# Patient Record
Sex: FEMALE | Race: WHITE
[De-identification: names, ages, dates, MRNs, and addresses within clinical notes are randomized per-mention and may not be internally consistent; named-entity substitution may affect disease eponyms.]

---

## 2017-11-29 ENCOUNTER — HOSPITAL ENCOUNTER (OUTPATIENT)
Dept: HOSPITAL 75 - LAB | Age: 77
End: 2017-11-29
Attending: INTERNAL MEDICINE
Payer: MEDICARE

## 2017-11-29 ENCOUNTER — HOSPITAL ENCOUNTER (OUTPATIENT)
Dept: HOSPITAL 75 - RAD | Age: 77
End: 2017-11-29
Attending: NURSE PRACTITIONER
Payer: MEDICARE

## 2017-11-29 DIAGNOSIS — J43.9: Primary | ICD-10-CM

## 2017-11-29 DIAGNOSIS — R31.9: Primary | ICD-10-CM

## 2017-11-29 DIAGNOSIS — I48.91: ICD-10-CM

## 2017-11-29 LAB
ALBUMIN SERPL-MCNC: 3.8 GM/DL (ref 3.2–4.5)
ALT SERPL-CCNC: 10 U/L (ref 0–55)
ANION GAP SERPL CALC-SCNC: 8 MMOL/L (ref 5–14)
AST SERPL-CCNC: 17 U/L (ref 5–34)
BILIRUB DIRECT SERPL-MCNC: 0.3 MG/DL (ref 0–0.3)
BILIRUB SERPL-MCNC: 0.8 MG/DL (ref 0.1–1)
BUN SERPL-MCNC: 14 MG/DL (ref 7–18)
BUN/CREAT SERPL: 19
CALCIUM SERPL-MCNC: 9.1 MG/DL (ref 8.5–10.1)
CHLORIDE SERPL-SCNC: 101 MMOL/L (ref 98–107)
CHOLEST SERPL-MCNC: 182 MG/DL (ref ?–200)
CO2 SERPL-SCNC: 27 MMOL/L (ref 21–32)
CREAT SERPL-MCNC: 0.72 MG/DL (ref 0.6–1.3)
ERYTHROCYTE [DISTWIDTH] IN BLOOD BY AUTOMATED COUNT: 12 % (ref 10–14.5)
GFR SERPLBLD BASED ON 1.73 SQ M-ARVRAT: > 60 ML/MIN
GLUCOSE SERPL-MCNC: 86 MG/DL (ref 70–105)
LDLC SERPL DIRECT ASSAY-MCNC: 98 MG/DL (ref 1–129)
MAGNESIUM SERPL-MCNC: 2.1 MG/DL (ref 1.8–2.4)
MCH RBC QN AUTO: 33 PG (ref 25–34)
MCHC RBC AUTO-ENTMCNC: 34 G/DL (ref 32–36)
MCV RBC AUTO: 96 FL (ref 80–99)
PLATELET # BLD: 314 10^3/UL (ref 130–400)
PMV BLD AUTO: 9.1 FL (ref 7.4–10.4)
POTASSIUM SERPL-SCNC: 4.1 MMOL/L (ref 3.6–5)
PROT SERPL-MCNC: 6.6 GM/DL (ref 6.4–8.2)
RBC # BLD AUTO: 3.9 10^6/UL (ref 4.35–5.85)
SODIUM SERPL-SCNC: 136 MMOL/L (ref 135–145)
TRIGL SERPL-MCNC: 79 MG/DL (ref ?–150)
VLDLC SERPL CALC-MCNC: 16 MG/DL (ref 5–40)
WBC # BLD AUTO: 9.9 10^3/UL (ref 4.3–11)

## 2017-11-29 PROCEDURE — 36415 COLL VENOUS BLD VENIPUNCTURE: CPT

## 2017-11-29 PROCEDURE — 84443 ASSAY THYROID STIM HORMONE: CPT

## 2017-11-29 PROCEDURE — 80053 COMPREHEN METABOLIC PANEL: CPT

## 2017-11-29 PROCEDURE — 84439 ASSAY OF FREE THYROXINE: CPT

## 2017-11-29 PROCEDURE — 71020: CPT

## 2017-11-29 PROCEDURE — 83735 ASSAY OF MAGNESIUM: CPT

## 2017-11-29 PROCEDURE — 85027 COMPLETE CBC AUTOMATED: CPT

## 2017-11-29 PROCEDURE — 80076 HEPATIC FUNCTION PANEL: CPT

## 2017-11-29 PROCEDURE — 80061 LIPID PANEL: CPT

## 2017-11-29 NOTE — DIAGNOSTIC IMAGING REPORT
INDICATION: Dyspnea. History of emphysema.



COMPARISON: 12/26/2015



FINDINGS: Two views of the chest were obtained. The heart size is

normal. The pulmonary vessels appear unremarkable. There is no

pneumothorax, mediastinal widening or pleural fluid demonstrated.

There are chronic findings of COPD with flattening of diaphragms.

Nodular density at the right lung base is unchanged and likely

represents a nipple shadow. No new or acute pulmonary parenchymal

abnormality is suspected. The osseous structures appear

unremarkable.



IMPRESSION: Chronic findings of COPD. No acute or new

cardiopulmonary abnormality seen compared to the prior study.



Dictated by: 



  Dictated on workstation # [id number]

## 2017-12-06 ENCOUNTER — HOSPITAL ENCOUNTER (OUTPATIENT)
Dept: HOSPITAL 75 - RT | Age: 77
End: 2017-12-06
Attending: NURSE PRACTITIONER
Payer: MEDICARE

## 2017-12-06 DIAGNOSIS — R06.00: ICD-10-CM

## 2017-12-06 DIAGNOSIS — J43.9: Primary | ICD-10-CM

## 2017-12-06 PROCEDURE — 94726 PLETHYSMOGRAPHY LUNG VOLUMES: CPT

## 2017-12-06 PROCEDURE — 94729 DIFFUSING CAPACITY: CPT

## 2017-12-06 PROCEDURE — 94060 EVALUATION OF WHEEZING: CPT

## 2017-12-28 ENCOUNTER — HOSPITAL ENCOUNTER (OUTPATIENT)
Dept: HOSPITAL 75 - RAD | Age: 77
End: 2017-12-28
Attending: NURSE PRACTITIONER
Payer: MEDICARE

## 2017-12-28 DIAGNOSIS — J44.9: Primary | ICD-10-CM

## 2017-12-28 PROCEDURE — 71020: CPT

## 2017-12-28 NOTE — DIAGNOSTIC IMAGING REPORT
PA and lateral views of the chest.



INDICATION: Dyspnea. COPD.



FINDINGS: The lungs are hyperinflated. There is a stable 8 mm

calcified nodule in the right lung base. This is likely related

to an old granuloma. Similarly a left lung base nodule is also

stable from old exams. No focal consolidation. The heart size is

normal. No effusion or pneumothorax.



The mediastinum and bridget appear unremarkable.



IMPRESSION: COPD. No acute process.



Dictated by: 



  Dictated on workstation # [id number]

## 2018-05-23 ENCOUNTER — HOSPITAL ENCOUNTER (OUTPATIENT)
Dept: HOSPITAL 75 - LAB | Age: 78
End: 2018-05-23
Attending: FAMILY MEDICINE
Payer: MEDICARE

## 2018-05-23 DIAGNOSIS — R19.7: Primary | ICD-10-CM

## 2018-05-23 PROCEDURE — 87324 CLOSTRIDIUM AG IA: CPT

## 2018-05-23 PROCEDURE — 87449 NOS EACH ORGANISM AG IA: CPT

## 2018-05-23 PROCEDURE — 89055 LEUKOCYTE ASSESSMENT FECAL: CPT

## 2018-05-23 PROCEDURE — 87045 FECES CULTURE AEROBIC BACT: CPT

## 2018-05-23 PROCEDURE — 87046 STOOL CULTR AEROBIC BACT EA: CPT

## 2018-06-01 ENCOUNTER — HOSPITAL ENCOUNTER (OUTPATIENT)
Dept: HOSPITAL 75 - CARD | Age: 78
End: 2018-06-01
Attending: FAMILY MEDICINE
Payer: MEDICARE

## 2018-06-01 DIAGNOSIS — K52.9: Primary | ICD-10-CM

## 2018-06-01 PROCEDURE — 78227 HEPATOBIL SYST IMAGE W/DRUG: CPT

## 2018-06-01 NOTE — DIAGNOSTIC IMAGING REPORT
INDICATION: Chronic diarrhea and abdominal pain.



EXAMINATION: The patient was administered 5.5 mCi technetium 99m

Choletec and imaging over the abdomen was performed. 



FINDINGS: At one hour the patient ingested 8 ounces of Ensure and

gallbladder ejection fraction was calculated.



There is homogeneous uptake of activity by the liver. There

appears to be prompt excretion of activity into the common duct

and gallbladder. Normal passage of activity into the small bowel

is identified. Gallbladder ejection fraction is calculated to be

78%.



IMPRESSION: Normal HIDA scan and gallbladder ejection fraction. 



Dictated by: 



  Dictated on workstation # [id number]

## 2018-06-20 ENCOUNTER — HOSPITAL ENCOUNTER (OUTPATIENT)
Dept: HOSPITAL 75 - RAD | Age: 78
End: 2018-06-20
Attending: FAMILY MEDICINE
Payer: MEDICARE

## 2018-06-20 DIAGNOSIS — Z87.2: ICD-10-CM

## 2018-06-20 DIAGNOSIS — L29.8: Primary | ICD-10-CM

## 2018-06-20 NOTE — DIAGNOSTIC IMAGING REPORT
INDICATION: Foot itching.



Three views of each foot were obtained.



FINDINGS: There is no fracture or dislocation. Joint spaces well

maintained. Articular surfaces appear smooth.



IMPRESSION: Negative right and left foot.



Dictated by: 



  Dictated on workstation # [id number]

## 2018-07-12 ENCOUNTER — HOSPITAL ENCOUNTER (OUTPATIENT)
Dept: HOSPITAL 75 - CARD | Age: 78
End: 2018-07-12
Payer: MEDICARE

## 2018-07-12 DIAGNOSIS — I48.91: Primary | ICD-10-CM

## 2018-07-12 PROCEDURE — 93005 ELECTROCARDIOGRAM TRACING: CPT

## 2018-09-06 ENCOUNTER — HOSPITAL ENCOUNTER (OUTPATIENT)
Dept: HOSPITAL 75 - RAD | Age: 78
End: 2018-09-06
Attending: FAMILY MEDICINE
Payer: MEDICARE

## 2018-09-06 DIAGNOSIS — Z12.31: Primary | ICD-10-CM

## 2018-09-06 DIAGNOSIS — M81.0: ICD-10-CM

## 2018-09-06 DIAGNOSIS — Z78.0: ICD-10-CM

## 2018-09-06 DIAGNOSIS — Z13.820: ICD-10-CM

## 2018-09-06 DIAGNOSIS — M85.88: ICD-10-CM

## 2018-09-06 PROCEDURE — 77067 SCR MAMMO BI INCL CAD: CPT

## 2018-09-06 PROCEDURE — 77080 DXA BONE DENSITY AXIAL: CPT

## 2018-09-06 NOTE — DIAGNOSTIC IMAGING REPORT
INDICATION: Routine screening.



No prior mammograms are available for comparison.



2-D and 3-D bilateral screening mammography was performed with a

Computer Aided Detection (CAD) system.



FINDINGS: Both breasts are heterogeneously dense, limiting the

sensitivity of mammography. There are benign parenchymal and

vascular calcifications bilaterally. No mass or malignant

appearing microcalcifications are seen. The axillae are

unremarkable.



IMPRESSION: No mammographic features suspicious for malignancy

are identified.



ACR BI-RADS Category 2: Benign findings.

Result letter will be mailed to the patient.

Note: At least 10% of breast cancer is not imaged by mammography.



Dictated by: 



  Dictated on workstation # [id number]

## 2018-11-03 ENCOUNTER — HOSPITAL ENCOUNTER (OUTPATIENT)
Dept: HOSPITAL 75 - ER | Age: 78
Setting detail: OBSERVATION
LOS: 1 days | Discharge: HOME | End: 2018-11-04
Attending: INTERNAL MEDICINE | Admitting: INTERNAL MEDICINE
Payer: MEDICARE

## 2018-11-03 VITALS — SYSTOLIC BLOOD PRESSURE: 128 MMHG | DIASTOLIC BLOOD PRESSURE: 67 MMHG

## 2018-11-03 VITALS — BODY MASS INDEX: 18.61 KG/M2 | WEIGHT: 105 LBS | HEIGHT: 63 IN

## 2018-11-03 VITALS — DIASTOLIC BLOOD PRESSURE: 48 MMHG | SYSTOLIC BLOOD PRESSURE: 99 MMHG

## 2018-11-03 VITALS — DIASTOLIC BLOOD PRESSURE: 67 MMHG | SYSTOLIC BLOOD PRESSURE: 141 MMHG

## 2018-11-03 VITALS — SYSTOLIC BLOOD PRESSURE: 114 MMHG | DIASTOLIC BLOOD PRESSURE: 45 MMHG

## 2018-11-03 VITALS — DIASTOLIC BLOOD PRESSURE: 98 MMHG | SYSTOLIC BLOOD PRESSURE: 126 MMHG

## 2018-11-03 VITALS — SYSTOLIC BLOOD PRESSURE: 124 MMHG | DIASTOLIC BLOOD PRESSURE: 68 MMHG

## 2018-11-03 VITALS — SYSTOLIC BLOOD PRESSURE: 157 MMHG | DIASTOLIC BLOOD PRESSURE: 67 MMHG

## 2018-11-03 VITALS — SYSTOLIC BLOOD PRESSURE: 127 MMHG | DIASTOLIC BLOOD PRESSURE: 69 MMHG

## 2018-11-03 DIAGNOSIS — M34.9: ICD-10-CM

## 2018-11-03 DIAGNOSIS — E87.1: ICD-10-CM

## 2018-11-03 DIAGNOSIS — Z79.82: ICD-10-CM

## 2018-11-03 DIAGNOSIS — R00.1: Primary | ICD-10-CM

## 2018-11-03 DIAGNOSIS — R13.10: ICD-10-CM

## 2018-11-03 DIAGNOSIS — I10: ICD-10-CM

## 2018-11-03 DIAGNOSIS — I27.20: ICD-10-CM

## 2018-11-03 DIAGNOSIS — J44.9: ICD-10-CM

## 2018-11-03 DIAGNOSIS — I48.0: ICD-10-CM

## 2018-11-03 DIAGNOSIS — Z79.899: ICD-10-CM

## 2018-11-03 DIAGNOSIS — M06.9: ICD-10-CM

## 2018-11-03 DIAGNOSIS — Z87.891: ICD-10-CM

## 2018-11-03 DIAGNOSIS — I73.9: ICD-10-CM

## 2018-11-03 LAB
ALBUMIN SERPL-MCNC: 4.2 GM/DL (ref 3.2–4.5)
ALP SERPL-CCNC: 64 U/L (ref 40–136)
ALT SERPL-CCNC: 9 U/L (ref 0–55)
APTT BLD: 72 SEC (ref 24–35)
BASOPHILS # BLD AUTO: 0.1 10^3/UL (ref 0–0.1)
BASOPHILS NFR BLD AUTO: 1 % (ref 0–10)
BILIRUB SERPL-MCNC: 0.6 MG/DL (ref 0.1–1)
BUN/CREAT SERPL: 14
CALCIUM SERPL-MCNC: 9.4 MG/DL (ref 8.5–10.1)
CHLORIDE SERPL-SCNC: 97 MMOL/L (ref 98–107)
CO2 SERPL-SCNC: 22 MMOL/L (ref 21–32)
CREAT SERPL-MCNC: 0.79 MG/DL (ref 0.6–1.3)
EOSINOPHIL # BLD AUTO: 0.2 10^3/UL (ref 0–0.3)
EOSINOPHIL NFR BLD AUTO: 2 % (ref 0–10)
ERYTHROCYTE [DISTWIDTH] IN BLOOD BY AUTOMATED COUNT: 13.2 % (ref 10–14.5)
GFR SERPLBLD BASED ON 1.73 SQ M-ARVRAT: > 60 ML/MIN
GLUCOSE SERPL-MCNC: 115 MG/DL (ref 70–105)
HCT VFR BLD CALC: 35 % (ref 35–52)
HGB BLD-MCNC: 12.1 G/DL (ref 11.5–16)
INR PPP: 3.3 (ref 0.8–1.4)
LYMPHOCYTES # BLD AUTO: 1.7 X 10^3 (ref 1–4)
LYMPHOCYTES NFR BLD AUTO: 21 % (ref 12–44)
MAGNESIUM SERPL-MCNC: 2 MG/DL (ref 1.8–2.4)
MANUAL DIFFERENTIAL PERFORMED BLD QL: NO
MCH RBC QN AUTO: 34 PG (ref 25–34)
MCHC RBC AUTO-ENTMCNC: 35 G/DL (ref 32–36)
MCV RBC AUTO: 96 FL (ref 80–99)
MONOCYTES # BLD AUTO: 0.8 X 10^3 (ref 0–1)
MONOCYTES NFR BLD AUTO: 10 % (ref 0–12)
MYOGLOBIN SERPL-MCNC: 32.4 NG/ML (ref 10–92)
NEUTROPHILS # BLD AUTO: 5.6 X 10^3 (ref 1.8–7.8)
NEUTROPHILS NFR BLD AUTO: 67 % (ref 42–75)
PLATELET # BLD: 332 10^3/UL (ref 130–400)
PMV BLD AUTO: 9 FL (ref 7.4–10.4)
POTASSIUM SERPL-SCNC: 4 MMOL/L (ref 3.6–5)
PROT SERPL-MCNC: 6.9 GM/DL (ref 6.4–8.2)
PROTHROMBIN TIME: 34 SEC (ref 12.2–14.7)
RBC # BLD AUTO: 3.61 10^6/UL (ref 4.35–5.85)
SODIUM SERPL-SCNC: 130 MMOL/L (ref 135–145)
T4 FREE SERPL-MCNC: 1.12 NG/DL (ref 0.7–1.48)
TSH SERPL DL<=0.05 MIU/L-ACNC: 0.33 UIU/ML (ref 0.35–4.94)
WBC # BLD AUTO: 8.4 10^3/UL (ref 4.3–11)

## 2018-11-03 PROCEDURE — 83735 ASSAY OF MAGNESIUM: CPT

## 2018-11-03 PROCEDURE — 93005 ELECTROCARDIOGRAM TRACING: CPT

## 2018-11-03 PROCEDURE — 84443 ASSAY THYROID STIM HORMONE: CPT

## 2018-11-03 PROCEDURE — 83874 ASSAY OF MYOGLOBIN: CPT

## 2018-11-03 PROCEDURE — 36415 COLL VENOUS BLD VENIPUNCTURE: CPT

## 2018-11-03 PROCEDURE — 84439 ASSAY OF FREE THYROXINE: CPT

## 2018-11-03 PROCEDURE — 93041 RHYTHM ECG TRACING: CPT

## 2018-11-03 PROCEDURE — 80061 LIPID PANEL: CPT

## 2018-11-03 PROCEDURE — 85730 THROMBOPLASTIN TIME PARTIAL: CPT

## 2018-11-03 PROCEDURE — 85025 COMPLETE CBC W/AUTO DIFF WBC: CPT

## 2018-11-03 PROCEDURE — 84484 ASSAY OF TROPONIN QUANT: CPT

## 2018-11-03 PROCEDURE — 71045 X-RAY EXAM CHEST 1 VIEW: CPT

## 2018-11-03 PROCEDURE — 80053 COMPREHEN METABOLIC PANEL: CPT

## 2018-11-03 PROCEDURE — 93306 TTE W/DOPPLER COMPLETE: CPT

## 2018-11-03 PROCEDURE — 85610 PROTHROMBIN TIME: CPT

## 2018-11-03 PROCEDURE — 80048 BASIC METABOLIC PNL TOTAL CA: CPT

## 2018-11-03 RX ADMIN — SODIUM CHLORIDE SCH MLS/HR: 900 INJECTION, SOLUTION INTRAVENOUS at 14:39

## 2018-11-03 RX ADMIN — SOTALOL HYDROCHLORIDE SCH MG: 80 TABLET ORAL at 20:01

## 2018-11-03 NOTE — ED CHEST PAIN
General


Chief Complaint:  Cardiac/General Problems


Stated Complaint:  LOW HEART RATE,CARDIOVERSION YESTERDAY,HX OF A-FIB


Nursing Triage Note:  


TO ROOM CONCERN REPORTS HR HAS BEEN IN 50'S OVER HER LOW HEART RATE. REPORTS 

SHE 


HAD A CARDIOVERSION AT [facility] YESTERDAY FOR A FIB.


Nursing Sepsis Screen:  No Definite Risk


Source:  patient, family, old records


Exam Limitations:  no limitations





History of Present Illness


Date Seen by Provider:  Nov 3, 2018


Time Seen by Provider:  12:13


Initial Comments


This 78 year old woman with history of atrial fibrillation presents to the ER 

after having electrocardioversion therapy at [place] yesterday.  She reports 

heart rates in the 40's and 50's overnight as well as some chest heaviness.  

She denies coronary artery disease.  She is anticoagulated but has not taken 

aspirin.  Her cardiologist is Dr. Bustillo in [place].  Her primary care provider 

is Saadia Cueva.  She had a cardioversion in August as well.  She is noted to 

have sinus rhythm with frequent PVCs on the monitor.





Allergies and Home Medications


Allergies


Coded Allergies:  


     Penicillins (Verified  Allergy, Unknown, 12/26/15)


     fluticasone (Verified  Allergy, Unknown, 12/26/15)


     lactase (Verified  Allergy, Unknown, 12/26/15)





Home Medications


Acetaminophen 500 Mg Tablet, 500 MG PO Q6H, (Reported)


Aspirin 81 Mg Tablet.dr, 81 MG PO DAILY, (Reported)


Atorvastatin Calcium 40 Mg Tablet, 40 MG PO DAILY, (Reported)


Clopidogrel Bisulfate 75 Mg Tablet, 75 MG PO DAILY, (Reported)


Metoprolol Succinate 50 Mg Tab.er.24h, 50 MG PO DAILY, (Reported)


Pilocarpine HCl 5 Mg Tablet, 5 MG PO TID, (Reported)


Ranitidine HCl 150 Mg Tablet, 150 MG PO BID, (Reported)


Rivaroxaban 20 Mg Tablet, 20 MG PO DAILY


   Prescribed by: ROSLYN WHITMAN on 12/26/15 1137





Patient Home Medication List


Home Medication List Reviewed:  Yes





Review of Systems


Review of Systems


Constitutional:  no symptoms reported


EENTM:  No Symptoms Reported


Respiratory:  No Symptoms Reported


Cardiovascular:  See HPI


Gastrointestinal:  No Symptoms Reported


Genitourinary:  No Symptoms Reported


Musculoskeletal:  no symptoms reported


Skin:  no symptoms reported


Psychiatric/Neurological:  No Symptoms Reported


Endocrine:  No Symptoms Reported


Hematologic/Lymphatic:  See HPI





Past Medical-Social-Family Hx


Past Med/Social Hx:  Reviewed and Corrections made


Patient Social History


Alcohol Use:  Regular Use


Alcohol Beverage of Choice:  Beer


Recreational Drug Use:  No


Smoking Status:  Never a Smoker


Former Smoker, Quit:  1980


Recent Foreign Travel:  No


Contact w/Someone Who Travel:  No


Recent Infectious Disease Expo:  No





Seasonal Allergies


Seasonal Allergies:  No





Past Medical History


Surgeries:  Yes


Eye Surgery, Orthopedic


Respiratory:  Yes


COPD


Currently Using CPAP:  No


Currently Using BIPAP:  No


Cardiac:  Yes


Atrial Fibrillation, Hypertension, Peripheral Vascular, Valvular Heart Disease


Neurological:  Yes


TIA


Pregnant:  No


Reproductive Disorders:  No


GYN History:  Menopausal


Genitourinary:  No


Gastrointestinal:  Yes (dysphagia)


Musculoskeletal:  Yes (scleroderma)


Rheumatoid Arthritis


Endocrine:  No


Cataract


Cancer:  No


Psychosocial:  No


Integumentary:  Yes (SCLERODERMA)


Blood Disorders:  No


Adverse Reaction/Blood Tranf:  No





Physical Exam


Vital Signs





Vital Signs - First Documented








 11/3/18





 12:02


 


Temp 98.0


 


Pulse 52


 


Resp 18


 


B/P (MAP) 129/82 (98)


 


Pulse Ox 100


 


O2 Delivery Room Air





Capillary Refill : Less Than 3 Seconds


Height, Weight, BMI


Height: 5'3.00"


Weight: 103lbs. 0.2oz. 46.178646ce; 19.1 BMI


Method:Stated


General Appearance:  No Apparent Distress, Thin


HEENT:  PERRL/EOMI, Normal ENT Inspection


Neck:  Normal Inspection


Respiratory:  Lungs Clear, Normal Breath Sounds, No Accessory Muscle Use, No 

Respiratory Distress


Cardiovascular:  No Edema, No Murmur, Irregularly Irregular


Gastrointestinal:  Non Tender, Soft


Extremity:  Normal Inspection, No Pedal Edema


Neurologic/Psychiatric:  Alert, Oriented x3, No Motor/Sensory Deficits, Normal 

Mood/Affect, CNs II-XII Norm as Tested


Skin:  Normal Color, Warm/Dry





Progress/Results/Core Measures


Results/Orders


Lab Results





Laboratory Tests








Test


 11/3/18


12:16 Range/Units


 


 


White Blood Count


 8.4 


 4.3-11.0


10^3/uL


 


Red Blood Count


 3.61 L


 4.35-5.85


10^6/uL


 


Hemoglobin 12.1  11.5-16.0  G/DL


 


Hematocrit 35  35-52  %


 


Mean Corpuscular Volume 96  80-99  FL


 


Mean Corpuscular Hemoglobin 34  25-34  PG


 


Mean Corpuscular Hemoglobin


Concent 35 


 32-36  G/DL





 


Red Cell Distribution Width 13.2  10.0-14.5  %


 


Platelet Count


 332 


 130-400


10^3/uL


 


Mean Platelet Volume 9.0  7.4-10.4  FL


 


Neutrophils (%) (Auto) 67  42-75  %


 


Lymphocytes (%) (Auto) 21  12-44  %


 


Monocytes (%) (Auto) 10  0-12  %


 


Eosinophils (%) (Auto) 2  0-10  %


 


Basophils (%) (Auto) 1  0-10  %


 


Neutrophils # (Auto) 5.6  1.8-7.8  X 10^3


 


Lymphocytes # (Auto) 1.7  1.0-4.0  X 10^3


 


Monocytes # (Auto) 0.8  0.0-1.0  X 10^3


 


Eosinophils # (Auto)


 0.2 


 0.0-0.3


10^3/uL


 


Basophils # (Auto)


 0.1 


 0.0-0.1


10^3/uL


 


Prothrombin Time 34.0 H 12.2-14.7  SEC


 


INR Comment 3.3 H 0.8-1.4  


 


Activated Partial


Thromboplast Time 72 H


 24-35  SEC





 


Sodium Level 130 L 135-145  MMOL/L


 


Potassium Level 4.0  3.6-5.0  MMOL/L


 


Chloride Level 97 L   MMOL/L


 


Carbon Dioxide Level 22  21-32  MMOL/L


 


Anion Gap 11  5-14  MMOL/L


 


Blood Urea Nitrogen 11  7-18  MG/DL


 


Creatinine


 0.79 


 0.60-1.30


MG/DL


 


Estimat Glomerular Filtration


Rate > 60 


  





 


BUN/Creatinine Ratio 14   


 


Glucose Level 115 H   MG/DL


 


Calcium Level 9.4  8.5-10.1  MG/DL


 


Corrected Calcium 9.2  8.5-10.1  MG/DL


 


Magnesium Level 2.0  1.8-2.4  MG/DL


 


Total Bilirubin 0.6  0.1-1.0  MG/DL


 


Aspartate Amino Transf


(AST/SGOT) 23 


 5-34  U/L





 


Alanine Aminotransferase


(ALT/SGPT) 9 


 0-55  U/L





 


Alkaline Phosphatase 64    U/L


 


Myoglobin


 32.4 


 10.0-92.0


NG/ML


 


Troponin I < 0.30  <0.30  NG/ML


 


Total Protein 6.9  6.4-8.2  GM/DL


 


Albumin 4.2  3.2-4.5  GM/DL


 


Free Thyroxine


 1.12 


 0.70-1.48


NG/DL


 


TSH Cascade Testing


 0.33 L


 0.35-4.94


UIU/ML








My Orders





Orders - ROSLYN ALANIS MD


Cbc With Automated Diff (11/3/18 12:22)


Magnesium (11/3/18 12:22)


Chest 1 View, Ap/Pa Only (11/3/18 12:22)


Cardiac Profile 1 (11/3/18 12:22)


Comprehensive Metabolic Panel (11/3/18 12:22)


Myoglobin Serum (11/3/18 12:22)


Protime With Inr (11/3/18 12:22)


Partial Thromboplastin Time (11/3/18 12:22)


O2 (11/3/18 12:22)


Monitor-Rhythm Ecg Trace Only (11/3/18 12:22)


Lipid Panel (18 06:00)


Saline Lock/Iv-Start (11/3/18 12:22)


Thyroid Analyzer (11/3/18 12:22)


Aspirin Chewable Tablet (Baby Aspirin Ch (11/3/18 13:00)


Free T4 (Free Thyroxine) (11/3/18 12:16)





Medications Given in ED





Current Medications








 Medications  Dose


 Ordered  Sig/Sylvester


 Route  Start Time


 Stop Time Status Last Admin


Dose Admin


 


 Aspirin  324 mg  ONCE  ONCE


 PO  11/3/18 13:00


 11/3/18 13:01 DC 11/3/18 13:07


324 MG








Vital Signs/I&O











 11/3/18 11/3/18





 12:02 12:50


 


Temp 98.0 


 


Pulse 52 56


 


Resp 18 18


 


B/P (MAP) 129/82 (98) 114/45 (68)


 


Pulse Ox 100 96


 


O2 Delivery Room Air Room Air














Blood Pressure Mean:  68











Progress


Progress Note :  


Progress Note


Workup was largely unremarkable.  She has mild hyponatremia.  She continued to 

have frequent PVCs throughout her ER stay.  Aspirin was given.  Case was 

discussed with Dr. Babcock who believes patient should be admitted for 

observation.  He requested an echocardiogram in the morning and continuation of 

her home medications.  Patient was offered the choice to transfer to her 

primary cardiology team versus admission at [facility].  She much prefers admission to 

[facility].


Initial ECG Impression Date:  Nov 3, 2018


Initial ECG Impression Time:  12:05


Initial ECG Rate:  68


Comment


Sinus rhythm with frequent PVCs.  No overt ST elevation or depression.  No 

abnormal intervals or axis deviation.





Diagnostic Imaging





   Diagonstic Imaging:  Xray


   Plain Films/CT/US/NM/MRI:  chest


Comments


Chest x-ray viewed by me and report reviewed.  See report below:





NAME:   ED PHILLIPS  


[facility] REC#:   [id number]  


ACCOUNT#:   [id number]  


PT STATUS:   REG ER  


:   1940  


PHYSICIAN:   ROSLYN ALANIS MD  


ADMIT DATE:   18/ER  


 ***Draft***  


Date of Exam:18  


 


CHEST 1 VIEW, AP/PA ONLY  


 


Indication: Dyspnea.  


 


 Comparison: 2017.  


 


 Discussion: Single portable upright view of the chest was  


 obtained. Underlying COPD is stable. Scarring within the lung  


 apices is stable. Stable normal heart size. Antecedent  


 granulomatous disease is again noted, benign. No focal  


 consolidation, pleural fluid, or pneumothorax. No osseous  


 abnormality.  


 


 Impression:  


 1. Stable changes of chronic lung disease.  


 


   Dictated on workstation # [id number]  


 


Dict:   18 [id number]  


Trans:   18 [id number]  


[facility] [phone]  


 


Interpreted by:     SHAKEEL MCDONALD MD





Departure


Communication (Admissions)


Time/Spoke to Admitting Phy:  13:26


Dr. Richard


Time/Spoke to Consulting Phy:  13:15


Dr. Babcock





Impression





 Primary Impression:  


 Chest pain


 Qualified Codes:  R07.9 - Chest pain, unspecified


 Additional Impressions:  


 Frequent PVCs


 Hyponatremia


 History of atrial fibrillation


Disposition:   ADMITTED AS INPATIENT


Condition:  Stable





Admissions


Decision to Admit Reason:  Admit from ER (General)


Decision to Admit/Date:  Nov 3, 2018


Time/Decision to Admit Time:  13:15





Departure-Patient Inst.


Referrals:  


SAADIA CUEVA MD (PCP/Family)


Primary Care Physician











ROSLYN ALANIS MD Nov 3, 2018 13:00

## 2018-11-03 NOTE — DIAGNOSTIC IMAGING REPORT
Indication: Dyspnea.



Comparison: 12/28/2017.



Discussion: Single portable upright view of the chest was

obtained. Underlying COPD is stable. Scarring within the lung

apices is stable. Stable normal heart size. Antecedent

granulomatous disease is again noted, benign. No focal

consolidation, pleural fluid, or pneumothorax. No osseous

abnormality.



Impression:

1. Stable changes of chronic lung disease.



Dictated by: 



  Dictated on workstation # [id number]

## 2018-11-04 VITALS — SYSTOLIC BLOOD PRESSURE: 128 MMHG | DIASTOLIC BLOOD PRESSURE: 54 MMHG

## 2018-11-04 VITALS — DIASTOLIC BLOOD PRESSURE: 66 MMHG | SYSTOLIC BLOOD PRESSURE: 139 MMHG

## 2018-11-04 VITALS — SYSTOLIC BLOOD PRESSURE: 139 MMHG | DIASTOLIC BLOOD PRESSURE: 68 MMHG

## 2018-11-04 VITALS — DIASTOLIC BLOOD PRESSURE: 54 MMHG | SYSTOLIC BLOOD PRESSURE: 128 MMHG

## 2018-11-04 VITALS — SYSTOLIC BLOOD PRESSURE: 105 MMHG | DIASTOLIC BLOOD PRESSURE: 53 MMHG

## 2018-11-04 VITALS — SYSTOLIC BLOOD PRESSURE: 132 MMHG | DIASTOLIC BLOOD PRESSURE: 71 MMHG

## 2018-11-04 VITALS — SYSTOLIC BLOOD PRESSURE: 128 MMHG | DIASTOLIC BLOOD PRESSURE: 70 MMHG

## 2018-11-04 LAB
BASOPHILS # BLD AUTO: 0 10^3/UL (ref 0–0.1)
BASOPHILS NFR BLD AUTO: 1 % (ref 0–10)
BUN/CREAT SERPL: 12
CALCIUM SERPL-MCNC: 8.8 MG/DL (ref 8.5–10.1)
CHLORIDE SERPL-SCNC: 108 MMOL/L (ref 98–107)
CHOLEST SERPL-MCNC: 150 MG/DL (ref ?–200)
CO2 SERPL-SCNC: 20 MMOL/L (ref 21–32)
CREAT SERPL-MCNC: 0.6 MG/DL (ref 0.6–1.3)
EOSINOPHIL # BLD AUTO: 0.2 10^3/UL (ref 0–0.3)
EOSINOPHIL NFR BLD AUTO: 3 % (ref 0–10)
ERYTHROCYTE [DISTWIDTH] IN BLOOD BY AUTOMATED COUNT: 13.2 % (ref 10–14.5)
GFR SERPLBLD BASED ON 1.73 SQ M-ARVRAT: > 60 ML/MIN
GLUCOSE SERPL-MCNC: 85 MG/DL (ref 70–105)
HCT VFR BLD CALC: 33 % (ref 35–52)
HDLC SERPL-MCNC: 49 MG/DL (ref 40–60)
HGB BLD-MCNC: 11 G/DL (ref 11.5–16)
LYMPHOCYTES # BLD AUTO: 1.4 X 10^3 (ref 1–4)
LYMPHOCYTES NFR BLD AUTO: 26 % (ref 12–44)
MANUAL DIFFERENTIAL PERFORMED BLD QL: NO
MCH RBC QN AUTO: 33 PG (ref 25–34)
MCHC RBC AUTO-ENTMCNC: 34 G/DL (ref 32–36)
MCV RBC AUTO: 98 FL (ref 80–99)
MONOCYTES # BLD AUTO: 0.6 X 10^3 (ref 0–1)
MONOCYTES NFR BLD AUTO: 10 % (ref 0–12)
NEUTROPHILS # BLD AUTO: 3.3 X 10^3 (ref 1.8–7.8)
NEUTROPHILS NFR BLD AUTO: 61 % (ref 42–75)
PLATELET # BLD: 235 10^3/UL (ref 130–400)
PMV BLD AUTO: 9.6 FL (ref 7.4–10.4)
POTASSIUM SERPL-SCNC: 3.9 MMOL/L (ref 3.6–5)
RBC # BLD AUTO: 3.32 10^6/UL (ref 4.35–5.85)
SODIUM SERPL-SCNC: 137 MMOL/L (ref 135–145)
TRIGL SERPL-MCNC: 74 MG/DL (ref ?–150)
VLDLC SERPL CALC-MCNC: 15 MG/DL (ref 5–40)
WBC # BLD AUTO: 5.5 10^3/UL (ref 4.3–11)

## 2018-11-04 RX ADMIN — SODIUM CHLORIDE SCH MLS/HR: 900 INJECTION, SOLUTION INTRAVENOUS at 07:53

## 2018-11-04 RX ADMIN — SODIUM CHLORIDE SCH MLS/HR: 900 INJECTION, SOLUTION INTRAVENOUS at 00:32

## 2018-11-04 RX ADMIN — SOTALOL HYDROCHLORIDE SCH MG: 80 TABLET ORAL at 07:52

## 2018-11-04 NOTE — HISTORY & PHYSICAL-HOSPITALIST
History of Present Illness


HPI/Chief Complaint


This is a 78-year-old white female with a history of atrial fibrillation status 

post cardioversion on Friday one day prior to this presentation.  This was done 

at [place] by Dr. Dixon  The cardioversion was successful with restoration 

of sinus rhythm however the patient never felt right after it occurred.  

Saturday morning she woke up and was so dizzy she couldn't stand up straight 

and kept nearly passing out.  She checked her blood pressure and pulse at home 

and her pulse was found to be in the 30s.  Here in the emergency room she was 

found to be in sinus bradycardia with multiple PVCs.  She relates some of her 

symptoms it would appear to the Procardia.  This morning she is feeling much 

better with a stable blood pressure and a pulse around 59.


Source:  patient


Exam Limitations:  no limitations


Date Seen


18


Time Seen by a Provider:  08:00


Attending Physician


Francoise Richard MD


PCP


Saadia Cueva MD


Referring Physician





Date of Admission


Nov 3, 2018 at 13:57





Home Medications & Allergies


Home Medications


Reviewed patient Home Medication Reconciliation performed by pharmacy 

medication reconciliations technician and/or nursing.


Patients Allergies have been reviewed.





Allergies





Allergies


Coded Allergies


  Penicillins (Verified Allergy, Unknown, 12/26/15)


  fluticasone (Verified Allergy, Unknown, 12/26/15)


  lactase (Verified Allergy, Unknown, 12/26/15)








Past Medical-Social-Family Hx


Past Med/Social Hx:  Reviewed Nursing Past Med/Soc Hx, Reviewed and Corrections 

made


Patient Social History


Marrital Status:  single, cohabiting


Employed/Student:  employed ([employer] in [place])


Alcohol Use:  Regular Use


Number of Drinks Today:  AA


Alcohol Beverage of Choice:  Beer (2-3 a day)


Recreational Drug Use:  No


Smoking Status:  Former Smoker


Former Smoker, Quit:  1980


Physical Abuse Screen:  No


Sexual Abuse:  No


Recent Foreign Travel:  No


Contact w/other who traveled:  No


Recent Infectious Disease Expo:  No





Immunizations Up To Date


Date of Pneumonia Vaccine:  Nov 3, 2017


Date of Influenza Vaccine:  Sep 30, 2018





Seasonal Allergies


Seasonal Allergies:  No





Past Medical History


Surgeries:  Eye Surgery, Orthopedic


Currently Using CPAP:  No


Currently Using BIPAP:  No


Cardiac:  Atrial Fibrillation, Hypertension, Peripheral Vascular, Valvular 

Heart Disease


Neurological:  Neuropathy, TIA


Pregnant:  No


Reproductive:  No


Menopausal


Musculoskeletal:  Rheumatoid Arthritis


HEENT:  Cataract


History of Blood Disorders:  No


Adverse Reaction to Blood Quintanilla:  No





Family History


Reviewed Nursing Family Hx


No Pertinent Family Hx





Review of Systems


Constitutional:  dizziness, weakness


EENTM:  dental problems, other (Dry mouth)


Respiratory:  no symptoms reported


Cardiovascular:  palpitations


Gastrointestinal:  no symptoms reported, dysphagia


Genitourinary:  no symptoms reported


Musculoskeletal:  muscle stiffness


Skin:  dryness





Physical Exam


Physical Exam


Vital Signs





Vital Signs - First Documented








 11/3/18





 12:02


 


Temp 98.0


 


Pulse 52


 


Resp 18


 


B/P (MAP) 129/82 (98)


 


Pulse Ox 100


 


O2 Delivery Room Air





Capillary Refill : Less Than 3 Seconds


Height, Weight, BMI


Height: 5'3.00"


Weight: 105lbs. 0.0oz. 47.550328ly; 18.3 BMI


Method:Stated


General Appearance:  No Apparent Distress, WD/WN, Thin


Eyes:  Bilateral Eye Normal Inspection


HEENT:  Other (Dentures upper and lower dry mouth)


Neck:  Normal Inspection, Non Tender, Supple


Respiratory:  Chest Non Tender, Lungs Clear, Normal Breath Sounds, No Accessory 

Muscle Use, No Respiratory Distress


Cardiovascular:  Regular Rate, Rhythm, No Gallop, Systolic Murmur (High pitched 

2/6), Extra Beats


Gastrointestinal:  Normal Bowel Sounds, No Organomegaly, No Pulsatile Mass, Non 

Tender, Soft


Rectal:  Deferred


Back:  Normal Inspection, No CVA Tenderness, No Vertebral Tenderness


Extremity:  Normal Capillary Refill, Normal Inspection, Normal Range of Motion, 

Non Tender, No Calf Tenderness, No Pedal Edema


Neurologic/Psychiatric:  Alert, Oriented x3, No Motor/Sensory Deficits, Normal 

Mood/Affect, CNs II-XII Norm as Tested


Skin:  Normal Color, Warm/Dry


Lymphatic:  No Adenopathy





Results


Results/Procedures


Labs


Laboratory Tests


11/3/18 12:16








18 03:00








Patient resulted labs reviewed.


Imaging:  Reviewed Imaging Report





Assessment/Plan


Admission Diagnosis


Bradycardia


Dizziness


History of a A. fib status post cardioversion


Valvular heart disease


Hypertension by history


Scleroderma


Dysphagia most likely secondary to scleroderma


Peripheral neuropathy possible orthostasis





The patient's Procardia's been held she's been continued on her sotalol pulse 

and blood pressure adequate we'll check orthostatics blood pressures and 

discharge planning per Dr. Andre.


Admission Status:  Observation





Clinical Quality Measures


DVT/VTE Risk/Contraindication:


Risk Factor Score Per Nursin


RFS Level Per Nursing on Admit:  1=Low/No VTE PPX





Copy


Copies To 1:   SAADIA CUEVA MD, KATHLEEN M MD 2018 08:46

## 2018-11-04 NOTE — CONSULTATION-CARDIOLOGY
HPI-Cardiology


Cardiology Consultation:


Date of Consultation


18


Time Seen by a Provider:  11:00


Date of Admission





Attending Physician


Francoise Richard MD


Admitting Physician


Saadia Cueva MD


Consulting Physician


NICOLE DUMONT MD, MA, FACP, FACC, FSCAI, CCDS





HPI:


Chief Complaint:


CC: Low heart rate and chest discomfort


79 yo woman who underwent elec CV for A Fib with Dr Dixon at [facility] on 

18. Presented to ER at this [facility] on 11/3/18 because she felt her heart 

rate was low. In addition, she had had a feeling of some vague chest discomfort 

since the elec CV. This persisted for over 24 hours and gradually faded away. 

Nothing made it worse or better. It was mild, burning/pressure-like, 

nonradiating, never experienced before and w/o recurrence. Feels good at the 

time of my exam and wishes to go home. Denies shortness of breath or palp or 

syncope or leg swelling





Review of Systems-Cardiology


Review of Systems


Constitutional:  malaise, tiredness; No weight loss, No weight gain


Eyes:  No vision change


Ears/Nose/Throat:  No ear discharge, No nasal drainage


Respiratory:  As described under HPI


Cardiovascular:  As described under HPI


Gastrointestinal:  No constipation, No diarrhea, No nausea, No vomiting; other (

chronic mild dysphagia)


Genitourinary:  No dysuria, No hematuria


Musculoskeletal:  back pain (chronic)


Skin:  No rash on exposed areas, No ulcerations on exposed areas


Psychiatric/Neurological:  No seizure, No focal weakness, No syncope


Hematologic:  No bleeding abnormalities





PMH-Social-Family Hx


Patient Social History


Marrital Status:  single, cohabiting


Employed/Student:  employed ([employer] in [place])


Alcohol Use:  Regular Use


Recreational Drug Use:  No


Smoking Status:  Former Smoker


Recent Foreign Travel:  No


Recent Infectious Disease Expo:  No


Hospitalization with Isolation:  Denies


Physical Abuse Screen:  No


Sexual Abuse:  No





Immunizations Up To Date


Date of Pneumonia Vaccine:  Nov 3, 2017


Date of Influenza Vaccine:  Sep 30, 2018





Past Medical History


PMH


As described under Assessment.





Family Medical History


Family Medical History:  


She does not report any fam h/o early CAD or SCD





Allergies and Home Medications


Allergies


Coded Allergies:  


     Penicillins (Verified  Allergy, Unknown, 12/26/15)


     fluticasone (Verified  Allergy, Unknown, 12/26/15)


     lactase (Verified  Allergy, Unknown, 12/26/15)





Home Medications


Acetaminophen 500 Mg Tablet, 500 MG PO Q6H, (Reported)


Losartan Potassium 50 Mg Tablet, 50 MG PO DAILY, (Reported)


Rivaroxaban 20 Mg Tablet, 20 MG PO DAILY


   Prescribed by: ROSLYN WHITMAN on 12/26/15 1556


Sotalol HCl 80 Mg Tablet, 80 MG PO BID, (Reported)





Patient Home Medication List


Home Medication List Reviewed:  Yes





Physical Exam-Cardiology


Physical Exam


Vital Signs/I&O











 18





 01:08 03:38 04:00 07:00


 


Temp  98.0  


 


Pulse 46 59  65


 


Resp  17  


 


B/P (MAP)  128/70 (89)  


 


Pulse Ox  98 99 


 


O2 Delivery  Room Air Room Air 


 


    





 18





 08:00 08:00 12:00 12:00


 


Temp  98.7 98.2 


 


Pulse  73 62 62


 


Resp  20 18 


 


B/P (MAP)  105/53 (70) 132/71 (91) 132/71 (91)


 


Pulse Ox 98 98 98 


 


O2 Delivery Room Air Room Air Room Air 


 


    





 18 





 12:00 12:16 12:18 


 


Pulse  60 60 





  59  


 


Resp   18 


 


B/P (MAP)  139/66 (90) 139/66 





  128/68 (88)  


 


Pulse Ox 98  98 


 


O2 Delivery Room Air  Room Air 














 18





 00:00


 


Intake Total 300 ml


 


Balance 300 ml





Capillary Refill : Less Than 3 Seconds


Constitutional:  AAO x 3, well-developed, other (thin appearing)


HEENT:  PERRL, EOMI, oral hygience is good; No xanthelasmas are seen


Neck:  carotid pulses are 2 + bilaterally, with good upstrokes


Respiratory:  No accessory muscle use; other (good bilat air entry)


Cardiovascular:  regular rate-rhythm, S1 and S2, systolic murmur (soft DEBORAH at 

card base)


Gastrointestinal:  No tender; soft; No guarding, No rebound; audible bowel 

sounds


Extremities:  No clubbing, No cyanosis, No significant edema


Neurologic/Psychiatric:  oriented x 3, grossly intact, power is 5/5 both on 

sides


Skin:  No rash on exposed areas, No ulcerations on exposed areas





Data Review


Labs


Laboratory Tests


11/3/18 20:15: Troponin I < 0.30


18 03:00: 


White Blood Count 5.5, Red Blood Count 3.32L, Hemoglobin 11.0L, Hematocrit 33L, 

Mean Corpuscular Volume 98, Mean Corpuscular Hemoglobin 33, Mean Corpuscular 

Hemoglobin Concent 34, Red Cell Distribution Width 13.2, Platelet Count 235, 

Mean Platelet Volume 9.6, Neutrophils (%) (Auto) 61, Lymphocytes (%) (Auto) 26, 

Monocytes (%) (Auto) 10, Eosinophils (%) (Auto) 3, Basophils (%) (Auto) 1, 

Neutrophils # (Auto) 3.3, Lymphocytes # (Auto) 1.4, Monocytes # (Auto) 0.6, 

Eosinophils # (Auto) 0.2, Basophils # (Auto) 0.0, Sodium Level 137, Potassium 

Level 3.9, Chloride Level 108H, Carbon Dioxide Level 20L, Anion Gap 9, Blood 

Urea Nitrogen 7, Creatinine 0.60, Estimat Glomerular Filtration Rate > 60, BUN/

Creatinine Ratio 12, Glucose Level 85, Calcium Level 8.8, Triglycerides Level 74

, Cholesterol Level 150, LDL Cholesterol Direct 92, VLDL Cholesterol 15, HDL 

Cholesterol 49





Laboratory Tests


11/3/18 12:16








18 03:00











A/P-Cardiology


Assessment/Admission Diagnosis





PAF, s/p elec cardioversion at [facility] on 18. Currently 

exhibiting NSR with PVCs





Bradycardia, essentially asymptomatic





No evidence of ACS during this admission





Echo of 18: LVEF 65-70%, grade 3 diastolic dysfunction of LV, mild AS (

peak grad 25, mean grad 16, valve area 1.2 sq cm), mild AI, mod to severe TR, 

MAC w/o MS





Mod pulm htn (see echo results above)





H/o scleroderma with a h/o dysphagia





Reports a recent stress test with Dr Dixon in [place] and states she was told 

it was normal





Discussion and Recomendations





* I had a detailed discussion with her regarding her CV issues


* We advised f/u with her regular cardiologist, but she wishes to establish f/u 

locally. We will try to assist her with that


* We have advised return to ER in case of recurrent symptoms or new symptoms





Clinical Quality Measures


DVT/VTE Risk/Contraindication:


Risk Factor Score Per Nursin


RFS Level Per Nursing on Admit:  1=Low/No VTE PPX











NICOLE DUMONT MD FACP [facility] CCDS 2018 11:32

## 2019-03-29 ENCOUNTER — HOSPITAL ENCOUNTER (OUTPATIENT)
Dept: HOSPITAL 75 - RAD | Age: 79
End: 2019-03-29
Attending: FAMILY MEDICINE
Payer: MEDICARE

## 2019-03-29 DIAGNOSIS — T17.920A: Primary | ICD-10-CM

## 2019-03-29 PROCEDURE — 71046 X-RAY EXAM CHEST 2 VIEWS: CPT

## 2019-03-29 NOTE — DIAGNOSTIC IMAGING REPORT
INDICATION: Aspiration.



TIME OF EXAM: 10:26 AM



Correlation is made with prior chest from 11/03/2018.



FINDINGS: The heart size is stable. Lungs appear to be clear of

acute infiltrates. No effusion or pneumothorax is identified. No

definite radiopaque foreign bodies are seen. There is some

hyperinflation consistent with COPD.



IMPRESSION: No acute feature is detected.



Dictated by: 



  Dictated on workstation # [id number]

## 2019-08-23 ENCOUNTER — HOSPITAL ENCOUNTER (EMERGENCY)
Dept: HOSPITAL 75 - ER | Age: 79
Discharge: HOME | End: 2019-08-23
Payer: MEDICARE

## 2019-08-27 ENCOUNTER — HOSPITAL ENCOUNTER (OUTPATIENT)
Dept: HOSPITAL 75 - CATH | Age: 79
Discharge: HOME | End: 2019-08-27
Attending: INTERNAL MEDICINE
Payer: MEDICARE

## 2019-08-27 VITALS — DIASTOLIC BLOOD PRESSURE: 99 MMHG | SYSTOLIC BLOOD PRESSURE: 169 MMHG

## 2019-08-27 VITALS — SYSTOLIC BLOOD PRESSURE: 146 MMHG | DIASTOLIC BLOOD PRESSURE: 99 MMHG

## 2019-08-27 VITALS — SYSTOLIC BLOOD PRESSURE: 172 MMHG | DIASTOLIC BLOOD PRESSURE: 103 MMHG

## 2019-08-27 VITALS — SYSTOLIC BLOOD PRESSURE: 183 MMHG | DIASTOLIC BLOOD PRESSURE: 98 MMHG

## 2019-08-27 VITALS — SYSTOLIC BLOOD PRESSURE: 170 MMHG | DIASTOLIC BLOOD PRESSURE: 90 MMHG

## 2019-08-27 VITALS — SYSTOLIC BLOOD PRESSURE: 180 MMHG | DIASTOLIC BLOOD PRESSURE: 109 MMHG

## 2019-08-27 VITALS — SYSTOLIC BLOOD PRESSURE: 156 MMHG | DIASTOLIC BLOOD PRESSURE: 99 MMHG

## 2019-08-27 VITALS — WEIGHT: 114 LBS | BODY MASS INDEX: 20.2 KG/M2 | HEIGHT: 63 IN

## 2019-08-27 VITALS — SYSTOLIC BLOOD PRESSURE: 164 MMHG | DIASTOLIC BLOOD PRESSURE: 88 MMHG

## 2019-08-27 DIAGNOSIS — Z82.49: ICD-10-CM

## 2019-08-27 DIAGNOSIS — I25.10: Primary | ICD-10-CM

## 2019-08-27 DIAGNOSIS — Z79.899: ICD-10-CM

## 2019-08-27 DIAGNOSIS — Z83.6: ICD-10-CM

## 2019-08-27 DIAGNOSIS — I65.29: ICD-10-CM

## 2019-08-27 DIAGNOSIS — Z87.891: ICD-10-CM

## 2019-08-27 DIAGNOSIS — M79.89: ICD-10-CM

## 2019-08-27 DIAGNOSIS — Z79.82: ICD-10-CM

## 2019-08-27 DIAGNOSIS — J44.9: ICD-10-CM

## 2019-08-27 DIAGNOSIS — Z79.01: ICD-10-CM

## 2019-08-27 DIAGNOSIS — Z84.0: ICD-10-CM

## 2019-08-27 DIAGNOSIS — I27.0: ICD-10-CM

## 2019-08-27 DIAGNOSIS — I48.91: ICD-10-CM

## 2019-08-27 LAB
ALBUMIN SERPL-MCNC: 4.3 GM/DL (ref 3.2–4.5)
ALP SERPL-CCNC: 83 U/L (ref 40–136)
ALT SERPL-CCNC: < 6 U/L (ref 0–55)
APTT BLD: 47 SEC (ref 24–35)
BILIRUB SERPL-MCNC: 0.6 MG/DL (ref 0.1–1)
BUN/CREAT SERPL: 13
CALCIUM SERPL-MCNC: 9.4 MG/DL (ref 8.5–10.1)
CHLORIDE SERPL-SCNC: 104 MMOL/L (ref 98–107)
CHOLEST SERPL-MCNC: 191 MG/DL (ref ?–200)
CO2 SERPL-SCNC: 24 MMOL/L (ref 21–32)
CREAT SERPL-MCNC: 0.72 MG/DL (ref 0.6–1.3)
ERYTHROCYTE [DISTWIDTH] IN BLOOD BY AUTOMATED COUNT: 12.3 % (ref 10–14.5)
GFR SERPLBLD BASED ON 1.73 SQ M-ARVRAT: > 60 ML/MIN
GLUCOSE SERPL-MCNC: 81 MG/DL (ref 70–105)
HCT VFR BLD CALC: 39 % (ref 35–52)
HDLC SERPL-MCNC: 55 MG/DL (ref 40–60)
HGB BLD-MCNC: 12.8 G/DL (ref 11.5–16)
INR PPP: 1.8 (ref 0.8–1.4)
MCH RBC QN AUTO: 32 PG (ref 25–34)
MCHC RBC AUTO-ENTMCNC: 33 G/DL (ref 32–36)
MCV RBC AUTO: 96 FL (ref 80–99)
PLATELET # BLD: 290 10^3/UL (ref 130–400)
PMV BLD AUTO: 9.2 FL (ref 7.4–10.4)
POTASSIUM SERPL-SCNC: 4.2 MMOL/L (ref 3.6–5)
PROT SERPL-MCNC: 7.3 GM/DL (ref 6.4–8.2)
PROTHROMBIN TIME: 21.6 SEC (ref 12.2–14.7)
SODIUM SERPL-SCNC: 139 MMOL/L (ref 135–145)
TRIGL SERPL-MCNC: 76 MG/DL (ref ?–150)
VLDLC SERPL CALC-MCNC: 15 MG/DL (ref 5–40)
WBC # BLD AUTO: 6.7 10^3/UL (ref 4.3–11)

## 2019-08-27 PROCEDURE — 87081 CULTURE SCREEN ONLY: CPT

## 2019-08-27 PROCEDURE — 93458 L HRT ARTERY/VENTRICLE ANGIO: CPT

## 2019-08-27 PROCEDURE — 85027 COMPLETE CBC AUTOMATED: CPT

## 2019-08-27 PROCEDURE — 80061 LIPID PANEL: CPT

## 2019-08-27 PROCEDURE — 80053 COMPREHEN METABOLIC PANEL: CPT

## 2019-08-27 PROCEDURE — 85610 PROTHROMBIN TIME: CPT

## 2019-08-27 PROCEDURE — 36415 COLL VENOUS BLD VENIPUNCTURE: CPT

## 2019-08-27 PROCEDURE — 85730 THROMBOPLASTIN TIME PARTIAL: CPT

## 2019-08-27 NOTE — NUR
SPOKE WITH PT AS WELL AS GOING THRU THE EXTERNAL MED HISTORY TO COMPLETE THE MED REC. 



PT WAS ABLE TO TELL HER ALL HER MEDICATIONS ALONG WITH HOW SHE TAKES EACH ONE.



OTC MEDS:

ACETAMINOPHEN 650M TAB PRN 

ASPIRIN 81M TAB DAILY

DIPHENDYDRAMINE 25M HS PRN 

DOCUSATE 100M DAILY PRN

## 2019-08-27 NOTE — NUR
DR DUMONT NOTIFIED OF B/P.  NO FURTHER ORDERS.  STATED OK TO D/C.

-------------------------------------------------------------------------------

Addendum: 08/27/19 at 1706 by SUPA PALENCIA RN

-------------------------------------------------------------------------------

Amended: Links added.

## 2019-08-27 NOTE — CARDIAC PROCEDURE NOTE-CS/ASA
Pre-Procedure Note


Pre-Op Procedure Note


H&P Reviewed


The H&P was reviewed, patient examined and no changes noted.


Date H&P Reviewed:  Aug 27, 2019


Time H&P Reviewed:  13:53





Conscious Sedation Pre-Proced


Time


13:53





ASA Score


3


For ASA 3 and 4: Consider anesthesia and medical clearance. Also, for patients

with a history of failed moderate sedation consider anesthesia.

















Airway 


 


Lungs 


 


Heart 


 


 ASA score


 


 ASA 1: a normal healthy patient


 


 ASA 2:  a patient with a mild systemic disease (mid diabetes, controlled 

hypertension, obesity 


 


 ASA 3:  a patient with a severe systemic disease that limits activity  (angina,

COPD, prior Myocardial infarction)


 


 ASA 4:  a patient with an incapacitating disease that is a constant threat to 

life (CHF, renal failure)


 


 ASA 5:  a moribund patient not expected to survive 24 hrs.  (ruptured aneurysm)


 


 ASA 6:  a declared brain-dead patient whose organs are being harvested.


 


 For emergent operations, add the letter E after the classification











Mallampati Classification


Grade 2





Sedation Plan


Analgesia, Amnesia, Plan communicated to team members, Discussed options with 

patient/fam, Discussed risks with patient/fam


The patient is an appropriate candidate to undergo the planned procedure, 

sedation, and anesthesia.





The patient immediately re-assessed prior to indication.











NICOLE DUMONT MD FACP FAC CCDS   Aug 27, 2019 13:53

## 2019-08-27 NOTE — DISCHARGE INST-POST CATH
Discharge Inst-CATH/EP


Problems Reviewed?:  Yes


Post Cardiac Cath/EP D/C Inst


Follow Up/Plan


F/u with Dr Babcock in 2 weeks








ACTIVITY





* Go Home directly and rest.


* Limit activity of the leg (or wrist if it was used) for 7 days including 

aerobics, swimming,


   jogging, bicycling, etc.


* Restrict stair-climbing for 7 days if possible, if not, climb up with your 

non-cath leg, then


   bring together on the same step.


* Avoid lifting, pushing, pulling or excessive movement of the affected 

extremity for 7 days.


* Customary sexual activity may be resumed after 2 days-use caution not to use a

position  


   that strains or causes pain to the affected extremity.


* No driving for 24 hours.


* NO SMOKING. 


* Avoid straining for bowel movements for 7 days.


* Gentle walking on level ground is allowed.


* Returning to work will depend on the type of procedure and the results. Your 

doctor will discuss


   this with you.





CALL YOUR DOCTOR FOR ANY OF THE FOLLOWING:





*If bleeding from the puncture site occurs- Apply gentle pressure to site with 

clean cloth and call


   your doctor or EMS.


* If a knot or lump forms under the skin, increases in size, or causes pain.


* If bruising appears to be worsening or moving further down your leg instead of

disappearing.


* Temperature above 101 F.





CARE OF YOUR GROIN INCISION;





* Bruising or purple discoloration of the skin near the puncture site is common.


* You may shower only, no bathtub bathing for 5 days.  Be careful to avoid 

slipping as your


   leg may feel stiff.


* If a closure device was used on your femoral artery, please see the attached 

guide regarding


   care of the device and your leg.


* Leave dressing on FOR 24 hours.





CARE OF YOUR WRIST INCISION;





* Bruising or purple discoloration of the skin near the puncture site is common.


* You may shower.


* DO NOT submerge wrist.


* Leave dressing on FOR 24 hours.











NICOLE BABCOCK MD FACP [facility] CCDS   Aug 27, 2019 14:23

## 2019-08-27 NOTE — DISCHARGE INST-CARDIOLOGY
Discharge Inst-Cardiac


Problems Reviewed?:  Yes


Discharge Medications


New Medications:  


Pravastatin Sodium (Pravachol) 20 Mg Tablet


20 MG PO HS for 30 Days, #30 TAB 5 Refills





 


Continued Medications:  


Acetaminophen (Tylenol Arthritis) 650 Mg Tablet.er


650 MG PO Q8H PRN for PAIN-MILD, TAB





Aspirin (Aspir 81) 81 Mg Tablet.dr


81 MG PO DAILY, TAB





Diphenhydramine HCl (Benadryl) 25 Mg Capsule


25 MG PO HS PRN for INSOMNIA, CAP





Docusate Sodium (Docusate Sodium) 100 Mg Capsule


100 MG PO DAILY PRN for CONSTIPATION-1ST LINE, CAP





Gabapentin (Gabapentin) 800 Mg Tablet


800 MG PO HS





Losartan Potassium (Losartan Potassium) 50 Mg Tablet


50 MG PO DAILY





Rivaroxaban (Xarelto) 20 Mg Tablet


20 MG PO 1000





Sotalol HCl (Sotalol) 80 Mg Tablet


80 MG PO BID

















NICOLE DUMONT MD FACP [facility] CCDS   Aug 27, 2019 14:22

## 2019-08-27 NOTE — CARDIAC CATHETERIZATION
DATE OF SERVICE:  08/27/2019



CARDIAC CATHETERIZATION REPORT



INDICATIONS:

The patient is an 79-year-old lady, who has multiple coronary artery disease

risk factors and who has been experiencing chest discomfort that is suggestive

of new onset of angina pectoris.  Cardiac catheterization was carried out today

after having obtained an informed consent.



PROCEDURE IN DETAIL:

She was brought to the cardiac catheterization laboratory in a fasting state. 

Right groin was prepared and draped in the usual sterile fashion.  Lidocaine 1%

was used for local anesthesia.  Modified Seldinger technique was used to advance

a 5-Andorran sheath in the right femoral artery, 5-Andorran JL4 catheter was used

for left coronary angiography, 5-Andorran JR4 catheter for right coronary

angiography, 5-Andorran pigtail catheter was used for left heart catheterization

and left ventricular angiography.  The pigtail catheter was pulled back and then

removed.  Angiography of the right femoral artery was carried out through the

sheath.  Mynx was used to achieve hemostasis.  She tolerated the procedure well.



HEMODYNAMICS:

Left ventricular end-diastolic pressure following coronary angiography was 13

mmHg.  There was no significant pressure gradient on pullback across the aortic

valve.  Ascending aortic pressure was 149/87 with a mean of 111 mmHg.



CORONARY ANGIOGRAPHY:

Coronary calcification is seen.  Left main coronary artery does not exhibit

significant obstructive disease.  Left anterior descending and left circumflex

arteries have mild plaques.  Right coronary artery is dominant and has mild

plaques.



LEFT VENTRICULAR ANGIOGRAPHY:

Left ventricular angiography was carried out in the right anterior oblique

projection.  Global left ventricular systolic function is normal.  No distinct

regional wall motion abnormality was seen in this view.  Ejection fraction is

approximately 65%.  There is mitral annular calcification seen.



CONCLUSIONS:

1.  Angiographically mild coronary artery disease.

2.  Normal global left ventricular systolic function with ejection fraction of

65%.

3.   Left ventricular end-diastolic pressure at the top limit of normal.



DISCUSSION AND RECOMMENDATIONS:

Based on results of the study, it appears appropriate to continue a conservative

approach.  Risk factor modification has been reviewed and outpatient followup is

advised.





Job ID: [id number]

DocumentID: [id number]

Dictated Date:  08/27/2019 14:09:06

Transcription Date: 08/27/2019 14:19:34

Dictated By: NICOLE DUMONT MD, MA, FACP, FACC,

## 2019-09-07 ENCOUNTER — HOSPITAL ENCOUNTER (EMERGENCY)
Dept: HOSPITAL 75 - ER | Age: 79
Discharge: HOME | End: 2019-09-07
Payer: MEDICARE

## 2019-09-07 VITALS — BODY MASS INDEX: 19.84 KG/M2 | WEIGHT: 112 LBS | HEIGHT: 63 IN

## 2019-09-07 VITALS — SYSTOLIC BLOOD PRESSURE: 144 MMHG | DIASTOLIC BLOOD PRESSURE: 85 MMHG

## 2019-09-07 DIAGNOSIS — Z87.891: ICD-10-CM

## 2019-09-07 DIAGNOSIS — M06.9: ICD-10-CM

## 2019-09-07 DIAGNOSIS — I48.91: ICD-10-CM

## 2019-09-07 DIAGNOSIS — J44.9: ICD-10-CM

## 2019-09-07 DIAGNOSIS — Z86.73: ICD-10-CM

## 2019-09-07 DIAGNOSIS — I10: ICD-10-CM

## 2019-09-07 DIAGNOSIS — I97.630: Primary | ICD-10-CM

## 2019-09-07 DIAGNOSIS — Z79.82: ICD-10-CM

## 2019-09-07 DIAGNOSIS — K21.9: ICD-10-CM

## 2019-09-07 DIAGNOSIS — I73.9: ICD-10-CM

## 2019-09-07 DIAGNOSIS — M79.81: ICD-10-CM

## 2019-09-07 DIAGNOSIS — Z95.9: ICD-10-CM

## 2019-09-07 DIAGNOSIS — G62.9: ICD-10-CM

## 2019-09-07 PROCEDURE — 96365 THER/PROPH/DIAG IV INF INIT: CPT

## 2019-09-07 NOTE — ED INTEGUMENTARY GENERAL
General


Chief Complaint:  Skin/Wound Problems


Stated Complaint:  RECENT HEART CATH,BLEEDING FROM WOUND,DIZZINESS


Nursing Triage Note:  


PT AMBULATED TO ROOM 5 PT CO OF R GROIN AREA OOZING BLOOD FROM HEART CATH APPROX




11DAYS AGO PT DENIES PAIN. PT HAS BRUISING NOTED ON R GROIN AREA AND UPPER THIGH




FROM HEART CATH. NO SWELLING NOTED.


Source:  patient


Exam Limitations:  no limitations


 (CARMELO BUNN MD)





History of Present Illness


Date Seen by Provider:  Sep 7, 2019


Time Seen by Provider:  08:46


Initial Comments


This 79-year-old white female presents with bleeding from her right femoral 

artery catheterization site following a cardiac catheterization a week ago.  The

patient had noted some bruising to the medial aspect of her thigh but had not 

had any bleeding from the catheterization site until this morning.  The patient 

stated that she bled approximately an eyedrop are full during her shower.  

Patient has noted raised blister to the area of the catheterization puncture 

site.





The patient's bleeding was self-limited.  She is back on her L Aquinas for her 

A. fib.  The procedure was done by Dr. Babcock.


 (CARMELO BUNN MD)





Allergies and Home Medications


Allergies


Coded Allergies:  


     No Known Drug Allergies (Unverified , 8/27/19)





Home Medications


Acetaminophen 650 Mg Tablet.er, 650 MG PO Q8H PRN for PAIN-MILD, (Reported)


Aspirin 81 Mg Tablet.dr, 81 MG PO DAILY, (Reported)


Diphenhydramine HCl 25 Mg Capsule, 25 MG PO HS PRN for INSOMNIA, (Reported)


Docusate Sodium 100 Mg Capsule, 100 MG PO DAILY PRN for CONSTIPATION-1ST LINE, 

(Reported)


Gabapentin 800 Mg Tablet, 800 MG PO HS, (Reported)


Losartan Potassium 50 Mg Tablet, 50 MG PO DAILY, (Reported)


Pravastatin Sodium 20 Mg Tablet, 20 MG PO HS


   Prescribed by: NICOLE BABCOCK on 8/27/19 1421


Rivaroxaban 20 Mg Tablet, 20 MG PO 1000, (Reported)


Sotalol HCl 80 Mg Tablet, 80 MG PO BID, (Reported)





Patient Home Medication List


Home Medication List Reviewed:  Yes


 (CARMELO BUNN MD)





Review of Systems


Review of Systems


Constitutional:  No chills, No fever


EENTM:  no symptoms reported


Respiratory:  no symptoms reported


Cardiovascular:  no symptoms reported; No chest pain, No palpitations


Gastrointestinal:  No abdominal pain, No nausea, No vomiting


Genitourinary:  no symptoms reported


Musculoskeletal:  No back pain


Skin:  see HPI


Psychiatric/Neurological:  No Symptoms Reported


Endocrine:  No Symptoms Reported


Hematologic/Lymphatic:  No Symptoms Reported (CARMELO BUNN MD)





Past Medical-Social-Family Hx


Past Med/Social Hx:  Reviewed Nursing Past Med/Soc Hx


 (CARMELO BUNN MD)





Patient Social History


Alcohol Use:  Regular Use


Number of Drinks Today:  0


Alcohol Beverage of Choice:  Beer


Recreational Drug Use:  No


Smoking Status:  Never a Smoker


Type Used:  Cigarettes


Former Smoker, Quit:  Feb 1, 1980


2nd Hand Smoke Exposure:  Yes (1980 QUITE)


Recent Foreign Travel:  No


Contact w/Someone Who Travel:  No


Recent Infectious Disease Expo:  No


Physical Abuse:  No


Sexual Abuse:  No


 (CARMELO BUNN MD)





Immunizations Up To Date


Date of Pneumonia Vaccine:  Nov 3, 2017


Date of Influenza Vaccine:  Sep 30, 2018


 (CARMELO BUNN MD)





Seasonal Allergies


Seasonal Allergies:  No


 (CARMELO BUNN MD)





Past Medical History


Surgeries:  Yes


Eye Surgery, Orthopedic


Respiratory:  Yes


COPD


Currently Using CPAP:  No


Currently Using BIPAP:  No


Cardiac:  Yes


Atrial Fibrillation, Hypertension, Peripheral Vascular, Valvular Heart Disease


Neurological:  Yes


Neuropathy, TIA


Reproductive Disorders:  No


GYN History:  Menopausal


Genitourinary:  No


Gastrointestinal:  Yes (dysphagia)


Gastroesophageal Reflux


Musculoskeletal:  Yes (scleroderma)


Rheumatoid Arthritis


Endocrine:  No


Cataract


Cancer:  No


Psychosocial:  No


Integumentary:  Yes (SCLERODERMA)


Blood Disorders:  No


Adverse Reaction/Blood Tranf:  No


 (CARMELO BUNN MD)





Family Medical History


No Pertinent Family Hx


 (CARMELO BUNN MD)





Physical Exam


Vital Signs





Vital Signs - First Documented








 9/7/19





 08:05


 


Temp 97.8


 


Pulse 84


 


Resp 22


 


B/P (MAP) 144/85 (104)


 


Pulse Ox 99





 (MALORIE HILLIARD MD)


Vital Signs


Capillary Refill : Less Than 3 Seconds 


 (CARMELO BUNN MD)


General Appearance:  WD/WN, no apparent distress


HEENT:  normal ENT inspection


Neck:  full range of motion, normal inspection


Cardiovascular:  irregularly irregular


Respiratory:  lungs clear, normal breath sounds


Gastrointestinal:  normal bowel sounds, non tender


Back:  normal inspection


Extremities:  other (there is bruising present to the medial aspect of the right

thigh.  There is a raised blister lesion to the catheterization insertion site 

in the right groin.)


Neurologic/Psychiatric:  no motor/sensory deficits, alert, normal mood/affect


Skin:  ecchymosis (Ecchymosis to the medial right thigh.  Raised blister at the 

catheter insertion site in the right groin) (CARMELO BUNN MD)





Progress/Results/Core Measures


Results/Orders


My Orders





Orders - MALORIE HILLIARD MD


Vancomycin Injection (Vancomycin Injecti (9/7/19 11:30)


Vancomycin Injection (Vancomycin Injecti (9/7/19 12:00)


 (MALORIE HILLIARD MD)


Vital Signs/I&O











 9/7/19





 08:05


 


Temp 97.8


 


Pulse 84


 


Resp 22


 


B/P (MAP) 144/85 (104)


 


Pulse Ox 99





 (MALORIE HILLIARD MD)








Blood Pressure Mean:                    104











Progress


Progress Note :  


   Time:  08:50


Progress Note


I performed a bedside ultrasound the patient's right groin catheter insertion 

site.  I do not see any evidence of a pulsatile mass in the area.  However I do 

not feel that my exam was adequate.





I consulted  who recommended a formal ultrasound.  He is contacting the

echo tech to see a this would be possible today on Saturday.





Dr. Hilliard was kind enough to assume care at 9:45 of the patient. Dr. Gamez is 

working on getting the echo tech to evaluate the patient.


 (CARMELO BUNN MD)





Departure


Communication (Admissions)


[id number] Dr. Gamez has completed his exam.  The ultrasound showed no evidence of 

pseudoaneurysm or arterial leak.  This is therefore most likely a liquefying 

hematoma.  She will be given a gram of vancomycin IV here and dismissed on 

Keflex.  She is to see NICOLE Britt in the office on Monday.


 (MALORIE HILLIARD MD)





Impression





   Primary Impression:  


   right groin hematoma post cardiac angiography one week


Disposition:  01 HOME, SELF-CARE


Condition:  Stable/Unchanged





Departure-Patient Inst.


Decision time for Depature:  11:30


 (MALORIE HILLIARD MD)


Referrals:  


SALVADOR BAIRES MD (PCP/Family)


Primary Care Physician





Add. Discharge Instructions:  


All discharge instructions reviewed with patient and/or family. Voiced un

derstanding.


Take Keflex as directed.


Call NICOLE Britt's office Monday morning for an appointment on Monday.











CARMELO BUNN MD              Sep 7, 2019 08:51


MALORIE HILLIARD MD              Sep 7, 2019 11:19

## 2019-09-07 NOTE — CONSULTATION-CARDIOLOGY
HPI-Cardiology


Cardiology Consultation:


Date of Consultation


9/7/19


Date of Admission





Attending Physician





Admitting Physician


Saadia Cueva MD


Consulting Physician


MARTÍN SANFORD MD





HPI:


Time Seen by a Provider:  09:45


Chief Complaint:


Bleeding at the cardiac catheterization access site


This is a 79-year-old female who follows with Dr. Babcock as an outpatient.  She 

has history of atrial fibrillation on sotalol, oral anticoagulation.  She had 

recent coronary angiography through the right femoral artery one week ago.  She 

noted some bruising on the medial aspect of her thigh.  This morning she had a 

very slight bleeding from the site of catheterization.  There is also a blister.

 No further bleeding.  She denies any other cardiac complaints including chest 

pain, shortness of breath, syncope or near syncope.





Review of Systems-Cardiology


Review of Systems


Constitutional:  As described under HPI; No As described under HPI, No no 

symptoms reported, No chills, No fever, No lightheadedness


Eyes:  No As described under HPI, No no symptoms reported, No blindness, No 

blurred vision, No contact lenses, No drainage, No decreased acuity, No foreign 

body sensation, No pain, No vision change


Ears/Nose/Throat:  No As described under HPI, No no symptoms reported, No 

chronic hearing loss, No ear discharge, No ear pain, No nasal drainage, No 

ulcerations


Respiratory:  No no symptoms reported; As described under HPI; No As described 

under HPI, No cough, No orthopnea, No shortness of breath, No SOB with excertion


Cardiovascular:  No no symptoms reported; As described under HPI; No As 

described under HPI, No chest pain, No edema, No irregular heart rate, No 

lightheadedness, No palpitations


Gastrointestinal:  No no symptoms reported, No As described under HPI, No 

abdomen distended, No abdominal pain, No blood streaked bowels, No constipation,

No diarrhea, No nausea, No vomiting, No stool coloration changes


Genitourinary:  No As described under HPI, No burning, No dysuria, No discharge,

No frequency, No flank pain, No hematuria, No urgency


Pregnant:  Yes


Pregnant:  No


Musculoskeletal:  As describe under HPI


Skin:  As described under HPI; No rash, No skin related problems, No ulcerations


Psychiatric/Neurological:  No anxiety, No depression, No seizure, No focal 

weakness, No syncope


Hematologic:  No bleeding abnormalities





PMH-Social-Family Hx


Patient Social History


Alcohol Use:  Regular Use


Recreational Drug Use:  No


Smoking Status:  Never a Smoker


Type Used:  Cigarettes


2nd Hand Smoke Exposure:  Yes (1980 QUITE)


Recent Foreign Travel:  No


Recent Infectious Disease Expo:  No


Hospitalization with Isolation:  Denies





Immunizations Up To Date


Date of Pneumonia Vaccine:  Nov 3, 2017


Date of Influenza Vaccine:  Sep 30, 2018





Past Medical History


PMH


As described under Assessment.





Family Medical History


Family Medical History:  


She does not report any fam h/o early CAD or SCD





Allergies and Home Medications


Allergies


Coded Allergies:  


     No Known Drug Allergies (Unverified , 8/27/19)





Home Medications


Acetaminophen 650 Mg Tablet.er, 650 MG PO Q8H PRN for PAIN-MILD, (Reported)


Aspirin 81 Mg Tablet.dr, 81 MG PO DAILY, (Reported)


Diphenhydramine HCl 25 Mg Capsule, 25 MG PO HS PRN for INSOMNIA, (Reported)


Docusate Sodium 100 Mg Capsule, 100 MG PO DAILY PRN for CONSTIPATION-1ST LINE, 

(Reported)


Gabapentin 800 Mg Tablet, 800 MG PO HS, (Reported)


Losartan Potassium 50 Mg Tablet, 50 MG PO DAILY, (Reported)


Pravastatin Sodium 20 Mg Tablet, 20 MG PO HS


   Prescribed by: NICOLE BABCOCK on 8/27/19 1421


Rivaroxaban 20 Mg Tablet, 20 MG PO 1000, (Reported)


Sotalol HCl 80 Mg Tablet, 80 MG PO BID, (Reported)





Patient Home Medication List


Home Medication List Reviewed:  Yes





Physical Exam-Cardiology


Physical Exam


Vital Signs/I&O











 9/7/19





 08:05


 


Temp 97.8


 


Pulse 84


 


Resp 22


 


B/P (MAP) 144/85 (104)


 


Pulse Ox 99





Capillary Refill : Less Than 3 Seconds


Constitutional:  appears stated age, AAO x 3; No apparent distress; well-

developed, well-nourished


HEENT:  PERRL; No discharge; hearing is well preserved, oral hygience is good; 

No ulceration, No xanthelasmas are seen


Neck:  No carotid bruit; carotid pulses are 2 + bilaterally


Respiratory:  No accessory muscle use, No respiratory distress, No chest tender,

No chest expansion is symmetric; chest is bilaterally symmetric; No lungs clear 

to percussion; lungs clear to auscultation; No crackles, No rhonchi, No rales, 

No stridor, No wheezing, No pleural rub, No other


Cardiovascular:  No regular rate-rhythm; irregularly irregular; No extra beats, 

No parasternal heave is noted, No JVD, No edema, No bradycardia, No tachycardia,

No point of maximal impulse, No cardiac thrills are palpable; S1 and S2; No 

gallop/S3, No gallop/S4, No diastolic murmur; systolic murmur; No friction rub, 

No click, No other


Gastrointestinal:  No tender; soft; No round, No distended, No pulsatile mass, 

No organomegaly, No guarding, No rebound, No tenderness, No hernia, No mass; 

audible bowel sounds; No abnormal bowel sounds, No abdominal bruits, No 

spleenomegaly, No other


Rectal:  deferred


Extremities:  other (mild bruising right lower extremity, induration and slight 

swelling right groin.  pustule.  No bruit auscultated.); No clubbing, No 

cyanosis; no lower extremity edema bilateral; No significant edema; wound


Neurologic/Psychiatric:  no motor/sensory deficits, alert, normal mood/affect, 

oriented x 3, power is 5/5 both on sides


Skin:  normal color; No rash, No ulcerations





A/P-Cardiology


Assessment/Admission Diagnosis


Recent cardiac catheterization,


Mild bleeding from the cardiac catheterization access site,


Persistent atrial fibrillation,


Infected pustule at the site of cath access





Plan


This is a 79-year-old lady who had recent cardiac catheterization a week ago 

from right femoral artery.  She is complaining of mild bruising of the right 

lower extremity, mild bleeding from the site of the right femoral access as well

as pustule.





There is no bruit however there is an induration, mild swelling and pustule.  I 

performed a bedside ultrasound on the right groin which showed a small hematoma 

but no pseudoaneurysm or AV fistula.  I have recommended vancomycin 1 g in the 

ER followed by Keflex 500 mg 3 times a day for 5 days.  No further bleeding.  

She can continue oral anticoagulation and follow with Dr. Babcock/KIMMY on 

Monday.





Persistent atrial fibrillation, on sotalol, Eliquis.





Hypertension, continue outpatient medication.





Hyperlipidemia, continue pravastatin.








Thank you for your consultation. Please call me if you have any questions.








ANASTASIA Sanford MD, FACP, FACC, FSCAI, FHRS, CCDS


Interventional Cardiology


Cardiac Electrophysiology


Vascular Medicine and Endovascular Interventions











MARTÍN SANFORD MD              Sep 7, 2019 10:09

## 2019-10-23 ENCOUNTER — HOSPITAL ENCOUNTER (OUTPATIENT)
Dept: HOSPITAL 75 - REHAB | Age: 79
Discharge: HOME | End: 2019-10-23
Attending: FAMILY MEDICINE
Payer: MEDICARE

## 2019-10-23 DIAGNOSIS — R53.1: ICD-10-CM

## 2019-10-23 DIAGNOSIS — M34.9: Primary | ICD-10-CM

## 2019-12-18 ENCOUNTER — HOSPITAL ENCOUNTER (OUTPATIENT)
Dept: HOSPITAL 75 - PULM | Age: 79
LOS: 90 days | Discharge: HOME | End: 2020-03-17
Attending: NURSE PRACTITIONER
Payer: MEDICARE

## 2019-12-18 DIAGNOSIS — J44.9: Primary | ICD-10-CM

## 2019-12-18 PROCEDURE — 99211 OFF/OP EST MAY X REQ PHY/QHP: CPT

## 2019-12-18 NOTE — NUR
Pt came for Pulmonary Rehab evaluation; during walk test, pt had to stop and rest several 
times. She stated due to SOA and a little dizzy. Later she stated she was having left sided 
chest pain (sharp), while walking. Pt states she has chronic a-fibb; I asked how long this 
pain has been there; she stated it started earlier this past week with exertion. Pt's BP 
100/50; I asked pt if she would like to go to Emergency Room; she declined. I did instruct 
pt to contact her cardiologist, Dr. Babcock, to follow up with him about new symptom, she 
stated she would. I also instructed pt to go to E.R if symptoms persist or worsen, she 
stated she would. Pt rested, pain went away (she states its only with exertion). I 
instructed pt that we would need medical clearance from physician before starting exercise; 
pt to follow up with Dr. Babcock.

## 2020-01-06 ENCOUNTER — HOSPITAL ENCOUNTER (OUTPATIENT)
Dept: HOSPITAL 75 - RAD | Age: 80
End: 2020-01-06
Attending: NURSE PRACTITIONER
Payer: MEDICARE

## 2020-01-06 DIAGNOSIS — I48.0: ICD-10-CM

## 2020-01-06 DIAGNOSIS — M34.9: ICD-10-CM

## 2020-01-06 DIAGNOSIS — J30.9: ICD-10-CM

## 2020-01-06 DIAGNOSIS — J43.8: Primary | ICD-10-CM

## 2020-01-06 LAB
BUN/CREAT SERPL: 16
CREAT SERPL-MCNC: 0.76 MG/DL (ref 0.6–1.3)
GFR SERPLBLD BASED ON 1.73 SQ M-ARVRAT: > 60 ML/MIN

## 2020-01-06 PROCEDURE — 71260 CT THORAX DX C+: CPT

## 2020-01-06 PROCEDURE — 84520 ASSAY OF UREA NITROGEN: CPT

## 2020-01-06 PROCEDURE — 36415 COLL VENOUS BLD VENIPUNCTURE: CPT

## 2020-01-06 PROCEDURE — 82565 ASSAY OF CREATININE: CPT

## 2020-01-06 NOTE — NUR
PT CAME FOR PFT; PHYSICIAN DID ORDER A POST BRONCHODILATOR TX. PT STATED SHE DID NOT WANT TO 
TAKE TX, DUE TO REACTION IN PAST TO ALBUTEROL (LIGHTHEADEDNESS, DIZZY, AND WEAKNESS). 
ALBUTEROL WAS NOT GIVEN PER PTS REFUSAL.

## 2020-01-06 NOTE — DIAGNOSTIC IMAGING REPORT
PROCEDURE: CT chest with contrast only.



TECHNIQUE: Multiple contiguous axial images were obtained through

the chest after administration of intravenous contrast. Auto

Exposure Controls were utilized during the CT exam to meet ALARA

standards for radiation dose reduction. 



INDICATION: Thyroid issues and right shoulder pain.



COMPARISON: Correlation is made with a prior CT chest study from

06/10/2015.



FINDINGS: No axillary lymphadenopathy is detected. No mediastinal

or hilar lymphadenopathy is detected. No pericardial fluid is

identified. There is a trace right pleural effusion. No discrete

noncalcified parenchymal mass is identified. There is some

scarring versus atelectasis in the posteromedial right lower

lobe. The upper abdomen is unremarkable. The bony structures are

nonacute. Central airways are patent.



IMPRESSION: Right lower lobe scarring or atelectasis. No

parenchymal mass or infiltrate is seen. No thoracic

lymphadenopathy is identified. Note is made of trace right

pleural effusion.



Dictated by: 



  Dictated on workstation # [id number]

## 2020-01-09 ENCOUNTER — HOSPITAL ENCOUNTER (OUTPATIENT)
Dept: HOSPITAL 75 - LAB | Age: 80
End: 2020-01-09
Attending: NURSE PRACTITIONER
Payer: MEDICARE

## 2020-01-09 DIAGNOSIS — E05.90: Primary | ICD-10-CM

## 2020-01-09 LAB
ALBUMIN SERPL-MCNC: 4 GM/DL (ref 3.2–4.5)
ALP SERPL-CCNC: 94 U/L (ref 40–136)
ALT SERPL-CCNC: 15 U/L (ref 0–55)
BILIRUB SERPL-MCNC: 0.6 MG/DL (ref 0.1–1)
BUN/CREAT SERPL: 14
CALCIUM SERPL-MCNC: 9.3 MG/DL (ref 8.5–10.1)
CHLORIDE SERPL-SCNC: 99 MMOL/L (ref 98–107)
CO2 SERPL-SCNC: 22 MMOL/L (ref 21–32)
CREAT SERPL-MCNC: 0.74 MG/DL (ref 0.6–1.3)
GFR SERPLBLD BASED ON 1.73 SQ M-ARVRAT: > 60 ML/MIN
GLUCOSE SERPL-MCNC: 105 MG/DL (ref 70–105)
POTASSIUM SERPL-SCNC: 5.2 MMOL/L (ref 3.6–5)
PROT SERPL-MCNC: 7.1 GM/DL (ref 6.4–8.2)
SODIUM SERPL-SCNC: 131 MMOL/L (ref 135–145)
T4 FREE SERPL-MCNC: 0.99 NG/DL (ref 0.7–1.48)

## 2020-01-09 PROCEDURE — 84480 ASSAY TRIIODOTHYRONINE (T3): CPT

## 2020-01-09 PROCEDURE — 36415 COLL VENOUS BLD VENIPUNCTURE: CPT

## 2020-01-09 PROCEDURE — 80053 COMPREHEN METABOLIC PANEL: CPT

## 2020-01-09 PROCEDURE — 84439 ASSAY OF FREE THYROXINE: CPT

## 2020-01-14 VITALS — SYSTOLIC BLOOD PRESSURE: 130 MMHG | DIASTOLIC BLOOD PRESSURE: 60 MMHG

## 2020-01-14 VITALS — DIASTOLIC BLOOD PRESSURE: 56 MMHG | SYSTOLIC BLOOD PRESSURE: 97 MMHG

## 2020-01-16 VITALS — DIASTOLIC BLOOD PRESSURE: 40 MMHG | SYSTOLIC BLOOD PRESSURE: 100 MMHG

## 2020-01-16 VITALS — DIASTOLIC BLOOD PRESSURE: 40 MMHG | SYSTOLIC BLOOD PRESSURE: 90 MMHG

## 2020-01-21 VITALS — DIASTOLIC BLOOD PRESSURE: 60 MMHG | SYSTOLIC BLOOD PRESSURE: 128 MMHG

## 2020-01-21 VITALS — SYSTOLIC BLOOD PRESSURE: 100 MMHG | DIASTOLIC BLOOD PRESSURE: 60 MMHG

## 2020-01-23 VITALS — DIASTOLIC BLOOD PRESSURE: 60 MMHG | SYSTOLIC BLOOD PRESSURE: 120 MMHG

## 2020-01-23 VITALS — DIASTOLIC BLOOD PRESSURE: 60 MMHG | SYSTOLIC BLOOD PRESSURE: 110 MMHG

## 2020-01-24 ENCOUNTER — HOSPITAL ENCOUNTER (EMERGENCY)
Dept: HOSPITAL 75 - ER | Age: 80
Discharge: HOME | End: 2020-01-24
Payer: MEDICARE

## 2020-01-24 VITALS — WEIGHT: 110.23 LBS | BODY MASS INDEX: 19.53 KG/M2 | HEIGHT: 62.99 IN

## 2020-01-24 VITALS — SYSTOLIC BLOOD PRESSURE: 146 MMHG | DIASTOLIC BLOOD PRESSURE: 76 MMHG

## 2020-01-24 DIAGNOSIS — J44.9: ICD-10-CM

## 2020-01-24 DIAGNOSIS — I10: Primary | ICD-10-CM

## 2020-01-24 DIAGNOSIS — I48.91: ICD-10-CM

## 2020-01-24 DIAGNOSIS — Z79.82: ICD-10-CM

## 2020-01-24 DIAGNOSIS — M06.9: ICD-10-CM

## 2020-01-24 DIAGNOSIS — E05.90: ICD-10-CM

## 2020-01-24 DIAGNOSIS — Z86.73: ICD-10-CM

## 2020-01-24 DIAGNOSIS — G62.9: ICD-10-CM

## 2020-01-24 DIAGNOSIS — Z77.22: ICD-10-CM

## 2020-01-24 DIAGNOSIS — R91.8: ICD-10-CM

## 2020-01-24 DIAGNOSIS — K21.9: ICD-10-CM

## 2020-01-24 DIAGNOSIS — Z87.891: ICD-10-CM

## 2020-01-24 DIAGNOSIS — Z79.01: ICD-10-CM

## 2020-01-24 LAB
ALBUMIN SERPL-MCNC: 3.9 GM/DL (ref 3.2–4.5)
ALP SERPL-CCNC: 95 U/L (ref 40–136)
ALT SERPL-CCNC: 12 U/L (ref 0–55)
BASOPHILS # BLD AUTO: 0 10^3/UL (ref 0–0.1)
BASOPHILS NFR BLD AUTO: 0 % (ref 0–10)
BASOPHILS NFR BLD MANUAL: 0 %
BILIRUB SERPL-MCNC: 0.5 MG/DL (ref 0.1–1)
BUN/CREAT SERPL: 19
CALCIUM SERPL-MCNC: 9.3 MG/DL (ref 8.5–10.1)
CHLORIDE SERPL-SCNC: 96 MMOL/L (ref 98–107)
CO2 SERPL-SCNC: 23 MMOL/L (ref 21–32)
CREAT SERPL-MCNC: 0.74 MG/DL (ref 0.6–1.3)
EOSINOPHIL # BLD AUTO: 0 10^3/UL (ref 0–0.3)
EOSINOPHIL NFR BLD AUTO: 0 % (ref 0–10)
EOSINOPHIL NFR BLD MANUAL: 0 %
ERYTHROCYTE [DISTWIDTH] IN BLOOD BY AUTOMATED COUNT: 12.3 % (ref 10–14.5)
GFR SERPLBLD BASED ON 1.73 SQ M-ARVRAT: > 60 ML/MIN
GLUCOSE SERPL-MCNC: 138 MG/DL (ref 70–105)
HCT VFR BLD CALC: 34 % (ref 35–52)
HGB BLD-MCNC: 11.3 G/DL (ref 11.5–16)
LYMPHOCYTES # BLD AUTO: 0.4 X 10^3 (ref 1–4)
LYMPHOCYTES NFR BLD AUTO: 3 % (ref 12–44)
MAGNESIUM SERPL-MCNC: 1.9 MG/DL (ref 1.6–2.4)
MANUAL DIFFERENTIAL PERFORMED BLD QL: YES
MCH RBC QN AUTO: 31 PG (ref 25–34)
MCHC RBC AUTO-ENTMCNC: 34 G/DL (ref 32–36)
MCV RBC AUTO: 93 FL (ref 80–99)
MONOCYTES # BLD AUTO: 0.3 X 10^3 (ref 0–1)
MONOCYTES NFR BLD AUTO: 3 % (ref 0–12)
MONOCYTES NFR BLD: 3 %
NEUTROPHILS # BLD AUTO: 10.8 X 10^3 (ref 1.8–7.8)
NEUTROPHILS NFR BLD AUTO: 94 % (ref 42–75)
NEUTS BAND NFR BLD MANUAL: 92 %
NEUTS BAND NFR BLD: 0 %
PLATELET # BLD: 461 10^3/UL (ref 130–400)
PMV BLD AUTO: 8.7 FL (ref 7.4–10.4)
POTASSIUM SERPL-SCNC: 4.7 MMOL/L (ref 3.6–5)
PROT SERPL-MCNC: 7.2 GM/DL (ref 6.4–8.2)
RBC MORPH BLD: NORMAL
SODIUM SERPL-SCNC: 130 MMOL/L (ref 135–145)
T4 FREE SERPL-MCNC: 1.36 NG/DL (ref 0.7–1.48)
VARIANT LYMPHS NFR BLD MANUAL: 5 %
WBC # BLD AUTO: 11.5 10^3/UL (ref 4.3–11)

## 2020-01-24 PROCEDURE — 85027 COMPLETE CBC AUTOMATED: CPT

## 2020-01-24 PROCEDURE — 84439 ASSAY OF FREE THYROXINE: CPT

## 2020-01-24 PROCEDURE — 83735 ASSAY OF MAGNESIUM: CPT

## 2020-01-24 PROCEDURE — 71045 X-RAY EXAM CHEST 1 VIEW: CPT

## 2020-01-24 PROCEDURE — 36415 COLL VENOUS BLD VENIPUNCTURE: CPT

## 2020-01-24 PROCEDURE — 93005 ELECTROCARDIOGRAM TRACING: CPT

## 2020-01-24 PROCEDURE — 93041 RHYTHM ECG TRACING: CPT

## 2020-01-24 PROCEDURE — 84443 ASSAY THYROID STIM HORMONE: CPT

## 2020-01-24 PROCEDURE — 84484 ASSAY OF TROPONIN QUANT: CPT

## 2020-01-24 PROCEDURE — 80053 COMPREHEN METABOLIC PANEL: CPT

## 2020-01-24 PROCEDURE — 85007 BL SMEAR W/DIFF WBC COUNT: CPT

## 2020-01-24 NOTE — DIAGNOSTIC IMAGING REPORT
INDICATION: Tachycardia, palpitations, and hypertension.



Frontal chest obtained at 3:27 p.m. and is compared to

08/23/2019.



FINDINGS: Heart is borderline in size. There is some mild

bibasilar atelectasis versus infiltrate. There are small

bilateral pleural effusions, left greater than right. There is no

pneumothorax.



IMPRESSION:



Cardiomegaly. Mild bibasilar atelectatic change versus infiltrate

with small bilateral pleural effusions, left greater than right.



Dictated by: 



  Dictated on workstation # [id number]

## 2020-01-24 NOTE — ED CARDIAC GENERAL
History of Present Illness


General


Chief Complaint:  Cardiac/General Problems


Stated Complaint:  FEELS LIKE A-FIB


Nursing Triage Note:  


PT PRESENTS TO ED FROM HOME WITH COMPLAINTS OF PALPITATIONS, TACHYCARDIA, 


BILATERAL ANKLE SWEELING, AND HYPERTENSION X 1 WEEK. PT REPROTS SHE THINKS THE 


STERIODS SHE RECIEVED ON 1/21/2020 MAY HAVE CAUSED SOME OF HER S/S. PT ALSO 


REPORTS SHE WAS RECENTLY TAKEN OF HER LOSARTAN AS WELL.


Source:  patient


Exam Limitations:  no limitations





History of Present Illness


Date Seen by Provider:  Jan 24, 2020


Time Seen by Provider:  14:52


Initial Comments


This 79-year-old woman presents to the emergency room anxious about her cardiac 

health.  She has recently been experiencing increased swelling of her lower 

extremities which improved by the time of arrival to the ER.  She also has had 

increased blood pressure with blood pressure 154/88 at home.  She reports her 

heart rate was 116.  She is known history of atrial fibrillation and is on 

Xarelto.  She noted dyspnea on exertion today as well.  She reports Dr. Babcock 

recently discontinued her losartan.  She was also recently treated with a 

steroid taper by her pulmonologist.  She is afebrile and has no chest pain.  

Review of chart notes a cardiac catheter in August of last year demonstrating 

mild coronary artery disease and ejection fraction of 60 percent.  She does take

methimazole for hyperthyroidism.





Allergies and Home Medications


Allergies


Coded Allergies:  


     No Known Drug Allergies (Unverified , 8/27/19)





Home Medications


Acetaminophen 650 Mg Tablet.er, 650 MG PO Q8H PRN for PAIN-MILD, (Reported)


Aspirin 81 Mg Tablet.dr, 81 MG PO DAILY, (Reported)


Cephalexin 500 Mg Capsule, 500 MG PO 3 times a day


   Prescribed by: MALORIE HILLIARD on 9/7/19 1225


Diphenhydramine HCl 25 Mg Capsule, 25 MG PO HS PRN for INSOMNIA, (Reported)


Docusate Sodium 100 Mg Capsule, 100 MG PO DAILY PRN for CONSTIPATION-1ST LINE, 

(Reported)


Doxycycline Hyclate 100 Mg Tablet, 100 MG PO BID


   Prescribed by: ROSLYN WHITMAN on 1/24/20 1709


Gabapentin 800 Mg Tablet, 800 MG PO HS, (Reported)


Losartan Potassium 50 Mg Tablet, 50 MG PO DAILY, (Reported)


Pravastatin Sodium 20 Mg Tablet, 20 MG PO HS


   Prescribed by: NICOLE BABCOCK on 8/27/19 1421


Rivaroxaban 20 Mg Tablet, 20 MG PO 1000, (Reported)


Sotalol HCl 80 Mg Tablet, 80 MG PO BID, (Reported)





Patient Home Medication List


Home Medication List Reviewed:  Yes





Review of Systems


Review of Systems


Constitutional:  no symptoms reported


EENTM:  No Symptoms Reported


Respiratory:  See HPI


Cardiovascular:  See HPI


Gastrointestinal:  No Symptoms Reported


Genitourinary:  No Symptoms Reported


Musculoskeletal:  no symptoms reported


Skin:  no symptoms reported


Psychiatric/Neurological:  No Symptoms Reported


Endocrine:  No Symptoms Reported


Hematologic/Lymphatic:  No Symptoms Reported





Past Medical-Social-Family Hx


Past Med/Social Hx:  Reviewed Nursing Past Med/Soc Hx


Patient Social History


Alcohol Use:  Occasionally Uses


Number of Drinks Today:  AA


Alcohol Beverage of Choice:  Beer


Recreational Drug Use:  No


Smoking Status:  Former Smoker


Type Used:  Cigarettes


Former Smoker, Quit:  Feb 1, 1980


2nd Hand Smoke Exposure:  Yes (1980 QUITE)


Recent Foreign Travel:  No


Contact w/Someone Who Travel:  No


Recent Infectious Disease Expo:  No





Immunizations Up To Date


Date of Pneumonia Vaccine:  Sep 18, 2019


Date of Influenza Vaccine:  Sep 12, 2019





Seasonal Allergies


Seasonal Allergies:  No





Past Medical History


Surgeries:  Yes


Eye Surgery, Orthopedic


Respiratory:  Yes


COPD


Currently Using CPAP:  No


Currently Using BIPAP:  No


Cardiac:  Yes


Atrial Fibrillation, Hypertension, Peripheral Vascular, Valvular Heart Disease


Neurological:  Yes


Neuropathy, TIA


Reproductive Disorders:  No


GYN History:  Menopausal


Genitourinary:  No


Gastrointestinal:  Yes (dysphagia)


Gastroesophageal Reflux


Musculoskeletal:  Yes (scleroderma)


Rheumatoid Arthritis


Endocrine:  Yes


Hyperthyroidism


Cataract


Cancer:  No


Psychosocial:  No


Integumentary:  Yes (SCLERODERMA)


Blood Disorders:  No


Adverse Reaction/Blood Tranf:  No





Family Medical History


No Pertinent Family Hx





Physical Exam


Vital Signs





Vital Signs - First Documented








 1/24/20 1/24/20





 15:06 17:21


 


Temp 37.3 


 


Pulse 96 


 


Resp 20 


 


B/P (MAP) 161/95 (117) 


 


Pulse Ox 99 


 


O2 Delivery  Room Air





Capillary Refill : Less Than 3 Seconds


Height, Weight, BMI


Height: 5'3.00"


Weight: 112lbs. 0.0oz. [id number]; 19.00 BMI


Method:Stated


General Appearance:  WD/WN, Anxious, Thin


HEENT:  PERRL/EOMI, Normal ENT Inspection


Neck:  Normal Inspection


Respiratory:  Lungs Clear, Normal Breath Sounds, No Accessory Muscle Use, No 

Respiratory Distress


Cardiovascular:  No Edema, No Murmur, Normal Peripheral Pulses, Irregularly 

Irregular


Extremity:  Normal Inspection, Non Tender, No Pedal Edema


Neurologic/Psychiatric:  Alert, Oriented x3, No Motor/Sensory Deficits, CNs II-

XII Norm as Tested, Other (mildly anxious)


Skin:  Normal Color, Warm/Dry





Progress/Results/Core Measures


Results/Orders


Lab Results





Laboratory Tests








Test


 1/24/20


15:14 Range/Units


 


 


White Blood Count


 11.5 H


 4.3-11.0


10^3/uL


 


Red Blood Count


 3.60 L


 4.35-5.85


10^6/uL


 


Hemoglobin 11.3 L 11.5-16.0  G/DL


 


Hematocrit 34 L 35-52  %


 


Mean Corpuscular Volume 93  80-99  FL


 


Mean Corpuscular Hemoglobin 31  25-34  PG


 


Mean Corpuscular Hemoglobin


Concent 34 


 32-36  G/DL





 


Red Cell Distribution Width 12.3  10.0-14.5  %


 


Platelet Count


 461 H


 130-400


10^3/uL


 


Mean Platelet Volume 8.7  7.4-10.4  FL


 


Neutrophils (%) (Auto) 94 H 42-75  %


 


Lymphocytes (%) (Auto) 3 L 12-44  %


 


Monocytes (%) (Auto) 3  0-12  %


 


Eosinophils (%) (Auto) 0  0-10  %


 


Basophils (%) (Auto) 0  0-10  %


 


Neutrophils # (Auto) 10.8 H 1.8-7.8  X 10^3


 


Lymphocytes # (Auto) 0.4 L 1.0-4.0  X 10^3


 


Monocytes # (Auto) 0.3  0.0-1.0  X 10^3


 


Eosinophils # (Auto)


 0.0 


 0.0-0.3


10^3/uL


 


Basophils # (Auto)


 0.0 


 0.0-0.1


10^3/uL


 


Neutrophils % (Manual) 92   %


 


Lymphocytes % (Manual) 5   %


 


Monocytes % (Manual) 3   %


 


Eosinophils % (Manual) 0   %


 


Basophils % (Manual) 0   %


 


Band Neutrophils 0   %


 


Blood Morphology Comment NORMAL   


 


Sodium Level 130 L 135-145  MMOL/L


 


Potassium Level 4.7  3.6-5.0  MMOL/L


 


Chloride Level 96 L   MMOL/L


 


Carbon Dioxide Level 23  21-32  MMOL/L


 


Anion Gap 11  5-14  MMOL/L


 


Blood Urea Nitrogen 14  7-18  MG/DL


 


Creatinine


 0.74 


 0.60-1.30


MG/DL


 


Estimat Glomerular Filtration


Rate > 60 


  





 


BUN/Creatinine Ratio 19   


 


Glucose Level 138 H   MG/DL


 


Calcium Level 9.3  8.5-10.1  MG/DL


 


Corrected Calcium 9.4  8.5-10.1  MG/DL


 


Magnesium Level 1.9  1.6-2.4  MG/DL


 


Total Bilirubin 0.5  0.1-1.0  MG/DL


 


Aspartate Amino Transf


(AST/SGOT) 21 


 5-34  U/L





 


Alanine Aminotransferase


(ALT/SGPT) 12 


 0-55  U/L





 


Alkaline Phosphatase 95    U/L


 


Troponin I < 0.028  <0.028  NG/ML


 


Total Protein 7.2  6.4-8.2  GM/DL


 


Albumin 3.9  3.2-4.5  GM/DL


 


Thyroid Stimulating Hormone


(TSH) 0.03 L


 0.35-4.94


UIU/ML


 


Free Thyroxine


 1.36 


 0.70-1.48


NG/DL








My Orders





Orders - ROSLYN ALANIS MD


Ekg Tracing (1/24/20 14:52)


Monitor-Rhythm Ecg Trace Only (1/24/20 14:52)


Cbc With Automated Diff (1/24/20 15:13)


Comprehensive Metabolic Panel (1/24/20 15:13)


Magnesium (1/24/20 15:13)


Thyroid Stimulating Hormone (1/24/20 15:13)


Troponin I (1/24/20 15:13)


Free T4 (Free Thyroxine) (1/24/20 15:13)


Chest 1 View, Ap/Pa Only (1/24/20 15:13)


Manual Differential (1/24/20 15:14)


Doxycycline Hyclate Tablet (Vibramycin T (1/24/20 17:15)





Vital Signs/I&O











 1/24/20 1/24/20





 15:06 17:21


 


Temp 37.3 


 


Pulse 96 88


 


Resp 20 11


 


B/P (MAP) 161/95 (117) 146/76


 


Pulse Ox 99 97


 


O2 Delivery  Room Air








2





Blood Pressure Mean:                    117











Progress


Progress Note :  


Progress Note


Workup was relatively benign.  Blood pressure was stable and within normal 

limits for most measurements.  There was some question of infiltrate on the 

chest x-ray.  There is no overt failure.  Patient's lower extremity edema and 

actually improved prior to coming to the ER.  Because of the questionable 

infiltrate on chest x-ray, doxycycline was initiated as an addition to her 

steroids as part of her COPD exacerbation treatment.  Patient was given 

reassurance about her heart rate and blood pressure.


Initial ECG Impression Date:  Jan 24, 2020


Initial ECG Impression Time:  15:05


Initial ECG Rate:  91


Initial ECG Rhythm:  A Fib/Flutter


Initial ECG Impression:  Atrial Fibrillation


Comment


Atrial fibrillation with no ST elevation or depression.  Rate controlled.





Departure


Impression





   Primary Impression:  


   Hypertension


   Qualified Codes:  I10 - Essential (primary) hypertension


   Additional Impressions:  


   Pulmonary infiltrate


   Hyperthyroidism


   Atrial fibrillation


   Qualified Codes:  I48.91 - Unspecified atrial fibrillation


Disposition:  01 HOME, SELF-CARE


Condition:  Stable





Departure-Patient Inst.


Decision time for Depature:  17:07


Referrals:  


SALVADOR BAIRES MD (PCP/Family)


Primary Care Physician


Patient Instructions:  Atrial Fibrillation





Add. Discharge Instructions:  


Continue your usual medications as previously prescribed.


Complete doxycycline as prescribed.


Follow-up with your primary care provider next week.


Elevate your feet when you experience swelling.


Avoid checking your blood pressure too often as this may induce anxiety about 

high blood pressure.


Return to the emergency room if you have worsening symptoms despite these 

measures.





All discharge instructions reviewed with patient and/or family. Voiced 

understanding.


Scripts


Doxycycline Hyclate (Doxycycline Hyclate) 100 Mg Tablet


100 MG PO BID, #20 TAB 0 Refills


   Prov: ROSLYN ALANIS MD         1/24/20





Copy


Copies To 1:   NICOLE BABCOCK MD [facility] CCDS


Copies To 2:   SALVADOR BAIRES MD, JOSHUA T MD        Jan 24, 2020 15:35

## 2020-01-28 ENCOUNTER — HOSPITAL ENCOUNTER (OUTPATIENT)
Dept: HOSPITAL 75 - LAB | Age: 80
End: 2020-01-28
Attending: NURSE PRACTITIONER
Payer: MEDICARE

## 2020-01-28 VITALS — DIASTOLIC BLOOD PRESSURE: 62 MMHG | SYSTOLIC BLOOD PRESSURE: 96 MMHG

## 2020-01-28 VITALS — SYSTOLIC BLOOD PRESSURE: 120 MMHG | DIASTOLIC BLOOD PRESSURE: 50 MMHG

## 2020-01-28 DIAGNOSIS — E04.2: Primary | ICD-10-CM

## 2020-01-28 PROCEDURE — 84480 ASSAY TRIIODOTHYRONINE (T3): CPT

## 2020-01-28 PROCEDURE — 36415 COLL VENOUS BLD VENIPUNCTURE: CPT

## 2020-01-30 VITALS — DIASTOLIC BLOOD PRESSURE: 60 MMHG | SYSTOLIC BLOOD PRESSURE: 140 MMHG

## 2020-01-30 VITALS — DIASTOLIC BLOOD PRESSURE: 60 MMHG | SYSTOLIC BLOOD PRESSURE: 130 MMHG

## 2020-02-04 VITALS — SYSTOLIC BLOOD PRESSURE: 120 MMHG | DIASTOLIC BLOOD PRESSURE: 60 MMHG

## 2020-02-04 VITALS — DIASTOLIC BLOOD PRESSURE: 74 MMHG | SYSTOLIC BLOOD PRESSURE: 110 MMHG

## 2020-02-06 VITALS — DIASTOLIC BLOOD PRESSURE: 64 MMHG | SYSTOLIC BLOOD PRESSURE: 120 MMHG

## 2020-02-06 VITALS — DIASTOLIC BLOOD PRESSURE: 60 MMHG | SYSTOLIC BLOOD PRESSURE: 130 MMHG

## 2020-02-11 VITALS — DIASTOLIC BLOOD PRESSURE: 60 MMHG | SYSTOLIC BLOOD PRESSURE: 120 MMHG

## 2020-02-11 VITALS — SYSTOLIC BLOOD PRESSURE: 140 MMHG | DIASTOLIC BLOOD PRESSURE: 60 MMHG

## 2020-02-13 VITALS — SYSTOLIC BLOOD PRESSURE: 150 MMHG | DIASTOLIC BLOOD PRESSURE: 50 MMHG

## 2020-02-13 VITALS — DIASTOLIC BLOOD PRESSURE: 60 MMHG | SYSTOLIC BLOOD PRESSURE: 120 MMHG

## 2020-02-18 VITALS — SYSTOLIC BLOOD PRESSURE: 128 MMHG | DIASTOLIC BLOOD PRESSURE: 60 MMHG

## 2020-02-18 VITALS — SYSTOLIC BLOOD PRESSURE: 120 MMHG | DIASTOLIC BLOOD PRESSURE: 50 MMHG

## 2020-02-20 VITALS — SYSTOLIC BLOOD PRESSURE: 120 MMHG | DIASTOLIC BLOOD PRESSURE: 60 MMHG

## 2020-02-20 VITALS — DIASTOLIC BLOOD PRESSURE: 70 MMHG | SYSTOLIC BLOOD PRESSURE: 116 MMHG

## 2020-02-25 VITALS — SYSTOLIC BLOOD PRESSURE: 102 MMHG | DIASTOLIC BLOOD PRESSURE: 63 MMHG

## 2020-02-25 VITALS — SYSTOLIC BLOOD PRESSURE: 150 MMHG | DIASTOLIC BLOOD PRESSURE: 60 MMHG

## 2020-03-10 VITALS — SYSTOLIC BLOOD PRESSURE: 120 MMHG | DIASTOLIC BLOOD PRESSURE: 80 MMHG

## 2020-03-10 VITALS — SYSTOLIC BLOOD PRESSURE: 120 MMHG | DIASTOLIC BLOOD PRESSURE: 60 MMHG

## 2020-03-17 VITALS — SYSTOLIC BLOOD PRESSURE: 110 MMHG | DIASTOLIC BLOOD PRESSURE: 50 MMHG

## 2020-03-17 VITALS — DIASTOLIC BLOOD PRESSURE: 50 MMHG | SYSTOLIC BLOOD PRESSURE: 110 MMHG

## 2020-03-19 VITALS — SYSTOLIC BLOOD PRESSURE: 115 MMHG | DIASTOLIC BLOOD PRESSURE: 60 MMHG

## 2020-03-19 VITALS — SYSTOLIC BLOOD PRESSURE: 100 MMHG | DIASTOLIC BLOOD PRESSURE: 50 MMHG

## 2020-06-07 ENCOUNTER — HOSPITAL ENCOUNTER (EMERGENCY)
Dept: HOSPITAL 75 - ER | Age: 80
Discharge: HOME | End: 2020-06-07
Payer: MEDICARE

## 2020-06-07 VITALS — BODY MASS INDEX: 22.04 KG/M2 | HEIGHT: 64.96 IN | WEIGHT: 132.28 LBS

## 2020-06-07 VITALS — SYSTOLIC BLOOD PRESSURE: 190 MMHG | DIASTOLIC BLOOD PRESSURE: 77 MMHG

## 2020-06-07 DIAGNOSIS — Z79.82: ICD-10-CM

## 2020-06-07 DIAGNOSIS — Z79.01: ICD-10-CM

## 2020-06-07 DIAGNOSIS — I48.91: ICD-10-CM

## 2020-06-07 DIAGNOSIS — R55: ICD-10-CM

## 2020-06-07 DIAGNOSIS — Z87.891: ICD-10-CM

## 2020-06-07 DIAGNOSIS — E87.1: ICD-10-CM

## 2020-06-07 DIAGNOSIS — Z86.73: ICD-10-CM

## 2020-06-07 DIAGNOSIS — I10: ICD-10-CM

## 2020-06-07 DIAGNOSIS — G62.9: Primary | ICD-10-CM

## 2020-06-07 LAB
ALBUMIN SERPL-MCNC: 3.9 GM/DL (ref 3.2–4.5)
ALP SERPL-CCNC: 64 U/L (ref 40–136)
ALT SERPL-CCNC: 8 U/L (ref 0–55)
APTT PPP: YELLOW S
BACTERIA #/AREA URNS HPF: (no result) /HPF
BASOPHILS # BLD AUTO: 0 10^3/UL (ref 0–0.1)
BASOPHILS NFR BLD AUTO: 0 % (ref 0–10)
BILIRUB SERPL-MCNC: 0.6 MG/DL (ref 0.1–1)
BILIRUB UR QL STRIP: NEGATIVE
BUN/CREAT SERPL: 12
CALCIUM SERPL-MCNC: 8.6 MG/DL (ref 8.5–10.1)
CHLORIDE SERPL-SCNC: 92 MMOL/L (ref 98–107)
CO2 SERPL-SCNC: 21 MMOL/L (ref 21–32)
CREAT SERPL-MCNC: 0.66 MG/DL (ref 0.6–1.3)
EOSINOPHIL # BLD AUTO: 0 10^3/UL (ref 0–0.3)
EOSINOPHIL NFR BLD AUTO: 1 % (ref 0–10)
EOSINOPHIL NFR BLD MANUAL: 1 %
ERYTHROCYTE [DISTWIDTH] IN BLOOD BY AUTOMATED COUNT: 12.8 % (ref 10–14.5)
FIBRINOGEN PPP-MCNC: CLEAR MG/DL
GFR SERPLBLD BASED ON 1.73 SQ M-ARVRAT: > 60 ML/MIN
GLUCOSE SERPL-MCNC: 100 MG/DL (ref 70–105)
GLUCOSE UR STRIP-MCNC: NEGATIVE MG/DL
HCT VFR BLD CALC: 33 % (ref 35–52)
HGB BLD-MCNC: 11.8 G/DL (ref 11.5–16)
KETONES UR QL STRIP: NEGATIVE
LEUKOCYTE ESTERASE UR QL STRIP: NEGATIVE
LYMPHOCYTES # BLD AUTO: 0.5 X 10^3 (ref 1–4)
LYMPHOCYTES NFR BLD AUTO: 6 % (ref 12–44)
MANUAL DIFFERENTIAL PERFORMED BLD QL: YES
MCH RBC QN AUTO: 33 PG (ref 25–34)
MCHC RBC AUTO-ENTMCNC: 35 G/DL (ref 32–36)
MCV RBC AUTO: 93 FL (ref 80–99)
MONOCYTES # BLD AUTO: 0.6 X 10^3 (ref 0–1)
MONOCYTES NFR BLD AUTO: 8 % (ref 0–12)
MONOCYTES NFR BLD: 6 %
NEUTROPHILS # BLD AUTO: 6.6 X 10^3 (ref 1.8–7.8)
NEUTROPHILS NFR BLD AUTO: 85 % (ref 42–75)
NEUTS BAND NFR BLD MANUAL: 87 %
NITRITE UR QL STRIP: NEGATIVE
PH UR STRIP: 7.5 [PH] (ref 5–9)
PLATELET # BLD: 234 10^3/UL (ref 130–400)
PMV BLD AUTO: 9.3 FL (ref 7.4–10.4)
POTASSIUM SERPL-SCNC: 4.3 MMOL/L (ref 3.6–5)
PROT SERPL-MCNC: 6.5 GM/DL (ref 6.4–8.2)
PROT UR QL STRIP: NEGATIVE
RBC #/AREA URNS HPF: (no result) /HPF
RBC MORPH BLD: NORMAL
SODIUM SERPL-SCNC: 124 MMOL/L (ref 135–145)
SP GR UR STRIP: 1.01 (ref 1.02–1.02)
SQUAMOUS #/AREA URNS HPF: (no result) /HPF
TSH SERPL DL<=0.05 MIU/L-ACNC: 0.85 UIU/ML (ref 0.35–4.94)
VARIANT LYMPHS NFR BLD MANUAL: 6 %
WBC # BLD AUTO: 7.8 10^3/UL (ref 4.3–11)
WBC #/AREA URNS HPF: (no result) /HPF

## 2020-06-07 PROCEDURE — 93005 ELECTROCARDIOGRAM TRACING: CPT

## 2020-06-07 PROCEDURE — 81000 URINALYSIS NONAUTO W/SCOPE: CPT

## 2020-06-07 PROCEDURE — 36415 COLL VENOUS BLD VENIPUNCTURE: CPT

## 2020-06-07 PROCEDURE — 85007 BL SMEAR W/DIFF WBC COUNT: CPT

## 2020-06-07 PROCEDURE — 84443 ASSAY THYROID STIM HORMONE: CPT

## 2020-06-07 PROCEDURE — 84484 ASSAY OF TROPONIN QUANT: CPT

## 2020-06-07 PROCEDURE — 85027 COMPLETE CBC AUTOMATED: CPT

## 2020-06-07 PROCEDURE — 80053 COMPREHEN METABOLIC PANEL: CPT

## 2020-06-07 NOTE — ED SYNCOPE
General


Chief Complaint:  Dizziness/Syncope


Stated Complaint:  WEAKNESS


Nursing Triage Note:  


THE PT IS ASSISTED TO THE ROOM BY WHEELCHAIR. 











NO DISTRESS SEEN ON ARRIVAL.  











LOC IS NORMAL FOR THE PT. 











THE PT C/O GENERALIZED WEAKNESS.





History of Present Illness


Date Seen by Provider:  Jun 7, 2020


Time Seen by Provider:  14:40


Initial Comments


79-year-old  female presents for neuropathy in bilateral upper and 

lower extremities. She's had this for multiple years, her PCP recently stopped 

her Gabapentin and started her on Lyrica, she had an allergic reaction to the 

Lyrica and had to stop it. She is not taking any medication for the neuropathy 

and it has gotten worse. She feels weak and dizzy at times. She reports eating 

and drinking. She lives alone but her son assists her, as needed. She sees her 

PCP regularly, up to 2 times a week. She denies any falls, she uses a walker for

ambulation


Timing/Prior Episodes:  Recent History


Symptoms Prior to Episode:  None


Precipitating Factors:  None


Loss of Consciousness:  No Loss of Consciousness


Current Symptoms:  No Blurred Vision, No Chest Pain, No Diaphoresis; Dizziness; 

No Headache, No Injury; Lightheadedness; No Loss of Bladder Control, No Loss of 

Bowel Control, No Motionless, No Nausea; Pale; No Shallow/Rapid Breathing, No 

Weak/Absent Pulse; Weakness (generalized)





Allergies and Home Medications


Allergies


Coded Allergies:  


     No Known Drug Allergies (Unverified , 8/27/19)





Home Medications


Acetaminophen 650 Mg Tablet.er, 650 MG PO Q8H PRN for PAIN-MILD, (Reported)


Aspirin 81 Mg Tablet.dr, 81 MG PO DAILY, (Reported)


Cephalexin 500 Mg Capsule, 500 MG PO 3 times a day


   Prescribed by: MALORIE HILLIARD on 9/7/19 1225


Diphenhydramine HCl 25 Mg Capsule, 25 MG PO HS PRN for INSOMNIA, (Reported)


Docusate Sodium 100 Mg Capsule, 100 MG PO DAILY PRN for CONSTIPATION-1ST LINE, 

(Reported)


Doxycycline Hyclate 100 Mg Tablet, 100 MG PO BID


   Prescribed by: ROSLYN WHITMAN on 1/24/20 1709


Gabapentin 800 Mg Tablet, 800 MG PO HS, (Reported)


Losartan Potassium 50 Mg Tablet, 50 MG PO DAILY, (Reported)


Pravastatin Sodium 20 Mg Tablet, 20 MG PO HS


   Prescribed by: NICOLE DUMONT on 8/27/19 1421


Rivaroxaban 20 Mg Tablet, 20 MG PO 1000, (Reported)


Sotalol HCl 80 Mg Tablet, 80 MG PO BID, (Reported)





Patient Home Medication List


Home Medication List Reviewed:  Yes





Review of Systems


Constitutional:  see HPI; No fever; malaise, weakness; No weight gain, No weight

loss


EENTM:  see HPI, no symptoms reported


Respiratory:  no symptoms reported, see HPI; No cough, No dyspnea on exertion, 

No short of breath


Cardiovascular:  no symptoms reported, see HPI; No chest pain, No palpitations


Gastrointestinal:  no symptoms reported, see HPI


Genitourinary:  no symptoms reported, see HPI


Musculoskeletal:  see HPI, muscle weakness (and numbness UEs and LEs)


Skin:  no symptoms reported, see HPI





All Other Systems Reviewed


Negative Unless Noted:  Yes





Past Medical-Social-Family Hx


Past Med/Social Hx:  Reviewed Nursing Past Med/Soc Hx


Patient Social History


Alcohol Beverage of Choice:  Beer


Type Used:  Cigarettes


Former Smoker, Quit:  Feb 1, 1980


2nd Hand Smoke Exposure:  Yes (1980 QUITE)


Recent Foreign Travel:  No


Contact w/Someone Who Travel:  No


Recent Infectious Disease Expo:  No





Immunizations Up To Date


Date of Pneumonia Vaccine:  Sep 18, 2019


Date of Influenza Vaccine:  Sep 12, 2019





Seasonal Allergies


Seasonal Allergies:  No





Past Medical History


Surgeries:  Yes


Eye Surgery, Orthopedic


Respiratory:  Yes


COPD


Currently Using CPAP:  No


Currently Using BIPAP:  No


Cardiac:  Yes


Atrial Fibrillation, Hypertension, Peripheral Vascular, Valvular Heart Disease


Neurological:  Yes


Neuropathy, TIA


Reproductive Disorders:  No


GYN History:  Menopausal


Genitourinary:  No


Gastrointestinal:  Yes (dysphagia)


Gastroesophageal Reflux


Musculoskeletal:  Yes (scleroderma)


Rheumatoid Arthritis


Endocrine:  Yes


Hyperthyroidism


Cataract


Cancer:  No


Psychosocial:  No


Integumentary:  Yes (SCLERODERMA)


Blood Disorders:  No


Adverse Reaction/Blood Tranf:  No





Family Medical History


No Pertinent Family Hx





Physical Exam


Vital Signs





Vital Signs - First Documented








 6/7/20 6/7/20





 14:50 16:40


 


Temp 36.6 


 


Pulse 70 


 


Resp 18 


 


B/P (MAP) 200/75 (116) 


 


Pulse Ox  97





Capillary Refill : Less Than 3 Seconds


Height, Weight, BMI


Height: 5'3.00"


Weight: 112lbs. 0.0oz. [id number]; 22.00 BMI


Method:Stated


General Appearance:  No Apparent Distress, WD/WN


HEENT:  PERRL/EOMI, TMs Normal, Normal ENT Inspection, Pharynx Normal


Neck:  Full Range of Motion, Normal Inspection, Non Tender, Supple


Cardiovascular:  Regular Rate, Rhythm, No Edema, Normal Peripheral Pulses, 

Diastolic Murmur, Systolic Murmur (III/VI)


Respiratory:  Chest Non Tender, Lungs Clear, Normal Breath Sounds, No 

Respiratory Distress


Gastrointestinal:  Normal Bowel Sounds, Non Tender, Soft


Back:  Normal Inspection, No CVA Tenderness, No Vertebral Tenderness


Extremities:  Normal Capillary Refill, Normal Inspection, Normal Range of 

Motion, No Pedal Edema


Neurologic/Psychiatric:  Alert, Oriented x3, No Motor/Sensory Deficits, Normal 

Mood/Affect, CNs II-XII Norm as Tested


Cranial Nerves:  Normal Hearing, Normal Speech, PERRL


Coordination/Gait:  Normal Finger to Nose


Motor/Sensory:  No Motor Deficit, No Sensory Deficit


Skin:  Normal Color, Warm/Dry





Progress/Results/Core Measures


Results/Orders


Lab Results





Laboratory Tests








Test


 6/7/20


15:15 6/7/20


15:30 Range/Units


 


 


White Blood Count


 7.8 


 


 4.3-11.0


10^3/uL


 


Red Blood Count


 3.59 L


 


 4.35-5.85


10^6/uL


 


Hemoglobin 11.8   11.5-16.0  G/DL


 


Hematocrit 33 L  35-52  %


 


Mean Corpuscular Volume 93   80-99  FL


 


Mean Corpuscular Hemoglobin 33   25-34  PG


 


Mean Corpuscular Hemoglobin


Concent 35 


 


 32-36  G/DL





 


Red Cell Distribution Width 12.8   10.0-14.5  %


 


Platelet Count


 234 


 


 130-400


10^3/uL


 


Mean Platelet Volume 9.3   7.4-10.4  FL


 


Neutrophils (%) (Auto) 85 H  42-75  %


 


Lymphocytes (%) (Auto) 6 L  12-44  %


 


Monocytes (%) (Auto) 8   0-12  %


 


Eosinophils (%) (Auto) 1   0-10  %


 


Basophils (%) (Auto) 0   0-10  %


 


Neutrophils # (Auto) 6.6   1.8-7.8  X 10^3


 


Lymphocytes # (Auto) 0.5 L  1.0-4.0  X 10^3


 


Monocytes # (Auto) 0.6   0.0-1.0  X 10^3


 


Eosinophils # (Auto)


 0.0 


 


 0.0-0.3


10^3/uL


 


Basophils # (Auto)


 0.0 


 


 0.0-0.1


10^3/uL


 


Neutrophils % (Manual) 87    %


 


Lymphocytes % (Manual) 6    %


 


Monocytes % (Manual) 6    %


 


Eosinophils % (Manual) 1    %


 


Blood Morphology Comment NORMAL    


 


Sodium Level 124 *L  135-145  MMOL/L


 


Potassium Level 4.3   3.6-5.0  MMOL/L


 


Chloride Level 92 L    MMOL/L


 


Carbon Dioxide Level 21   21-32  MMOL/L


 


Anion Gap 11   5-14  MMOL/L


 


Blood Urea Nitrogen 8   7-18  MG/DL


 


Creatinine


 0.66 


 


 0.60-1.30


MG/DL


 


Estimat Glomerular Filtration


Rate > 60 


 


  





 


BUN/Creatinine Ratio 12    


 


Glucose Level 100     MG/DL


 


Calcium Level 8.6   8.5-10.1  MG/DL


 


Corrected Calcium 8.7   8.5-10.1  MG/DL


 


Total Bilirubin 0.6   0.1-1.0  MG/DL


 


Aspartate Amino Transf


(AST/SGOT) 23 


 


 5-34  U/L





 


Alanine Aminotransferase


(ALT/SGPT) 8 


 


 0-55  U/L





 


Alkaline Phosphatase 64     U/L


 


Troponin I < 0.028   <0.028  NG/ML


 


Total Protein 6.5   6.4-8.2  GM/DL


 


Albumin 3.9   3.2-4.5  GM/DL


 


TSH Cascade Testing


 0.85 


 


 0.35-4.94


UIU/ML


 


Urine Color  YELLOW   


 


Urine Clarity  CLEAR   


 


Urine pH  7.5  5-9  


 


Urine Specific Gravity  1.010 L 1.016-1.022  


 


Urine Protein  NEGATIVE  NEGATIVE  


 


Urine Glucose (UA)  NEGATIVE  NEGATIVE  


 


Urine Ketones  NEGATIVE  NEGATIVE  


 


Urine Nitrite  NEGATIVE  NEGATIVE  


 


Urine Bilirubin  NEGATIVE  NEGATIVE  


 


Urine Urobilinogen  1.0  < = 1.0  MG/DL


 


Urine Leukocyte Esterase  NEGATIVE  NEGATIVE  


 


Urine RBC (Auto)  NEGATIVE  NEGATIVE  


 


Urine RBC  NONE   /HPF


 


Urine WBC  0-2   /HPF


 


Urine Squamous Epithelial


Cells 


 2-5 


  /HPF





 


Urine Crystals  NONE   /LPF


 


Urine Bacteria  TRACE   /HPF


 


Urine Casts  NONE   /LPF


 


Urine Mucus  NEGATIVE   /LPF


 


Urine Culture Indicated  NO   








My Orders





Orders - ALEJANDRA CEBALLOS


Cbc With Automated Diff (6/7/20 14:45)


Comprehensive Metabolic Panel (6/7/20 14:45)


Ua Culture If Indicated (6/7/20 14:45)


Ekg Tracing (6/7/20 14:45)


Thyroid Analyzer (6/7/20 14:47)


Troponin I (6/7/20 14:47)


Ed Iv/Invasive Line Start (6/7/20 15:05)


Ns Iv 500 Ml (Sodium Chloride 0.9%) (6/7/20 15:05)


Manual Differential (6/7/20 15:15)





Medications Given in ED





Current Medications








 Medications  Dose


 Ordered  Sig/Sylvester


 Route  Start Time


 Stop Time Status Last Admin


Dose Admin


 


 Sodium Chloride  500 ml @ 0


 mls/hr  Q0M ONCE


 IV  6/7/20 15:05


 6/7/20 15:06 DC 6/7/20 15:45


500 MLS/HR








Vital Signs/I&O











 6/7/20 6/7/20





 14:50 16:40


 


Temp 36.6 37.7


 


Pulse 70 70


 


Resp 18 18


 


B/P (MAP) 200/75 (116) 190/77


 


Pulse Ox  97














Blood Pressure Mean:                    116











Progress


Progress Note :  


   Time:  14:40


Progress Note


Patient seen and evaluated, will obtain labs, normal saline half a liter per IV,

and EKG.


1520 lab results discussed with the patient. She is not on diuretics to explain 

the low sodium. She does not feel like she is having polyuria. She's not 

increased her water intake.IV fluids infusing. 


1600 Discharge instructions and return precautions discussed.


Initial ECG Impression Date:  Jun 7, 2020


Initial ECG Impression Time:  15:20


Initial ECG Rate:  65


Initial ECG Rhythm:  Normal Sinus


Initial ECG Intervals:  Normal


Initial ECG Intervals


; QRSD 91; ; QTc 474


Axis P 76; QRS 15; T 61


Initial ECG Impression:  Normal


Initial ECG Comparisson:  Unchanged





Departure


Impression





   Primary Impression:  


   Peripheral neuropathy


   Qualified Codes:  G62.9 - Polyneuropathy, unspecified


   Additional Impressions:  


   Syncope


   Qualified Codes:  R55 - Syncope and collapse


   Hyponatremia


Disposition:  01 HOME, SELF-CARE


Condition:  Improved





Departure-Patient Inst.


Decision time for Depature:  16:00


Referrals:  


SALVADOR CUEVA MD (PCP/Family)


Primary Care Physician


Patient Instructions:  Hyponatremia (DC)





Add. Discharge Instructions:  


Use small amounts of salt on your foods. 


Limit your water intake, 4-6 oz every 2 hours, while awake. 


Follow up with Dr. Cueva in 4-5 days, to have your labs checked and discuss 

medications for your neuropathy. 


Return to the Emergency Dept for new, urgent health care needs. 





All discharge instructions reviewed with patient and/or family. Voiced 

understanding.





Copy


Copies To 1:   SALVADOR CUEVA MD, AMY ARNP                   Jun 7, 2020 15:02

## 2020-06-08 ENCOUNTER — HOSPITAL ENCOUNTER (EMERGENCY)
Dept: HOSPITAL 75 - ER | Age: 80
Discharge: HOME | End: 2020-06-08
Payer: MEDICARE

## 2020-06-08 VITALS — HEIGHT: 62.99 IN | BODY MASS INDEX: 23.44 KG/M2 | WEIGHT: 132.28 LBS

## 2020-06-08 VITALS — SYSTOLIC BLOOD PRESSURE: 145 MMHG | DIASTOLIC BLOOD PRESSURE: 59 MMHG

## 2020-06-08 DIAGNOSIS — Z79.01: ICD-10-CM

## 2020-06-08 DIAGNOSIS — I73.9: ICD-10-CM

## 2020-06-08 DIAGNOSIS — K21.9: ICD-10-CM

## 2020-06-08 DIAGNOSIS — Z87.891: ICD-10-CM

## 2020-06-08 DIAGNOSIS — I48.91: ICD-10-CM

## 2020-06-08 DIAGNOSIS — Z88.8: ICD-10-CM

## 2020-06-08 DIAGNOSIS — Z86.73: ICD-10-CM

## 2020-06-08 DIAGNOSIS — Z79.82: ICD-10-CM

## 2020-06-08 DIAGNOSIS — I10: ICD-10-CM

## 2020-06-08 DIAGNOSIS — R10.13: ICD-10-CM

## 2020-06-08 DIAGNOSIS — G62.9: Primary | ICD-10-CM

## 2020-06-08 LAB
ALBUMIN SERPL-MCNC: 4.1 GM/DL (ref 3.2–4.5)
ALP SERPL-CCNC: 64 U/L (ref 40–136)
ALT SERPL-CCNC: 9 U/L (ref 0–55)
APAP SERPL-MCNC: 19 UG/ML (ref 10–30)
APTT PPP: YELLOW S
BACTERIA #/AREA URNS HPF: (no result) /HPF
BASOPHILS # BLD AUTO: 0 10^3/UL (ref 0–0.1)
BASOPHILS NFR BLD AUTO: 0 % (ref 0–10)
BASOPHILS NFR BLD MANUAL: 0 %
BILIRUB SERPL-MCNC: 0.5 MG/DL (ref 0.1–1)
BILIRUB UR QL STRIP: NEGATIVE
BUN/CREAT SERPL: 13
CALCIUM SERPL-MCNC: 8.9 MG/DL (ref 8.5–10.1)
CHLORIDE SERPL-SCNC: 95 MMOL/L (ref 98–107)
CO2 SERPL-SCNC: 23 MMOL/L (ref 21–32)
CREAT SERPL-MCNC: 0.67 MG/DL (ref 0.6–1.3)
EOSINOPHIL # BLD AUTO: 0 10^3/UL (ref 0–0.3)
EOSINOPHIL NFR BLD AUTO: 0 % (ref 0–10)
EOSINOPHIL NFR BLD MANUAL: 3 %
ERYTHROCYTE [DISTWIDTH] IN BLOOD BY AUTOMATED COUNT: 13 % (ref 10–14.5)
FIBRINOGEN PPP-MCNC: CLEAR MG/DL
GFR SERPLBLD BASED ON 1.73 SQ M-ARVRAT: > 60 ML/MIN
GLUCOSE SERPL-MCNC: 116 MG/DL (ref 70–105)
GLUCOSE UR STRIP-MCNC: NEGATIVE MG/DL
HCT VFR BLD CALC: 37 % (ref 35–52)
HGB BLD-MCNC: 12.6 G/DL (ref 11.5–16)
KETONES UR QL STRIP: NEGATIVE
LEUKOCYTE ESTERASE UR QL STRIP: NEGATIVE
LYMPHOCYTES # BLD AUTO: 0.5 X 10^3 (ref 1–4)
LYMPHOCYTES NFR BLD AUTO: 6 % (ref 12–44)
MANUAL DIFFERENTIAL PERFORMED BLD QL: YES
MCH RBC QN AUTO: 32 PG (ref 25–34)
MCHC RBC AUTO-ENTMCNC: 35 G/DL (ref 32–36)
MCV RBC AUTO: 93 FL (ref 80–99)
MONOCYTES # BLD AUTO: 0.6 X 10^3 (ref 0–1)
MONOCYTES NFR BLD AUTO: 8 % (ref 0–12)
MONOCYTES NFR BLD: 6 %
NEUTROPHILS # BLD AUTO: 6.8 X 10^3 (ref 1.8–7.8)
NEUTROPHILS NFR BLD AUTO: 85 % (ref 42–75)
NEUTS BAND NFR BLD MANUAL: 85 %
NITRITE UR QL STRIP: NEGATIVE
PH UR STRIP: 6.5 [PH] (ref 5–9)
PLATELET # BLD: 275 10^3/UL (ref 130–400)
PMV BLD AUTO: 9.2 FL (ref 7.4–10.4)
POTASSIUM SERPL-SCNC: 4.3 MMOL/L (ref 3.6–5)
PROT SERPL-MCNC: 6.9 GM/DL (ref 6.4–8.2)
PROT UR QL STRIP: NEGATIVE
RBC #/AREA URNS HPF: (no result) /HPF
RBC MORPH BLD: NORMAL
SALICYLATES SERPL-MCNC: < 5 MG/DL (ref 5–20)
SODIUM SERPL-SCNC: 126 MMOL/L (ref 135–145)
SP GR UR STRIP: 1.01 (ref 1.02–1.02)
SQUAMOUS #/AREA URNS HPF: (no result) /HPF
VARIANT LYMPHS NFR BLD MANUAL: 6 %
WBC # BLD AUTO: 8 10^3/UL (ref 4.3–11)
WBC #/AREA URNS HPF: (no result) /HPF

## 2020-06-08 PROCEDURE — 81000 URINALYSIS NONAUTO W/SCOPE: CPT

## 2020-06-08 PROCEDURE — 36415 COLL VENOUS BLD VENIPUNCTURE: CPT

## 2020-06-08 PROCEDURE — 80329 ANALGESICS NON-OPIOID 1 OR 2: CPT

## 2020-06-08 PROCEDURE — 86141 C-REACTIVE PROTEIN HS: CPT

## 2020-06-08 PROCEDURE — 80053 COMPREHEN METABOLIC PANEL: CPT

## 2020-06-08 PROCEDURE — 85007 BL SMEAR W/DIFF WBC COUNT: CPT

## 2020-06-08 PROCEDURE — 85027 COMPLETE CBC AUTOMATED: CPT

## 2020-06-08 NOTE — ED GENERAL
General


Chief Complaint:  General Problems/Pain


Stated Complaint:  WEAKNESS


Nursing Triage Note:  


PT TO RM 6 BY WHEELCHAIR WITH COMPLAINT OF WEAKNESS, GAS, AND DIFFICULTY EATING.




STATES HAS BEEN GOING ON FOR MONTHS. WAS SEEN HERE YESTERDAY.


Nursing Sepsis Screen:  No Definite Risk


Source of Information:  Patient


Exam Limitations:  No Limitations





History of Present Illness


Date Seen by Provider:  Jun 8, 2020


Time Seen by Provider:  07:49


Initial Comments


Here with report of weakness and numbness and tingling of hands and legs. Also 

had difficulty with eating and states that she can't really eat well because her

stomach hurts. Reports acid in her stomach. Seen yesterday for the same and 

workup revealed hyponatremia. Patient was given IV fluid and did a little better

at discharge. Patient states that she got worse overnight especially since 

midnight. She states that her weakness and the numbness of her hands and legs 

was so bad that she started taking Tylenol every 2 hours starting at midnight. 

She states that she took 500 mg each time. She does not know the total dose. 

Previously on gabapentin and then changed to Lyrica. She had adverse reaction to

Lyrica and stop that. She is unsure what that reaction was. Follows with Dr. Cueva. Has not talked to her about this recently. Denies fever or chills.


Timing/Duration:  1 Week, Getting Worse


Severity:  Moderate


Modifying Factors:  improves with Other (no relieving factors)


Associated Systoms:  No Chest Pain, No Cough, No Fever/Chills, No Shortness of 

Air; Weakness





Allergies and Home Medications


Allergies


Coded Allergies:  


     pregabalin (Verified  Allergy, Unknown, 6/8/20)





Home Medications


Acetaminophen 650 Mg Tablet.er, 650 MG PO Q8H PRN for PAIN-MILD, (Reported)


Aspirin 81 Mg Tablet.dr, 81 MG PO DAILY, (Reported)


Cephalexin 500 Mg Capsule, 500 MG PO 3 times a day


   Prescribed by: MALORIE HILLIARD on 9/7/19 1225


Diphenhydramine HCl 25 Mg Capsule, 25 MG PO HS PRN for INSOMNIA, (Reported)


Docusate Sodium 100 Mg Capsule, 100 MG PO DAILY PRN for CONSTIPATION-1ST LINE, 

(Reported)


Doxycycline Hyclate 100 Mg Tablet, 100 MG PO BID


   Prescribed by: ROSLYN WHITMAN on 1/24/20 1709


Gabapentin 800 Mg Tablet, 800 MG PO HS, (Reported)


Losartan Potassium 50 Mg Tablet, 50 MG PO DAILY, (Reported)


Pravastatin Sodium 20 Mg Tablet, 20 MG PO HS


   Prescribed by: NICOLE DUMONT on 8/27/19 1421


Rivaroxaban 20 Mg Tablet, 20 MG PO 1000, (Reported)


Sotalol HCl 80 Mg Tablet, 80 MG PO BID, (Reported)





Patient Home Medication List


Home Medication List Reviewed:  Yes





Review of Systems


Review of Systems


Constitutional:  see HPI; No chills, No fever; malaise, weakness


EENTM:  No nose congestion, No throat pain


Respiratory:  No cough, No short of breath


Cardiovascular:  No chest pain, No edema


Gastrointestinal:  abdominal pain; No diarrhea; heartburn, nausea; No vomiting


Genitourinary:  No dysuria, No pain


Musculoskeletal:  No back pain; muscle weakness; No neck pain


Skin:  no symptoms reported


Psychiatric/Neurological:  Anxiety, Depressed, Weakness





All Other Systems Reviewed


Negative Unless Noted:  Yes





Past Medical-Social-Family Hx


Past Med/Social Hx:  Reviewed Nursing Past Med/Soc Hx


Patient Social History


Alcohol Use:  Denies Use


Number of Drinks Today:  AA


Alcohol Beverage of Choice:  Beer


Recreational Drug Use:  No


Smoking Status:  Former Smoker


Type Used:  Cigarettes


Former Smoker, Quit:  Feb 1, 1980


2nd Hand Smoke Exposure:  Yes (1980 QUITE)


Recent Foreign Travel:  No


Contact w/Someone Who Travel:  No


Recent Infectious Disease Expo:  No





Immunizations Up To Date


Tetanus Booster (TDap):  Unknown


Date of Pneumonia Vaccine:  Sep 18, 2019


Date of Influenza Vaccine:  Sep 12, 2019





Seasonal Allergies


Seasonal Allergies:  No





Past Medical History


Surgeries:  Yes


Eye Surgery, Orthopedic


Respiratory:  Yes


COPD


Currently Using CPAP:  No


Currently Using BIPAP:  No


Cardiac:  Yes


Atrial Fibrillation, Hypertension, Peripheral Vascular, Valvular Heart Disease


Neurological:  Yes


Neuropathy, TIA


Reproductive Disorders:  No


GYN History:  Menopausal


Genitourinary:  No


Gastrointestinal:  Yes (dysphagia)


Gastroesophageal Reflux


Musculoskeletal:  Yes (scleroderma)


Rheumatoid Arthritis


Endocrine:  Yes


Hyperthyroidism


Cataract


Cancer:  No


Psychosocial:  No


Integumentary:  Yes (SCLERODERMA)


Blood Disorders:  No


Adverse Reaction/Blood Tranf:  No





Family Medical History


Reviewed Nursing Family Hx


No Pertinent Family Hx





Physical Exam


Vital Signs





Vital Signs - First Documented








 6/8/20





 07:35


 


Temp 36.6


 


Pulse 60


 


Resp 16


 


B/P (MAP) 173/62 (99)


 


Pulse Ox 97


 


O2 Delivery Room Air





Capillary Refill : Less Than 3 Seconds


Height, Weight, BMI


Height: 5'3.00"


Weight: 112lbs. 0.0oz. 50.208024om; 23.00 BMI


Method:Stated


General Appearance:  Anxious, Mild Distress, Thin


HEENT:  PERRL/EOMI, Pharynx Normal


Neck:  Non Tender, Supple


Respiratory:  Lungs Clear, Normal Breath Sounds


Cardiovascular:  No Edema, Systolic Murmur (4/6)


Gastrointestinal:  Non Tender, Soft


Back:  Normal Inspection, No CVA Tenderness, No Vertebral Tenderness


Extremity:  Normal Range of Motion, Non Tender


Neurologic/Psychiatric:  Alert, Oriented x3


Skin:  Normal Color, Warm/Dry





Progress/Results/Core Measures


Suspected Sepsis


Recent Fever Within 48 Hours:  No


Infection Criteria Present:  None


New/Unexplained  Altered Menta:  No


Sepsis Screen:  No Definite Risk


SIRS


Temperature: 


Pulse: 60 


Respiratory Rate: 16


 


Laboratory Tests


6/8/20 07:48: White Blood Count 8.0


Blood Pressure 173 /62 


Mean: 99


 


Laboratory Tests


6/8/20 07:48: 


Creatinine 0.67, Platelet Count 275, Total Bilirubin 0.5








Results/Orders


Lab Results





Laboratory Tests








Test


 6/8/20


07:48 6/8/20


09:39 Range/Units


 


 


White Blood Count


 8.0 


 


 4.3-11.0


10^3/uL


 


Red Blood Count


 3.94 L


 


 4.35-5.85


10^6/uL


 


Hemoglobin 12.6   11.5-16.0  G/DL


 


Hematocrit 37   35-52  %


 


Mean Corpuscular Volume 93   80-99  FL


 


Mean Corpuscular Hemoglobin 32   25-34  PG


 


Mean Corpuscular Hemoglobin


Concent 35 


 


 32-36  G/DL





 


Red Cell Distribution Width 13.0   10.0-14.5  %


 


Platelet Count


 275 


 


 130-400


10^3/uL


 


Mean Platelet Volume 9.2   7.4-10.4  FL


 


Neutrophils (%) (Auto) 85 H  42-75  %


 


Lymphocytes (%) (Auto) 6 L  12-44  %


 


Monocytes (%) (Auto) 8   0-12  %


 


Eosinophils (%) (Auto) 0   0-10  %


 


Basophils (%) (Auto) 0   0-10  %


 


Neutrophils # (Auto) 6.8   1.8-7.8  X 10^3


 


Lymphocytes # (Auto) 0.5 L  1.0-4.0  X 10^3


 


Monocytes # (Auto) 0.6   0.0-1.0  X 10^3


 


Eosinophils # (Auto)


 0.0 


 


 0.0-0.3


10^3/uL


 


Basophils # (Auto)


 0.0 


 


 0.0-0.1


10^3/uL


 


Neutrophils % (Manual) 85    %


 


Lymphocytes % (Manual) 6    %


 


Monocytes % (Manual) 6    %


 


Eosinophils % (Manual) 3    %


 


Basophils % (Manual) 0    %


 


Blood Morphology Comment NORMAL    


 


Sodium Level 126 L  135-145  MMOL/L


 


Potassium Level 4.3   3.6-5.0  MMOL/L


 


Chloride Level 95 L    MMOL/L


 


Carbon Dioxide Level 23   21-32  MMOL/L


 


Anion Gap 8   5-14  MMOL/L


 


Blood Urea Nitrogen 9   7-18  MG/DL


 


Creatinine


 0.67 


 


 0.60-1.30


MG/DL


 


Estimat Glomerular Filtration


Rate > 60 


 


  





 


BUN/Creatinine Ratio 13    


 


Glucose Level 116 H    MG/DL


 


Calcium Level 8.9   8.5-10.1  MG/DL


 


Corrected Calcium 8.8   8.5-10.1  MG/DL


 


Total Bilirubin 0.5   0.1-1.0  MG/DL


 


Aspartate Amino Transf


(AST/SGOT) 21 


 


 5-34  U/L





 


Alanine Aminotransferase


(ALT/SGPT) 9 


 


 0-55  U/L





 


Alkaline Phosphatase 64     U/L


 


C-Reactive Protein High


Sensitivity 0.16 


 


 0.00-0.50


MG/DL


 


Total Protein 6.9   6.4-8.2  GM/DL


 


Albumin 4.1   3.2-4.5  GM/DL


 


Salicylates Level < 5.0 L  5.0-20.0  MG/DL


 


Acetaminophen Level 19   10-30  UG/ML


 


Urine Color  YELLOW   


 


Urine Clarity  CLEAR   


 


Urine pH  6.5  5-9  


 


Urine Specific Gravity  1.010 L 1.016-1.022  


 


Urine Protein  NEGATIVE  NEGATIVE  


 


Urine Glucose (UA)  NEGATIVE  NEGATIVE  


 


Urine Ketones  NEGATIVE  NEGATIVE  


 


Urine Nitrite  NEGATIVE  NEGATIVE  


 


Urine Bilirubin  NEGATIVE  NEGATIVE  


 


Urine Urobilinogen  0.2  < = 1.0  MG/DL


 


Urine Leukocyte Esterase  NEGATIVE  NEGATIVE  


 


Urine RBC (Auto)  TRACE-I  NEGATIVE  


 


Urine RBC  0-2   /HPF


 


Urine WBC  NONE   /HPF


 


Urine Squamous Epithelial


Cells 


 2-5 


  /HPF





 


Urine Crystals  NONE   /LPF


 


Urine Bacteria  TRACE   /HPF


 


Urine Casts  NONE   /LPF


 


Urine Mucus  NEGATIVE   /LPF


 


Urine Culture Indicated  NO   








My Orders





Orders - GARRET BROUSSARD MD


Cbc With Automated Diff (6/8/20 07:55)


Comprehensive Metabolic Panel (6/8/20 07:55)


Hs C Reactive Protein (6/8/20 07:55)


Ua Culture If Indicated (6/8/20 07:55)


Ed Iv/Invasive Line Start (6/8/20 07:55)


Ns Iv 500 Ml (Sodium Chloride 0.9%) (6/8/20 07:55)


Famotidine Injection (Pepcid Injection) (6/8/20 07:55)


Pantoprazole Injection (Protonix Injecti (6/8/20 08:00)


Ondansetron Injection (Zofran Injectio (6/8/20 08:15)


Manual Differential (6/8/20 07:48)


Acetaminophen (6/8/20 08:13)


Salicylate (6/8/20 08:13)


Ketorolac Injection (Toradol Injection) (6/8/20 11:04)


Tramadol Tablet (Ultram Tablet) (6/8/20 11:15)





Medications Given in ED





Current Medications








 Medications  Dose


 Ordered  Sig/Sylvester


 Route  Start Time


 Stop Time Status Last Admin


Dose Admin


 


 Ondansetron HCl  4 mg  ONCE  ONCE


 IVP  6/8/20 08:15


 6/8/20 08:16 DC 6/8/20 08:08


4 MG


 


 Pantoprazole  40 mg  ONCE  ONCE


 IV  6/8/20 08:00


 6/8/20 08:01 DC 6/8/20 08:06


40 MG


 


 Sodium Chloride  500 ml @ 0


 mls/hr  Q0M ONCE


 IV  6/8/20 07:55


 6/8/20 07:57 DC 6/8/20 08:05


0 MLS/HR








Vital Signs/I&O











 6/8/20





 07:35


 


Temp 36.6


 


Pulse 60


 


Resp 16


 


B/P (MAP) 173/62 (99)


 


Pulse Ox 97


 


O2 Delivery Room Air





Capillary Refill : Less Than 3 Seconds








Blood Pressure Mean:                    99








Progress Note :  


Progress Note


Seen and evaluated. IV, labs, UA, and will saline 500 mL bolus, Zofran 4 mg IV, 

Pepcid 20 mg IV and Protonix 40 mg IV ordered. Monitor patient. We will check 

Tylenol and aspirin levels given her report of taking Tylenol every 2 hours. 

1116: Toradol 15 mg IV and tramadol 50 mg by mouth given. 1210: Patient states 

that she is actually doing much better. She was able to stand with assistance 

and I did walk her several steps and she states that she is actually feeling 

better. I did talk with the patient's son. She has had problems with eating and 

stomach upset recently. I do believe the Protonix and Pepcid that was given 

earlier have helped. We discussed options for her including over-the-counter 

omeprazole which she will initiate for the next 2 weeks. They are going to try 

to find a new doctor here in town and he has 3 names that they are working on. I

will write for prescription for tramadol as well as the recommendation for 

omeprazole and Tylenol as needed for pain. He was in agreement and appreciative 

of the evaluation and plan. Patient also and agreement with plan. Discharged 

home with return precautions. Patient verbalize understanding of instructions 

and agreement with plan.





Departure


Impression





   Primary Impression:  


   Peripheral neuropathy


   Qualified Codes:  G62.9 - Polyneuropathy, unspecified


   Additional Impression:  


   Epigastric pain


Disposition:  01 HOME, SELF-CARE


Condition:  Improved





Departure-Patient Inst.


Decision time for Depature:  12:19


Referrals:  


CARMELO LANGLEY MD, HOLLY A MD STEVENS, RACHEL L MD (PCP/Family)


Primary Care Physician








JADA TOLBERT MD


Patient Instructions:  Acid Reflux and Gastroesophageal Reflux Disease in 

Adults, Peripheral Neuropathy





Add. Discharge Instructions:  








All discharge instructions reviewed with patient and/or family. Voiced 

understanding.





You may take Tylenol/acetaminophen 500 mg every 6 hours as needed for pain. You 

may take the prescribed pain medicine every 6 hours as needed for pain but only 

take that if you are in more significant pain. You should continue with 

instructions related to the low sodium as discussed yesterday and drinking 1 

Gatorade a day will likely help those symptoms quite a bit. You may take over-

the-counter omeprazole 20 mg 1 tablet daily for the next 2 weeks and then 

reevaluate further need based on symptoms after that. Discussed this with your 

doctor. Return for worse pain, fever, vomiting, weakness, breathing problems or 

other concerns as needed.


Scripts


Tramadol HCl (Tramadol HCl) 50 Mg Tablet


50 MG PO Q6H PRN for PAIN-MODERATE (5-7) for 7 Days, #20 TAB 0 Refills


   Prov: GARRET BROUSSARD MD         6/8/20











GARRET BROUSSARD MD           Jun 8, 2020 08:19

## 2020-06-10 ENCOUNTER — HOSPITAL ENCOUNTER (INPATIENT)
Dept: HOSPITAL 75 - ER | Age: 80
LOS: 2 days | Discharge: HOME HEALTH | DRG: 641 | End: 2020-06-12
Attending: INTERNAL MEDICINE | Admitting: INTERNAL MEDICINE
Payer: MEDICARE

## 2020-06-10 VITALS — SYSTOLIC BLOOD PRESSURE: 200 MMHG | DIASTOLIC BLOOD PRESSURE: 87 MMHG

## 2020-06-10 VITALS — DIASTOLIC BLOOD PRESSURE: 66 MMHG | SYSTOLIC BLOOD PRESSURE: 122 MMHG

## 2020-06-10 VITALS — BODY MASS INDEX: 22.22 KG/M2 | HEIGHT: 64.96 IN | WEIGHT: 133.38 LBS

## 2020-06-10 VITALS — DIASTOLIC BLOOD PRESSURE: 70 MMHG | SYSTOLIC BLOOD PRESSURE: 172 MMHG

## 2020-06-10 DIAGNOSIS — G62.9: ICD-10-CM

## 2020-06-10 DIAGNOSIS — E03.9: ICD-10-CM

## 2020-06-10 DIAGNOSIS — M34.9: ICD-10-CM

## 2020-06-10 DIAGNOSIS — M06.9: ICD-10-CM

## 2020-06-10 DIAGNOSIS — E86.1: ICD-10-CM

## 2020-06-10 DIAGNOSIS — R13.10: ICD-10-CM

## 2020-06-10 DIAGNOSIS — J44.1: ICD-10-CM

## 2020-06-10 DIAGNOSIS — Z79.01: ICD-10-CM

## 2020-06-10 DIAGNOSIS — K59.00: ICD-10-CM

## 2020-06-10 DIAGNOSIS — I10: ICD-10-CM

## 2020-06-10 DIAGNOSIS — I73.9: ICD-10-CM

## 2020-06-10 DIAGNOSIS — E78.5: ICD-10-CM

## 2020-06-10 DIAGNOSIS — E87.1: Primary | ICD-10-CM

## 2020-06-10 DIAGNOSIS — K21.9: ICD-10-CM

## 2020-06-10 DIAGNOSIS — Z87.891: ICD-10-CM

## 2020-06-10 DIAGNOSIS — I48.0: ICD-10-CM

## 2020-06-10 DIAGNOSIS — J90: ICD-10-CM

## 2020-06-10 DIAGNOSIS — Z86.73: ICD-10-CM

## 2020-06-10 DIAGNOSIS — R09.02: ICD-10-CM

## 2020-06-10 DIAGNOSIS — I35.0: ICD-10-CM

## 2020-06-10 DIAGNOSIS — I25.10: ICD-10-CM

## 2020-06-10 LAB
ALBUMIN SERPL-MCNC: 4.1 GM/DL (ref 3.2–4.5)
ALP SERPL-CCNC: 69 U/L (ref 40–136)
ALT SERPL-CCNC: 12 U/L (ref 0–55)
AMYLASE SERPL-CCNC: 38 U/L (ref 25–125)
APTT BLD: 49 SEC (ref 24–35)
APTT PPP: YELLOW S
BACTERIA #/AREA URNS HPF: NEGATIVE /HPF
BASOPHILS # BLD AUTO: 0 10^3/UL (ref 0–0.1)
BASOPHILS NFR BLD AUTO: 0 % (ref 0–10)
BILIRUB SERPL-MCNC: 0.7 MG/DL (ref 0.1–1)
BILIRUB UR QL STRIP: NEGATIVE
BUN/CREAT SERPL: 15
CALCIUM SERPL-MCNC: 8.6 MG/DL (ref 8.5–10.1)
CHLORIDE SERPL-SCNC: 87 MMOL/L (ref 98–107)
CO2 SERPL-SCNC: 22 MMOL/L (ref 21–32)
CREAT SERPL-MCNC: 0.61 MG/DL (ref 0.6–1.3)
EOSINOPHIL # BLD AUTO: 0 10^3/UL (ref 0–0.3)
EOSINOPHIL NFR BLD AUTO: 0 % (ref 0–10)
ERYTHROCYTE [DISTWIDTH] IN BLOOD BY AUTOMATED COUNT: 12.7 % (ref 10–14.5)
FIBRINOGEN PPP-MCNC: CLEAR MG/DL
GFR SERPLBLD BASED ON 1.73 SQ M-ARVRAT: > 60 ML/MIN
GLUCOSE SERPL-MCNC: 105 MG/DL (ref 70–105)
GLUCOSE UR STRIP-MCNC: NEGATIVE MG/DL
GRAN CASTS #/AREA URNS LPF: (no result) /LPF
HCT VFR BLD CALC: 34 % (ref 35–52)
HGB BLD-MCNC: 12.2 G/DL (ref 11.5–16)
INR PPP: 3.2 (ref 0.8–1.4)
KETONES UR QL STRIP: (no result)
LEUKOCYTE ESTERASE UR QL STRIP: NEGATIVE
LIPASE SERPL-CCNC: 7 U/L (ref 8–78)
LYMPHOCYTES # BLD AUTO: 0.4 X 10^3 (ref 1–4)
LYMPHOCYTES NFR BLD AUTO: 3 % (ref 12–44)
MAGNESIUM SERPL-MCNC: 1.6 MG/DL (ref 1.6–2.4)
MANUAL DIFFERENTIAL PERFORMED BLD QL: NO
MCH RBC QN AUTO: 32 PG (ref 25–34)
MCHC RBC AUTO-ENTMCNC: 36 G/DL (ref 32–36)
MCV RBC AUTO: 91 FL (ref 80–99)
MONOCYTES # BLD AUTO: 0.8 X 10^3 (ref 0–1)
MONOCYTES NFR BLD AUTO: 8 % (ref 0–12)
NEUTROPHILS # BLD AUTO: 9.5 X 10^3 (ref 1.8–7.8)
NEUTROPHILS NFR BLD AUTO: 89 % (ref 42–75)
NITRITE UR QL STRIP: NEGATIVE
PH UR STRIP: 7 [PH] (ref 5–9)
PLATELET # BLD: 265 10^3/UL (ref 130–400)
PMV BLD AUTO: 9.2 FL (ref 7.4–10.4)
POTASSIUM SERPL-SCNC: 3.9 MMOL/L (ref 3.6–5)
PROT SERPL-MCNC: 6.8 GM/DL (ref 6.4–8.2)
PROT UR QL STRIP: NEGATIVE
PROTHROMBIN TIME: 34.3 SEC (ref 12.2–14.7)
RBC #/AREA URNS HPF: (no result) /HPF
SODIUM SERPL-SCNC: 121 MMOL/L (ref 135–145)
SP GR UR STRIP: 1.01 (ref 1.02–1.02)
SQUAMOUS #/AREA URNS HPF: (no result) /HPF
WBC # BLD AUTO: 10.7 10^3/UL (ref 4.3–11)
WBC #/AREA URNS HPF: (no result) /HPF

## 2020-06-10 PROCEDURE — 71260 CT THORAX DX C+: CPT

## 2020-06-10 PROCEDURE — 80048 BASIC METABOLIC PNL TOTAL CA: CPT

## 2020-06-10 PROCEDURE — 85730 THROMBOPLASTIN TIME PARTIAL: CPT

## 2020-06-10 PROCEDURE — 80053 COMPREHEN METABOLIC PANEL: CPT

## 2020-06-10 PROCEDURE — 83605 ASSAY OF LACTIC ACID: CPT

## 2020-06-10 PROCEDURE — 85027 COMPLETE CBC AUTOMATED: CPT

## 2020-06-10 PROCEDURE — 80329 ANALGESICS NON-OPIOID 1 OR 2: CPT

## 2020-06-10 PROCEDURE — 84295 ASSAY OF SERUM SODIUM: CPT

## 2020-06-10 PROCEDURE — 96375 TX/PRO/DX INJ NEW DRUG ADDON: CPT

## 2020-06-10 PROCEDURE — 87635 SARS-COV-2 COVID-19 AMP PRB: CPT

## 2020-06-10 PROCEDURE — 86141 C-REACTIVE PROTEIN HS: CPT

## 2020-06-10 PROCEDURE — 82570 ASSAY OF URINE CREATININE: CPT

## 2020-06-10 PROCEDURE — 83935 ASSAY OF URINE OSMOLALITY: CPT

## 2020-06-10 PROCEDURE — 36415 COLL VENOUS BLD VENIPUNCTURE: CPT

## 2020-06-10 PROCEDURE — 81000 URINALYSIS NONAUTO W/SCOPE: CPT

## 2020-06-10 PROCEDURE — 93005 ELECTROCARDIOGRAM TRACING: CPT

## 2020-06-10 PROCEDURE — 85007 BL SMEAR W/DIFF WBC COUNT: CPT

## 2020-06-10 PROCEDURE — 83690 ASSAY OF LIPASE: CPT

## 2020-06-10 PROCEDURE — 83735 ASSAY OF MAGNESIUM: CPT

## 2020-06-10 PROCEDURE — 93306 TTE W/DOPPLER COMPLETE: CPT

## 2020-06-10 PROCEDURE — 85610 PROTHROMBIN TIME: CPT

## 2020-06-10 PROCEDURE — 82150 ASSAY OF AMYLASE: CPT

## 2020-06-10 PROCEDURE — 84300 ASSAY OF URINE SODIUM: CPT

## 2020-06-10 PROCEDURE — 96374 THER/PROPH/DIAG INJ IV PUSH: CPT

## 2020-06-10 PROCEDURE — 85025 COMPLETE CBC W/AUTO DIFF WBC: CPT

## 2020-06-10 PROCEDURE — 83880 ASSAY OF NATRIURETIC PEPTIDE: CPT

## 2020-06-10 PROCEDURE — 82962 GLUCOSE BLOOD TEST: CPT

## 2020-06-10 PROCEDURE — 74177 CT ABD & PELVIS W/CONTRAST: CPT

## 2020-06-10 PROCEDURE — 87804 INFLUENZA ASSAY W/OPTIC: CPT

## 2020-06-10 PROCEDURE — 96361 HYDRATE IV INFUSION ADD-ON: CPT

## 2020-06-10 PROCEDURE — 84443 ASSAY THYROID STIM HORMONE: CPT

## 2020-06-10 PROCEDURE — 93041 RHYTHM ECG TRACING: CPT

## 2020-06-10 PROCEDURE — 87040 BLOOD CULTURE FOR BACTERIA: CPT

## 2020-06-10 RX ADMIN — AMIODARONE HYDROCHLORIDE SCH MG: 200 TABLET ORAL at 20:27

## 2020-06-10 RX ADMIN — DOCUSATE SODIUM SCH MG: 100 CAPSULE ORAL at 20:27

## 2020-06-10 RX ADMIN — SENNOSIDES SCH MG: 8.6 TABLET, FILM COATED ORAL at 20:27

## 2020-06-10 RX ADMIN — SODIUM CHLORIDE SCH MLS/HR: 900 INJECTION, SOLUTION INTRAVENOUS at 20:28

## 2020-06-10 RX ADMIN — SODIUM CHLORIDE SCH MLS/HR: 900 INJECTION, SOLUTION INTRAVENOUS at 13:24

## 2020-06-10 RX ADMIN — SODIUM CHLORIDE SCH MLS/HR: 900 INJECTION, SOLUTION INTRAVENOUS at 19:44

## 2020-06-10 NOTE — NUR
LAB NOTIFIED THIS RN OF CRITICAL SODIUM  AT THIS TIME. DR TAVRAEZ NOTIFIED AT 1634. NO 
FURTHER ORDERS AT THIS TIME.

## 2020-06-10 NOTE — DIAGNOSTIC IMAGING REPORT
PROCEDURE: 

CT chest, abdomen, and pelvis with contrast.



TECHNIQUE: 

Multiple contiguous axial images were obtained through the chest,

abdomen, and pelvis after the administration of intravenous

contrast. Auto Exposure Controls were utilized during the CT exam

to meet ALARA standards for radiation dose reduction. 



INDICATION:  

Weakness, urinary tract infection.



COMPARISON: 

01/06/2020.



FINDINGS: 

No significant adenopathy within the chest. Moderate background

vascular calcifications. No aneurysmal dilatation of the thoracic

aorta. The heart is mildly enlarged. No significant pericardial

effusion. Moderate dependently layering pleural effusions are

noted bilaterally with adjacent atelectasis. Mild background

emphysematous changes. No pneumothorax. The trachea is patent. No

acute osseous abnormality within the chest.



Calcified hepatic granuloma; otherwise, the liver is

unremarkable. Calcified splenic granuloma; otherwise, the spleen

is unremarkable. The adrenal glands are unremarkable. The

gallbladder is mildly distended. There is suggestion of possible

gallbladder wall thickening. The pancreas is unremarkable. The

kidneys are unremarkable. Advanced vascular calcifications within

the abdominal aorta and its branch vessels without aneurysmal

dilatation of the abdominal aorta. The appendix is unremarkable.

A Kim catheter is present within the decompressed urinary

bladder. The uterus and adnexal structures are unremarkable for

age. No bowel obstruction or pneumatosis. No significant

adenopathy, free air, or free fluid within the abdomen or pelvis.

Scattered osseous degenerative changes without acute osseous

abnormality.



IMPRESSION: 

Moderate sized bilateral pleural effusions with adjacent

atelectasis.



Questionable gallbladder wall thickening. If there is clinical

concern for the gallbladder or for acute cholecystitis, a right

upper quadrant ultrasound would be recommended.



Mild background emphysematous changes.



Evidence of chronic granulomatous disease.



Additional findings as above.



Dictated by: 



  Dictated on workstation # [id number]

## 2020-06-10 NOTE — NUR
NURSE NOTIFIED ME THAT THERE WAS A MED LIST ON THE CLEAN AREA OUTSIDE OF THE PATIENTS ROOM- 
I WENT OVER THE LIST AND WENT THRU THE EXT MED HISTORY TO COMPLETE THE MED REC



THERE WERE MEDICATIONS LISTED ON THE LIST (PROAIR AND TRELEGY) THAT [facility] DOES NOT HAVE ON 
FILE. I TRIED TO CALL THE PATIENTS ROOM PHONE 3X AND CALLED THE CELL PHONE # WE HAVE ON FILE 
AND GOT NO ANSWER FROM EITHER. I AM LEAVING THOSE 2 OFF THE MED REC UNTIL I CAN SPEAK WITH 
THE PT. 



OTC MEDS:

ASPIRIN 81

TYLENOL

MIRALAX

-------------------------------------------------------------------------------

Addendum: 06/11/20 at 1453 by MICKEY NUNEZ CPhT

-------------------------------------------------------------------------------

I WAS ABLE TO SPEAK WITH THE PT, SHE LET ME KNOW I SHOULD CONTACT HER SON (JAI) AS HE TAKES 
CARE OF ALL HER MEDICATIONS. EVERYTHING I HAD ON THE MED REC WAS CORRECT AND JAI LET ME KNOW 
SHE DOES NOT USE THE INHALERS

-------------------------------------------------------------------------------

Addendum: 06/11/20 at 1456 by MICKEY NUNEZ CPhT

-------------------------------------------------------------------------------

ALSO THE PT IS NOT TAKING LYRICA- SHE HAD AN ALLERGIC REACTION Spoke to Violet schedule 3 month f/u with Trini Prado NP   AVS printed, mailed to patient along with lab slips and appointment card

## 2020-06-10 NOTE — NUR
Ed Phillips admitted to room 425-1, with an admitting diagnosis of hyponatremia, on 
06/10/20 from AM via , accompanied by .ED PHILLIPS introduced to surroundings, call 
light, bed controls, phone, TV, temperature control, lights, meal times, smoking policy, 
visitor policy, side rail policy, bathrooms and showers.  Patient Rights given to patient in 
the handbook.ED PHILLIPS verbalizes understanding that [facility] is not responsible 
for the loss or damage to any personal effects or valuables that are kept in the patients 
posession during their hospitalization. ED PHILLIPS verbalizes understanding of 
Interdisciplinary Patient Education. Patient and/or family were informed about the Rapid 
Response Team and its purpose.

## 2020-06-10 NOTE — ED GENERAL
General


Chief Complaint:  Abdominal/GI Problems


Stated Complaint:  WEAKNESS,BURNING URINATION


Nursing Triage Note:  


BIBA for diffuse abdominal pain; seen twice this week for same sx. No relief 


from home meds. Reports constipation and absence of passing gas with nausea.


Nursing Sepsis Screen:  No Definite Risk


Source of Information:  Patient, EMS, Family, Old Records


Exam Limitations:  No Limitations





History of Present Illness


Date Seen by Provider:  Steven 10, 2020


Time Seen by Provider:  05:34


Initial Comments


This 79-year-old woman presents to the emergency room with complaints of 

abdominal pain and weakness.  She has been seen in the emergency room on  

and  for dysuria, weakness, hyponatremia, and GI complaints.  Symptoms are 

worse today.  Pain is more intense now and she complains of no bowel movement or

flatus for several days.  She is afebrile.  She does report a new cough in 

recent days and chronic shortness of breath.





Allergies and Home Medications


Allergies


Coded Allergies:  


     pregabalin (Verified  Allergy, Unknown, 20)





Home Medications


Acetaminophen 325 Mg Tablet, 325-650 MG PO Q6H PRN for PAIN-MODERATE (5-7), 

(Reported)


Amiodarone HCl 200 Mg Tablet, 200 MG PO BID, (Reported)


Amlodipine Besylate 5 Mg Tablet, 5 MG PO DAILY, (Reported)


Aspirin 81 Mg Tablet.dr, 81 MG PO DAILY, (Reported)


Gabapentin 800 Mg Tablet, 800 MG PO HS, (Reported)


Levothyroxine Sodium 50 Mcg Tablet, 50 MCG PO DAILY, (Reported)


Losartan Potassium 100 Mg Tablet, 100 MG PO DAILY, (Reported)


Polyethylene Glycol 3350 17 Gm Powd.pack, 17 GM PO DAILY PRN for CONSTIPATION-

2ND LINE, (Reported)


Pravastatin Sodium 20 Mg Tablet, 20 MG PO HS, (Reported)


Pregabalin 50 Mg Capsule, 50 MG PO BID, (Reported)


Rivaroxaban 20 Mg Tablet, 20 MG PO DAILY, (Reported)


Tramadol HCl 50 Mg Tablet, 50 MG PO Q6H PRN for PAIN-MODERATE (5-7), (Reported)





Patient Home Medication List


Home Medication List Reviewed:  Yes





Review of Systems


Review of Systems


Constitutional:  see HPI, weakness


EENTM:  no symptoms reported


Respiratory:  see HPI, cough, short of breath


Cardiovascular:  no symptoms reported


Gastrointestinal:  see HPI, abdominal pain, nausea


Genitourinary:  see HPI


Musculoskeletal:  no symptoms reported


Skin:  no symptoms reported


Psychiatric/Neurological:  No Symptoms Reported


Hematologic/Lymphatic:  No Symptoms Reported


Immunological/Allergic:  no symptoms reported





Past Medical-Social-Family Hx


Past Med/Social Hx:  Reviewed Nursing Past Med/Soc Hx


Patient Social History


Alcohol Use:  Denies Use


Number of Drinks Today:  AA


Alcohol Beverage of Choice:  Beer


Recreational Drug Use:  No


Type Used:  Cigarettes


Former Smoker, Quit:  1980


2nd Hand Smoke Exposure:  Yes ( QUITE)


Recent Foreign Travel:  No


Contact w/Someone Who Travel:  No


Recent Infectious Disease Expo:  No


Physical Abuse:  No


Sexual Abuse:  No


Mistreated:  No


Fear:  No





Immunizations Up To Date


Tetanus Booster (TDap):  Unknown


Date of Pneumonia Vaccine:  Sep 18, 2019


Date of Influenza Vaccine:  Sep 12, 2019





Seasonal Allergies


Seasonal Allergies:  No





Past Medical History


Surgeries:  Yes


Eye Surgery, Orthopedic


Respiratory:  Yes


COPD


Currently Using CPAP:  No


Currently Using BIPAP:  No


Cardiac:  Yes


Atrial Fibrillation, Hypertension, Peripheral Vascular, Valvular Heart Disease


Neurological:  Yes


Neuropathy, TIA


Reproductive Disorders:  No


GYN History:  Menopausal


Genitourinary:  No


Gastrointestinal:  Yes (dysphagia)


Gastroesophageal Reflux


Musculoskeletal:  Yes (scleroderma)


Rheumatoid Arthritis


Endocrine:  Yes


Hyperthyroidism


Cataract


Cancer:  No


Psychosocial:  No


Integumentary:  Yes (SCLERODERMA)


Blood Disorders:  No


Adverse Reaction/Blood Tranf:  No





Family Medical History


No Pertinent Family Hx





Physical Exam


Vital Signs





Vital Signs - First Documented








 6/10/20 6/10/20





 06:04 12:00


 


Temp 36.6 


 


Pulse 67 


 


Resp 16 


 


B/P (MAP) 180/67 (104) 


 


Pulse Ox 93 


 


O2 Delivery Room Air 


 


O2 Flow Rate  2.00





Capillary Refill : Less Than 3 Seconds


Height, Weight, BMI


Height: 5'3.00"


Weight: 112lbs. 0.0oz. 50.894691yn; 21.00 BMI


Method:Stated


General Appearance:  No Apparent Distress, WD/WN, Other


HEENT:  PERRL/EOMI, Normal ENT Inspection (week), Pharynx Normal


Neck:  Normal Inspection


Respiratory:  No Accessory Muscle Use, No Respiratory Distress, Crackles 

(basis), Wheezing


Cardiovascular:  Regular Rate, Rhythm, No Edema, No Murmur, Normal Peripheral 

Pulses


Gastrointestinal:  Normal Bowel Sounds, Soft, Tenderness (generalized)


Extremity:  Normal Inspection, No Pedal Edema


Neurologic/Psychiatric:  Alert, Oriented x3, No Motor/Sensory Deficits, Normal 

Mood/Affect, CNs II-XII Norm as Tested


Skin:  Normal Color, Warm/Dry





Focused Exam


Lactate Level


6/10/20 05:57: Lactic Acid Level 1.24





Lactic Acid Level








Progress/Results/Core Measures


Suspected Sepsis


Recent Fever Within 48 Hours:  No


Infection Criteria Present:  None


New/Unexplained  Altered Menta:  No


Sepsis Screen:  No Definite Risk


SIRS


Temperature: 


Pulse: 67 


Respiratory Rate: 16


 


Laboratory Tests


6/10/20 05:36: White Blood Count 10.7


Blood Pressure 180 /67 


Mean: 104


 


6/10/20 05:57: Lactic Acid Level 1.24


Laboratory Tests


6/10/20 05:36: Platelet Count 265


6/10/20 06:13: 


Creatinine 0.61, INR Comment 3.2H, Total Bilirubin 0.7








Results/Orders


Lab Results





Laboratory Tests








Test


 6/10/20


05:36 6/10/20


05:49 6/10/20


05:57 6/10/20


06:12 Range/Units


 


 


White Blood Count


 10.7 


 


 


 


 4.3-11.0


10^3/uL


 


Red Blood Count


 3.77 L


 


 


 


 4.35-5.85


10^6/uL


 


Hemoglobin 12.2     11.5-16.0  G/DL


 


Hematocrit 34 L    35-52  %


 


Mean Corpuscular Volume 91     80-99  FL


 


Mean Corpuscular Hemoglobin 32     25-34  PG


 


Mean Corpuscular Hemoglobin


Concent 36 


 


 


 


 32-36  G/DL





 


Red Cell Distribution Width 12.7     10.0-14.5  %


 


Platelet Count


 265 


 


 


 


 130-400


10^3/uL


 


Mean Platelet Volume 9.2     7.4-10.4  FL


 


Neutrophils (%) (Auto) 89 H    42-75  %


 


Lymphocytes (%) (Auto) 3 L    12-44  %


 


Monocytes (%) (Auto) 8     0-12  %


 


Eosinophils (%) (Auto) 0     0-10  %


 


Basophils (%) (Auto) 0     0-10  %


 


Neutrophils # (Auto) 9.5 H    1.8-7.8  X 10^3


 


Lymphocytes # (Auto) 0.4 L    1.0-4.0  X 10^3


 


Monocytes # (Auto) 0.8     0.0-1.0  X 10^3


 


Eosinophils # (Auto)


 0.0 


 


 


 


 0.0-0.3


10^3/uL


 


Basophils # (Auto)


 0.0 


 


 


 


 0.0-0.1


10^3/uL


 


B-Type Natriuretic Peptide 189.5 H    <100.0  PG/ML


 


Urine Color  YELLOW     


 


Urine Clarity  CLEAR     


 


Urine pH  7.0    5-9  


 


Urine Specific Gravity  1.015 L   1.016-1.022  


 


Urine Protein  NEGATIVE    NEGATIVE  


 


Urine Glucose (UA)  NEGATIVE    NEGATIVE  


 


Urine Ketones  1+ H   NEGATIVE  


 


Urine Nitrite  NEGATIVE    NEGATIVE  


 


Urine Bilirubin  NEGATIVE    NEGATIVE  


 


Urine Urobilinogen  1.0    < = 1.0  MG/DL


 


Urine Leukocyte Esterase  NEGATIVE    NEGATIVE  


 


Urine RBC (Auto)  NEGATIVE    NEGATIVE  


 


Urine RBC  NONE     /HPF


 


Urine WBC  NONE     /HPF


 


Urine Squamous Epithelial


Cells 


 0-2 


 


 


  /HPF





 


Urine Crystals  NONE     /LPF


 


Urine Bacteria  NEGATIVE     /HPF


 


Urine Casts  PRESENT     /LPF


 


Urine Granular Casts  RARE     /LPF


 


Urine Mucus  NEGATIVE     /LPF


 


Urine Culture Indicated  NO     


 


Lactic Acid Level


 


 


 1.24 


 


 0.50-2.00


MMOL/L


 


C-Reactive Protein High


Sensitivity 


 


 


 0.24 


 0.00-0.50


MG/DL


 


Test


 6/10/20


06:13 6/10/20


07:50 6/10/20


16:15 


 Range/Units


 


 


Prothrombin Time 34.3 H    12.2-14.7  SEC


 


INR Comment 3.2 H    0.8-1.4  


 


Activated Partial


Thromboplast Time 49 H


 


 


 


 24-35  SEC





 


Sodium Level 121 *L  123 *L  135-145  MMOL/L


 


Potassium Level 3.9     3.6-5.0  MMOL/L


 


Chloride Level 87 L      MMOL/L


 


Carbon Dioxide Level 22     21-32  MMOL/L


 


Anion Gap 12     5-14  MMOL/L


 


Blood Urea Nitrogen 9     7-18  MG/DL


 


Creatinine


 0.61 


 


 


 


 0.60-1.30


MG/DL


 


Estimat Glomerular Filtration


Rate > 60 


 


 


 


  





 


BUN/Creatinine Ratio 15      


 


Glucose Level 105       MG/DL


 


Calcium Level 8.6     8.5-10.1  MG/DL


 


Corrected Calcium 8.5     8.5-10.1  MG/DL


 


Magnesium Level 1.6     1.6-2.4  MG/DL


 


Total Bilirubin 0.7     0.1-1.0  MG/DL


 


Aspartate Amino Transf


(AST/SGOT) 25 


 


 


 


 5-34  U/L





 


Alanine Aminotransferase


(ALT/SGPT) 12 


 


 


 


 0-55  U/L





 


Alkaline Phosphatase 69       U/L


 


Total Protein 6.8     6.4-8.2  GM/DL


 


Albumin 4.1     3.2-4.5  GM/DL


 


Amylase Level 38       U/L


 


Lipase 7 L    8-78  U/L


 


Coronavirus (COVID-19)(PCR)  Negative    Negative  


 


Thyroid Stimulating Hormone


(TSH) 


 


 1.17 


 


 0.35-4.94


UIU/ML








Micro Results





Microbiology


6/10/20 Influenza Types A,B Antigen (SALLIE) - Final, Complete


          





My Orders





Orders - ROSLYN ALANIS MD


Ns Iv 1000 Ml (Sodium Chloride 0.9%) (6/10/20 06:36)


Ns Iv 1000 Ml (Sodium Chloride 0.9%) (6/10/20 06:36)


Albuterol Inhaler (Ventolin Hfa) (6/10/20 06:45)


Ct Chest/Abdomen/Pelvis W (6/10/20 07:29)


Iohexol Injection (Omnipaque 350 Mg/Ml 1 (6/10/20 07:45)


Received Contrast (Hold Metformin- Contr (6/10/20 07:45)


Ns (Ivpb) (Sodium Chloride 0.9% Ivpb Bag (6/10/20 07:45)


Fentanyl  Injection (Sublimaze Injection (6/10/20 07:45)


Rx-Albuterol Inhaler (Rx-Proair) (6/10/20 07:34)


Influenza A And B Antigens (6/10/20 07:36)


Hs C Reactive Protein (6/10/20 07:36)


Coronavirus Sars-Cov-2 So  (6/10/20 07:36)





Medications Given in ED





Current Medications








 Medications  Dose


 Ordered  Sig/Sylvester


 Route  Start Time


 Stop Time Status Last Admin


Dose Admin


 


 Fentanyl Citrate  25 mcg  ONCE  ONCE


 IVP  6/10/20 07:45


 6/10/20 07:46 DC 6/10/20 07:50


25 MCG


 


 Iohexol  100 ml  ONCE  ONCE


 IV  6/10/20 07:45


 6/10/20 07:46 DC 6/10/20 08:27


80 ML


 


 Sodium Chloride  100 ml  ONCE  ONCE


 IV  6/10/20 07:45


 6/10/20 07:46 DC 6/10/20 08:28


80 ML








Vital Signs/I&O











 6/10/20 6/10/20 6/10/20 6/10/20





 12:00 12:12 12:55 13:03


 


Temp  36.1  


 


Pulse 70 70  64


 


Resp 15 22  


 


B/P (MAP) 134/90 200/87  


 


Pulse Ox 98 97  


 


O2 Delivery Nasal Cannula Nasal Cannula Nasal Cannula 


 


O2 Flow Rate 2.00 2.00 2.00 


 


    





 6/10/20   





 16:00   


 


Temp 36.7   


 


Pulse 70   


 


Resp 20   


 


B/P (MAP) 172/70 (104)   


 


Pulse Ox 97   


 


O2 Delivery Nasal Cannula   


 


O2 Flow Rate 1.00   





Capillary Refill : Less Than 3 Seconds








Blood Pressure Mean:                    104








Progress Note :  


   Time:  09:28


Progress Note


This 79-year-old woman was worked up extensively and found to have hyponatremia.

 A liter of IV normal saline is being provided.  She was found to be hypoxic and

nasal cannula oxygen was provided.  CT demonstrated pleural effusions.  No 

source of infection was identified.  There was some question about gallbladder 

disease on the CT scan but CRP and WBC do not suggest infection.  Patient is 

also not febrile.  Patient did report a new cough and corona virus testing and 

influenza testing were performed.  MDI was given for wheezing.





ECG


Initial ECG Impression Date:  Steven 10, 2020


Initial ECG Impression Time:  06:13


Initial ECG Rate:  68


Initial ECG Rhythm:  Normal Sinus


Initial ECG Intervals:  Normal


Initial ECG Impression:  Normal


Comment


Normal sinus rhythm with no ST elevation or depression.  No abnormal intervals 

or axis deviation.





Diagnostic Imaging





   Diagonstic Imaging:  CT


   Plain Films/CT/US/NM/MRI:  chest, abdomen, pelvis


Comments


CT chest, abdomen and pelvis viewed by me and report reviewed.  See report 

below:





NAME:   ED PHILLIPS


[facility] REC#:   [id number]


ACCOUNT#:   [id number]


PT STATUS:   REG ER


:   1940


PHYSICIAN:   ROSLYN ALANIS MD


ADMIT DATE:   06/10/20/ER


***Signed***


Date of Exam:06/10/20





CT CHEST/ABDOMEN/PELVIS W





PROCEDURE: 


CT chest, abdomen, and pelvis with contrast.





TECHNIQUE: 


Multiple contiguous axial images were obtained through the chest,


abdomen, and pelvis after the administration of intravenous


contrast. Auto Exposure Controls were utilized during the CT exam


to meet ALARA standards for radiation dose reduction. 





INDICATION:  


Weakness, urinary tract infection.





COMPARISON: 


2020.





FINDINGS: 


No significant adenopathy within the chest. Moderate background


vascular calcifications. No aneurysmal dilatation of the thoracic


aorta. The heart is mildly enlarged. No significant pericardial


effusion. Moderate dependently layering pleural effusions are


noted bilaterally with adjacent atelectasis. Mild background


emphysematous changes. No pneumothorax. The trachea is patent. No


acute osseous abnormality within the chest.





Calcified hepatic granuloma; otherwise, the liver is


unremarkable. Calcified splenic granuloma; otherwise, the spleen


is unremarkable. The adrenal glands are unremarkable. The


gallbladder is mildly distended. There is suggestion of possible


gallbladder wall thickening. The pancreas is unremarkable. The


kidneys are unremarkable. Advanced vascular calcifications within


the abdominal aorta and its branch vessels without aneurysmal


dilatation of the abdominal aorta. The appendix is unremarkable.


A Kim catheter is present within the decompressed urinary


bladder. The uterus and adnexal structures are unremarkable for


age. No bowel obstruction or pneumatosis. No significant


adenopathy, free air, or free fluid within the abdomen or pelvis.


Scattered osseous degenerative changes without acute osseous


abnormality.





IMPRESSION: 


Moderate sized bilateral pleural effusions with adjacent


atelectasis.





Questionable gallbladder wall thickening. If there is clinical


concern for the gallbladder or for acute cholecystitis, a right


upper quadrant ultrasound would be recommended.





Mild background emphysematous changes.





Evidence of chronic granulomatous disease.





Additional findings as above.





Dictated by: 





  Dictated on workstation # [id number]





Dict:   06/10/20 [id number]


Trans:   06/10/20 0851


 [id number]





Interpreted by:     SATNAM FERNANDO MD


Electronically signed by: SATNAM FERNANDO MD 06/10/20 0851





Departure


Communication (Admissions)


Time/Spoke to Admitting Phy:  09:46


Dr. Hassan





Impression





   Primary Impression:  


   Generalized abdominal pain


   Additional Impressions:  


   Hyponatremia


   Hypoxia


   Pleural effusion


   Hypertension


   Qualified Codes:  I10 - Essential (primary) hypertension


   Cough


   COPD exacerbation


Disposition:   ADMITTED AS INPATIENT


Condition:  Improved





Admissions


Decision to Admit Reason:  Admit from ER (General)


Decision to Admit/Date:  Steven 10, 2020


Time/Decision to Admit Time:  06:30





Departure-Patient Inst.


Referrals:  


SALVADOR BAIRES MD (PCP/Family)


Primary Care Physician





Copy


Copies To 1:   SALVADOR BAIRES MD, JOSHUA T MD        Steven 10, 2020 08:20

## 2020-06-10 NOTE — HISTORY & PHYSICAL-HOSPITALIST
History of Present Illness


HPI/Chief Complaint


Frida Rai is an 79 year old female with PMH HTN, HLD, CAD, hypothyroidism, 

paroxysmal atrial fibrillation on Xarelto, who presented with weakness. She 

reports that her weakness has been going on for months. She reports that she has

been feeling more short of breath lately. She also reports cough. She denies any

fevers and chills. She denies any orthopnea and PND. She feels like she is not 

strong enough to return home. She denies abdominal pain. She reports nausea, but

no vomiting. She reports palpitations. She denies leg swelling. She reports that

she has been having "sodium problems recently".


Source:  patient


Exam Limitations:  no limitations


Date Seen


6/10/20


Time Seen by a Provider:  16:00


Attending Physician


Pina Tavarez MD


PCP


Saadia Cueva MD


Referring Physician





Date of Admission


Steven 10, 2020 at 10:00





Home Medications & Allergies


Home Medications


Reviewed patient Home Medication Reconciliation performed by pharmacy medication

reconciliations technician and/or nursing.


Patients Allergies have been reviewed.





Allergies





Allergies


Coded Allergies


  pregabalin (Verified Allergy, Unknown, 20)








Past Medical-Social-Family Hx


Past Med/Social Hx:  Reviewed Nursing Past Med/Soc Hx


Patient Social History


Alcohol Use:  Denies Use


Number of Drinks Today:  AA


Alcohol Beverage of Choice:  Beer


Recreational Drug Use:  No


Former Smoker, Quit:  1980


Type Used:  Cigarettes


2nd Hand Smoke Exposure:  Yes ( QUITE)


Recent Foreign Travel:  No


Contact w/other who traveled:  No


Recent Infectious Disease Expo:  No





Immunizations Up To Date


Tetanus Booster (TDap):  Unknown


Date of Pneumonia Vaccine:  Steven 10, 2018


Date of Influenza Vaccine:  Sep 12, 2019





Seasonal Allergies


Seasonal Allergies:  No





Past Medical History


Surgeries:  Eye Surgery, Orthopedic


Currently Using CPAP:  No


Currently Using BIPAP:  No


Cardiac:  Atrial Fibrillation, Hypertension, Peripheral Vascular, Valvular Heart

Disease


Neurological:  Neuropathy, TIA


Reproductive:  No


Menopausal


Gastrointestinal:  Gastroesophageal Reflux


Musculoskeletal:  Rheumatoid Arthritis


Endocrine:  Hyperthyroidism


HEENT:  Cataract


History of Blood Disorders:  No


Adverse Reaction to Blood [place]:  No





Family History


No Pertinent Family Hx





Review of Systems


Constitutional:  weakness


EENTM:  no symptoms reported


Respiratory:  cough, short of breath


Cardiovascular:  palpitations


Gastrointestinal:  nausea


Genitourinary:  no symptoms reported


Musculoskeletal:  no symptoms reported


Skin:  no symptoms reported


Psychiatric/Neurological:  No Symptoms Reported





Physical Exam


Physical Exam


Vital Signs





Vital Signs - First Documented








 6/10/20 6/10/20





 06:04 12:00


 


Temp 36.6 


 


Pulse 67 


 


Resp 16 


 


B/P (MAP) 180/67 (104) 


 


Pulse Ox 93 


 


O2 Delivery Room Air 


 


O2 Flow Rate  2.00





Capillary Refill : Less Than 3 Seconds


Height, Weight, BMI


Height: 5'3.00"


Weight: 112lbs. 0.0oz. 50.172104bk; 17.85 BMI


Method:Stated


General Appearance:  No Apparent Distress, Chronically ill


HEENT:  PERRL/EOMI, Pharynx Normal


Neck:  Normal Inspection, Supple


Respiratory:  No Respiratory Distress, Decreased Breath Sounds


Cardiovascular:  Regular Rate, Rhythm, No Edema, Systolic Murmur (Blowing)


Gastrointestinal:  Normal Bowel Sounds, Non Tender, Soft


Extremity:  Normal Inspection, Non Tender, No Pedal Edema


Neurologic/Psychiatric:  Alert, Oriented x3, No Motor/Sensory Deficits, Normal 

Mood/Affect


Skin:  Normal Color, Warm/Dry





Results


Results/Procedures


Labs


Laboratory Tests


6/10/20 05:36








6/10/20 06:13








6/10/20 16:15








Patient resulted labs reviewed.


Imaging:  Reviewed Imaging Report





Assessment/Plan


Admission Diagnosis


Hyponatremia


Admission Status:  Inpatient Order (span 2 midnights)


Reason for Inpatient Admission:  


Hyponatremia and weakness requiring further treatment and likely placement





Assessment and Plan


Hyponatremia


-Hypovolemic vs euvolemic


-Started on normal saline, continue


-Sodium 121 on arrival, repeat increased to 123


-Urine studies pending


-TSH normal


-Fluid restriction 1L


-Repeat BMP tomorrow morning





Pleural effusions


Systolic murmur


-CT with moderate bilateral pleural effusions


-


-Obtain echocardiogram





HTN


-Continue amlodipine and losartan





AFib


-Continue amiodarone and Xarelto





Hypothyroidism


-Continue Synthroid





DVT Prophylaxis: already receiving therapeutic anticoagulation





Diagnosis/Problems


Diagnosis/Problems





(1) Hyponatremia


Status:  Acute


(2) Pleural effusion


Status:  Acute


(3) Hypertension


Status:  Acute


Qualifiers:  


   Hypertension type:  essential hypertension  Qualified Codes:  I10 - Essential

(primary) hypertension


(4) Atrial fibrillation


Status:  Acute





Clinical Quality Measures


DVT/VTE Risk/Contraindication:


Risk Factor Score Per Nursin


RFS Level Per Nursing on Admit:  4+=Very High











PINA TAVAREZ MD              Steven 10, 2020 20:56

## 2020-06-11 VITALS — DIASTOLIC BLOOD PRESSURE: 90 MMHG | SYSTOLIC BLOOD PRESSURE: 143 MMHG

## 2020-06-11 VITALS — SYSTOLIC BLOOD PRESSURE: 128 MMHG | DIASTOLIC BLOOD PRESSURE: 62 MMHG

## 2020-06-11 VITALS — DIASTOLIC BLOOD PRESSURE: 86 MMHG | SYSTOLIC BLOOD PRESSURE: 136 MMHG

## 2020-06-11 VITALS — SYSTOLIC BLOOD PRESSURE: 186 MMHG | DIASTOLIC BLOOD PRESSURE: 76 MMHG

## 2020-06-11 VITALS — SYSTOLIC BLOOD PRESSURE: 136 MMHG | DIASTOLIC BLOOD PRESSURE: 63 MMHG

## 2020-06-11 VITALS — DIASTOLIC BLOOD PRESSURE: 77 MMHG | SYSTOLIC BLOOD PRESSURE: 179 MMHG

## 2020-06-11 LAB
BASOPHILS # BLD AUTO: 0 10^3/UL (ref 0–0.1)
BASOPHILS NFR BLD AUTO: 0 % (ref 0–10)
BASOPHILS NFR BLD MANUAL: 0 %
BUN/CREAT SERPL: 9
CALCIUM SERPL-MCNC: 7.9 MG/DL (ref 8.5–10.1)
CHLORIDE SERPL-SCNC: 101 MMOL/L (ref 98–107)
CO2 SERPL-SCNC: 19 MMOL/L (ref 21–32)
CREAT SERPL-MCNC: 0.56 MG/DL (ref 0.6–1.3)
EOSINOPHIL # BLD AUTO: 0 10^3/UL (ref 0–0.3)
EOSINOPHIL NFR BLD AUTO: 0 % (ref 0–10)
EOSINOPHIL NFR BLD MANUAL: 0 %
ERYTHROCYTE [DISTWIDTH] IN BLOOD BY AUTOMATED COUNT: 12.9 % (ref 10–14.5)
GFR SERPLBLD BASED ON 1.73 SQ M-ARVRAT: > 60 ML/MIN
GLUCOSE SERPL-MCNC: 84 MG/DL (ref 70–105)
HCT VFR BLD CALC: 32 % (ref 35–52)
HGB BLD-MCNC: 11.2 G/DL (ref 11.5–16)
LYMPHOCYTES # BLD AUTO: 0.4 X 10^3 (ref 1–4)
LYMPHOCYTES NFR BLD AUTO: 4 % (ref 12–44)
MAGNESIUM SERPL-MCNC: 2.3 MG/DL (ref 1.6–2.4)
MCH RBC QN AUTO: 33 PG (ref 25–34)
MCHC RBC AUTO-ENTMCNC: 35 G/DL (ref 32–36)
MCV RBC AUTO: 93 FL (ref 80–99)
MONOCYTES # BLD AUTO: 0.8 X 10^3 (ref 0–1)
MONOCYTES NFR BLD AUTO: 8 % (ref 0–12)
MONOCYTES NFR BLD: 7 %
NEUTROPHILS # BLD AUTO: 8.3 X 10^3 (ref 1.8–7.8)
NEUTROPHILS NFR BLD AUTO: 88 % (ref 42–75)
NEUTS BAND NFR BLD MANUAL: 89 %
NEUTS BAND NFR BLD: 0 %
PLATELET # BLD: 248 10^3/UL (ref 130–400)
PMV BLD AUTO: 9.3 FL (ref 7.4–10.4)
POTASSIUM SERPL-SCNC: 3.8 MMOL/L (ref 3.6–5)
RBC MORPH BLD: NORMAL
SODIUM SERPL-SCNC: 131 MMOL/L (ref 135–145)
VARIANT LYMPHS NFR BLD MANUAL: 4 %
WBC # BLD AUTO: 9.5 10^3/UL (ref 4.3–11)

## 2020-06-11 RX ADMIN — HYDRALAZINE HYDROCHLORIDE SCH MG: 25 TABLET ORAL at 22:04

## 2020-06-11 RX ADMIN — DOCUSATE SODIUM SCH MG: 100 CAPSULE ORAL at 07:31

## 2020-06-11 RX ADMIN — RIVAROXABAN SCH MG: 20 TABLET, FILM COATED ORAL at 07:34

## 2020-06-11 RX ADMIN — SODIUM CHLORIDE SCH MLS/HR: 900 INJECTION, SOLUTION INTRAVENOUS at 03:08

## 2020-06-11 RX ADMIN — ASPIRIN SCH MG: 81 TABLET ORAL at 07:31

## 2020-06-11 RX ADMIN — DOCUSATE SODIUM SCH MG: 100 CAPSULE ORAL at 20:44

## 2020-06-11 RX ADMIN — AMIODARONE HYDROCHLORIDE SCH MG: 200 TABLET ORAL at 20:44

## 2020-06-11 RX ADMIN — AMLODIPINE BESYLATE SCH MG: 5 TABLET ORAL at 11:23

## 2020-06-11 RX ADMIN — LOSARTAN POTASSIUM SCH MG: 100 TABLET, FILM COATED ORAL at 07:31

## 2020-06-11 RX ADMIN — SODIUM CHLORIDE SCH MLS/HR: 900 INJECTION, SOLUTION INTRAVENOUS at 11:04

## 2020-06-11 RX ADMIN — SENNOSIDES SCH MG: 8.6 TABLET, FILM COATED ORAL at 07:35

## 2020-06-11 RX ADMIN — AMIODARONE HYDROCHLORIDE SCH MG: 200 TABLET ORAL at 07:32

## 2020-06-11 RX ADMIN — SENNOSIDES SCH MG: 8.6 TABLET, FILM COATED ORAL at 20:44

## 2020-06-11 RX ADMIN — HYDRALAZINE HYDROCHLORIDE SCH MG: 25 TABLET ORAL at 14:31

## 2020-06-11 RX ADMIN — LEVOTHYROXINE SODIUM SCH MCG: 50 TABLET ORAL at 06:02

## 2020-06-11 NOTE — NUR
IRF Evaluation 



Order received to evaluate patient for the ARU. Chart review complete and it appears patient 
is completing bed mobility, transferring and ambulating (300ft, RW) with set-up/SBA; 
therefore, patient does not require intensive therapies. Dr. Hassan notified of denial. 



Thank you for this referral.

## 2020-06-11 NOTE — PROGRESS NOTE - HOSPITALIST
Subjective


HPI/CC On Admission


Date Seen by Provider:  2020


Time Seen by Provider:  09:50


Frida Rai is an 79 year old female with PMH HTN, HLD, CAD, hypothyroidism, 

paroxysmal atrial fibrillation on Xarelto, who presented with weakness. She 

reports that her weakness has been going on for months. She reports that she has

been feeling more short of breath lately. She also reports cough. She denies any

fevers and chills. She denies any orthopnea and PND. She feels like she is not 

strong enough to return home. She denies abdominal pain. She reports nausea, but

no vomiting. She reports palpitations. She denies leg swelling. She reports that

she has been having "sodium problems recently".


Subjective/Events-last exam


She reports that she is feeling better today.  She reports having some 

confusion.  She says that her weakness is better.  She denies any shortness of 

breath or cough.  She denies any fevers or chills.  She denies any chest pain.  

She has no other complaints or concerns.  When asked if she had been told about 

a heart murmur, she is aware of this and says that her cardiologist had talked 

about replacing one of her heart valves but it was not severe enough to need the

procedure at that point.





Focused Exam


Lactate Level


6/10/20 05:57: Lactic Acid Level 1.24








Objective


Exam


Vital Signs





Vital Signs








  Date Time  Temp Pulse Resp B/P (MAP) Pulse Ox O2 Delivery O2 Flow Rate FiO2


 


20 08:24      Nasal Cannula 2.00 


 


20 08:00     98   


 


20 07:45 36.9 68 20 179/77 (111)    





Capillary Refill : Less Than 3 SecondsLess Than 3 Seconds


General Appearance:  No Apparent Distress, Chronically ill


Respiratory:  Lungs Clear, Normal Breath Sounds, No Respiratory Distress


Cardiovascular:  Regular Rate, Rhythm, No Edema, No Murmur


Gastrointestinal:  Normal Bowel Sounds, Non Tender, Soft


Extremity:  Normal Inspection, Non Tender, No Pedal Edema


Neurologic/Psychiatric:  Alert, Normal Mood/Affect, Motor Weakness


Skin:  Normal Color, Warm/Dry





Results/Procedures


Lab


Laboratory Tests


6/10/20 16:15








20 07:16








Patient resulted labs reviewed.


Imaging:  Reviewed Imaging Report





Assessment/Plan


Assessment and Plan


Assess & Plan/Chief Complaint


Hyponatremia


-Appears to be mainly hypovolemic with good response to fluid resuscitation


-Sodium 121 on arrival, improved to 131 today


-Urine studies pending


-TSH normal


-Fluid restriction 1L


-Continue normal saline at decreased rate





Pleural effusions


Systolic murmur, likely aortic stenosis


-CT with moderate bilateral pleural effusions


-


-Obtain echocardiogram





HTN


-Continue losartan


-Increase amlodipine


-Add hydralazine





AFib


-Continue amiodarone and Xarelto





Hypothyroidism


-Continue Synthroid





DVT Prophylaxis: already receiving therapeutic anticoagulation





Diagnosis/Problems


Diagnosis/Problems





(1) Hyponatremia


Status:  Acute


(2) Pleural effusion


Status:  Acute


(3) Hypertension


Status:  Acute


Qualifiers:  


   Hypertension type:  essential hypertension  Qualified Codes:  I10 - Essential

(primary) hypertension


(4) Atrial fibrillation


Status:  Acute


(5) Debility


Status:  Acute





Clinical Quality Measures


DVT/VTE Risk/Contraindication:


Risk Factor Score Per Nursin


RFS Level Per Nursing on Admit:  4+=Very High











KEDAR TAVAREZ MD              2020 11:21

## 2020-06-11 NOTE — NUR
CM DISCHARGE PLANNING: Patient will likely discharge to home tomorrow with home health care. 
Visited with patient about this POC. She reports that she would like to be able to stay at 
the hospital because of the level of support she is receiving here. She reports that she 
still feels slightly dizzy. We discussed skilled nursing facilities et this choice was 
offered to her as a second option. She reports that she does not want to do that. She is in 
agreement for HHC when she is discharged from the hospital.

## 2020-06-11 NOTE — D/C HH FACE TO FACE ORDER
D/C  Face to Face Orders


Reconcile Patient Problems


Problems Reviewed?:  Yes





Instructions for Patient


[facility], [phone]


Patient Instructions/FollowUp:  


Take medications as prescribed.  Participate in therapies.  Follow-up with


your primary care physician.


Physician to follow Patient:  Saadia Cueva


Discharge Diet for Home:  No Restrictions





Patient Data-Allergies,Ht & Wt


Patient Allergies:  


Coded Allergies:  


     pregabalin (Verified  Allergy, Unknown, 6/8/20)


Height (Feet):  5


Height (Inches):  3.00


Weight (Pounds):  112


Weight (Ounces):  0.0





Home Health Need/Face to Face


Date of Face to Face:  Jun 11, 2020


Clinical Findings:  Generalized weakness and fatigue, Muscle weakness


I have seen Pt face-to-face:  Yes


Discharged To:  Home


Diagnosis/Conditions:  


Hyponatremia, Debility


Problems/Diagnosis/Condition:  


(1) Debility


Patient is Homebound due to:  Muscle weakness


Homebound Status


   Due to the above stated illness, injury or surgical procedure (medical 

condition or diagnosis) and associated clinical findings, the patient is 

homebound because of his/her inability to leave home except with aid of a 

supportive device and/or person AND leaving the home requires a considerable and

taxing effort or is medically contraindicated.


Pt req the following assistanc:  Aid of another person





Home Health Nursing Orders


Home Health Services Order:  Nursing Services, Occupational Ther-Evaluate & 

Treat, Physical Therapy-Evaluate & Treat





Home Health Infusion Therapy


Line Start Date:  Steven 10, 2020





Therapy Orders


Therapy Orders:  OT (must have SN or PT order), Physical Therapy


Therapy Specific Orders:  Gait training, Increase strength/endurance


Certify Stmt


I certify that this patient is under my care and that I, a nurse practitioner or

a physician; a assistant working with me, had a face to face encounter that -

meets the physician face to face encounter requirements with this patient as 

dated.











KEDAR TAVAREZ MD              Jun 11, 2020 14:37

## 2020-06-11 NOTE — NUR
RD ASSESSMENT 



PMHx: 

HTN; HLD; CAD; hypothyroidism; afib; GERD; RA



PT INTERACTION: 

Pt was awake and pleasant during consult for MST score. Pt states current appetite is good, 
though it was poor prior to admit. Note avg PO intake 25% x2meal, per chart review. Pt 
states following a regular diet at home, and has some issues with swallowing food. Pt states 
some recent issues with nausea and constipation, and that her last BM was 6/8. Note pt 
currently on bowel regimen of colace BID; and senna BID, per chart review. Pt states no 
recent wt changes. Note recent 4# wt gain x5mon, per chart review. Note unable to fully 
visually assess pt as pt was covered in blankets. Note pt has BMI 19.1, which at her age is 
classified as Underweight. 



Though pt has low BMI, given diet hx and wt hx, pt does not meet criteria for malnutrition 
per ASPEN guidelines. 



ABNORMAL NUTRITION-RELATED LAB VALUES

LOW: Na 131; BUN 5; cr 0.56; Ca 7.9

HIGH:



Est. kcal needs: 9311-4844 kcal | 30-35 kcal/kg  

Est. Pro needs:  52-62 g Pro | 1.0-1.2 g Pro/kg 



PES STATEMENT: 

Inadequate oral intake (NI-2.1) related to nausea | constipation as evidenced by pt 
interview | avg PO intake 25% x2meal



Underweight (NC-3.1) related to inadequate energy intake as evidenced by BMI 19.1



INTERVENTION:  

Continue with current diet order of Clear Liquid diet. 

Encouraged pt to eat when able. 

Cannot recommend nutrition supplementation at this time, due to fluid restriction. 

Will continue to follow and reassess as pt needs, intake, and status change. 



MONITOR/EVALUATE:  

PO Intake; Plan of Care; Hydration Status; Weight Status; Lab Values 





RENA Malcolm, MS, RD, LD

## 2020-06-11 NOTE — OCCUPATIONAL THERAPY EVAL
OT Evaluation-General/PLF


Medical Diagnosis


Admission Date


Steven 10, 2020 at 10:00


Medical Diagnosis:  abdominal pain/ hyponatremia


Onset Date:  Steven 10, 2020





Therapy Diagnosis


Therapy Diagnosis:  Weakness





Height/Weight


Height (Feet):  5


Height (Inches):  3.00


Weight (Pounds):  112


Weight (Ounces):  0.0





Precautions


Precautions/Isolations:  Fall Prevention, Standard Precautions





Referral


Physician:  Mo


Referral Reason:  Activity Tolerance, Self Care, Evaluation/Treatment, 

Strengthening/ROM





Medical History


Pertinent Medical History:  Atrial Fib, CAD, COPD, GERD, HTN, PVD, Rheumatoid 

Arthritis


Reviewed History:  Yes





Social History


Home:  Single Level


Current Living Status:  Alone


Entry Into Home:  Stairs With Railing


 Steps Into Home:  3





ADL-Prior Level of Function


SCALE: Activities may be completed with or without assistive devices.





6-Indepedent-patient completes the activity by him/herself with no assistance f

rom a helper.


5-Set-up or Clean-up Assistance-helper sets up or cleans up; patient completes 

activity. [place] assists only prior to or  


    following the activity.


4-Supervision or Touching Assistance-helper provides verbal cues and/or 

touching/steadying and/or contact guard assistance as patient completes 

activity. Assistance may be provided   


    throughout the activity or intermittently.


3-Partial/Moderate Assistance-helper does LESS THAN HALF the effort. [place] 

lifts, holds or supports trunk or limbs, but provides less than half the effort.


2-Substantial/Maximal Assistance-helper does MORE THAN HALF the effort. [place] 

lifts or holds trunk or limbs and provides more than half the effort.


2-Vjatrapuc-wpctuf does ALL the effort. Patient does none of the effort to 

complete the activity. Or, the assistance of 2 or more helpers is required for 

the patient to complete the  


    activity.


If activity was not attempted, code reason:


7-Patient Refused.


9-Not Applicable-not attempted and the patient did not perform the activity 

before the current illness, exacerbation or injury.


10-Not Attempted due to Environmental Limitations-(lack of equipment, weather 

restraints, etc.).


88-Not Attempted due to Medical Conditions or Safety Concerns.


ADL PLOF Comments


Pt. reports that she was receiving in home therapy, but no other services.  Pt. 

states that she is getting set up for 24 hour care, and has paperwork to fill 

out.  When OT asks what this means, she is able to verbalize that she has the 

"insurance" for someone to cook and clean for her.  Pt. slightly confused.  Does

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  Reports that she 

was independent with daily skills.  She has a 4 wheeled walker, but would like a

2 wheeled walker.


Self Care:  Independent (per pt.)


Functional Cognition:  Unknown


DME/Equipment:  Bath Chair, Shower





OT Current Status


Subjective


Pt. reports no pain, but that she is weak.





Appearance


Pt. up in chair.  Agrees to work with OT.





Mental Status/Objective


Patient Orientation:  Person, Place


Attachments:  IV, Oxygen





Current


Glasses/Contacts:  Yes


Hand Dominance:  Right


Upper Extremity ROM


WFL with limitation at shoulder level.





ADL-Treatment


Shower/Bathe Self (QC):  4 (CGA in stance and cues for sequencing for sponge 

bath.  Declined showering stating she wasn't ready yet.  OT washed hair with 

shampoo cap.)


On/Off Footwear (QC):  4 (SBA to doff/don slipper socks.)





Other Treatments


Pt. requires cues to sequence next step during tasks.  Requires encouragement.  

Becomes slightly confused after bathing and attempts to put sock on nasal ca

nnula.  Pt. catches herself and states, "now why do I do that?"  Pt. is in chair

with feet elevated and warm blankets applied.  All needs met.





Education


OT Patient Education:  Correct positioning, Modified ADL techniques, Progress 

toward Goal/Update tx plan, Purpose of tx/functional activities, Reviewed 

precautions, Rehab process, Transfer techniques


Teaching Recipient:  Patient


Teaching Methods:  Demonstration, Discussion


Response to Teaching:  Verbalize Understanding, Return Demonstration, 

Reinforcement Needed





OT Long Term Goals


Long Term Goals


Time Frame:  Jun 18, 2020


Eating (QC):  6


Oral Hygiene (QC):  6


Toileting Hygiene (QC):  4


Shower/Bathe Self (QC):  4


Upper Body Dressing (QC):  5


Lower Body Dressing (QC):  5


On/Off Footwear (QC):  5


Additional Goals:  1-Demonstrate ADL Tasks, 2-Verbalize Understanding, 

3-ImproveStrength/Eliu


1=Demonstrate adherence to instructed precautions during ADL tasks.


2=Patient will verbalize/demonstrate understanding of assistive 

devices/modifications for ADL.


3=Patient will improve strength/tolerance for activity to enable patient to 

perform ADL's.





OT Education/Plan


Problem List/Assessment


Assessment:  Decreased Activ Tolerance, Impaired I ADL's, Impaired Self-Care 

Skills





Discharge Recommendations


Plan/Recommendations:  Continue POC


Therapy Discharge Recommendati:  Post Acute OT





Treatment Plan/Plan of Care


Treatment,Training & Education:  Yes


Patient would benefit from OT for education, treatment and training to promote 

independence in ADL's, mobility, safety and/or upper extremity function for 

ADL's.


Plan of Care:  ADL Retraining, Functional Mobility, UE Funct Exercise/Act


Treatment Duration:  Jun 18, 2020


Frequency:  5 times per week


Estimated Hrs Per Day:  .25 hour per day


Agreement:  Yes


Rehab Potential:  Fair





Time/GCodes


Start Time:  10:05


Stop Time:  10:35


Total Time Billed (hr/min):  30


Billed Treatment Time


1, EVM x 15minutes, ADL x 15minutes











ELMIRA JUAN OT           Jun 11, 2020 12:15

## 2020-06-12 VITALS — DIASTOLIC BLOOD PRESSURE: 57 MMHG | SYSTOLIC BLOOD PRESSURE: 142 MMHG

## 2020-06-12 VITALS — SYSTOLIC BLOOD PRESSURE: 175 MMHG | DIASTOLIC BLOOD PRESSURE: 81 MMHG

## 2020-06-12 VITALS — DIASTOLIC BLOOD PRESSURE: 65 MMHG | SYSTOLIC BLOOD PRESSURE: 148 MMHG

## 2020-06-12 VITALS — DIASTOLIC BLOOD PRESSURE: 61 MMHG | SYSTOLIC BLOOD PRESSURE: 161 MMHG

## 2020-06-12 LAB
BUN/CREAT SERPL: 10
CALCIUM SERPL-MCNC: 8.1 MG/DL (ref 8.5–10.1)
CHLORIDE SERPL-SCNC: 102 MMOL/L (ref 98–107)
CO2 SERPL-SCNC: 22 MMOL/L (ref 21–32)
CREAT SERPL-MCNC: 0.6 MG/DL (ref 0.6–1.3)
GFR SERPLBLD BASED ON 1.73 SQ M-ARVRAT: > 60 ML/MIN
GLUCOSE SERPL-MCNC: 94 MG/DL (ref 70–105)
POTASSIUM SERPL-SCNC: 3.5 MMOL/L (ref 3.6–5)
SODIUM SERPL-SCNC: 132 MMOL/L (ref 135–145)

## 2020-06-12 RX ADMIN — HYDRALAZINE HYDROCHLORIDE SCH MG: 25 TABLET ORAL at 13:32

## 2020-06-12 RX ADMIN — LOSARTAN POTASSIUM SCH MG: 100 TABLET, FILM COATED ORAL at 08:26

## 2020-06-12 RX ADMIN — HYDRALAZINE HYDROCHLORIDE SCH MG: 25 TABLET ORAL at 06:17

## 2020-06-12 RX ADMIN — BISACODYL PRN MG: 10 SUPPOSITORY RECTAL at 08:26

## 2020-06-12 RX ADMIN — AMIODARONE HYDROCHLORIDE SCH MG: 200 TABLET ORAL at 08:27

## 2020-06-12 RX ADMIN — ASPIRIN SCH MG: 81 TABLET ORAL at 08:26

## 2020-06-12 RX ADMIN — RIVAROXABAN SCH MG: 20 TABLET, FILM COATED ORAL at 08:26

## 2020-06-12 RX ADMIN — BISACODYL PRN MG: 10 SUPPOSITORY RECTAL at 06:18

## 2020-06-12 RX ADMIN — SENNOSIDES SCH MG: 8.6 TABLET, FILM COATED ORAL at 08:26

## 2020-06-12 RX ADMIN — DOCUSATE SODIUM SCH MG: 100 CAPSULE ORAL at 08:26

## 2020-06-12 RX ADMIN — LEVOTHYROXINE SODIUM SCH MCG: 50 TABLET ORAL at 06:11

## 2020-06-12 RX ADMIN — SODIUM CHLORIDE SCH MLS/HR: 900 INJECTION, SOLUTION INTRAVENOUS at 00:45

## 2020-06-12 RX ADMIN — AMLODIPINE BESYLATE SCH MG: 5 TABLET ORAL at 08:26

## 2020-06-12 NOTE — PHYSICAL THERAPY DAILY NOTE
PT Daily Note-Current


Subjective


Patient agrees to PT.





Mental Status


Patient Orientation:  Person, Time, Situation


Attachments:  IV





Transfers


SCALE: Activities may be completed with or without assistive devices.





6-Indepedent-patient completes the activity by him/herself with no assistance 

from a helper.


5-Set-up or Clean-up Assistance-helper sets up or cleans up; patient completes 

activity. [place] assists only prior to or  


    following the activity.


4-Supervision or Touching Assistance-helper provides verbal cues and/or 

touching/steadying and/or contact guard assistance as patient completes 

activity. Assistance may be provided   


    throughout the activity or intermittently.


3-Partial/Moderate Assistance-helper does LESS THAN HALF the effort. [place] 

lifts, holds or supports trunk or limbs, but provides less than half the effort.


2-Substantial/Maximal Assistance-helper does MORE THAN HALF the effort. [place] 

lifts or holds trunk or limbs and provides more than half the effort.


6-Ywwgcofbr-segxkt does ALL the effort. Patient does none of the effort to 

complete the activity. Or, the assistance of 2 or more helpers is required for 

the patient to complete the  


    activity.


If activity was not attempted, code reason:


7-Patient Refused.


9-Not Applicable-not attempted and the patient did not perform the activity 

before the current illness, exacerbation or injury.


10-Not Attempted due to Environmental Limitations-(lack of equipment, weather 

restraints, etc.).


88-Not Attempted due to Medical Conditions or Safety Concerns.


Roll Left & Right (QC):  6


Lying to Sitting/Side of Bed(Q:  6


Sit to Stand (QC):  6


Chair/Bed-to-Chair Xfer(QC):  6





Weight Bearing


Right Lower Extremity:  Right


Weight Bearing/Tolerated


Left Lower Extremity:  Left


Weight Bearing/Tolerated





Gait Training


Does the Patient Walk?:  Yes


Distance:  600'


Walk 10 feet (QC):  6


Walk 50 ft with 2 Turns(QC):  6


Walk 150 ft (QC):  6


Gait Assistive Device:  FWW


safe and functional with no deviation





Assessment


Patient is up in recliner with warm blanket for comfort.  Patient is currently 

at independent PLOF with all gross motor skills.  PT to dismiss patient from 

services and nursing to continue with ambulating in hallway PRN.





PT Short Term Goals


Short Term Goals


Time Frame:  2020


Roll Left & Right:  6


Sit to lyin


Lying to sitting on side of be:  6


Sit to stand:  6


Chair/bed-to-chair transfer:  6


Toilet transfer:  6


Walk 10 feet:  6


Walk 50 feet with two turns:  6


Walk 150 feet:  6





PT Plan


Treatment/Plan


Treatment Plan:  Discontinue PT, goals met


Treatment Plan:  Bed Mobility, Education, Functional Activity Eliu, Functional 

Strength, Gait, Safety, Therapeutic Exercise, Transfers


Treatment Duration:  2020


Frequency:  3 times per week


Estimated Hrs Per Day:  .25 hour per day


Patient and/or Family Agrees t:  Yes





Time/GCodes


Time In:  943


Time Out:  956


Total Billed Treatment Time:  13


Total Billed Treatment


1 visit


FA 13 min











SONG ISRAEL PT              2020 10:05

## 2020-06-12 NOTE — OCCUPATIONAL THER DAILY NOTE
OT Current Status-Daily Note


Subjective


Pt seated in recliner, agreeable to OT Tx. Pt reports she doesn't think her AC 

is working at home and states concerns.





ADL-Treatment


Therapy Code Descriptions/Definitions 





Functional [place] Measure:


0=Not Assessed/NA        4=Minimal Assistance


1=Total Assistance        5=Supervision or Setup


2=Maximal Assistance  6=Modified [place]


3=Moderate Assistance 7=Complete IndependenceSCALE: Activities may be completed 

with or without assistive devices.





6-Indepedent-patient completes the activity by him/herself with no assistance 

from a helper.


5-Set-up or Clean-up Assistance-helper sets up or cleans up; patient completes 

activity. [place] assists only prior to or  


    following the activity.


4-Supervision or Touching Assistance-helper provides verbal cues and/or 

touching/steadying and/or contact guard assistance as patient completes activi

ty. Assistance may be provided   


    throughout the activity or intermittently.


3-Partial/Moderate Assistance-helper does LESS THAN HALF the effort. [place] 

lifts, holds or supports trunk or limbs, but provides less than half the effort.


2-Substantial/Maximal Assistance-helper does MORE THAN HALF the effort. [place] 

lifts or holds trunk or limbs and provides more than half the effort.


2-Yptoqoesq-mdbaem does ALL the effort. Patient does none of the effort to 

complete the activity. Or, the assistance of 2 or more helpers is required for 

the patient to complete the  


    activity.


If activity was not attempted, code reason:


7-Patient Refused.


9-Not Applicable-not attempted and the patient did not perform the activity 

before the current illness, exacerbation or injury.


10-Not Attempted due to Environmental Limitations-(lack of equipment, weather 

restraints, etc.).


88-Not Attempted due to Medical Conditions or Safety Concerns.


Oral Hygiene (QC):  4 (Pt able to brush dentures with set up. Min verbal cues 

for sequencing.)





Other Treatment


Pt seated in recliner, agreeable to OT Tx with focus on ADLS. OT gathered ADL 

supplies and set up on tray table. Pt washed her face with set up at recliner 

level, then brushed her dentures. Pt required min verbal cues for sequencing of 

task. OT cleaned up post ADL tx. Pt seated in recliner, call light in reach and 

all needs met post tx.





Education


OT Patient Education:  Correct positioning, Energy conservation, Exercise 

program, Modified ADL techniques, Progress toward Goal/Update tx plan, Purpose 

of tx/functional activities


Teaching Recipient:  Patient


Teaching Methods:  Discussion


Response to Teaching:  Verbalize Understanding





OT Long Term Goals


Long Term Goals


Time Frame:  Jun 18, 2020


Eating (QC):  6


Oral Hygiene (QC):  6


Toileting Hygiene (QC):  4


Shower/Bathe Self (QC):  4


Upper Body Dressing (QC):  5


Lower Body Dressing (QC):  5


On/Off Footwear (QC):  5


Additional Goals:  1-Demonstrate ADL Tasks, 2-Verbalize Understanding, 3-

ImproveStrength/Eliu


1=Demonstrate adherence to instructed precautions during ADL tasks.


2=Patient will verbalize/demonstrate understanding of assistive 

devices/modifications for ADL.


3=Patient will improve strength/tolerance for activity to enable patient to 

perform ADL's.





OT Education/Plan


Problem List/Assessment


Assessment:  Decreased Activ Tolerance, Decreased UE Strength, Impaired I ADL's,

Impaired Self-Care Skills





Discharge Recommendations


Plan/Recommendations:  Continue POC





Treatment Plan/Plan of Care


Patient would benefit from OT for education, treatment and training to promote 

independence in ADL's, mobility, safety and/or upper extremity function for 

ADL's.


Plan of Care:  ADL Retraining, Functional Mobility, UE Funct Exercise/Act


Treatment Duration:  Jun 18, 2020


Frequency:  5 times per week


Estimated Hrs Per Day:  .25 hour per day


Agreement:  Yes


Rehab Potential:  Fair





Time/GCodes


Start Time:  10:25


Stop Time:  10:38


Total Time Billed (hr/min):  13


Billed Treatment Time


1, ADL











ALIDA GAVIRIA OT          Jun 12, 2020 11:07

## 2020-06-12 NOTE — NUR
CM FINALIZED DISCHARGE PLAN: Patient will likely discharge to home today with [facility]. She has 
elected [facility] for her home health care needs. She requests Physical et 
Occupational therapies d/t continued weakness. She also reports that she is constipated this 
morning et is hopeful for an enema so that her bowels will be cleared before she goes home. 
She denies any other needs or concerns at this time. She has been in communication with her 
son et has notified him of dismissal today. I left a message on his cell phone yesterday 
evening but have not had a return phone call from him.

## 2020-06-12 NOTE — DISCHARGE SUMMARY
Discharge Summary


Hospital Course


Was the Problem List Reviewed?:  Yes


Problems/Dx:  


(1) Hyponatremia


Status:  Acute


(2) Pleural effusion


Status:  Acute


(3) Hypertension


Status:  Acute


Qualifiers:  


   Qualified Codes:  I10 - Essential (primary) hypertension


(4) Atrial fibrillation


Status:  Acute


(5) Debility


Status:  Acute


(6) Aortic stenosis


Qualifiers:  


   Qualified Codes:  I35.0 - Nonrheumatic aortic (valve) stenosis


Hospital Course


Date of Admission: Steven 10, 2020 at 10:00 


Admission Diagnosis :  hyponatremia





Family Physician/Provider: Saadia Cueva MD  





Date of Discharge: 20 


Discharge Diagnosis:  hyponatremia








Hospital Course:


Frida Rai is an 79-year-old female who presented with weakness and was 

admitted with hyponatremia.  Her sodium on arrival is 121.  She was started on 

IV fluid resuscitation with normal saline and her sodium level improved.  On 

discharge her sodium level increased to 132.  Her hyponatremia was thought to be

due to hypovolemia.  Her CT scan on arrival showed bilateral pleural effusions. 

She did not require any supplemental oxygen.  Her workup revealed moderate to 

severe aortic stenosis by echocardiogram.  This was a known finding and she 

follows with Dr. Dixon in [place].  She should follow-up with her cardiologist

a couple weeks.  She plans to establish care with a new primary care physician, 

Dr. Delaney.














Labs and Pending Lab Test:


Laboratory Tests


20 05:29: Glucometer 89


20 10:30: 


Sodium Level 132L, Potassium Level 3.5L, Chloride Level 102, Carbon Dioxide 

Level 22, Anion Gap 8, Blood Urea Nitrogen 6L, Creatinine 0.60, Estimat 

Glomerular Filtration Rate > 60, BUN/Creatinine Ratio 10, Glucose Level 94, 

Calcium Level 8.1L





Microbiology


6/10/20 Influenza Types A,B Antigen (SALLIE) - Final, Complete


          


6/10/20 Blood Culture - Preliminary, Resulted


          No growth





Home Meds


Active


Amlodipine Besylate 10 Mg Tablet 10 Mg PO DAILY 90 Days


Reported


Miralax (Polyethylene Glycol 3350) 17 Gm Powd.pack 17 Gm PO DAILY PRN


Pravastatin Sodium 20 Mg Tablet 20 Mg PO HS


Amiodarone HCl 200 Mg Tablet 200 Mg PO BID


Euthyrox (Levothyroxine Sodium) 50 Mcg Tablet 50 Mcg PO DAILY


Losartan Potassium 100 Mg Tablet 100 Mg PO DAILY


Tramadol HCl 50 Mg Tablet 50 Mg PO Q6H PRN


Acetaminophen 325 Mg Tablet 325-650 Mg PO Q6H PRN


Aspir 81 (Aspirin) 81 Mg Tablet. 81 Mg PO DAILY


Xarelto (Rivaroxaban) 20 Mg Tablet 20 Mg PO DAILY


Gabapentin 800 Mg Tablet 800 Mg PO HS


Assessment/Pt Instructions


Take medications as prescribed.  Follow-up with your cardiologist for urinary 

aortic stenosis.  Establish care with a new primary care physician, Dr. Delaney.


Discharge Planning:  <30 minutes discharge planning





Discharge Instructions


Discharge Diet:  No Restrictions


Activity as Tolerated:  Yes





Discharge Physical Examination


Vital Signs





Vital Signs








  Date Time  Temp Pulse Resp B/P (MAP) Pulse Ox O2 Delivery O2 Flow Rate FiO2


 


20 13:02  76      


 


20 12:03 36.5  19 148/65 (92) 95 Room Air  


 


20 11:43       2.00 








General Appearance:  No Apparent Distress, Chronically ill


HEENT:  PERRL/EOMI, Pharynx Normal


Respiratory:  Lungs Clear, Normal Breath Sounds, No Respiratory Distress


Cardiovascular:  Regular Rate, Rhythm, Systolic Murmur


Gastrointestinal:  Normal Bowel Sounds, Non Tender, Soft


Extremity:  Normal Inspection, Non Tender, No Pedal Edema


Skin:  Normal Color, Warm/Dry


Neurologic/Psychiatric:  Alert, Oriented x3, Normal Mood/Affect, Motor Weakness


Allergies:  


Coded Allergies:  


     pregabalin (Verified  Allergy, Unknown, 20)





Copy


Copies To 1:   JELENA DELANEY MD





Discharge Summary


Date of Admission


Steven 10, 2020 at 10:00


Date of Discharge


2020 at 14:22


Discharge Date:  2020


Discharge Time:  14:22


Admission Diagnosis


Hyponatremia


Discharge Diagnosis


Hyponatremia





(1) Hyponatremia


Status:  Acute


(2) Pleural effusion


Status:  Acute


(3) Hypertension


Status:  Acute


Qualifiers:  


   Qualified Codes:  I10 - Essential (primary) hypertension


(4) Atrial fibrillation


Status:  Acute


(5) Debility


Status:  Acute





Clinical Quality Measures


DVT/VTE Risk/Contraindication:


Risk Factor Score Per Nursin


RFS Level Per Nursing on Admit:  4+=Very High











KEDAR TAVAREZ MD              2020 14:37

## 2020-07-27 ENCOUNTER — HOSPITAL ENCOUNTER (INPATIENT)
Dept: HOSPITAL 75 - ER | Age: 80
LOS: 8 days | DRG: 329 | End: 2020-08-04
Attending: INTERNAL MEDICINE | Admitting: FAMILY MEDICINE
Payer: MEDICARE

## 2020-07-27 VITALS — DIASTOLIC BLOOD PRESSURE: 48 MMHG | SYSTOLIC BLOOD PRESSURE: 134 MMHG

## 2020-07-27 VITALS — SYSTOLIC BLOOD PRESSURE: 102 MMHG | DIASTOLIC BLOOD PRESSURE: 41 MMHG

## 2020-07-27 VITALS — SYSTOLIC BLOOD PRESSURE: 80 MMHG | DIASTOLIC BLOOD PRESSURE: 34 MMHG

## 2020-07-27 VITALS — DIASTOLIC BLOOD PRESSURE: 27 MMHG | SYSTOLIC BLOOD PRESSURE: 62 MMHG

## 2020-07-27 VITALS — DIASTOLIC BLOOD PRESSURE: 43 MMHG | SYSTOLIC BLOOD PRESSURE: 123 MMHG

## 2020-07-27 VITALS — SYSTOLIC BLOOD PRESSURE: 62 MMHG | DIASTOLIC BLOOD PRESSURE: 23 MMHG

## 2020-07-27 VITALS — SYSTOLIC BLOOD PRESSURE: 112 MMHG | DIASTOLIC BLOOD PRESSURE: 45 MMHG

## 2020-07-27 VITALS — SYSTOLIC BLOOD PRESSURE: 91 MMHG | DIASTOLIC BLOOD PRESSURE: 37 MMHG

## 2020-07-27 VITALS — SYSTOLIC BLOOD PRESSURE: 71 MMHG | DIASTOLIC BLOOD PRESSURE: 33 MMHG

## 2020-07-27 VITALS — DIASTOLIC BLOOD PRESSURE: 39 MMHG | SYSTOLIC BLOOD PRESSURE: 92 MMHG

## 2020-07-27 VITALS — SYSTOLIC BLOOD PRESSURE: 113 MMHG | DIASTOLIC BLOOD PRESSURE: 43 MMHG

## 2020-07-27 VITALS — DIASTOLIC BLOOD PRESSURE: 45 MMHG | SYSTOLIC BLOOD PRESSURE: 104 MMHG

## 2020-07-27 VITALS — DIASTOLIC BLOOD PRESSURE: 56 MMHG | SYSTOLIC BLOOD PRESSURE: 153 MMHG

## 2020-07-27 VITALS — DIASTOLIC BLOOD PRESSURE: 34 MMHG | SYSTOLIC BLOOD PRESSURE: 80 MMHG

## 2020-07-27 VITALS — DIASTOLIC BLOOD PRESSURE: 35 MMHG | SYSTOLIC BLOOD PRESSURE: 78 MMHG

## 2020-07-27 VITALS — DIASTOLIC BLOOD PRESSURE: 44 MMHG | SYSTOLIC BLOOD PRESSURE: 131 MMHG

## 2020-07-27 VITALS — BODY MASS INDEX: 28.24 KG/M2 | WEIGHT: 159.39 LBS | HEIGHT: 62.99 IN

## 2020-07-27 VITALS — SYSTOLIC BLOOD PRESSURE: 113 MMHG | DIASTOLIC BLOOD PRESSURE: 47 MMHG

## 2020-07-27 VITALS — DIASTOLIC BLOOD PRESSURE: 29 MMHG | SYSTOLIC BLOOD PRESSURE: 69 MMHG

## 2020-07-27 DIAGNOSIS — Z53.31: ICD-10-CM

## 2020-07-27 DIAGNOSIS — I63.9: ICD-10-CM

## 2020-07-27 DIAGNOSIS — E03.9: ICD-10-CM

## 2020-07-27 DIAGNOSIS — Z87.891: ICD-10-CM

## 2020-07-27 DIAGNOSIS — D64.9: ICD-10-CM

## 2020-07-27 DIAGNOSIS — Z95.5: ICD-10-CM

## 2020-07-27 DIAGNOSIS — J15.6: ICD-10-CM

## 2020-07-27 DIAGNOSIS — M34.9: ICD-10-CM

## 2020-07-27 DIAGNOSIS — J90: ICD-10-CM

## 2020-07-27 DIAGNOSIS — G62.9: ICD-10-CM

## 2020-07-27 DIAGNOSIS — J96.01: ICD-10-CM

## 2020-07-27 DIAGNOSIS — E87.4: ICD-10-CM

## 2020-07-27 DIAGNOSIS — K21.9: ICD-10-CM

## 2020-07-27 DIAGNOSIS — M06.9: ICD-10-CM

## 2020-07-27 DIAGNOSIS — I08.3: ICD-10-CM

## 2020-07-27 DIAGNOSIS — J44.9: ICD-10-CM

## 2020-07-27 DIAGNOSIS — Z79.01: ICD-10-CM

## 2020-07-27 DIAGNOSIS — R57.8: ICD-10-CM

## 2020-07-27 DIAGNOSIS — I48.0: ICD-10-CM

## 2020-07-27 DIAGNOSIS — R00.1: ICD-10-CM

## 2020-07-27 DIAGNOSIS — I50.31: ICD-10-CM

## 2020-07-27 DIAGNOSIS — I65.23: ICD-10-CM

## 2020-07-27 DIAGNOSIS — R13.10: ICD-10-CM

## 2020-07-27 DIAGNOSIS — E87.6: ICD-10-CM

## 2020-07-27 DIAGNOSIS — I25.10: ICD-10-CM

## 2020-07-27 DIAGNOSIS — K55.9: Primary | ICD-10-CM

## 2020-07-27 DIAGNOSIS — I73.9: ICD-10-CM

## 2020-07-27 DIAGNOSIS — E78.5: ICD-10-CM

## 2020-07-27 DIAGNOSIS — Z86.73: ICD-10-CM

## 2020-07-27 DIAGNOSIS — D62: ICD-10-CM

## 2020-07-27 DIAGNOSIS — I11.0: ICD-10-CM

## 2020-07-27 DIAGNOSIS — Z66: ICD-10-CM

## 2020-07-27 DIAGNOSIS — Z51.5: ICD-10-CM

## 2020-07-27 DIAGNOSIS — E83.42: ICD-10-CM

## 2020-07-27 DIAGNOSIS — G81.94: ICD-10-CM

## 2020-07-27 LAB
ALBUMIN SERPL-MCNC: 3.1 GM/DL (ref 3.2–4.5)
ALP SERPL-CCNC: 113 U/L (ref 40–136)
ALT SERPL-CCNC: 37 U/L (ref 0–55)
AMORPH SED URNS QL MICRO: (no result) /LPF
APTT PPP: YELLOW S
ARTERIAL PATENCY WRIST A: (no result)
BACTERIA #/AREA URNS HPF: (no result) /HPF
BASE EXCESS STD BLDA CALC-SCNC: -6.8 MMOL/L (ref -2.5–2.5)
BASOPHILS # BLD AUTO: 0 10^3/UL (ref 0–0.1)
BASOPHILS # BLD AUTO: 0 10^3/UL (ref 0–0.1)
BASOPHILS NFR BLD AUTO: 0 % (ref 0–10)
BASOPHILS NFR BLD AUTO: 0 % (ref 0–10)
BDY SITE: (no result)
BILIRUB SERPL-MCNC: 0.5 MG/DL (ref 0.1–1)
BILIRUB UR QL STRIP: NEGATIVE
BODY TEMPERATURE: 35
BUN/CREAT SERPL: 16
BUN/CREAT SERPL: 23
CALCIUM SERPL-MCNC: 7.1 MG/DL (ref 8.5–10.1)
CALCIUM SERPL-MCNC: 8.2 MG/DL (ref 8.5–10.1)
CHLORIDE SERPL-SCNC: 101 MMOL/L (ref 98–107)
CHLORIDE SERPL-SCNC: 96 MMOL/L (ref 98–107)
CO2 BLDA CALC-SCNC: 24.1 MMOL/L (ref 21–31)
CO2 SERPL-SCNC: 18 MMOL/L (ref 21–32)
CO2 SERPL-SCNC: 19 MMOL/L (ref 21–32)
CREAT SERPL-MCNC: 0.7 MG/DL (ref 0.6–1.3)
CREAT SERPL-MCNC: 0.77 MG/DL (ref 0.6–1.3)
EOSINOPHIL # BLD AUTO: 0 10^3/UL (ref 0–0.3)
EOSINOPHIL # BLD AUTO: 0.1 10^3/UL (ref 0–0.3)
EOSINOPHIL NFR BLD AUTO: 0 % (ref 0–10)
EOSINOPHIL NFR BLD AUTO: 1 % (ref 0–10)
ERYTHROCYTE [DISTWIDTH] IN BLOOD BY AUTOMATED COUNT: 14 % (ref 10–14.5)
ERYTHROCYTE [DISTWIDTH] IN BLOOD BY AUTOMATED COUNT: 14.1 % (ref 10–14.5)
FIBRINOGEN PPP-MCNC: CLEAR MG/DL
GFR SERPLBLD BASED ON 1.73 SQ M-ARVRAT: > 60 ML/MIN
GFR SERPLBLD BASED ON 1.73 SQ M-ARVRAT: > 60 ML/MIN
GLUCOSE SERPL-MCNC: 144 MG/DL (ref 70–105)
GLUCOSE SERPL-MCNC: 186 MG/DL (ref 70–105)
GLUCOSE UR STRIP-MCNC: NEGATIVE MG/DL
HCT VFR BLD CALC: 17 % (ref 35–52)
HCT VFR BLD CALC: 25 % (ref 35–52)
HGB BLD-MCNC: 5.4 G/DL (ref 11.5–16)
HGB BLD-MCNC: 8.2 G/DL (ref 11.5–16)
HYALINE CASTS #/AREA URNS LPF: (no result) /LPF
INHALED O2 FLOW RATE: (no result) L/MIN
INR PPP: 2.4 (ref 0.8–1.4)
INR PPP: 2.8 (ref 0.8–1.4)
KETONES UR QL STRIP: NEGATIVE
LEUKOCYTE ESTERASE UR QL STRIP: (no result)
LYMPHOCYTES # BLD AUTO: 0.3 X 10^3 (ref 1–4)
LYMPHOCYTES # BLD AUTO: 1.7 X 10^3 (ref 1–4)
LYMPHOCYTES NFR BLD AUTO: 14 % (ref 12–44)
LYMPHOCYTES NFR BLD AUTO: 3 % (ref 12–44)
MAGNESIUM SERPL-MCNC: 1.4 MG/DL (ref 1.6–2.4)
MANUAL DIFFERENTIAL PERFORMED BLD QL: NO
MCH RBC QN AUTO: 31 PG (ref 25–34)
MCH RBC QN AUTO: 31 PG (ref 25–34)
MCHC RBC AUTO-ENTMCNC: 33 G/DL (ref 32–36)
MCHC RBC AUTO-ENTMCNC: 33 G/DL (ref 32–36)
MCV RBC AUTO: 94 FL (ref 80–99)
MCV RBC AUTO: 96 FL (ref 80–99)
MONOCYTES # BLD AUTO: 0.4 X 10^3 (ref 0–1)
MONOCYTES # BLD AUTO: 1.4 X 10^3 (ref 0–1)
MONOCYTES NFR BLD AUTO: 11 % (ref 0–12)
MONOCYTES NFR BLD AUTO: 4 % (ref 0–12)
MONOCYTES NFR BLD: 1 %
NEUTROPHILS # BLD AUTO: 10.8 X 10^3 (ref 1.8–7.8)
NEUTROPHILS # BLD AUTO: 9.3 X 10^3 (ref 1.8–7.8)
NEUTROPHILS NFR BLD AUTO: 74 % (ref 42–75)
NEUTROPHILS NFR BLD AUTO: 93 % (ref 42–75)
NEUTS BAND NFR BLD MANUAL: 78 %
NEUTS BAND NFR BLD: 18 %
NITRITE UR QL STRIP: NEGATIVE
PCO2 BLDA: 69 MMHG (ref 35–45)
PH BLDA: 7.11 [PH] (ref 7.37–7.43)
PH UR STRIP: 6 [PH] (ref 5–9)
PHOSPHATE SERPL-MCNC: 5.3 MG/DL (ref 2.3–4.7)
PLATELET # BLD: 395 10^3/UL (ref 130–400)
PLATELET # BLD: 545 10^3/UL (ref 130–400)
PMV BLD AUTO: 8.4 FL (ref 7.4–10.4)
PMV BLD AUTO: 8.7 FL (ref 7.4–10.4)
PO2 BLDA: 100 MMHG (ref 79–93)
POTASSIUM SERPL-SCNC: 3.9 MMOL/L (ref 3.6–5)
POTASSIUM SERPL-SCNC: 4.1 MMOL/L (ref 3.6–5)
PROT SERPL-MCNC: 5.8 GM/DL (ref 6.4–8.2)
PROT UR QL STRIP: NEGATIVE
PROTHROMBIN TIME: 26.9 SEC (ref 12.2–14.7)
PROTHROMBIN TIME: 29.5 SEC (ref 12.2–14.7)
RBC #/AREA URNS HPF: (no result) /HPF
RBC MORPH BLD: NORMAL
SAO2 % BLDA FROM PO2: 96 % (ref 94–100)
SODIUM SERPL-SCNC: 128 MMOL/L (ref 135–145)
SODIUM SERPL-SCNC: 131 MMOL/L (ref 135–145)
SP GR UR STRIP: 1.01 (ref 1.02–1.02)
VARIANT LYMPHS NFR BLD MANUAL: 3 %
VENTILATION MODE VENT: YES
WBC # BLD AUTO: 11.6 10^3/UL (ref 4.3–11)
WBC # BLD AUTO: 12.5 10^3/UL (ref 4.3–11)
WBC #/AREA URNS HPF: (no result) /HPF

## 2020-07-27 PROCEDURE — 83605 ASSAY OF LACTIC ACID: CPT

## 2020-07-27 PROCEDURE — 0DBH0ZZ EXCISION OF CECUM, OPEN APPROACH: ICD-10-PCS | Performed by: SURGERY

## 2020-07-27 PROCEDURE — 86920 COMPATIBILITY TEST SPIN: CPT

## 2020-07-27 PROCEDURE — 85007 BL SMEAR W/DIFF WBC COUNT: CPT

## 2020-07-27 PROCEDURE — 71045 X-RAY EXAM CHEST 1 VIEW: CPT

## 2020-07-27 PROCEDURE — 5A1945Z RESPIRATORY VENTILATION, 24-96 CONSECUTIVE HOURS: ICD-10-PCS | Performed by: INTERNAL MEDICINE

## 2020-07-27 PROCEDURE — 85018 HEMOGLOBIN: CPT

## 2020-07-27 PROCEDURE — 36415 COLL VENOUS BLD VENIPUNCTURE: CPT

## 2020-07-27 PROCEDURE — 85027 COMPLETE CBC AUTOMATED: CPT

## 2020-07-27 PROCEDURE — 84478 ASSAY OF TRIGLYCERIDES: CPT

## 2020-07-27 PROCEDURE — 74177 CT ABD & PELVIS W/CONTRAST: CPT

## 2020-07-27 PROCEDURE — 87040 BLOOD CULTURE FOR BACTERIA: CPT

## 2020-07-27 PROCEDURE — 96374 THER/PROPH/DIAG INJ IV PUSH: CPT

## 2020-07-27 PROCEDURE — 93306 TTE W/DOPPLER COMPLETE: CPT

## 2020-07-27 PROCEDURE — 85014 HEMATOCRIT: CPT

## 2020-07-27 PROCEDURE — 87070 CULTURE OTHR SPECIMN AEROBIC: CPT

## 2020-07-27 PROCEDURE — 87635 SARS-COV-2 COVID-19 AMP PRB: CPT

## 2020-07-27 PROCEDURE — 80053 COMPREHEN METABOLIC PANEL: CPT

## 2020-07-27 PROCEDURE — 80202 ASSAY OF VANCOMYCIN: CPT

## 2020-07-27 PROCEDURE — 0DTJ0ZZ RESECTION OF APPENDIX, OPEN APPROACH: ICD-10-PCS | Performed by: SURGERY

## 2020-07-27 PROCEDURE — 94799 UNLISTED PULMONARY SVC/PX: CPT

## 2020-07-27 PROCEDURE — 83880 ASSAY OF NATRIURETIC PEPTIDE: CPT

## 2020-07-27 PROCEDURE — 94664 DEMO&/EVAL PT USE INHALER: CPT

## 2020-07-27 PROCEDURE — 94640 AIRWAY INHALATION TREATMENT: CPT

## 2020-07-27 PROCEDURE — 0DBK0ZZ EXCISION OF ASCENDING COLON, OPEN APPROACH: ICD-10-PCS | Performed by: SURGERY

## 2020-07-27 PROCEDURE — 51702 INSERT TEMP BLADDER CATH: CPT

## 2020-07-27 PROCEDURE — 85025 COMPLETE CBC W/AUTO DIFF WBC: CPT

## 2020-07-27 PROCEDURE — 93005 ELECTROCARDIOGRAM TRACING: CPT

## 2020-07-27 PROCEDURE — 84145 PROCALCITONIN (PCT): CPT

## 2020-07-27 PROCEDURE — 87186 SC STD MICRODIL/AGAR DIL: CPT

## 2020-07-27 PROCEDURE — 87205 SMEAR GRAM STAIN: CPT

## 2020-07-27 PROCEDURE — 88307 TISSUE EXAM BY PATHOLOGIST: CPT

## 2020-07-27 PROCEDURE — 86900 BLOOD TYPING SEROLOGIC ABO: CPT

## 2020-07-27 PROCEDURE — 85610 PROTHROMBIN TIME: CPT

## 2020-07-27 PROCEDURE — 86901 BLOOD TYPING SEROLOGIC RH(D): CPT

## 2020-07-27 PROCEDURE — 81000 URINALYSIS NONAUTO W/SCOPE: CPT

## 2020-07-27 PROCEDURE — 80048 BASIC METABOLIC PNL TOTAL CA: CPT

## 2020-07-27 PROCEDURE — 70450 CT HEAD/BRAIN W/O DYE: CPT

## 2020-07-27 PROCEDURE — 96376 TX/PRO/DX INJ SAME DRUG ADON: CPT

## 2020-07-27 PROCEDURE — 82962 GLUCOSE BLOOD TEST: CPT

## 2020-07-27 PROCEDURE — 94003 VENT MGMT INPAT SUBQ DAY: CPT

## 2020-07-27 PROCEDURE — 96375 TX/PRO/DX INJ NEW DRUG ADDON: CPT

## 2020-07-27 PROCEDURE — 83735 ASSAY OF MAGNESIUM: CPT

## 2020-07-27 PROCEDURE — 0DBB0ZZ EXCISION OF ILEUM, OPEN APPROACH: ICD-10-PCS | Performed by: SURGERY

## 2020-07-27 PROCEDURE — 84132 ASSAY OF SERUM POTASSIUM: CPT

## 2020-07-27 PROCEDURE — 87077 CULTURE AEROBIC IDENTIFY: CPT

## 2020-07-27 PROCEDURE — 84484 ASSAY OF TROPONIN QUANT: CPT

## 2020-07-27 PROCEDURE — 94002 VENT MGMT INPAT INIT DAY: CPT

## 2020-07-27 PROCEDURE — 87081 CULTURE SCREEN ONLY: CPT

## 2020-07-27 PROCEDURE — 82805 BLOOD GASES W/O2 SATURATION: CPT

## 2020-07-27 PROCEDURE — 71260 CT THORAX DX C+: CPT

## 2020-07-27 PROCEDURE — 84100 ASSAY OF PHOSPHORUS: CPT

## 2020-07-27 PROCEDURE — 86850 RBC ANTIBODY SCREEN: CPT

## 2020-07-27 RX ADMIN — SODIUM CHLORIDE, SODIUM LACTATE, POTASSIUM CHLORIDE, AND CALCIUM CHLORIDE SCH MLS/HR: 600; 310; 30; 20 INJECTION, SOLUTION INTRAVENOUS at 22:05

## 2020-07-27 RX ADMIN — Medication SCH MLS/HR: at 20:58

## 2020-07-27 RX ADMIN — SODIUM CHLORIDE, SODIUM LACTATE, POTASSIUM CHLORIDE, AND CALCIUM CHLORIDE PRN MLS/HR: 600; 310; 30; 20 INJECTION, SOLUTION INTRAVENOUS at 19:51

## 2020-07-27 RX ADMIN — SODIUM CHLORIDE, SODIUM LACTATE, POTASSIUM CHLORIDE, AND CALCIUM CHLORIDE PRN MLS/HR: 600; 310; 30; 20 INJECTION, SOLUTION INTRAVENOUS at 19:00

## 2020-07-27 NOTE — DIAGNOSTIC IMAGING REPORT
PROCEDURE: CT chest, abdomen, and pelvis with contrast.



TECHNIQUE: Multiple contiguous axial images were obtained through

the chest, abdomen, and pelvis after the administration of

intravenous contrast. Auto Exposure Controls were utilized during

the CT exam to meet ALARA standards for radiation dose reduction.





INDICATION: Found down, mental status changes, confusion.



COMPARISON: None.



CT CHEST:

Moderate-sized bilateral pleural effusions are present with

dependent atelectasis. There is no focal infiltrate. No

pneumothorax is identified. The heart is slightly enlarged. There

is atherosclerotic disease seen throughout the coronary system.

There is advanced calcifications involving the mitral and aortic

valves. There is no pericardial effusion. Central airways are

grossly normal. Osseous structures are age-appropriate.



IMPRESSION: 

1. Moderate bilateral pleural effusions with dependent

atelectasis

2. Coronary artery, aortic and mitral valve disease.



CT ABDOMEN AND PELVIS.

Advanced atherosclerosis is seen throughout the abdominal aorta

and the origins of the visceral branch vessels. There is no

aneurysm. Suspect a degree of stenosis involving the bilateral

renal arteries and SMA. No obvious solid organ or bowel ischemia

is identified. The gallbladder retroperitoneum is grossly

unremarkable. The uterus is intact. Distal ureters and urinary

bladder are unremarkable. There is a Kim catheter within the

lumen of the bladder. The visualized osseous structures are

grossly unremarkable. There are no acute fractures.



IMPRESSION:

1. Advanced atherosclerotic disease. Stenosis is suspected

involving the origin of the SMA and bilateral renal arteries.

Please note, the study is limited due to motion number.

2. No solid organ or bowel ischemia.

3. No CT evidence for acute trauma within the abdomen or pelvis.



Dictated by: 



  Dictated on workstation # [id number]

## 2020-07-27 NOTE — CONSULTATION - SURGERY
History of Present Illness


History of Present Illness


Patient Consulted On(jimmy/time)


20


 17:51


Time Seen by Provider:  17:34


History of Present Illness


Surgery asked to consult regarding abdominal pain and anemia.





HPI per IM:  Pt is an 79yoCF with a PMH of aortic stenosis, COPD, CAD with rec

ent stenting, HTN, HLD, hypothyroidism, paroxysmal atrial fibrillation on 

chronic anticoagulation who presented to the ER due to hypoxia. She is unable to

provide much history and onlyanswered a few yes or no questions for me. She told

me she was sick and that she was not having pain. Otherwise all history obtained

from family and records. She was apparently in her normal state this morning 

(ambulatory and alert and oriented x4). She spoke to her son and then had lunch.

Sometime after lunch they found her in her room minimallyresponsive. There was 

concern for an episode of apnea as well. EMS was summoned and she was found to 

have oxygen saturations in the 60%. She was placed on a nonrebreather which 

brought her oxygen saturations up. She was taken for CT head as she is on 

Xarelto.





HPI per ED: To ER by EMS from nursing home with reports of altered mental status

and hypoxia. She was fine at lunchtime, up walking around and ate lunch 

normally. Nursing staff found all or in her room minimally responsive with a b

rief period of apnea. EMS arrived and found oxygen saturation to be 60% on room 

air, she was placed on a nonrebreather with increased to 100%. She just finished

antibiotics for a pneumonia 2-3 days ago. Follow-up with Dr. Bustillo from 

cardiology in [place] in regards to moderate to severe aortic stenosis. history 

of atrial fibrillation for which she is on Eliquis. COPD, recurrent right 

pleural effusion.


Timing/Duration:  1-2 Days


Severity:  Moderate


Associated Systoms:  Shortness of Air





When I spoke to pt up in ICU she could barely answer questions because of the 

pain and constant moaning, did get a lot of info from the son who is at bedside.

 Has never had pain like this before and no hx of anemia.  No hx of melena, 

hematochezia, hematemesis.





Allergies and Home Medications


Allergies


Coded Allergies:  


     pregabalin (Verified  Allergy, Unknown, 20)





Home Medications


Acetaminophen 325 Mg Tablet, 325-650 MG PO Q6H PRN for PAIN-MODERATE (5-7), 

(Reported)


Amiodarone HCl 200 Mg Tablet, 200 MG PO BID, (Reported)


Amlodipine Besylate 10 Mg Tablet, 10 MG PO DAILY


   Prescribed by: KEDAR TAVAREZ on 20 1432


Aspirin 81 Mg Tablet.dr, 81 MG PO DAILY, (Reported)


Gabapentin 800 Mg Tablet, 800 MG PO HS, (Reported)


Levothyroxine Sodium 50 Mcg Tablet, 50 MCG PO DAILY, (Reported)


Losartan Potassium 100 Mg Tablet, 100 MG PO DAILY, (Reported)


Polyethylene Glycol 3350 17 Gm Powd.pack, 17 GM PO DAILY PRN for CONSTIPATION-

2ND LINE, (Reported)


Pravastatin Sodium 20 Mg Tablet, 20 MG PO HS, (Reported)


Rivaroxaban 20 Mg Tablet, 20 MG PO DAILY, (Reported)


Tramadol HCl 50 Mg Tablet, 50 MG PO Q6H PRN for PAIN-MODERATE (5-7), (Reported)





Patient Home Medication List


Home Medication List Reviewed:  Yes





Past Medical-Social-Family Hx


Patient Social History


Alcohol Use:  Denies Use


Number of Drinks Today:  AA


Recreational Drug Use:  No


Smoking Status:  Former Smoker


Former Smoker, Quit:  1980


Type Used:  Cigarettes


2nd Hand Smoke Exposure:  Yes (1980)


Recent Foreign Travel:  No


Contact w/Someone Who Travel:  No


Recent Infectious Disease Expo:  No





Immunizations Up To Date


Tetanus Booster (TDap):  Unknown


Date of Pneumonia Vaccine:  Steven 10, 2018


Date of Influenza Vaccine:  Sep 12, 2019





Seasonal Allergies


Seasonal Allergies:  No





Surgeries


History of Surgeries:  Yes


Surgeries:  Eye Surgery, Orthopedic





Respiratory


History of Respiratory Disorde:  Yes (b/l pleural effusions with hx of 

thoracentesis)


Respiratory Disorders:  COPD





Cardiovascular


History of Cardiac Disorders:  Yes


Cardiac Disorders:  Atrial Fibrillation, Hypertension, Peripheral Vascular, 

Valvular Heart Disease





Neurological


History of Neurological Disord:  Yes


Neurological Disorders:  Neuropathy, TIA





Reproductive System


Pregnant:  No


Hx Reproductive Disorders:  No


GYN History:  Menopausal





Genitourinary


History of Genitourinary Disor:  No





Gastrointestinal


History of Gastrointestinal Di:  Yes (dysphagia)


Gastrointestinal Disorders:  Gastroesophageal Reflux





Musculoskeletal


History of Musculoskeletal Dis:  Yes (scleroderma)


Musculoskeletal Disorders:  Rheumatoid Arthritis





Endocrine


History of Endocrine Disorders:  Yes


Endocrine Disorders:  Hyperthyroidism





HEENT


HEENT Disorders:  Cataract





Cancer


History of Cancer:  No





Psychosocial


History of Psychiatric Problem:  No





Integumentary


History of Skin or Integumenta:  Yes (SCLERODERMA)





Blood Transfusions


History of Blood Disorders:  No


Adverse Reaction to a Blood Tr:  No





Family Medical History


Significant Family History:  Heart Disease (mother had afib), Stroke (mother 

 of stroke)





Review of Systems-General


Constitutional:  No chills, No diaphoresis; malaise, weakness


EENTM:  No blurred vision, No double vision, No mouth pain, No mouth swelling, 

No epistaxis


Respiratory:  cough, dyspnea on exertion; No hemoptysis; short of breath


Cardiovascular:  No chest pain; palpitations


Gastrointestinal:  abdominal pain; No jaundice; nausea; No vomiting


Genitourinary:  No dysuria, No frequency, No hematuria


Musculoskeletal:  joint pain, joint swelling, muscle pain, muscle stiffness


Skin:  No change in color, No change in hair/nails


Psychiatric/Neurological:  Denies Anxiety, Denies Depressed, Denies Seizure, 

Denies Tingling


Other


pt takes blood thinners and bleeds and bruises easily





Physical Exam-General Problems


Physical Exam


Vital Signs





Vital Signs - First Documented








 20





 14:12


 


Temp 36.5


 


Pulse 72


 


Resp 38


 


B/P (MAP) 153/56 (88)


 


Pulse Ox 100


 


O2 Delivery Non Rebreather


 


O2 Flow Rate 10.00





Capillary Refill : Less Than 3 Seconds


General Appearance:  severe distress, thin


Eyes:  Bilateral Eye PERRL, Bilateral Eye EOMI


HEENT:  No scleral icterus (R), No scleral icterus (L); other (edentulous)


Neck:  non-tender, supple


Respiratory:  respiratory distress, decreased breath sounds (at bases), 

accessory muscle use, crackles, wheezing


Cardiovascular:  bradycardia, systolic murmur


Gastrointestinal:  no organomegaly, distended, guarding, rebound, tenderness


Back:  no CVA tenderness, no vertebral tenderness


Extremities:  no pedal edema, no calf tenderness, normal capillary refill


Neurologic/Psychiatric:  alert, other (can't get pt to be still long enough to 

get good exam)


Skin:  ecchymosis (arms and legs), pallor


Lymphatic:  no adenopathy (neck, axilla or groin)





Data Review


Labs


Laboratory Tests


20 14:20: 


White Blood Count 12.5H, Red Blood Count 2.63L, Hemoglobin 8.2L, Hematocrit 25L,

Mean Corpuscular Volume 94, Mean Corpuscular Hemoglobin 31, Mean Corpuscular 

Hemoglobin Concent 33, Red Cell Distribution Width 14.1, Platelet Count 545H, 

Mean Platelet Volume 8.7, Neutrophils (%) (Auto) 74, Lymphocytes (%) (Auto) 14, 

Monocytes (%) (Auto) 11, Eosinophils (%) (Auto) 1, Basophils (%) (Auto) 0, 

Neutrophils # (Auto) 9.3H, Lymphocytes # (Auto) 1.7, Monocytes # (Auto) 1.4H, 

Eosinophils # (Auto) 0.1, Basophils # (Auto) 0.0, Prothrombin Time 29.5H, INR 

Comment 2.8H, Urine Color YELLOW, Urine Clarity CLEAR, Urine pH 6.0, Urine 

Specific Gravity 1.010L, Urine Protein NEGATIVE, Urine Glucose (UA) NEGATIVE, 

Urine Ketones NEGATIVE, Urine Nitrite NEGATIVE, Urine Bilirubin NEGATIVE, Urine 

Urobilinogen 0.2, Urine Leukocyte Esterase TRACEH, Urine RBC (Auto) NEGATIVE, 

Urine RBC NONE, Urine WBC 0-2, Urine Crystals PRESENTH, Urine Amorphous Sediment

FEW KAELYN URATESH, Urine Bacteria TRACE, Urine Casts PRESENT, Urine Hyaline Casts

5-10H, Urine Mucus NEGATIVE, Urine Culture Indicated NO, Sodium Level 128L, Pota

ssium Level 4.1, Chloride Level 96L, Carbon Dioxide Level 18L, Anion Gap 14, 

Blood Urea Nitrogen 12, Creatinine 0.77, Estimat Glomerular Filtration Rate > 

60, BUN/Creatinine Ratio 16, Glucose Level 186H, Lactic Acid Level 2.33*H, 

Calcium Level 8.2L, Corrected Calcium 8.9, Total Bilirubin 0.5, Aspartate Amino 

Transf (AST/SGOT) 67H, Alanine Aminotransferase (ALT/SGPT) 37, Alkaline Phos

phatase 113, Troponin I < 0.028, B-Type Natriuretic Peptide 186.2H, Total 

Protein 5.8L, Albumin 3.1L, Procalcitonin 0.05


20 16:37: Lactic Acid Level 1.51





Radiology


Date of Exam:20





CT CHEST/ABDOMEN/PELVIS W








PROCEDURE: CT chest, abdomen, and pelvis with contrast.





TECHNIQUE: Multiple contiguous axial images were obtained through


the chest, abdomen, and pelvis after the administration of


intravenous contrast. Auto Exposure Controls were utilized during


the CT exam to meet ALARA standards for radiation dose reduction.








INDICATION: Found down, mental status changes, confusion.





COMPARISON: None.





CT CHEST:


Moderate-sized bilateral pleural effusions are present with


dependent atelectasis. There is no focal infiltrate. No


pneumothorax is identified. The heart is slightly enlarged. There


is atherosclerotic disease seen throughout the coronary system.


There is advanced calcifications involving the mitral and aortic


valves. There is no pericardial effusion. Central airways are


grossly normal. Osseous structures are age-appropriate.





IMPRESSION: 


1. Moderate bilateral pleural effusions with dependent


atelectasis


2. Coronary artery, aortic and mitral valve disease.





CT ABDOMEN AND PELVIS.


Advanced atherosclerosis is seen throughout the abdominal aorta


and the origins of the visceral branch vessels. There is no


aneurysm. Suspect a degree of stenosis involving the bilateral


renal arteries and SMA. No obvious solid organ or bowel ischemia


is identified. The gallbladder retroperitoneum is grossly


unremarkable. The uterus is intact. Distal ureters and urinary


bladder are unremarkable. There is a Kim catheter within the


lumen of the bladder. The visualized osseous structures are


grossly unremarkable. There are no acute fractures.





IMPRESSION:


1. Advanced atherosclerotic disease. Stenosis is suspected


involving the origin of the SMA and bilateral renal arteries.


Please note, the study is limited due to motion number.


2. No solid organ or bowel ischemia.


3. No CT evidence for acute trauma within the abdomen or pelvis.





Dictated by: 





  Dictated on workstation # [id number]








Dict:   20 [id number]


Trans:   20 [id number]


[facility] [id number]





Interpreted by:     VENU LONGORIA


Electronically signed by: VENU LONGORIA [id number]





Assessment/Plan


Severe Abd pain


Hypoxia


Hx of Afib and aortic stenosis





Pt's pain is uncontrollable and I am concerned about the possibility of ischemic

bowel.  Gastric ulcer with perforation or bleeding is less likely because CT 

does not show any free air of free fluid.  I discussed with her son and asked pt

what she wanted; we can wait and see if anything changes, do nothing or go to 

surgery.  She is at increased risk of bleeding and complications because she has

still been taking Xarelto; along with normal risks of surgery including but not 

limited to pain, infection, scar and damage to bowel.  Both son and pt want 

procedure because the pain is so bad and nothing is helping.  She has not eaten 

since breakfast and will get consent for Diagnostic Laparoscopy, possible 

laparotomy and possible bowel resection.  All questions answered to their 

satisfaction.





Clinical Quality Measures


DVT/VTE Risk/Contraindication:


Risk Factor Score Per Nursin


RFS Level Per Nursing on Admit:  4+=Very High











DAKSHA VELOZ DO               2020 17:56

## 2020-07-27 NOTE — PROGRESS NOTE-POST OPERATIVE
Post-Operative Progess Note


Surgeon (s)/Assistant (s)


Surgeon


DAKSHA VELOZ DO


Assistant:  Jeronimo





Pre-Operative Diagnosis


Severe abd pain, Anemia





Post-Operative Diagnosis





Bleeding Colon mass





Procedure & Operative Findings


Date of Procedure


7/27/20


Procedure Performed/Findings


Diag Lap switched to Open right colon resection


Anesthesia Type


GET





Estimated Blood Loss


Estimated blood loss (mL):  scant





Specimens/Packing


Specimens Removed


cecum, portion of Asc colon, portion of TI and DAKSHA Tristan DO               Jul 27, 2020 20:26

## 2020-07-27 NOTE — XMS REPORT
Continuity of Care Document

                             Created on: 2020



[facility]

External Reference #: [id number]

: 1940

Sex: Female



Demographics





                          Home Phone                [phone]

 

                          Preferred Language        Unknown

 

                          Marital Status            Unknown

 

                          M[employer]on Affiliation     Unknown

 

                          Race                      Unknown

 

                          Ethnic Group              Unknown





Author





                          Organization              Unknown

 

                          Address                   Unknown

 

                          Phone                     Unavailable



              



Allergies

      



             Active           Description           Code           Type         

  Severity   

                Reaction           Onset           Reported/Identified          

 

Relationship to Patient                 Clinical Status        

 

             Yes           ALBUTEROL SULFATE                                    

 SEVERE        

             DIZZINESS                                                   

      

 

             Yes           ALBUTEROL SULFATE                                    

 SEVERE        

             SEVERE                                                      

   

 

           Yes           DICYCLOMINE                                   SEVERE   

        

OTHER                                                               

 

           Yes           DICYCLOMINE                                   SEVERE   

        

SEVERE                                                               

 

             Yes           FLONASE ALLERGY RELIEF                               

      UNKNOWN  

             UNKNOWN                                                     

    

 

           Yes           PENICILLINS                                   UNKNOWN  

         

UNKNOWN                                                               

 

           Yes           PREDNISONE                                   UNKNOWN   

        

DIZZINESS                                                               

 

           Yes           PREDNISONE                                   UNKNOWN   

        

UNKNOWN                                                               

 

                Yes             STATINS-HMG-COA REDUCTASE INHIBITORS            

                      

                UNKNOWN           GI PROBLEMS - DIARRH                          

         

                                                             

 

                Yes             STATINS-HMG-COA REDUCTASE INHIBITORS            

                      

             UNKNOWN           UNKNOWN                                   

          

                                                 

 

             Yes           fluticasone           [id number]           Drug Aller

gy           

Unknown           N/A                        2015                         

  

     

 

             Yes           Lactase           [id number]           Drug Allergy  

         

Unknown           N/A                        2015                         

  

     

 

             Yes           Penicillins           [id number]           Drug Aller

gy           

Unknown           N/A                        2015                         

  

     

 

                Yes             No Known Drug Allergies           [id number]    

       Drug 

Allergy           Unknown           N/A                             2019  

      

                                                             

 

             Yes           pregabalin           [id number]           Drug Allerg

y           

Unknown           N/A                        2020                         

  

     



                                              



Medications

      



There is no data.                  



Problems

      



             Date Dx Coded           Attending           Type           Code    

       

Diagnosis                               Diagnosed By        

 

                1134           SALVADOR BAIRES MD           Ot          

    M34.9          

                          SYSTEMIC SCLEROSIS, UNSPECIFIED                    

 

                1134           SALVADOR BAIRES MD           Ot          

    R53.1          

                          WEAKNESS                           

 

           2010                       Ot           784.2                  

           

  

 

           2010                       Ot           786.50                 

           

   

 

           2010                       Ot           789.06                 

           

   

 

           2010                       Ot           E946.6                 

           

   

 

           2015                       Ot           285.9                  

           

  

 

           2015                       Ot           401.9                  

           

  

 

           2015                       Ot           786.09                 

           

   

 

           2015                       Ot           401.9                  

           

  

 

           2015                       Ot           786.09                 

           

   

 

           02/10/2015                       Ot           285.9                  

           

  

 

           02/10/2015                       Ot           401.9                  

           

  

 

           02/10/2015                       Ot           786.09                 

           

   

 

           02/10/2015                       Ot           401.9                  

           

  

 

           02/10/2015                       Ot           786.09                 

           

   

 

           2015                       Ot           787.20                 

           

   

 

                2015           LIZETTBAANNIE DO, SYLVIA D           Ot       

       780.79      

                                                             

 

                2015           DEFMARCELABAANNIE DO, SYLVIA D           Ot       

       786.05      

                                                             

 

                2015           GURJITFENBAUGH DO, SYLVIA D           Ot       

       786.50      

                                                             

 

                2015           DEFFENBAUGH DO, SYLVIA D           Ot       

       780.79      

                                                             

 

                2015           DEFFENBAUGH DO, SYLVIA D           Ot       

       786.05      

                                                             

 

                2015           LIZETTBAUGH DO, SYLVIA D           Ot       

       786.50      

                                                             

 

                2015           GURJITFENBAUGH DO, SYLVIA D           Ot       

       787.99      

                                                             

 

           06/10/2015                       Ot           285.9                  

           

  

 

           06/10/2015                       Ot           401.9                  

           

  

 

           06/10/2015                       Ot           786.09                 

           

   

 

           06/10/2015                       Ot           401.9                  

           

  

 

           06/10/2015                       Ot           786.09                 

           

   

 

                06/10/2015           LIZETTBAANNIE DO, SYLVIA D           Ot       

       440.20      

                                                             

 

           06/10/2015                       Ot           787.20                 

           

   

 

                06/10/2015           LIZETTBAANNIE DO, SYLVIA D           Ot       

       780.79      

                                                             

 

                06/10/2015           GURJITFENBAUGH DO, SYLVIA D           Ot       

       786.05      

                                                             

 

                06/10/2015           GURJITFENBAUGH DO, SYLVIA D           Ot       

       786.50      

                                                             

 

                06/10/2015           GURJITFENBAUGH DO, SYLVIA D           Ot       

       780.79      

                                                             

 

                06/10/2015           LIZETTBAUGH DO, SYLVIA D           Ot       

       786.05      

                                                             

 

                06/10/2015           GURJITFENBAUGH DO, SYLVIA D           Ot       

       786.50      

                                                             

 

                06/10/2015           LIZETTBAUGH DO, SYLVIA D           Ot       

       787.99      

                                                             

 

                06/15/2015           LIZETTBAANNIE DO, SYLVIA D           Ot       

       780.79      

                                                             

 

                06/15/2015           LIZETTBAANNIE DO, SYLVIA D           Ot       

       786.05      

                                                             

 

                06/15/2015           GURJITFENBAUGH DO, SYLVIA D           Ot       

       786.50      

                                                             

 

             2015           SIN BERRIOS DO           Ot           492.

8            

                                                 

 

             2015           SIN BERRIOS DO           Ot           710.

1            

                                                 

 

             2015           RAYMUNDO BEDOYA MD           Ot           710.

1            

                                                 

 

             2015           RAYMUNDO BEDOYA MD           Ot           786.

05           

                                                 

 

           2015                       Ot           285.9                  

           

  

 

           2015                       Ot           401.9                  

           

  

 

           2015                       Ot           786.09                 

           

   

 

           2015                       Ot           401.9                  

           

  

 

           2015                       Ot           786.09                 

           

   

 

                2015           FABBY DO, SYLVIA D           Ot       

       440.20      

                                                             

 

           2015                       Ot           787.20                 

           

   

 

                2015           FABBY DO, SYLVIA D           Ot       

       780.79      

                                                             

 

                2015           FABBY DO, SYLVIA D           Ot       

       786.05      

                                                             

 

                2015           FABBY DO, SYLVIA D           Ot       

       786.50      

                                                             

 

                2015           FABBY DO, SYLVIA D           Ot       

       780.79      

                                                             

 

                2015           FABBY DO, SYLVIA D           Ot       

       786.05      

                                                             

 

                2015           FABBY DO, SYLVIA D           Ot       

       786.50      

                                                             

 

                2015           FABBY DO, SYLVIA D           Ot       

       787.99      

                                                             

 

             2015           SIN BERRIOS DO           Ot           492.

8            

                                                 

 

             2015           SIN BERRIOS DO           Ot           710.

1            

                                                 

 

             2015           RAYMUNDO BEDOYA MD           Ot           710.

1            

                                                 

 

             2015           RAYMUNDO BEDOYA MD           Ot           786.

05           

                                                 

 

                2015           ROSLYN ALANIS MD           Ot      

        F17.211    

                          NICOTINE DEPENDENCE, CIGARETTES, IN CASSIDY              

      

 

                2015           ROSLYN ALANIS MD           Ot      

        I48.91     

                          UNSPECIFIED ATRIAL FIBRILLATION                    

 

                2015           ROSLYN ALANIS MD           Ot      

        M34.9      

                          SYSTEMIC SCLEROSIS, UNSPECIFIED                    

 

                2015           ROSLYN ALANIS MD           Ot      

        R07.89     

                          OTHER CHEST PAIN                    

 

                2015           ROSLYN ALANIS MD           Ot      

        R20.2      

                          PARESTHESIA OF SKIN                    

 

                2015           ROSLYN ALANIS MD           Ot      

        Z79.02     

                          LONG TERM (CURRENT) USE OF ANTITHROMBOTI              

      

 

                2015           ROSLYN ALANIS MD           Ot      

        Z79.82     

                          LONG TERM (CURRENT) USE OF ASPIRIN                    

 

             01/10/2016           ALICIA CHIANG MD           Ot           G45.

9           

TRANSIENT CEREBRAL ISCHEMIC ATTACK, UNSP                    

 

             01/10/2016           ALICIA CHIANG MD           Ot           Z79.

01           

LONG TERM (CURRENT) USE OF ANTICOAGULANT                    

 

                2016           SALVADOR BAIRES MD           Ot          

    M34.9          

                                                             

 

                2016           NAYELI CALLE, DELGADO COPELAND           Ot        

      R07.9        

                                                             

 

                2016           SALVADOR BAIRES MD           Ot          

    M62.81         

                          MUSCLE WEAKNESS (GENERALIZED)                    

 

                2016           DEQUAN CALLE, SALVADOR L           Ot          

    M79.604        

                          PAIN IN RIGHT LEG                    

 

                2016           DEQUAN CALLE, SALVADOR KOEHLER           Ot          

    M79.605        

                          PAIN IN LEFT LEG                    

 

                2016           DEQUAN CALLE, SALVADOR KOEHLER           Ot          

    M62.81         

                          MUSCLE WEAKNESS (GENERALIZED)                    

 

                2016           DEQUAN CALLE, SALVADOR KOEHLER           Ot          

    M79.604        

                          PAIN IN RIGHT LEG                    

 

                2016           DEQUAN CALLE, SALVADOR KOEHLER           Ot          

    M79.605        

                          PAIN IN LEFT LEG                    

 

             10/24/2017           SALVADOR BAIRES           A            401.0  

         

MALIGNANT ESSENTIAL HYPERTENSION                    

 

             10/24/2017           SALVADOR BAIRES           A            I10    

       

ESSENTIAL (PRIMARY) HYPERTENSION                    

 

             10/24/2017           SALVADOR BAIRES           W            272.4  

         OTHER

AND UNSPECIFIED HYPERLIPIDEMIA                    

 

             10/24/2017           SALVADOR BAIRES           A            401.0  

         

MALIGNANT ESSENTIAL HYPERTENSION                    

 

             10/24/2017           SALVADOR BAIRES           W            E78.5  

         

HYPERLIPIDEMIA, UNSPECIFIED                      

 

             10/24/2017           SALVADOR BAIRES           A            I10    

       

ESSENTIAL (PRIMARY) HYPERTENSION                    

 

             10/24/2017           SALVADOR BAIRES           W            272.4  

         OTHER

AND UNSPECIFIED HYPERLIPIDEMIA                    

 

             10/24/2017           SALVADOR BAIRES           A            401.0  

         

MALIGNANT ESSENTIAL HYPERTENSION                    

 

             10/24/2017           SALVADOR BAIRES           W            427.31 

          

ATRIAL FIBRILLATION                              

 

             10/24/2017           SALVADOR BAIRES           W            E78.5  

         

HYPERLIPIDEMIA, UNSPECIFIED                      

 

             10/24/2017           SALVADOR BAIRES           A            I10    

       

ESSENTIAL (PRIMARY) HYPERTENSION                    

 

             10/24/2017           SALVADOR BAIRES           W            I48.0  

         

PAROXYSMAL ATRIAL FIBRILLATION                    

 

             10/24/2017                        W            272.4           OTHE

R AND 

UNSPECIFIED HYPERLIPIDEMIA                       

 

             10/24/2017                        A            401.0           MICHAEL

GNANT ESSENTIAL

HYPERTENSION                                     

 

             10/24/2017                        W            427.31           ATR

IAL 

FIBRILLATION                                     

 

             10/24/2017                        W            696.1           OTHE

R PSORIASIS    

                                                 

 

             10/24/2017                        W            715.16           OST

EOARTHROSIS, 

LOCALIZED, PRIMARY, INVOLVING LOWER LEG                    

 

             10/24/2017                        W            E78.5           HYPE

RLIPIDEMIA, 

UNSPECIFIED                                      

 

             10/24/2017                        A            I10           ESSENT

IAL (PRIMARY) 

HYPERTENSION                                     

 

             10/24/2017                        W            I48.0           PARO

XYSMAL ATRIAL 

FIBRILLATION                                     

 

             10/24/2017                        W            L40.0           PSOR

IASIS VULGARIS 

                                                 

 

             10/24/2017                        W            M17.11           UNI

LATERAL PRIMARY

OSTEOARTHRITIS, RIGHT KNEE                       

 

             10/24/2017           SALVADOR BAIRES           W            272.4  

         OTHER

AND UNSPECIFIED HYPERLIPIDEMIA                    

 

             10/24/2017           SALVADOR BAIRES           A            401.0  

         

MALIGNANT ESSENTIAL HYPERTENSION                    

 

             10/24/2017           RITU BAIRESHEL           ADAM            427.31 

          

ATRIAL FIBRILLATION                              

 

             10/24/2017           RITU BAIRESNIKKIE MEDINA            E78.5  

         

HYPERLIPIDEMIA, UNSPECIFIED                      

 

             10/24/2017           SALVADOR BAIRES            I10    

       

ESSENTIAL (PRIMARY) HYPERTENSION                    

 

             10/24/2017           SALAVDOR BAIRES           ADAM            I48.0  

         

PAROXYSMAL ATRIAL FIBRILLATION                    

 

                2017           SYLVIA SEQUEIRA DO           Ot       

       440.20      

                          ATHEROSCLEROSIS NATIVE ARTERIES EXTREMIT              

      

 

             2017                        Ot           787.20           DYS

PHAGIA, 

UNSPECIFIED                                      

 

                2017           FABBY GAMEZ SYLVIA D           Ot       

       780.79      

                          OTH MALAISE FATIGUE                    

 

                2017           ALEJANDRO SEQUEIRA DORY D           Ot       

       786.05      

                          SHORTNESS OF BREATH                    

 

                2017           ALEJANDRO SEQUEIRA DORY D           Ot       

       786.50      

                          CHEST PAIN NOS                     

 

                2017           ALEJANDRO SEQUEIRA DORY D           Ot       

       780.79      

                          OTH MALAISE FATIGUE                    

 

                2017           ALEJANDRO SEQUEIRA DORY D           Ot       

       786.05      

                          SHORTNESS OF BREATH                    

 

                2017           FABBY GAMEZ SYLVIA D           Ot       

       786.50      

                          CHEST PAIN NOS                     

 

                2017           FABBY GAMEZ SYLVIA D           Ot       

       787.99      

                          OTHER GI SYSTEM SYMPTOMS                    

 

             2017           SIN BERRIOS DO           Ot           492.

8           

EMPHYSEMA NEC                                    

 

             2017           SIN BERRIOS DO           Ot           710.

1           

SYSTEMIC SCLEROSIS                               

 

             2017           RAYMUNDO BEDOYA MD           Ot           710.

1           

SYSTEMIC SCLEROSIS                               

 

             2017           RAYMUNDO BEDOYA MD           Ot           786.

05           

SHORTNESS OF BREATH                              

 

                2017           SALVADOR BAIRES MD           Ot          

    M34.9          

                          SYSTEMIC SCLEROSIS, UNSPECIFIED                    

 

                2017           NAYELI CALLE, DELGADO COPELAND           Ot        

      R07.9        

                          CHEST PAIN, UNSPECIFIED                    

 

                2017           ELIOT EDMONDSON           Ot      

        E78.2      

                          MIXED HYPERLIPIDEMIA                    

 

                2017           ELIOT EDMONDSON           Ot      

        I49.9      

                          CARDIAC ARRHYTHMIA, UNSPECIFIED                    

 

                2017           RAHUL GUAN DO           Ot           

   I48.91          

                          UNSPECIFIED ATRIAL FIBRILLATION                    

 

             2017           RAHUL GUAN DO           Ot           R31

.9           

HEMATURIA, UNSPECIFIED                           

 

                2017           DEBO, ELIOT E APRN           Ot      

        J43.9      

                          EMPHYSEMA, UNSPECIFIED                    

 

                2017           DEBO, ELIOT E APRN           Ot      

        J43.9      

                          EMPHYSEMA, UNSPECIFIED                    

 

                2017           DEBO, ELIOT E APRN           Ot      

        R06.00     

                          DYSPNEA, UNSPECIFIED                    

 

             2018                        A            564.1           IRRI

TABLE BOWEL 

SYNDROME                                         

 

             2018                        A            K58.9           IRRI

TABLE BOWEL 

SYNDROME WITHOUT DIARRHEA                        

 

                2018           DEBO, ELIOT E APRN           Ot      

        J44.9      

                          CHRONIC OBSTRUCTIVE PULMONARY DISEASE, U              

      

 

                2018           DEBO, ELIOT E APRN           Ot      

        J43.9      

                          EMPHYSEMA, UNSPECIFIED                    

 

                2018           DEBO, ELIOT E APRN           Ot      

        R06.00     

                          DYSPNEA, UNSPECIFIED                    

 

                2018           DEBO, ELIOT E APRN           Ot      

        J43.9      

                          EMPHYSEMA, UNSPECIFIED                    

 

                2018           DEBO, ELIOT E APRN           Ot      

        R06.00     

                          DYSPNEA, UNSPECIFIED                    

 

                2018           DEBO, ELIOT E APRN           Ot      

        J43.9      

                          EMPHYSEMA, UNSPECIFIED                    

 

                2018           DEBO, ELIOT E APRN           Ot      

        R06.00     

                          DYSPNEA, UNSPECIFIED                    

 

                2018           DEBO, ELIOT E APRN           Ot      

        J43.9      

                          EMPHYSEMA, UNSPECIFIED                    

 

                2018           DEBO, ELIOT E APRN           Ot      

        R06.00     

                          DYSPNEA, UNSPECIFIED                    

 

                04/10/2018           DEBO, ELIOT E APRN           Ot      

        J43.9      

                          EMPHYSEMA, UNSPECIFIED                    

 

                04/10/2018           DEBO, ELIOT E APRN           Ot      

        R06.00     

                          DYSPNEA, UNSPECIFIED                    

 

                2018           DEBO, ELIOT E APRN           Ot      

        J43.9      

                          EMPHYSEMA, UNSPECIFIED                    

 

                2018           DEBO, ELIOT E APRN           Ot      

        R06.00     

                          DYSPNEA, UNSPECIFIED                    

 

                2018           DEBO, ELIOT E APRN           Ot      

        J43.9      

                          EMPHYSEMA, UNSPECIFIED                    

 

                2018           DEBO, ELIOT E APRN           Ot      

        R06.00     

                          DYSPNEA, UNSPECIFIED                    

 

                2018           DEBO, ELIOT E APRN           Ot      

        J43.9      

                          EMPHYSEMA, UNSPECIFIED                    

 

                2018           DEBO, ELIOT E APRN           Ot      

        R06.00     

                          DYSPNEA, UNSPECIFIED                    

 

                2018           DEBO, ELIOT E APRN           Ot      

        J43.9      

                          EMPHYSEMA, UNSPECIFIED                    

 

                2018           DEBO, ELIOT E APRN           Ot      

        R06.00     

                          DYSPNEA, UNSPECIFIED                    

 

                2018           DEBO, ELIOT E APRN           Ot      

        J43.9      

                          EMPHYSEMA, UNSPECIFIED                    

 

                2018           DEBO, ELIOT E APRN           Ot      

        R06.00     

                          DYSPNEA, UNSPECIFIED                    

 

                2018           DEBO ELIOT E APRN           Ot      

        J43.9      

                          EMPHYSEMA, UNSPECIFIED                    

 

                2018           DEBO ELIOT E APRN           Ot      

        R06.00     

                          DYSPNEA, UNSPECIFIED                    

 

                2018           DEBO ELIOT E APRN           Ot      

        J43.9      

                          EMPHYSEMA, UNSPECIFIED                    

 

                2018           DEBO ELIOT E APRN           Ot      

        R06.00     

                          DYSPNEA, UNSPECIFIED                    

 

                2018           DEBO ELIOT E APRN           Ot      

        J43.9      

                          EMPHYSEMA, UNSPECIFIED                    

 

                2018           DEBO ELIOT E APRN           Ot      

        R06.00     

                          DYSPNEA, UNSPECIFIED                    

 

             2018           DEQUAN, SALVADOR           W            401.0  

         

MALIGNANT ESSENTIAL HYPERTENSION                    

 

             2018           BAIRES, SALVADOR           A            787.91 

          

DIARRHEA                                         

 

             2018           BAIRES, SALVADOR           W            I10    

       

ESSENTIAL (PRIMARY) HYPERTENSION                    

 

             2018           BAIRES, SALVADOR           A            R19.7  

         

DIARRHEA, UNSPECIFIED                            

 

             2018           BAIRES, SALVADOR           W            244.9  

         

UNSPECIFIED HYPOTHYROIDISM                       

 

             2018           BAIRES, SALVADOR           W            401.0  

         

MALIGNANT ESSENTIAL HYPERTENSION                    

 

             2018           BAIRES, SALVADOR           A            787.91 

          

DIARRHEA                                         

 

             2018           BAIRES, SALVADOR           W            E03.9  

         

HYPOTHYROIDISM, UNSPECIFIED                      

 

             2018           BAIRES, SALVADOR           W            I10    

       

ESSENTIAL (PRIMARY) HYPERTENSION                    

 

             2018           BAIRES, SALVADOR           A            R19.7  

         

DIARRHEA, UNSPECIFIED                            

 

             2018           BAIRES, SALVADOR           W            244.9  

         

UNSPECIFIED HYPOTHYROIDISM                       

 

             2018           BAIRES, SALVADOR           W            401.0  

         

MALIGNANT ESSENTIAL HYPERTENSION                    

 

             2018           BAIRES, SALVADOR           A            787.91 

          

DIARRHEA                                         

 

             2018           BAIRES, SALVADOR           W            E03.9  

         

HYPOTHYROIDISM, UNSPECIFIED                      

 

             2018           BAIRES, SALVADOR           W            I10    

       

ESSENTIAL (PRIMARY) HYPERTENSION                    

 

             2018           BAIRES, SALVADOR           A            R19.7  

         

DIARRHEA, UNSPECIFIED                            

 

             2018                        W            244.9           UNSP

ECIFIED 

HYPOTHYROIDISM                                   

 

             2018                        W            401.0           MICHAEL

GNANT ESSENTIAL

HYPERTENSION                                     

 

             2018                        A            787.91           LULI

RRHEA          

                                                 

 

             2018                        W            E03.9           HYPO

THYROIDISM, 

UNSPECIFIED                                      

 

             2018                        W            I10           ESSENT

IAL (PRIMARY) 

HYPERTENSION                                     

 

             2018                        A            R19.7           DIAR

GRISELDA, 

UNSPECIFIED                                      

 

             2018           DEQUAN, SALVADOR           W            244.9  

         

UNSPECIFIED HYPOTHYROIDISM                       

 

             2018           BAIRES, SALVADOR           W            401.0  

         

MALIGNANT ESSENTIAL HYPERTENSION                    

 

             2018           BAIRES, SALVADOR           A            787.91 

          

DIARRHEA                                         

 

             2018           BAIRES, SALVADOR           W            E03.9  

         

HYPOTHYROIDISM, UNSPECIFIED                      

 

             2018           BAIRES, SALVADOR           W            I10    

       

ESSENTIAL (PRIMARY) HYPERTENSION                    

 

             2018           BAIRES, SALVADOR           A            R19.7  

         

DIARRHEA, UNSPECIFIED                            

 

             2018           BAIRES, SALVADOR           A            787.91 

          

DIARRHEA                                         

 

             2018           BAIRES, SALVADOR           A            R19.7  

         

DIARRHEA, UNSPECIFIED                            

 

             2018           BAIRES, SALVADOR           A            787.91 

          

DIARRHEA                                         

 

             2018           BAIRES, SALVADOR           A            R19.7  

         

DIARRHEA, UNSPECIFIED                            

 

                2018           DEQUAN CALLE, SALVADOR L           Ot          

    R19.7          

                          DIARRHEA, UNSPECIFIED                    

 

                2018           DEQUAN CALLE, SALVADOR L           Ot          

    K52.9          

                          NONINFECTIVE GASTROENTERITIS AND COLITIS              

      

 

                2018           DEQUAN CALLE, SALVADOR L           Ot          

    R19.7          

                          DIARRHEA, UNSPECIFIED                    

 

             2018           BAIRES, SALVADOR           A            696.1  

         OTHER

PSORIASIS                                        

 

             2018           BAIRES, SALVADOR           W            729.5  

         PAIN 

IN LIMB                                          

 

             2018           BAIRES, SALVADOR           W            782.3  

         EDEMA

                                                 

 

             2018           BAIRES, SALVADOR           A            L40.0  

         

PSORIASIS VULGARIS                               

 

             2018           BAIRES, SALVADOR           W            M79.673

           

PAIN IN UNSPECIFIED FOOT                         

 

             2018           BAIRES, SALVADOR           W            R60.0  

         

LOCALIZED EDEMA                                  

 

             2018           DEQUAN SALVADOR           A            696.1  

         OTHER

PSORIASIS                                        

 

             2018           DEQUAN SALVADOR           W            729.5  

         PAIN 

IN LIMB                                          

 

             2018           BAIRES, SALVADOR           W            782.3  

         EDEMA

                                                 

 

             2018           BAIRES, SALVADOR           W            787.91 

          

DIARRHEA                                         

 

             2018           BAIRES, SALVADOR           W            K52.9  

         

NONINFECTIVE GASTROENTERITIS AND COLITIS, UNSPECIFIED                    

 

             2018           SALVADOR BAIRES           A            L40.0  

         

PSORIASIS VULGARIS                               

 

             2018           SALVADOR BAIRES           W            M79.673

           

PAIN IN UNSPECIFIED FOOT                         

 

             2018           SALVADOR BAIRES           W            R60.0  

         

LOCALIZED EDEMA                                  

 

             2018           SALVADOR BAIRES           A            696.1  

         OTHER

PSORIASIS                                        

 

             2018           RITU BAIRESHEL           W            729.5  

         PAIN 

IN LIMB                                          

 

             2018           RITU BAIRESHEL           W            782.3  

         EDEMA

                                                 

 

             2018           DEQUAN, SALVADOR           W            787.91 

          

DIARRHEA                                         

 

             2018           SALVADOR BAIRES           W            K52.9  

         

NONINFECTIVE GASTROENTERITIS AND COLITIS, UNSPECIFIED                    

 

             2018           SALVADOR BAIRES           A            L40.0  

         

PSORIASIS VULGARIS                               

 

             2018           SALVADOR BAIRES           W            M79.673

           

PAIN IN UNSPECIFIED FOOT                         

 

             2018           SALVADOR BAIRES           W            R60.0  

         

LOCALIZED EDEMA                                  

 

             2018           SALVADOR BAIRES           A            696.1  

         OTHER

PSORIASIS                                        

 

             2018           SALVADOR BAIRES           W            729.5  

         PAIN 

IN LIMB                                          

 

             2018           RITU BAIRESHEL           W            782.3  

         EDEMA

                                                 

 

             2018           DEQUAN, SALVADOR           W            787.91 

          

DIARRHEA                                         

 

             2018           RITU BAIRESHEL           W            794.5  

         

NONSPECIFIC ABNORMAL RESULTS OF FUNCTION STUDY OF THYROID                    

 

             2018           SALVADOR BAIRES           W            K52.9  

         

NONINFECTIVE GASTROENTERITIS AND COLITIS, UNSPECIFIED                    

 

             2018           SALVADOR BAIRES           A            L40.0  

         

PSORIASIS VULGARIS                               

 

             2018           SALVADOR BAIRES           W            M79.673

           

PAIN IN UNSPECIFIED FOOT                         

 

             2018           SALVADOR BAIRES           W            R60.0  

         

LOCALIZED EDEMA                                  

 

             2018           SALVADOR BAIRES           W            R94.6  

         

ABNORMAL RESULTS OF THYROID FUNCTION STUDIES                    

 

             2018                        A            696.1           OTHE

R PSORIASIS    

                                                 

 

             2018                        W            729.5           PAIN

 IN LIMB       

                                                 

 

           2018                       W           782.3           EDEMA   

           

     

 

             2018                        W            787.91           LULI

RRHEA          

                                                 

 

             2018                        W            794.5           NONS

PECIFIC 

ABNORMAL RESULTS OF FUNCTION STUDY OF THYROID                    

 

             2018                        W            K52.9           PAXTON

NFECTIVE 

GASTROENTERITIS AND COLITIS, UNSPECIFIED                    

 

             2018                        A            L40.0           PSOR

IASIS VULGARIS 

                                                 

 

             2018                        W            M79.673           PA

IN IN 

UNSPECIFIED FOOT                                 

 

             2018                        W            R60.0           LOCA

LIZED EDEMA    

                                                 

 

             2018                        W            R94.6           ABNO

RMAL RESULTS OF

THYROID FUNCTION STUDIES                         

 

             2018           RITU BAIRESHEL           A            696.1  

         OTHER

PSORIASIS                                        

 

             2018           SALVADOR BAIRES           W            729.5  

         PAIN 

IN LIMB                                          

 

             2018           SALVADOR BAIRES           W            782.3  

         EDEMA

                                                 

 

             2018           SALVADOR BAIRES           W            787.91 

          

DIARRHEA                                         

 

             2018           SALVADOR BAIRES           W            794.5  

         

NONSPECIFIC ABNORMAL RESULTS OF FUNCTION STUDY OF THYROID                    

 

             2018           SALVADOR BAIRES           W            K52.9  

         

NONINFECTIVE GASTROENTERITIS AND COLITIS, UNSPECIFIED                    

 

             2018           BAIRESRITUSALVADOR           A            L40.0  

         

PSORIASIS VULGARIS                               

 

             2018           SALVADOR BAIRES           W            M79.673

           

PAIN IN UNSPECIFIED FOOT                         

 

             2018           RITU BAIRESHEL           W            R60.0  

         

LOCALIZED EDEMA                                  

 

             2018           SALVADOR BAIRES           W            R94.6  

         

ABNORMAL RESULTS OF THYROID FUNCTION STUDIES                    

 

                2018           SALVADOR BAIRES MD L           Ot          

    L29.8          

                          OTHER PRURITUS                     

 

                2018           SALVADOR BAIRES MD L           Ot          

    Z87.2          

                          PERSONAL HISTORY OF DISEASES OF THE SKIN              

      

 

                2018           SALVADOR BAIRES MD L           Ot          

    K52.9          

                          NONINFECTIVE GASTROENTERITIS AND COLITIS              

      

 

                07/10/2018           SALVADOR BAIRES MD L           Ot          

    L29.8          

                          OTHER PRURITUS                     

 

                07/10/2018           SALVADOR BAIRES MD L           Ot          

    Z87.2          

                          PERSONAL HISTORY OF DISEASES OF THE SKIN              

      

 

                2018           ELIOT LONG APRN           Ot    

          I48.91   

                          UNSPECIFIED ATRIAL FIBRILLATION                    

 

             2018                        W            356.9           UNSP

ECIFIED 

IDIOPATHIC PERIPHERAL NEUROPATHY                    

 

             2018                        A            427.32           ATR

IAL FLUTTER    

                                                 

 

             2018                        W            G64           OTHER 

DISORDERS OF 

PERIPHERAL NERVOUS SYSTEM                        

 

             2018                        A            I48.2           

SANJUANA ATRIAL 

FIBRILLATION                                     

 

             2018                        W            272.4           OTHE

R AND 

UNSPECIFIED HYPERLIPIDEMIA                       

 

             2018                        W            401.0           MICHAEL

GNANT ESSENTIAL

HYPERTENSION                                     

 

             2018                        W            427.31           ATR

IAL 

FIBRILLATION                                     

 

             2018                        W            E78.5           HYPE

RLIPIDEMIA, 

UNSPECIFIED                                      

 

             2018                        W            I10           ESSENT

IAL (PRIMARY) 

HYPERTENSION                                     

 

             2018                        W            I48.0           PARO

XYSMAL ATRIAL 

FIBRILLATION                                     

 

                2018           SALVADOR BAIRES MD           Ot          

    M81.0          

                          AGE-RELATED OSTEOPOROSIS W/O CURRENT PAT              

      

 

                2018           SALVADOR BAIRES MD           Ot          

    M85.88         

                          OT DISRD OF BONE DENSITY AND STRUCTURE,              

      

 

                2018           SALVADOR BAIRES MD           Ot          

    Z12.31         

                          ENCNTR SCREEN MAMMOGRAM FOR MALIGNANT NE              

      

 

                2018           SALVADOR BAIRES MD           Ot          

    Z13.820        

                          ENCOUNTER FOR SCREENING FOR OSTEOPOROSIS              

      

 

                2018           SALVADOR BAIRES MD           Ot          

    Z78.0          

                          ASYMPTOMATIC MENOPAUSAL STATE                    

 

                2018           SALVADOR BAIRES MD           Ot          

    M81.0          

                          AGE-RELATED OSTEOPOROSIS W/O CURRENT PAT              

      

 

                2018           SALVADOR BAIRES MD           Ot          

    M85.88         

                          OT DISRD OF BONE DENSITY AND STRUCTURE,              

      

 

                2018           SALVADOR BAIRES MD           Ot          

    Z12.31         

                          ENCNTR SCREEN MAMMOGRAM FOR MALIGNANT NE              

      

 

                2018           SALVADOR BAIRES MD           Ot          

    Z13.820        

                          ENCOUNTER FOR SCREENING FOR OSTEOPOROSIS              

      

 

                2018           SALVADOR BAIRES MD           Ot          

    Z78.0          

                          ASYMPTOMATIC MENOPAUSAL STATE                    

 

                2018           SALVADOR BAIRES MD           Ot          

    M81.0          

                          AGE-RELATED OSTEOPOROSIS W/O CURRENT PAT              

      

 

                2018           SALVADOR BAIRES MD           Ot          

    M85.88         

                          OT DISRD OF BONE DENSITY AND STRUCTURE,              

      

 

                2018           SALVADOR BAIRES MD           Ot          

    Z12.31         

                          ENCNTR SCREEN MAMMOGRAM FOR MALIGNANT NE              

      

 

                2018           SALVADOR BAIRES MD           Ot          

    Z13.820        

                          ENCOUNTER FOR SCREENING FOR OSTEOPOROSIS              

      

 

                2018           SALVADOR BAIRES MD           Ot          

    Z78.0          

                          ASYMPTOMATIC MENOPAUSAL STATE                    

 

             10/08/2018                        W            427.32           ATR

IAL FLUTTER    

                                                 

 

             10/08/2018                        W            496           CHRONI

C AIRWAY 

OBSTRUCTION, NOT ELSEWHERE CLASSIFIED                    

 

             10/08/2018                        W            I48.2           

SANJUANA ATRIAL 

FIBRILLATION                                     

 

             10/08/2018                        W            J44.9           

SANJUANA OBSTRUCTIVE

PULMONARY DISEASE, UNSPECIFIED                    

 

                2018           SYLVIA SEQUEIRA DO           Ot       

       440.20      

                          ATHEROSCLEROSIS NATIVE ARTERIES EXTREMIT              

      

 

             2018                        Ot           787.20           DYS

PHAGIA, 

UNSPECIFIED                                      

 

                2018           FABBY GAMEZALEJANDRORY D           Ot       

       780.79      

                          OTH MALAISE FATIGUE                    

 

                2018           DEFFENBAANNIE GAMEZ, SYLVIA D           Ot       

       786.05      

                          SHORTNESS OF BREATH                    

 

                2018           DEFFENBAANNIE DOALEJANDRORY D           Ot       

       786.50      

                          CHEST PAIN NOS                     

 

                2018           GURJITFENBAANNIE DOALEJANDRORY D           Ot       

       780.79      

                          OTH MALAISE FATIGUE                    

 

                2018           GURJITFENBAANNIE DOALEJANDRORY D           Ot       

       786.05      

                          SHORTNESS OF BREATH                    

 

                2018           DEFFENBAUGH DO, SYLVIA D           Ot       

       786.50      

                          CHEST PAIN NOS                     

 

                2018           GURJITFENBAALEJANDRO VALENCIA DORY D           Ot       

       787.99      

                          OTHER GI SYSTEM SYMPTOMS                    

 

             2018           SIN BERRIOS DO           Ot           492.

8           

EMPHYSEMA NEC                                    

 

             2018           SIN BERRIOS DO           Ot           710.

1           

SYSTEMIC SCLEROSIS                               

 

             2018           AURELIANO CALLE, RAYMUNDO HARDEN           Ot           710.

1           

SYSTEMIC SCLEROSIS                               

 

             2018           AURELIANO CALLE, RAYMUNDO HARDEN           Ot           786.

05           

SHORTNESS OF BREATH                              

 

                2018           DEQUAN CALLE, SALVADOR KOEHLER           Ot          

    M34.9          

                          SYSTEMIC SCLEROSIS, UNSPECIFIED                    

 

                2018           NAYELI CALLE, DELAGDO COPELAND           Ot        

      R07.9        

                          CHEST PAIN, UNSPECIFIED                    

 

                2018           ELIOT EDMONDSON APRN           Ot      

        J43.9      

                          EMPHYSEMA, UNSPECIFIED                    

 

                2018           ELIOT EDMONDSON APRN           Ot      

        R06.00     

                          DYSPNEA, UNSPECIFIED                    

 

                2018           ELIOT EDMONDSON APRN           Ot      

        J43.9      

                          EMPHYSEMA, UNSPECIFIED                    

 

                2018           LANGEVIN DO, RAHUL           Ot           

   I48.91          

                          UNSPECIFIED ATRIAL FIBRILLATION                    

 

             2018           LANGEVIN DO, RAHUL           Ot           R31

.9           

HEMATURIA, UNSPECIFIED                           

 

                2018           ELIOT EDMONDSON APRN           Ot      

        J44.9      

                          CHRONIC OBSTRUCTIVE PULMONARY DISEASE, U              

      

 

                2018           ELIOT EDMONDSON           Ot      

        J43.9      

                          EMPHYSEMA, UNSPECIFIED                    

 

                2018           ELIOT EDMONDSON APRN           Ot      

        R06.00     

                          DYSPNEA, UNSPECIFIED                    

 

                2018           DEQUAN CALLE, SALVADOR KOEHLER           Ot          

    R19.7          

                          DIARRHEA, UNSPECIFIED                    

 

                2018           DEQUAN CALLE, SALVADOR KOEHLER           Ot          

    K52.9          

                          NONINFECTIVE GASTROENTERITIS AND COLITIS              

      

 

                2018           SALVADOR BAIRES MD           Ot          

    L29.8          

                          OTHER PRURITUS                     

 

                2018           SALVADOR BAIRES MD, Ot          

    Z87.2          

                          PERSONAL HISTORY OF DISEASES OF THE SKIN              

      

 

                2018           ELIOT LONG DANIS           Ot    

          I48.91   

                          UNSPECIFIED ATRIAL FIBRILLATION                    

 

                2018           SALVADOR BAIRES MD           Ot          

    M81.0          

                          AGE-RELATED OSTEOPOROSIS W/O CURRENT PAT              

      

 

                2018           SALVADOR BAIRES MD, Ot          

    M85.88         

                          OTH DISRD OF BONE DENSITY AND STRUCTURE,              

      

 

                2018           SALVADOR BAIRES MD           Ot          

    Z12.31         

                          ENCNTR SCREEN MAMMOGRAM FOR MALIGNANT NE              

      

 

                2018           SALVADOR BAIRES MD, Ot          

    Z13.820        

                          ENCOUNTER FOR SCREENING FOR OSTEOPOROSIS              

      

 

                2018           SALVADOR BAIRES MD           Ot          

    Z78.0          

                          ASYMPTOMATIC MENOPAUSAL STATE                    

 

                2018           GERALD RODRIGUEZ MD           Ot       

       E87.1       

                          HYPO-OSMOLALITY AND HYPONATREMIA                    

 

                2018           GERALD RODRIGUEZ MD           Ot       

       I10         

                          ESSENTIAL (PRIMARY) HYPERTENSION                    

 

                2018           GERALD RODRIGUEZ MD           Ot       

       I27.20      

                          PULMONARY HYPERTENSION, UNSPECIFIED                   

 

 

                2018           GERALD RODRIGUEZ MD           Ot       

       I48.0       

                          PAROXYSMAL ATRIAL FIBRILLATION                    

 

                2018           GERALD RODRIGUEZ MD           Ot       

       I73.9       

                          PERIPHERAL VASCULAR DISEASE, UNSPECIFIED              

      

 

                2018           GERALD RODRIGUEZ MD           Ot       

       J44.9       

                          CHRONIC OBSTRUCTIVE PULMONARY DISEASE, U              

      

 

                2018           GERALD RODRIGUEZ MD           Ot       

       M06.9       

                          RHEUMATOID ARTHRITIS, UNSPECIFIED                    

 

                2018           GERALD RODRIGUEZ MD, Ot       

       M34.9       

                          SYSTEMIC SCLEROSIS, UNSPECIFIED                    

 

                2018           GERALD RODRIGUEZ MD           Ot       

       R00.1       

                          BRADYCARDIA, UNSPECIFIED                    

 

                2018           GERALD RODRIGUEZ MD           Ot       

       R13.10      

                          DYSPHAGIA, UNSPECIFIED                    

 

                2018           GERALD RODRIGUEZ MD           Ot       

       Z79.82      

                          LONG TERM (CURRENT) USE OF ASPIRIN                    

 

                2018           GERALD RODRIGUEZ MD           Ot       

       Z79.899     

                          OTHER LONG TERM (CURRENT) DRUG THERAPY                

    

 

                2018           GERALD RODRIGUEZ MD, Ot       

       Z87.891     

                          PERSONAL HISTORY OF NICOTINE DEPENDENCE               

     

 

             2019           SALVADOR BAIRES           W            401.0  

         

MALIGNANT ESSENTIAL HYPERTENSION                    

 

             2019           SALVADOR BAIRES           W            780.93 

          

MEMORY LOSS                                      

 

             2019           SALVADOR BAIRES           W            I10    

       

ESSENTIAL (PRIMARY) HYPERTENSION                    

 

             2019           SALVADOR BAIRES           W            R41.3  

         OTHER

AMNESIA                                          

 

             2019           SALVADOR BAIRES           W            244.9  

         

UNSPECIFIED HYPOTHYROIDISM                       

 

             2019           SALVADOR BAIRES           W            401.0  

         

MALIGNANT ESSENTIAL HYPERTENSION                    

 

             2019           SALVADOR BAIRES           W            780.93 

          

MEMORY LOSS                                      

 

             2019           SALVADOR BAIRES           W            E03.9  

         

HYPOTHYROIDISM, UNSPECIFIED                      

 

             2019           SALVADOR BAIRES           W            I10    

       

ESSENTIAL (PRIMARY) HYPERTENSION                    

 

             2019           SALVADOR BAIRES           W            R41.3  

         OTHER

AMNESIA                                          

 

             2019                        W            244.9           UNSP

ECIFIED 

HYPOTHYROIDISM                                   

 

             2019                        W            272.8           OTHE

R DISORDERS OF 

LIPOID METABOLISM                                

 

             2019                        W            401.0           MICHAEL

GNANT ESSENTIAL

HYPERTENSION                                     

 

             2019                        W            728.3           OTHE

R SPECIFIC 

MUSCLE DISORDERS                                 

 

             2019                        W            780.93           MEM

ORY LOSS       

                                                 

 

             2019                        W            924.00           CON

TUSION OF THIGH

                                                 

 

             2019                        W            E03.9           HYPO

THYROIDISM, 

UNSPECIFIED                                      

 

             2019                        W            I10           ESSENT

IAL (PRIMARY) 

HYPERTENSION                                     

 

             2019                        W            M62.89           OTH

ER SPECIFIED 

DISORDERS OF MUSCLE                              

 

             2019                        W            M79.89           OTH

ER SPECIFIED 

SOFT TISSUE DISORDERS                            

 

             2019                        W            R41.3           OTHE

R AMNESIA      

                                                 

 

             2019                        W            S70.10XA           C

ONTUSION OF 

UNSPECIFIED THIGH, INITIAL ENCOUNTER                    

 

             2019           RITU BAIRESHEL           W            244.9  

         

UNSPECIFIED HYPOTHYROIDISM                       

 

             2019           SALVADOR BAIRES           W            401.0  

         

MALIGNANT ESSENTIAL HYPERTENSION                    

 

             2019           SALVADOR BAIRES           W            780.93 

          

MEMORY LOSS                                      

 

             2019           SALVADOR BAIRES           W            E03.9  

         

HYPOTHYROIDISM, UNSPECIFIED                      

 

             2019           SALVADOR BAIRES           W            I10    

       

ESSENTIAL (PRIMARY) HYPERTENSION                    

 

             2019           SALVADOR BAIRES           W            R41.3  

         OTHER

AMNESIA                                          

 

             2019           SALVADOR BAIRES           W            728.3  

         OTHER

SPECIFIC MUSCLE DISORDERS                        

 

             2019           RITU BAIRESHEL           W            924.00 

          

CONTUSION OF THIGH                               

 

             2019           SALVADOR BAIRES           W            M62.89 

          

OTHER SPECIFIED DISORDERS OF MUSCLE                    

 

             2019           SALVADOR BAIRES           W            S70.10X

A           

CONTUSION OF UNSPECIFIED THIGH, INITIAL ENCOUNTER                    

 

             2019           BAIRESSALVADOR           W            728.3  

         OTHER

SPECIFIC MUSCLE DISORDERS                        

 

             2019           SALVADOR BAIRES           W            924.00 

          

CONTUSION OF THIGH                               

 

             2019           SALVADOR BAIRES           W            M62.89 

          

OTHER SPECIFIED DISORDERS OF MUSCLE                    

 

             2019           SALVADOR BAIRES           ADAM            S70.10X

A           

CONTUSION OF UNSPECIFIED THIGH, INITIAL ENCOUNTER                    

 

                2019           DEQUAN CALLE, SALVADOR KOEHLER           Ot          

    T17.920A       

                          FOOD IN RESP TRACT, PART UNSP CAUSING AS              

      

 

                2019           SALVADOR BAIRES MD           Ot          

    T17.920A       

                          FOOD IN RESP TRACT, PART UNSP CAUSING AS              

      

 

                2019           GARRET BROUSSARD MD, Ot        

      G62.9        

                          POLYNEUROPATHY, UNSPECIFIED                    

 

                2019           GARRET BROUSSARD MD, Ot        

      I10          

                          ESSENTIAL (PRIMARY) HYPERTENSION                    

 

                2019           GARRET BROUSSARD MD, Ot        

      I48.2        

                          CHRONIC ATRIAL FIBRILLATION                    

 

                2019           GARRET BROUSSARD MD, Ot        

      J44.9        

                          CHRONIC OBSTRUCTIVE PULMONARY DISEASE, U              

      

 

                2019           GARRET BROUSSARD MD, Ot        

      M06.9        

                          RHEUMATOID ARTHRITIS, UNSPECIFIED                    

 

                2019           GARRET BROUSSARD MD, Ot        

      R07.9        

                          CHEST PAIN, UNSPECIFIED                    

 

                2019           GARRET BROUSSARD MD           Ot        

      Z79.01       

                          LONG TERM (CURRENT) USE OF ANTICOAGULANT              

      

 

                2019           GARRET BROUSSARD MD           Ot        

      Z86.73       

                          PRSNL HX OF TIA (TIA), AND CEREB INFRC W              

      

 

                2019           GARRET BROUSSARD MD, Ot        

      Z87.891      

                          PERSONAL HISTORY OF NICOTINE DEPENDENCE               

     

 

                2019           GARRET BROUSSARD MD, Ot        

      Z88.0        

                          ALLERGY STATUS TO PENICILLIN                    

 

                2019           GARRET BROUSSARD MD, Ot        

      Z88.8        

                          ALLERGY STATUS TO OTH DRUG/MEDS/BIOL SUB              

      

 

                2019           GARRET BROUSSARD MD, Ot        

      G62.9        

                          POLYNEUROPATHY, UNSPECIFIED                    

 

                2019           GARRET BROUSSARD MD           Ot        

      I10          

                          ESSENTIAL (PRIMARY) HYPERTENSION                    

 

                2019           GARRET BROUSSARD MD           Ot        

      I48.2        

                          CHRONIC ATRIAL FIBRILLATION                    

 

                2019           GARRET BROUSSARD MD           Ot        

      J44.9        

                          CHRONIC OBSTRUCTIVE PULMONARY DISEASE, U              

      

 

                2019           GARRET BROUSSARD MD, Ot        

      M06.9        

                          RHEUMATOID ARTHRITIS, UNSPECIFIED                    

 

                2019           GARRET BROUSSARD MD           Ot        

      R07.9        

                          CHEST PAIN, UNSPECIFIED                    

 

                2019           GARRET BROUSSARD MD           Ot        

      Z79.01       

                          LONG TERM (CURRENT) USE OF ANTICOAGULANT              

      

 

                2019           GARRET BROUSSARD MD           Ot        

      Z86.73       

                          PRSNL HX OF TIA (TIA), AND CEREB INFRC W              

      

 

                2019           GARRET BROUSSARD MD, Ot        

      Z87.891      

                          PERSONAL HISTORY OF NICOTINE DEPENDENCE               

     

 

                2019           GARRET BROUSSARD MD, Ot        

      Z88.0        

                          ALLERGY STATUS TO PENICILLIN                    

 

                2019           GARRET BROUSSARD MD, Ot        

      Z88.8        

                          ALLERGY STATUS TO OTH DRUG/MEDS/BIOL SUB              

      

 

                2019           ELIOT EDMONDSON APRN           Ot      

        J43.9      

                          EMPHYSEMA, UNSPECIFIED                    

 

                2019           ELIOT EDMONDSON APRN           Ot      

        R06.00     

                          DYSPNEA, UNSPECIFIED                    

 

                2019           ELIOT EDMONDSON APRN           Ot      

        J43.9      

                          EMPHYSEMA, UNSPECIFIED                    

 

                2019           ELIOT EDMONDSON APRN           Ot      

        R06.00     

                          DYSPNEA, UNSPECIFIED                    

 

                2019           TIM CALLE FACC, NICOLE FACP CCDS           Ot 

             I25.10

                          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               

      

 

                2019           TIM CALLE FACC, ALI FACP CCDS           Ot 

             I27.0 

                          PRIMARY PULMONARY HYPERTENSION                    

 

                2019           TIM CALLE FACC, ALI FACP CCDS           Ot 

             I48.91

                          UNSPECIFIED ATRIAL FIBRILLATION                    

 

                2019           TIM CALLE FACC, ALI FACP CCDS           Ot 

             I65.29

                          OCCLUSION AND STENOSIS OF UNSPECIFIED CA              

      

 

                2019           TIM CALLE FACC, ALI FACP CCDS           Ot 

             J44.9 

                          CHRONIC OBSTRUCTIVE PULMONARY DISEASE, U              

      

 

                2019           TIM CALLE FACC, ALI FACP CCDS           Ot 

             M79.89

                          OTHER SPECIFIED SOFT TISSUE DISORDERS                 

   

 

                2019           TIM CALLE FACC, NICOLE FACP CCDS           Ot 

             Z79.01

                          LONG TERM (CURRENT) USE OF ANTICOAGULANT              

      

 

                2019           TIM CALLE FACC, ALI FACP CCDS           Ot 

             Z79.82

                          LONG TERM (CURRENT) USE OF ASPIRIN                    

 

                2019           TIM CALLE FACC, ALI FACP CCDS           Ot 

             

Z79.899                   OTHER LONG TERM (CURRENT) DRUG THERAPY                

    

 

                2019           TIM CALLE FACC, ALI FACP CCDS           Ot 

             Z82.49

                          FAMILY HX OF ISCHEM HEART DIS AND OTH DI              

      

 

                2019           TIM CALLE FACC, ALI FACP CCDS           Ot 

             Z83.6 

                          FAMILY HISTORY OF OTHER DISEASES OF THE               

      

 

                2019           TIM CALLE FACC, ALI FACP CCDS           Ot 

             Z84.0 

                          FAMILY HISTORY OF DISEASES OF THE SKIN,               

      

 

                2019           TIM CALLE FACC, ALI FACP CCDS           Ot 

             

Z87.891                   PERSONAL HISTORY OF NICOTINE DEPENDENCE               

     

 

                2019           TIM CALLE FACC, ALI FACP CCDS           Ot 

             I25.10

                          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               

      

 

                2019           TIM CALLE FACC, ALI FACP CCDS           Ot 

             I27.0 

                          PRIMARY PULMONARY HYPERTENSION                    

 

                2019           TIM CALLE FACC, ALI FACP CCDS           Ot 

             I48.91

                          UNSPECIFIED ATRIAL FIBRILLATION                    

 

                2019           TIM CALLE FACC, ALI FACP CCDS           Ot 

             I65.29

                          OCCLUSION AND STENOSIS OF UNSPECIFIED CA              

      

 

                2019           TIM CALLE FACC, ALI FACP CCDS           Ot 

             J44.9 

                          CHRONIC OBSTRUCTIVE PULMONARY DISEASE, U              

      

 

                2019           TIM CALLE FACC, ALI FACP CCDS           Ot 

             M79.89

                          OTHER SPECIFIED SOFT TISSUE DISORDERS                 

   

 

                2019           TIM CALLE FACC, ALI FACP CCDS           Ot 

             Z79.01

                          LONG TERM (CURRENT) USE OF ANTICOAGULANT              

      

 

                2019           TIM CALLE FACC, ALI FACP CCDS           Ot 

             Z79.82

                          LONG TERM (CURRENT) USE OF ASPIRIN                    

 

                2019           TIM CALLE FACC, ALI FACP CCDS           Ot 

             

Z79.899                   OTHER LONG TERM (CURRENT) DRUG THERAPY                

    

 

                2019           TIM CALLE FACC, ALI FACP CCDS           Ot 

             Z82.49

                          FAMILY HX OF ISCHEM HEART DIS AND OTH DI              

      

 

                2019           TIM CALLE FACC, ALI FACP CCDS           Ot 

             Z83.6 

                          FAMILY HISTORY OF OTHER DISEASES OF THE               

      

 

                2019           TIM CALLE FACC, ALI FACP CCDS           Ot 

             Z84.0 

                          FAMILY HISTORY OF DISEASES OF THE SKIN,               

      

 

                2019           TIM CALLE FAC, NICOLE ROWAN CCDS           Ot 

             

Z87.891                   PERSONAL HISTORY OF NICOTINE DEPENDENCE               

     

 

             2019           SALVADOR BAIRES           W            241.0  

         

NONTOXIC UNINODULAR GOITER                       

 

             2019           SALVADOR BAIRES           W            242.90 

          

THYROTOXICOSIS WITHOUT MENTION OF GOITER OR OTHER CAUSE, AND WITHOUT MENTION OF 
THYROTOXIC CRISIS OR STORM                       

 

             2019           SALVADOR BAIRES           W            E04.1  

         

NONTOXIC SINGLE THYROID NODULE                    

 

             2019           SALVADOR BAIRES           W            E05.90 

          

THYROTOXICOSIS, UNSPECIFIED WITHOUT THYROTOXIC CRISIS OR STORM                  

 

 

             2019           SALVADOR BAIRES           W            241.0  

         

NONTOXIC UNINODULAR GOITER                       

 

             2019           SALVADOR BAIRES           W            242.90 

          

THYROTOXICOSIS WITHOUT MENTION OF GOITER OR OTHER CAUSE, AND WITHOUT MENTION OF 
THYROTOXIC CRISIS OR STORM                       

 

             2019           SALVADOR BAIRES           W            998.11 

          

HEMORRHAGE COMPLICATING A PROCEDURE                    

 

             2019           SALVADOR BAIRES           W            E04.1  

         

NONTOXIC SINGLE THYROID NODULE                    

 

             2019           SALVADOR BAIRES           W            E05.90 

          

THYROTOXICOSIS, UNSPECIFIED WITHOUT THYROTOXIC CRISIS OR STORM                  

 

 

             2019           SALVADOR BAIRES           W            I97.610

           

POSTPROCEDURAL HEMORRHAGE AND HEMATOMA OF A CIRCULATORY SYSTEM ORGAN OR 
STRUCTURE FOLLOWING A CARDIAC CATHETERIZATION                    

 

             2019           SALVADOR BAIRES           W            241.0  

         

NONTOXIC UNINODULAR GOITER                       

 

             2019           SALVADOR BAIRES           W            242.90 

          

THYROTOXICOSIS WITHOUT MENTION OF GOITER OR OTHER CAUSE, AND WITHOUT MENTION OF 
THYROTOXIC CRISIS OR STORM                       

 

             2019           SALVADOR BAIRES           W            998.11 

          

HEMORRHAGE COMPLICATING A PROCEDURE                    

 

             2019           SALVADOR BAIRES           W            E04.1  

         

NONTOXIC SINGLE THYROID NODULE                    

 

             2019           SALVADOR BAIRES           W            E05.90 

          

THYROTOXICOSIS, UNSPECIFIED WITHOUT THYROTOXIC CRISIS OR STORM                  

 

 

             2019           SALVADOR BAIRES           W            I97.610

           

POSTPROCEDURAL HEMORRHAGE AND HEMATOMA OF A CIRCULATORY SYSTEM ORGAN OR 
STRUCTURE FOLLOWING A CARDIAC CATHETERIZATION                    

 

             2019           SALVADOR BAIRES           W            241.0  

         

NONTOXIC UNINODULAR GOITER                       

 

             2019           SALVADOR BAIRES           W            242.90 

          

THYROTOXICOSIS WITHOUT MENTION OF GOITER OR OTHER CAUSE, AND WITHOUT MENTION OF 
THYROTOXIC CRISIS OR STORM                       

 

             2019           SALVADOR BAIRES           W            998.11 

          

HEMORRHAGE COMPLICATING A PROCEDURE                    

 

             2019           SALVADOR BAIRES           W            E04.1  

         

NONTOXIC SINGLE THYROID NODULE                    

 

             2019           SALVADOR BAIRES           W            E05.90 

          

THYROTOXICOSIS, UNSPECIFIED WITHOUT THYROTOXIC CRISIS OR STORM                  

 

 

             2019           SALVADOR BAIRES           W            I97.610

           

POSTPROCEDURAL HEMORRHAGE AND HEMATOMA OF A CIRCULATORY SYSTEM ORGAN OR 
STRUCTURE FOLLOWING A CARDIAC CATHETERIZATION                    

 

             2019           SALVADOR BAIRES           W            241.0  

         

NONTOXIC UNINODULAR GOITER                       

 

             2019           SALVADOR BAIRES           W            E04.1  

         

NONTOXIC SINGLE THYROID NODULE                    

 

             2019           SALVADOR BAIRES           W            241.0  

         

NONTOXIC UNINODULAR GOITER                       

 

             2019           SALVADOR BAIRES           W            242.90 

          

THYROTOXICOSIS WITHOUT MENTION OF GOITER OR OTHER CAUSE, AND WITHOUT MENTION OF 
THYROTOXIC CRISIS OR STORM                       

 

             2019           SALVADOR BAIRES           W            E04.1  

         

NONTOXIC SINGLE THYROID NODULE                    

 

             2019           SALVADOR BAIRES           W            E05.90 

          

THYROTOXICOSIS, UNSPECIFIED WITHOUT THYROTOXIC CRISIS OR STORM                  

 

 

             2019           SALVADOR BAIRES           W            241.0  

         

NONTOXIC UNINODULAR GOITER                       

 

             2019           SALVADOR BAIRES           W            242.90 

          

THYROTOXICOSIS WITHOUT MENTION OF GOITER OR OTHER CAUSE, AND WITHOUT MENTION OF 
THYROTOXIC CRISIS OR STORM                       

 

             2019           SALVADOR BAIRES           W            E04.1  

         

NONTOXIC SINGLE THYROID NODULE                    

 

             2019           SALVADOR BAIRES           W            E05.90 

          

THYROTOXICOSIS, UNSPECIFIED WITHOUT THYROTOXIC CRISIS OR STORM                  

 

 

             2019           SEPIDEH CALLE, CARMELO RAMIREZ           Ot           G62.

9           

POLYNEUROPATHY, UNSPECIFIED                      

 

             2019           CARMELO BUNN MD           Ot           I10 

          

ESSENTIAL (PRIMARY) HYPERTENSION                    

 

             2019           CARMELO BUNN MD           Ot           I48.

91           

UNSPECIFIED ATRIAL FIBRILLATION                    

 

             2019           CARMELO BUNN MD           Ot           I73.

9           

PERIPHERAL VASCULAR DISEASE, UNSPECIFIED                    

 

                2019           CARMELO BUNN MD           Ot            

  I97.630          

                          POSTPROC HEMATOMA OF A CIRC SYS ORG FOLL              

      

 

             2019           CARMELO BUNN MD           Ot           J44.

9           

CHRONIC OBSTRUCTIVE PULMONARY DISEASE, U                    

 

             2019           CARMELO BUNN MD           Ot           K21.

9           

GASTRO-ESOPHAGEAL REFLUX DISEASE WITHOUT                    

 

             2019           CARMELO BUNN MD           Ot           M06.

9           

RHEUMATOID ARTHRITIS, UNSPECIFIED                    

 

             2019           CARMELO BUNN MD           Ot           M79.

81           

NONTRAUMATIC HEMATOMA OF SOFT TISSUE                    

 

             2019           CARMELO BUNN MD           Ot           Z79.

82           

LONG TERM (CURRENT) USE OF ASPIRIN                    

 

             2019           CARMELO BUNN MD           Ot           Z86.

73           

PRSNL HX OF TIA (TIA), AND CEREB INFRC W                    

 

                2019           CARMELO BUNN MD           Ot            

  Z87.891          

                          PERSONAL HISTORY OF NICOTINE DEPENDENCE               

     

 

             2019           CARMELO BUNN MD           Ot           Z95.

9           

PRESENCE OF CARDIAC AND VASCULAR IMPLANT                    

 

             2019           SALVADOR BAIRES           W            710.1  

         

SYSTEMIC SCLEROSIS                               

 

             2019           SALVADOR BAIRSE           W            M34.9  

         

SYSTEMIC SCLEROSIS, UNSPECIFIED                    

 

             2019           SALVADOR BAIRES           W            710.1  

         

SYSTEMIC SCLEROSIS                               

 

             2019           SALVADOR BAIRES           W            M34.9  

         

SYSTEMIC SCLEROSIS, UNSPECIFIED                    

 

             2019           SALVADOR BAIRES           W            242.90 

          

THYROTOXICOSIS WITHOUT MENTION OF GOITER OR OTHER CAUSE, AND WITHOUT MENTION OF 
THYROTOXIC CRISIS OR STORM                       

 

             2019           SALVADOR BAIRES           W            710.1  

         

SYSTEMIC SCLEROSIS                               

 

             2019           SALVADOR BAIRES           W            E05.90 

          

THYROTOXICOSIS, UNSPECIFIED WITHOUT THYROTOXIC CRISIS OR STORM                  

 

 

             2019           SALVADOR BAIRES           W            M34.9  

         

SYSTEMIC SCLEROSIS, UNSPECIFIED                    

 

             2019           SALVADOR BAIRES           W            242.90 

          

THYROTOXICOSIS WITHOUT MENTION OF GOITER OR OTHER CAUSE, AND WITHOUT MENTION OF 
THYROTOXIC CRISIS OR STORM                       

 

             2019           SALVADOR BAIRES           W            710.1  

         

SYSTEMIC SCLEROSIS                               

 

             2019           SALVADOR BAIRES           W            E05.90 

          

THYROTOXICOSIS, UNSPECIFIED WITHOUT THYROTOXIC CRISIS OR STORM                  

 

 

             2019           SALVADOR BAIRES           W            M34.9  

         

SYSTEMIC SCLEROSIS, UNSPECIFIED                    

 

             2019           SALVADOR BAIRES           W            241.1  

         

NONTOXIC MULTINODULAR GOITER                     

 

             2019           SALVADOR BAIRES           W            242.90 

          

THYROTOXICOSIS WITHOUT MENTION OF GOITER OR OTHER CAUSE, AND WITHOUT MENTION OF 
THYROTOXIC CRISIS OR STORM                       

 

             2019           SALVADOR BAIRES           W            710.1  

         

SYSTEMIC SCLEROSIS                               

 

             2019           DEQUAN SALVADOR           W            E01.1  

         

IODINE-DEFICIENCY RELATED MULTINODULAR (ENDEMIC) GOITER                    

 

             2019           RITU BAIRESHEL           W            E05.90 

          

THYROTOXICOSIS, UNSPECIFIED WITHOUT THYROTOXIC CRISIS OR STORM                  

 

 

             2019           DEQUAN SALVADOR           W            M34.9  

         

SYSTEMIC SCLEROSIS, UNSPECIFIED                    

 

             2019           BAIRES, SALVADOR           W            241.1  

         

NONTOXIC MULTINODULAR GOITER                     

 

             2019           DEQUAN SALVADOR           W            242.90 

          

THYROTOXICOSIS WITHOUT MENTION OF GOITER OR OTHER CAUSE, AND WITHOUT MENTION OF 
THYROTOXIC CRISIS OR STORM                       

 

             2019           SALVADOR BAIRES           W            710.1  

         

SYSTEMIC SCLEROSIS                               

 

             2019           SALVADOR BAIRES           W            E01.1  

         

IODINE-DEFICIENCY RELATED MULTINODULAR (ENDEMIC) GOITER                    

 

             2019           RITU BAIRESHEL           W            E05.90 

          

THYROTOXICOSIS, UNSPECIFIED WITHOUT THYROTOXIC CRISIS OR STORM                  

 

 

             2019           SALVADOR BAIRES           W            M34.9  

         

SYSTEMIC SCLEROSIS, UNSPECIFIED                    

 

             2019           SALVADOR BAIRES           W            241.1  

         

NONTOXIC MULTINODULAR GOITER                     

 

             2019           RITU BAIRESHEL           W            242.90 

          

THYROTOXICOSIS WITHOUT MENTION OF GOITER OR OTHER CAUSE, AND WITHOUT MENTION OF 
THYROTOXIC CRISIS OR STORM                       

 

             2019           SALVADOR BAIRES           W            710.1  

         

SYSTEMIC SCLEROSIS                               

 

             2019           RITU BAIRESHEL           W            E01.1  

         

IODINE-DEFICIENCY RELATED MULTINODULAR (ENDEMIC) GOITER                    

 

             2019           DEQUAN SALVADOR           W            E05.90 

          

THYROTOXICOSIS, UNSPECIFIED WITHOUT THYROTOXIC CRISIS OR STORM                  

 

 

             2019           SALVADOR BAIRES           W            M34.9  

         

SYSTEMIC SCLEROSIS, UNSPECIFIED                    

 

             2019           DEQUAN SALVADOR           W            241.0  

         

NONTOXIC UNINODULAR GOITER                       

 

             2019           DEQUAN SALVADOR           W            241.1  

         

NONTOXIC MULTINODULAR GOITER                     

 

             2019           BAIRES, SALVADOR           W            242.90 

          

THYROTOXICOSIS WITHOUT MENTION OF GOITER OR OTHER CAUSE, AND WITHOUT MENTION OF 
THYROTOXIC CRISIS OR STORM                       

 

             2019           SALVADOR BAIRES           W            710.1  

         

SYSTEMIC SCLEROSIS                               

 

             2019           DEQUAN SALVADOR           W            E01.1  

         

IODINE-DEFICIENCY RELATED MULTINODULAR (ENDEMIC) GOITER                    

 

             2019           SALVADOR BAIRES           W            E04.1  

         

NONTOXIC SINGLE THYROID NODULE                    

 

             2019           RITU BAIRESHEL           W            E05.90 

          

THYROTOXICOSIS, UNSPECIFIED WITHOUT THYROTOXIC CRISIS OR STORM                  

 

 

             2019           SALVADOR BAIRES            M34.9  

         

SYSTEMIC SCLEROSIS, UNSPECIFIED                    

 

             2019           BAIRESRTIUSALVADOR           W            241.0  

         

NONTOXIC UNINODULAR GOITER                       

 

             2019           BAIRES, SALVADOR           W            241.1  

         

NONTOXIC MULTINODULAR GOITER                     

 

             2019           BIARES, SALVADOR           W            242.90 

          

THYROTOXICOSIS WITHOUT MENTION OF GOITER OR OTHER CAUSE, AND WITHOUT MENTION OF 
THYROTOXIC CRISIS OR STORM                       

 

             2019           BAIRESSALVADOR AGUILAR           W            710.1  

         

SYSTEMIC SCLEROSIS                               

 

             2019           BAIRESSALVADOR AGUILAR           W            E01.1  

         

IODINE-DEFICIENCY RELATED MULTINODULAR (ENDEMIC) GOITER                    

 

             2019           BAIRESSALVADOR AGUILAR           W            E04.1  

         

NONTOXIC SINGLE THYROID NODULE                    

 

             2019           BAIRES, SALVADOR           W            E05.90 

          

THYROTOXICOSIS, UNSPECIFIED WITHOUT THYROTOXIC CRISIS OR STORM                  

 

 

             2019           SALVADOR BAIRES           W            M34.9  

         

SYSTEMIC SCLEROSIS, UNSPECIFIED                    

 

                10/10/2019           DEQUAN CALLE, SALVADOR L           Ot          

    M34.9          

                          SYSTEMIC SCLEROSIS, UNSPECIFIED                    

 

                10/10/2019           DEQUAN CALLE, SALVADOR L           Ot          

    R53.1          

                          WEAKNESS                           

 

                10/23/2019           DEQUAN CALLE, SALVADOR L           Ot          

    M34.9          

                          SYSTEMIC SCLEROSIS, UNSPECIFIED                    

 

                10/23/2019           DEQUAN CALLE, SALVADOR L           Ot          

    R53.1          

                          WEAKNESS                           

 

                10/28/2019           DEQUAN CALLE, SALVADOR L           Ot          

    M34.9          

                          SYSTEMIC SCLEROSIS, UNSPECIFIED                    

 

                10/28/2019           DEQUAN CALLE, SALVADOR L           Ot          

    R53.1          

                          WEAKNESS                           

 

                2019           SYLVIA SEQUEIRA DO           Ot       

       440.20      

                          ATHEROSCLEROSIS NATIVE ARTERIES EXTREMIT              

      

 

             2019                        Ot           787.20           DYS

PHAGIA, 

UNSPECIFIED                                      

 

                2019           SYLVIA SEQUEIRA DO           Ot       

       780.79      

                          OTH MALAISE FATIGUE                    

 

                2019           SYLVIA SEQUEIRA DO           Ot       

       786.05      

                          SHORTNESS OF BREATH                    

 

                2019           SYLVIA SEQUEIRA DO           Ot       

       786.50      

                          CHEST PAIN NOS                     

 

                2019           SYLVIA SEQUEIRA DO           Ot       

       780.79      

                          OTH MALAISE FATIGUE                    

 

                2019           SYLVIA SEQUEIRA DO           Ot       

       786.05      

                          SHORTNESS OF BREATH                    

 

                2019           SYLVIA SEQUEIRA DO           Ot       

       786.50      

                          CHEST PAIN NOS                     

 

                2019           SYLVIA SEQUEIRA DO           Ot       

       787.99      

                          OTHER GI SYSTEM SYMPTOMS                    

 

             2019           SIN BERRIOS DO           Ot           492.

8           

EMPHYSEMA NEC                                    

 

             2019           SIN BERRIOS DO           Ot           710.

1           

SYSTEMIC SCLEROSIS                               

 

             2019           RAYMUNDO BEDOYA MD           Ot           710.

1           

SYSTEMIC SCLEROSIS                               

 

             2019           RAYMUNDO BEDOYA MD           Ot           786.

05           

SHORTNESS OF BREATH                              

 

                2019           DEQUAN CALLE, SALVADOR KOEHLER           Ot          

    M34.9          

                          SYSTEMIC SCLEROSIS, UNSPECIFIED                    

 

                2019           NAYELI CALLE, DELGADO COPELAND           Ot        

      R07.9        

                          CHEST PAIN, UNSPECIFIED                    

 

                2019           ELIOT EDMONDSON APRN           Ot      

        J43.9      

                          EMPHYSEMA, UNSPECIFIED                    

 

                2019           ELIOT EDMONDSON APRN           Ot      

        R06.00     

                          DYSPNEA, UNSPECIFIED                    

 

                2019           ELIOT EDMONDSON APRN           Ot      

        J43.9      

                          EMPHYSEMA, UNSPECIFIED                    

 

                2019           LANGEVIN DO, RAHUL           Ot           

   I48.91          

                          UNSPECIFIED ATRIAL FIBRILLATION                    

 

             2019           LANGEVIN DO, RAHUL           Ot           R31

.9           

HEMATURIA, UNSPECIFIED                           

 

                2019           ELIOT EDMONDSON APRN           Ot      

        J44.9      

                          CHRONIC OBSTRUCTIVE PULMONARY DISEASE, U              

      

 

                2019           ELIOT EDMONDSON APRN           Ot      

        J43.9      

                          EMPHYSEMA, UNSPECIFIED                    

 

                2019           ELIOT EDMONDSON APRN           Ot      

        R06.00     

                          DYSPNEA, UNSPECIFIED                    

 

                2019           DEQUAN CALLE, SALVADOR KOEHLER           Ot          

    R19.7          

                          DIARRHEA, UNSPECIFIED                    

 

                2019           DEQUAN CALLE, SALVADOR KOEHLER           Ot          

    K52.9          

                          NONINFECTIVE GASTROENTERITIS AND COLITIS              

      

 

                2019           SALVADOR BAIRES MD           Ot          

    L29.8          

                          OTHER PRURITUS                     

 

                2019           DEQUAN CALLE, SALVADOR KOEHLER           Ot          

    Z87.2          

                          PERSONAL HISTORY OF DISEASES OF THE SKIN              

      

 

                2019           ELIOT LONG           Ot    

          I48.91   

                          UNSPECIFIED ATRIAL FIBRILLATION                    

 

                2019           SALVADOR BAIRES MD           Ot          

    M81.0          

                          AGE-RELATED OSTEOPOROSIS W/O CURRENT PAT              

      

 

                2019           SALVADOR BAIRES MD           Ot          

    M85.88         

                          OT DISRD OF BONE DENSITY AND STRUCTURE,              

      

 

                2019           SALVADOR BAIRES MD, Ot          

    Z12.31         

                          ENCNTR SCREEN MAMMOGRAM FOR MALIGNANT NE              

      

 

                2019           SALVADOR BAIRES MD, Ot          

    Z13.820        

                          ENCOUNTER FOR SCREENING FOR OSTEOPOROSIS              

      

 

                2019           SALVADOR BAIRES MD           Ot          

    Z78.0          

                          ASYMPTOMATIC MENOPAUSAL STATE                    

 

                2019           SALVADOR BAIRES MD, Ot          

    T17.920A       

                          FOOD IN RESP TRACT, PART UNSP CAUSING AS              

      

 

                2019           ELIOT EDMONDSON APRN           Ot      

        J44.9      

                          CHRONIC OBSTRUCTIVE PULMONARY DISEASE, U              

      

 

                2020           ELIOT EDMONDSON APRN           Ot      

        I48.0      

                          PAROXYSMAL ATRIAL FIBRILLATION                    

 

                2020           ELIOT EDMONDSON APRN           Ot      

        J30.9      

                          ALLERGIC RHINITIS, UNSPECIFIED                    

 

                2020           ELIOT EDMONDSON APRN           Ot      

        J43.8      

                          OTHER EMPHYSEMA                    

 

                2020           ELIOT EDMONDSON APRN           Ot      

        M34.9      

                          SYSTEMIC SCLEROSIS, UNSPECIFIED                    

 

                2020           DEFFENBASYLVIA VALENCIA DO           Ot       

       440.20      

                          ATHEROSCLEROSIS NATIVE ARTERIES EXTREMIT              

      

 

             2020                        Ot           787.20           DYS

PHAGIA, 

UNSPECIFIED                                      

 

                2020           DEFFENBASYLVIA VALENCIA DO D           Ot       

       780.79      

                          OTH MALAISE FATIGUE                    

 

                2020           LIZETTBASYLVIA VALENCIA DO           Ot       

       786.05      

                          SHORTNESS OF BREATH                    

 

                2020           DEFFENBASYLVIA VALENCIA DO D           Ot       

       786.50      

                          CHEST PAIN NOS                     

 

                2020           DEFFENBASYLVIA VALENCIA DO D           Ot       

       780.79      

                          OTH MALAISE FATIGUE                    

 

                2020           GURJITFENBASYLVIA VALENCIA DO           Ot       

       786.05      

                          SHORTNESS OF BREATH                    

 

                2020           DEFFENBASYLVIA VALENCIA DO D           Ot       

       786.50      

                          CHEST PAIN NOS                     

 

                2020           DEFFENBASYLVIA VALENCIA DO D           Ot       

       787.99      

                          OTHER GI SYSTEM SYMPTOMS                    

 

             2020           SIN BERRIOS DO           Ot           492.

8           

EMPHYSEMA NEC                                    

 

             2020           SIN BERRIOS DO           Ot           710.

1           

SYSTEMIC SCLEROSIS                               

 

             2020           RAYMUNDO BEDOYA MD           Ot           710.

1           

SYSTEMIC SCLEROSIS                               

 

             2020           RAYMUNDO BEDOYA MD           Ot           786.

05           

SHORTNESS OF BREATH                              

 

                2020           SALVADOR BAIRES MD L           Ot          

    M34.9          

                          SYSTEMIC SCLEROSIS, UNSPECIFIED                    

 

                2020           NAYELI CALLE, DELGADO COPELAND           Ot        

      R07.9        

                          CHEST PAIN, UNSPECIFIED                    

 

                2020           ELIOT EDMONDSON           Ot      

        J43.9      

                          EMPHYSEMA, UNSPECIFIED                    

 

                2020           ELIOT EDMONDSON           Ot      

        R06.00     

                          DYSPNEA, UNSPECIFIED                    

 

                2020           ELIOT EDMONDSON APRN           Ot      

        J43.9      

                          EMPHYSEMA, UNSPECIFIED                    

 

                2020           LANGEVIN DO, RAHLU           Ot           

   I48.91          

                          UNSPECIFIED ATRIAL FIBRILLATION                    

 

             2020           LANGEVIN DO, RAHUL           Ot           R31

.9           

HEMATURIA, UNSPECIFIED                           

 

                2020           ELIOT EDMONDSON           Ot      

        J44.9      

                          CHRONIC OBSTRUCTIVE PULMONARY DISEASE, U              

      

 

                2020           ELIOT EDMONDSON APRN           Ot      

        J43.9      

                          EMPHYSEMA, UNSPECIFIED                    

 

                2020           ELIOT EDMONDSON APRN           Ot      

        R06.00     

                          DYSPNEA, UNSPECIFIED                    

 

                2020           DEQUAN CALLE, SALVADOR KOEHLER           Ot          

    R19.7          

                          DIARRHEA, UNSPECIFIED                    

 

                2020           DEQUAN CALLE, SALVADOR KOEHLER           Ot          

    K52.9          

                          NONINFECTIVE GASTROENTERITIS AND COLITIS              

      

 

                2020           SALVADOR BAIRES MD           Ot          

    L29.8          

                          OTHER PRURITUS                     

 

                2020           SALVADOR BAIRES MD, Ot          

    Z87.2          

                          PERSONAL HISTORY OF DISEASES OF THE SKIN              

      

 

                2020           ELIOT LONG           Ot    

          I48.91   

                          UNSPECIFIED ATRIAL FIBRILLATION                    

 

                2020           SALVADOR BAIRES MD           Ot          

    M81.0          

                          AGE-RELATED OSTEOPOROSIS W/O CURRENT PAT              

      

 

                2020           SALVADOR BAIRES MD           Ot          

    M85.88         

                          OT DISRD OF BONE DENSITY AND STRUCTURE,              

      

 

                2020           SALVADOR BAIRES MD           Ot          

    Z12.31         

                          ENCNTR SCREEN MAMMOGRAM FOR MALIGNANT NE              

      

 

                2020           SALVADOR BAIRES MD, Ot          

    Z13.820        

                          ENCOUNTER FOR SCREENING FOR OSTEOPOROSIS              

      

 

                2020           SALVADOR BAIRES MD           Ot          

    Z78.0          

                          ASYMPTOMATIC MENOPAUSAL STATE                    

 

                2020           SALVADOR BAIRES MD, Ot          

    T17.920A       

                          FOOD IN RESP TRACT, PART UNSP CAUSING AS              

      

 

                2020           ELIOT EDMONDSON           Ot      

        I48.0      

                          PAROXYSMAL ATRIAL FIBRILLATION                    

 

                2020           ELIOT EDMONDSON APRN           Ot      

        J30.9      

                          ALLERGIC RHINITIS, UNSPECIFIED                    

 

                2020           DEBOELIOT CATHERINE APRN           Ot      

        J43.8      

                          OTHER EMPHYSEMA                    

 

                2020           ELIOT EDMONDSON APRN           Ot      

        M34.9      

                          SYSTEMIC SCLEROSIS, UNSPECIFIED                    

 

                2020           DEBOELIOT CATHERINE APRN           Ot      

        J44.9      

                          CHRONIC OBSTRUCTIVE PULMONARY DISEASE, U              

      

 

                2020           LEO NESBITT APRN           Ot        

      E05.90       

                          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               

      

 

                2020           DEBOANTONY CATHERINEINE DINORAH APRN           Ot      

        J44.9      

                          CHRONIC OBSTRUCTIVE PULMONARY DISEASE, U              

      

 

                2020           DEBOELIOT CATHERINE APRN           Ot      

        J44.9      

                          CHRONIC OBSTRUCTIVE PULMONARY DISEASE, U              

      

 

                2020           DEBOELIOT CATHERINE APRN           Ot      

        J44.9      

                          CHRONIC OBSTRUCTIVE PULMONARY DISEASE, U              

      

 

                2020           DEBOELIOT CATHERINE APRN           Ot      

        J44.9      

                          CHRONIC OBSTRUCTIVE PULMONARY DISEASE, U              

      

 

                2020           ELIOT EDMONDSON APRN           Ot      

        J44.9      

                          CHRONIC OBSTRUCTIVE PULMONARY DISEASE, U              

      

 

                2020           ELIOT EDMONDSON APRN           Ot      

        J44.9      

                          CHRONIC OBSTRUCTIVE PULMONARY DISEASE, U              

      

 

                2020           ROSLYN ALANIS MD           Ot      

        E05.90     

                          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               

      

 

                2020           ROSLYN ALANIS MD           Ot      

        G62.9      

                          POLYNEUROPATHY, UNSPECIFIED                    

 

                2020           ROSLYN ALANIS MD           Ot      

        I10        

                          ESSENTIAL (PRIMARY) HYPERTENSION                    

 

                2020           ROSLYN ALANIS MD           Ot      

        I48.91     

                          UNSPECIFIED ATRIAL FIBRILLATION                    

 

                2020           ROSLYN ALANIS MD, Ot      

        J44.9      

                          CHRONIC OBSTRUCTIVE PULMONARY DISEASE, U              

      

 

                2020           RSOLYN ALANIS MD           Ot      

        K21.9      

                          GASTRO-ESOPHAGEAL REFLUX DISEASE WITHOUT              

      

 

                2020           ROLSYN ALANIS MD           Ot      

        M06.9      

                          RHEUMATOID ARTHRITIS, UNSPECIFIED                    

 

                2020           ROSLYN ALANIS MD           Ot      

        R91.8      

                          OTHER NONSPECIFIC ABNORMAL FINDING OF KALEY              

      

 

                2020           ROSLYN ALANIS MD           Ot      

        Z77.22     

                          CNTCT W AND EXPSR TO ENVIRON TOBACCO SMO              

      

 

                2020           ROSLYN ALANIS MD, Ot      

        Z79.01     

                          LONG TERM (CURRENT) USE OF ANTICOAGULANT              

      

 

                2020           ROSLYN ALANIS MD           Ot      

        Z79.82     

                          LONG TERM (CURRENT) USE OF ASPIRIN                    

 

                2020           ROSLYN ALANIS MD, Ot      

        Z86.73     

                          PRSNL HX OF TIA (TIA), AND CEREB INFRC W              

      

 

                2020           ROSLYN ALANIS MD, Ot      

        Z87.891    

                          PERSONAL HISTORY OF NICOTINE DEPENDENCE               

     

 

                2020           ROSLYN ALANIS MD           Ot      

        E05.90     

                          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               

      

 

                2020           ROSLYN ALANIS MD, Ot      

        G62.9      

                          POLYNEUROPATHY, UNSPECIFIED                    

 

                2020           ROSLYN ALANIS MD, Ot      

        I10        

                          ESSENTIAL (PRIMARY) HYPERTENSION                    

 

                2020           ROSLYN ALANIS MD           Ot      

        I48.91     

                          UNSPECIFIED ATRIAL FIBRILLATION                    

 

                2020           ROSLYN ALANIS MD           Ot      

        J44.9      

                          CHRONIC OBSTRUCTIVE PULMONARY DISEASE, U              

      

 

                2020           ROSLYN ALANIS MD, Ot      

        K21.9      

                          GASTRO-ESOPHAGEAL REFLUX DISEASE WITHOUT              

      

 

                2020           ROSLYN ALANIS MD           Ot      

        M06.9      

                          RHEUMATOID ARTHRITIS, UNSPECIFIED                    

 

                2020           ROSLYN ALANIS MD           Ot      

        R91.8      

                          OTHER NONSPECIFIC ABNORMAL FINDING OF KALEY              

      

 

                2020           ROSLYN ALANIS MD, Ot      

        Z77.22     

                          CNTCT W AND EXPSR TO ENVIRON TOBACCO SMO              

      

 

                2020           ROSLYN ALANIS MD           Ot      

        Z79.01     

                          LONG TERM (CURRENT) USE OF ANTICOAGULANT              

      

 

                2020           ROSLYN ALANIS MD           Ot      

        Z79.82     

                          LONG TERM (CURRENT) USE OF ASPIRIN                    

 

                2020           ROSLYN ALANIS MD, Ot      

        Z86.73     

                          PRSNL HX OF TIA (TIA), AND CEREB INFRC W              

      

 

                2020           ROSLYN ALANIS MD           Ot      

        Z87.891    

                          PERSONAL HISTORY OF NICOTINE DEPENDENCE               

     

 

                2020           ELIOT EDMONDSON           Ot      

        I48.0      

                          PAROXYSMAL ATRIAL FIBRILLATION                    

 

                2020           ELIOT EDMONDSON           Ot      

        J30.9      

                          ALLERGIC RHINITIS, UNSPECIFIED                    

 

                2020           ELIOT EDMONDSON           Ot      

        J43.8      

                          OTHER EMPHYSEMA                    

 

                2020           ELIOT EDMONDSON           Ot      

        M34.9      

                          SYSTEMIC SCLEROSIS, UNSPECIFIED                    

 

                2020           ELIOT EDMONDSON           Ot      

        J44.9      

                          CHRONIC OBSTRUCTIVE PULMONARY DISEASE, U              

      

 

                2020           ELIOT EDMONDSON           Ot      

        J44.9      

                          CHRONIC OBSTRUCTIVE PULMONARY DISEASE, U              

      

 

                2020           LEO NESBITT APRN           Ot        

      E05.90       

                          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               

      

 

             2020                        Ot           E04.2           NONT

OXIC 

MULTINODULAR GOITER                              

 

                2020           ELIOT EDMONDSON           Ot      

        J44.9      

                          CHRONIC OBSTRUCTIVE PULMONARY DISEASE, U              

      

 

                2020           ROSLYN ALANIS MD           Ot      

        E05.90     

                          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               

      

 

                2020           ROSLYN ALANIS MD, Ot      

        G62.9      

                          POLYNEUROPATHY, UNSPECIFIED                    

 

                2020           ROSLYN ALANIS MD           Ot      

        I10        

                          ESSENTIAL (PRIMARY) HYPERTENSION                    

 

                2020           ROSLYN ALANIS MD           Ot      

        I48.91     

                          UNSPECIFIED ATRIAL FIBRILLATION                    

 

                2020           ROSLYN ALANIS MD, Ot      

        J44.9      

                          CHRONIC OBSTRUCTIVE PULMONARY DISEASE, U              

      

 

                2020           ROSLYN ALANIS MD, Ot      

        K21.9      

                          GASTRO-ESOPHAGEAL REFLUX DISEASE WITHOUT              

      

 

                2020           ROSLYN ALANIS MD           Ot      

        M06.9      

                          RHEUMATOID ARTHRITIS, UNSPECIFIED                    

 

                2020           ROSLYN ALANIS MD           Ot      

        R91.8      

                          OTHER NONSPECIFIC ABNORMAL FINDING OF KALEY              

      

 

                2020           ROSLYN ALANIS MD, Ot      

        Z77.22     

                          CNTCT W AND EXPSR TO ENVIRON TOBACCO SMO              

      

 

                2020           ROSLYN ALANIS MD, Ot      

        Z79.01     

                          LONG TERM (CURRENT) USE OF ANTICOAGULANT              

      

 

                2020           ROSLYN ALANIS MD, Ot      

        Z79.82     

                          LONG TERM (CURRENT) USE OF ASPIRIN                    

 

                2020           ROSLYN ALANIS MD, Ot      

        Z86.73     

                          PRSNL HX OF TIA (TIA), AND CEREB INFRC W              

      

 

                2020           ROSLYN ALANIS MD           Ot      

        Z87.891    

                          PERSONAL HISTORY OF NICOTINE DEPENDENCE               

     

 

                2020           ELIOT EDMONDSON APRN           Ot      

        J44.9      

                          CHRONIC OBSTRUCTIVE PULMONARY DISEASE, U              

      

 

                2020           ELIOT EDMONDSON APRN           Ot      

        J44.9      

                          CHRONIC OBSTRUCTIVE PULMONARY DISEASE, U              

      

 

                2020           ELIOT EDMONDSON APRN           Ot      

        J44.9      

                          CHRONIC OBSTRUCTIVE PULMONARY DISEASE, U              

      

 

                2020           ELIOT EDMONDSON APRN           Ot      

        J44.9      

                          CHRONIC OBSTRUCTIVE PULMONARY DISEASE, U              

      

 

                2020           ELIOT EDMONDSON APRN           Ot      

        J44.9      

                          CHRONIC OBSTRUCTIVE PULMONARY DISEASE, U              

      

 

                2020           DEBOELIOT CATHERINE APRN           Ot      

        J44.9      

                          CHRONIC OBSTRUCTIVE PULMONARY DISEASE, U              

      

 

                2020           ELIOT EDMONDSON APRN           Ot      

        J44.9      

                          CHRONIC OBSTRUCTIVE PULMONARY DISEASE, U              

      

 

             2020                        Ot           E04.2           NONT

OXIC 

MULTINODULAR GOITER                              

 

                03/10/2020           ELIOT EDMONDSON APRN           Ot      

        J44.9      

                          CHRONIC OBSTRUCTIVE PULMONARY DISEASE, U              

      

 

                2020           ELIOT EDMONDSON APRN           Ot      

        J44.9      

                          CHRONIC OBSTRUCTIVE PULMONARY DISEASE, U              

      

 

                2020           ELIOT EDMONDSON APRN           Ot      

        J44.9      

                          CHRONIC OBSTRUCTIVE PULMONARY DISEASE, U              

      

 

                2020           ELIOT EDMONDSON APRN           Ot      

        J44.9      

                          CHRONIC OBSTRUCTIVE PULMONARY DISEASE, U              

      

 

             2020           TUCKER, ALEJANDRA ARNP           Ot           E87.1   

        HYPO-

OSMOLALITY AND HYPONATREMIA                      

 

             2020           TUCKER ALEJANDRA ARNP           Ot           G62.9   

        

POLYNEUROPATHY, UNSPECIFIED                      

 

             2020           TUCKER, ALEJANDRA ARNP           Ot           I10     

      

ESSENTIAL (PRIMARY) HYPERTENSION                    

 

             2020           TUCKER, ALEJANDRA ARNP           Ot           I48.91  

         

UNSPECIFIED ATRIAL FIBRILLATION                    

 

             2020           TUCKER ALEJANDRA ARNP           Ot           R53.1   

        

WEAKNESS                                         

 

             2020           ALEJANDRA CEBALLOSP           Ot           R55     

      SYNCOPE

AND COLLAPSE                                     

 

             2020           ALEJANDRA CEBALLOS ARNP           Ot           Z79.01  

         LONG

TERM (CURRENT) USE OF ANTICOAGULANT                    

 

             2020           TUCKER, ALEJANDRA ARNP           Ot           Z79.82  

         LONG

TERM (CURRENT) USE OF ASPIRIN                    

 

             2020           TUCKER, ALEJANDRA ARNP           Ot           Z86.73  

         

PRSNL HX OF TIA (TIA), AND CEREB INFRC W                    

 

             2020           TUCKER, ALEJANDRA ARNP           Ot           Z87.891 

          

PERSONAL HISTORY OF NICOTINE DEPENDENCE                    

 

                2020           ELIOT EDMONDSON           Ot      

        J43.9      

                          EMPHYSEMA, UNSPECIFIED                    

 

                2020           ELIOT EDMONDSON           Ot      

        R06.00     

                          DYSPNEA, UNSPECIFIED                    

 

             2020                        Ot           J44.9           

SANJUANA 

OBSTRUCTIVE PULMONARY DISEASE, U                    

 

                2020           GARRET BROUSSARD MD, Ot        

      G62.9        

                          POLYNEUROPATHY, UNSPECIFIED                    

 

                2020           GARRET BROUSSARD MD           Ot        

      I10          

                          ESSENTIAL (PRIMARY) HYPERTENSION                    

 

                2020           GARRET BROUSSARD MD           Ot        

      I48.91       

                          UNSPECIFIED ATRIAL FIBRILLATION                    

 

                2020           GARRET BROUSSARD MD           Ot        

      I73.9        

                          PERIPHERAL VASCULAR DISEASE, UNSPECIFIED              

      

 

                2020           GARRET BROUSSARD MD, Ot        

      K21.9        

                          GASTRO-ESOPHAGEAL REFLUX DISEASE WITHOUT              

      

 

                2020           GARRET BROUSSARD MD           Ot        

      R10.13       

                          EPIGASTRIC PAIN                    

 

                2020           GARRET BROUSSARD MD           Ot        

      R53.1        

                          WEAKNESS                           

 

                2020           GARRET BROUSSARD MD, Ot        

      Z79.01       

                          LONG TERM (CURRENT) USE OF ANTICOAGULANT              

      

 

                2020           GARRET BROUSSARD MD           Ot        

      Z79.82       

                          LONG TERM (CURRENT) USE OF ASPIRIN                    

 

                2020           GARRET BROUSSARD MD           Ot        

      Z86.73       

                          PRSNL HX OF TIA (TIA), AND CEREB INFRC W              

      

 

                2020           GARRET BROUSSARD MD, Ot        

      Z87.891      

                          PERSONAL HISTORY OF NICOTINE DEPENDENCE               

     

 

                2020           GARRET BROUSSARD MD, Ot        

      Z88.8        

                          ALLERGY STATUS TO OT DRUG/MEDS/BIOL SUB              

      

 

             06/10/2020           TUCKERALEJANDRAP           Ot           E87.1   

        HYPO-

OSMOLALITY AND HYPONATREMIA                      

 

             06/10/2020           TUCKERALEJANDRA COPELAND           Ot           G62.9   

        

POLYNEUROPATHY, UNSPECIFIED                      

 

             06/10/2020           TUCKERALEJANDRA COPELANDP           Ot           I10     

      

ESSENTIAL (PRIMARY) HYPERTENSION                    

 

             06/10/2020           TUCKERALEJANDRA COPELANDP           Ot           I48.91  

         

UNSPECIFIED ATRIAL FIBRILLATION                    

 

             06/10/2020           TUCKERALEJANDRAP           Ot           R53.1   

        

WEAKNESS                                         

 

             06/10/2020           TUCKER, ALEJANDRA PORTILLOP           Ot           R55     

      SYNCOPE

AND COLLAPSE                                     

 

             06/10/2020           ALEJANDRA CEBALLOS           Ot           Z79.01  

         LONG

TERM (CURRENT) USE OF ANTICOAGULANT                    

 

             06/10/2020           TUCKER ALEJANDRA PATEL           Ot           Z79.82  

         LONG

TERM (CURRENT) USE OF ASPIRIN                    

 

             06/10/2020           TUCKER ALEJANDRA PATEL           Ot           Z86.73  

         

PRSNL HX OF TIA (TIA), AND CEREB INFRC W                    

 

             06/10/2020           TUCKER ALEJANDRA PATEL           Ot           Z87.891 

          

PERSONAL HISTORY OF NICOTINE DEPENDENCE                    

 

             2020           KEDAR TAVAREZ MD           Ot           E03.

9           

HYPOTHYROIDISM, UNSPECIFIED                      

 

             2020           KEDAR TAVAREZ MD           Ot           E78.

5           

HYPERLIPIDEMIA, UNSPECIFIED                      

 

             2020           KEDAR TAVAREZ MD           Ot           E86.

1           

HYPOVOLEMIA                                      

 

             2020           KEDAR TAVAREZ MD           Ot           E87.

1           

HYPO-OSMOLALITY AND HYPONATREMIA                    

 

             2020           KEDAR TAVAREZ MD           Ot           G62.

9           

POLYNEUROPATHY, UNSPECIFIED                      

 

             2020           KEDAR TAVAREZ MD           Ot           I10 

          

ESSENTIAL (PRIMARY) HYPERTENSION                    

 

             2020           KEDAR TAVAREZ MD           Ot           I25.

10           

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                     

 

             2020           KEDAR TAVAREZ MD           Ot           I35.

0           

NONRHEUMATIC AORTIC (VALVE) STENOSIS                    

 

             2020           KEDAR TAVAREZ MD           Ot           I48.

0           

PAROXYSMAL ATRIAL FIBRILLATION                    

 

             2020           KEDAR TAVAREZ MD           Ot           I73.

9           

PERIPHERAL VASCULAR DISEASE, UNSPECIFIED                    

 

             2020           KEDAR TAVAREZ MD           Ot           J44.

1           

CHRONIC OBSTRUCTIVE PULMONARY DISEASE W                     

 

             2020           KEDAR TAVAREZ MD           Ot           J90 

          

PLEURAL EFFUSION, NOT ELSEWHERE CLASSIFI                    

 

             2020           KEDAR TAVAREZ MD           Ot           K21.

9           

GASTRO-ESOPHAGEAL REFLUX DISEASE WITHOUT                    

 

             2020           KEDAR TAVAREZ MD           Ot           K59.

00           

CONSTIPATION, UNSPECIFIED                        

 

             2020           KEDAR TAVAREZ MD           Ot           M06.

9           

RHEUMATOID ARTHRITIS, UNSPECIFIED                    

 

             2020           KEDAR TAVAREZ MD           Ot           M34.

9           

SYSTEMIC SCLEROSIS, UNSPECIFIED                    

 

             2020           KEDAR TAVAREZ MD           Ot           R05 

          

COUGH                                            

 

             2020           KEDAR TAVAREZ MD           Ot           R09.

02           

HYPOXEMIA                                        

 

             2020           KEDAR TAVAREZ MD           Ot           R13.

10           

DYSPHAGIA, UNSPECIFIED                           

 

             2020           KEDAR TAVAREZ MD           Ot           R41.

0           

DISORIENTATION, UNSPECIFIED                      

 

             2020           KEDAR TAVAREZ MD           Ot           R53.

1           

WEAKNESS                                         

 

                2020           KEDAR TAVAREZ MD           Ot            

  Z20.828          

                          CONTACT W AND EXPOSURE TO OTH VIRAL COMM              

      

 

             2020           KEDAR TAVAREZ MD           Ot           Z79.

01           

LONG TERM (CURRENT) USE OF ANTICOAGULANT                    

 

             2020           KEDAR TAVAREZ MD           Ot           Z86.

73           

PRSNL HX OF TIA (TIA), AND CEREB INFRC W                    

 

                2020           KEDAR TAAVREZ MD           Ot            

  Z87.891          

                          PERSONAL HISTORY OF NICOTINE DEPENDENCE               

     

 

             2020           TUCKER, ALEJANDRA ARNP           Ot           E87.1   

        HYPO-

OSMOLALITY AND HYPONATREMIA                      

 

             2020           TUCKER, ALEJANDRA ARNP           Ot           G62.9   

        

POLYNEUROPATHY, UNSPECIFIED                      

 

             2020           TUCKER, ALEJANDRA ARNP           Ot           I10     

      

ESSENTIAL (PRIMARY) HYPERTENSION                    

 

             2020           TUCKER, ALEJANDRA ARNP           Ot           I48.91  

         

UNSPECIFIED ATRIAL FIBRILLATION                    

 

             2020           TUCKER, ALEJANDRA ARNP           Ot           R53.1   

        

WEAKNESS                                         

 

             2020           TUCKER, ALEJANDRA ARNP           Ot           R55     

      SYNCOPE

AND COLLAPSE                                     

 

             2020           TUCKER, ALEJANDRA ARNP           Ot           Z79.01  

         LONG

TERM (CURRENT) USE OF ANTICOAGULANT                    

 

             2020           TUCKER, ALEJANDRA ARNP           Ot           Z79.82  

         LONG

TERM (CURRENT) USE OF ASPIRIN                    

 

             2020           TUCKER, ALEJANDRA ARNP           Ot           Z86.73  

         

PRSNL HX OF TIA (TIA), AND CEREB INFRC W                    

 

             2020           TUCKER, ALEJANDRA ARNP           Ot           Z87.891 

          

PERSONAL HISTORY OF NICOTINE DEPENDENCE                    

 

                2020           GARRET BROUSSARD MD           Ot        

      G62.9        

                          POLYNEUROPATHY, UNSPECIFIED                    

 

                2020           GARRET BROUSSARD MD           Ot        

      I10          

                          ESSENTIAL (PRIMARY) HYPERTENSION                    

 

                2020           GARRET BROUSSARD MD           Ot        

      I48.91       

                          UNSPECIFIED ATRIAL FIBRILLATION                    

 

                2020           GARRET BROUSSARD MD           Ot        

      I73.9        

                          PERIPHERAL VASCULAR DISEASE, UNSPECIFIED              

      

 

                2020           GARRET BROUSSARD MD           Ot        

      K21.9        

                          GASTRO-ESOPHAGEAL REFLUX DISEASE WITHOUT              

      

 

                2020           GARRET BROUSSARD MD           Ot        

      R10.13       

                          EPIGASTRIC PAIN                    

 

                2020           GARRET BROUSSARD MD, Ot        

      R53.1        

                          WEAKNESS                           

 

                2020           GARRET BROUSSARD MD, Ot        

      Z79.01       

                          LONG TERM (CURRENT) USE OF ANTICOAGULANT              

      

 

                2020           GARRET BROUSSARD MD           Ot        

      Z79.82       

                          LONG TERM (CURRENT) USE OF ASPIRIN                    

 

                2020           GARRET BROUSSARD MD, Ot        

      Z86.73       

                          PRSNL HX OF TIA (TIA), AND CEREB INFRC W              

      

 

                2020           GARRET BROUSSARD MD, Ot        

      Z87.891      

                          PERSONAL HISTORY OF NICOTINE DEPENDENCE               

     

 

                2020           GARRET BROUSSARD MD, Ot        

      Z88.8        

                          ALLERGY STATUS TO OTH DRUG/MEDS/BIOL SUB              

      

 

             2020                        W            E03.8           Othe

r specified 

hypothyroidism                          Trista Montague        

 

             2020                        W            I10           Essent

ial (primary) 

hypertension                            Trista Montague        

 

             2020                        W            I25.10           CAD

 (coronary 

artery disease)                         Trista Montague        

 

             2020                        W            I50.9           CHF 

(congestive 

heart failure)                          Trista Montague        

 

             2020                        W            J44.9           COPD

 (chronic 

obstructive pulmonary disease)           Trista Montague        

 

             2020                        W            E03.8           Othe

r specified 

hypothyroidism                          Trista Montague        

 

             2020                        W            I10           Essent

ial (primary) 

hypertension                            Trista Montague        

 

             2020                        W            I25.10           CAD

 (coronary 

artery disease)                         Trista Montague        

 

             2020                        W            I50.9           CHF 

(congestive 

heart failure)                          Trista Montague        

 

             2020                        W            J44.9           COPD

 (chronic 

obstructive pulmonary disease)           Trista Montageu        

 

             2020                        W            Z09           Hospit

al discharge 

follow-up                               Trista Montague        

 

             2020                        W            Z09           Hospit

al discharge 

follow-up                               Trista Montague        



                                                                                
                                                                                
                                                                                
                                                                                
                                                                                
                                                                                
                                                                                
                                                                                
                                                                                
                                                                                
                                                                                
                                                                                
                                                                                
                                                                                
                                                                                
                                                                                
                                                                                
                                                                                
                                                                



Procedures

      



There is no data.                  



Results

      



                    Test                Result              Range        

 

                                        Thyroid Stimulating Hormone - 17 1

5:35         

 

                    TSH                 0.36 mIU/mL           0.32-5.00        

 

                                        Lipid Panel - 10/24/17 10:33         

 

                    C/HDL               3.5                 3.7-6.7        

 

                    Cholesterol           172 mg/dL           100-240        

 

                    HDL                 49 mg/dL            30-85        

 

                    LDL-Calculated           105 mg/dL           0-100        

 

                    Trig                91 mg/dL                    

 

                    VLDL                18 mg/dL            0-42        

 

                                        Automated blood complete blood count (he

mogram) panel - 17 07:11         

 

                          Blood leukocytes automated count (number/volume)      

     9.9 10*3/uL          

                                        4.3-11.0        

 

                          Blood erythrocytes automated count (number/volume)    

       3.90 10*6/uL       

                                        4.35-5.85        

 

                    Venous blood hemoglobin measurement (mass/volume)           

12.8 g/dL           

11.5-16.0        

 

                    Blood hematocrit (volume fraction)           37 %           

     35-52        

 

                    Automated erythrocyte mean corpuscular volume           96 [

foz_us]           

80-99        

 

                                        Automated erythrocyte mean corpuscular h

emoglobin (mass per erythrocyte)        

                          33 pg                     25-34        

 

                                        Automated erythrocyte mean corpuscular h

emoglobin concentration measurement 

(mass/volume)             34 g/dL                   32-36        

 

                    Automated erythrocyte distribution width ratio           12.

0 %              10.0-

14.5        

 

                    Automated blood platelet count (count/volume)           314 

10*3/uL           

130-400        

 

                          Automated blood platelet mean volume measurement      

     9.1 [foz_us]         

                                        7.4-10.4        

 

                                        Comprehensive metabolic panel - 17

 07:11         

 

                          Serum or plasma sodium measurement (moles/volume)     

      136 mmol/L          

                                        135-145        

 

                          Serum or plasma potassium measurement (moles/volume)  

         4.1 mmol/L       

                                        3.6-5.0        

 

                          Serum or plasma chloride measurement (moles/volume)   

        101 mmol/L        

                                                

 

                    Carbon dioxide           27 mmol/L           21-32        

 

                          Serum or plasma anion gap determination (moles/volume)

           8 mmol/L       

                                        5-14        

 

                          Serum or plasma urea nitrogen measurement (mass/volume

)           14 mg/dL      

                                        7-18        

 

                          Serum or plasma creatinine measurement (mass/volume)  

         0.72 mg/dL       

                                        0.60-1.30        

 

                    Serum or plasma urea nitrogen/creatinine mass ratio         

  19                  NRG 

       

 

                                        Serum or plasma creatinine measurement w

ith calculation of estimated glomerular 

filtration rate           >                         NRG        

 

                    Serum or plasma glucose measurement (mass/volume)           

86 mg/dL            

        

 

                    Serum or plasma calcium measurement (mass/volume)           

9.1 mg/dL           

8.5-10.1        

 

                          Serum or plasma total bilirubin measurement (mass/volu

me)           0.8 mg/dL   

                                        0.1-1.0        

 

                                        Serum or plasma alkaline phosphatase freya

surement (enzymatic activity/volume)    

                          57 U/L                            

 

                                        Serum or plasma aspartate aminotransfera

se measurement (enzymatic 

activity/volume)           17 U/L                    5-34        

 

                                        Serum or plasma alanine aminotransferase

 measurement (enzymatic activity/volume)

                          10 U/L                    0-55        

 

                    Serum or plasma protein measurement (mass/volume)           

6.6 g/dL            

6.4-8.2        

 

                    Serum or plasma albumin measurement (mass/volume)           

3.8 g/dL            

3.2-4.5        

 

                                        Liver function panel (serum or plasma al

k phos, alb, total and direct bili, 

total protein, ALT, AST) - 17 07:11         

 

                    Bilirubin direct           0.3 mg/dL           0.0-0.3      

  

 

                          Serum or plasma indirect bilirubin measurement (mass/v

olume)           0.5 mg/dL

                                        NRG        

 

                                        Magnesium - 17 07:11         

 

                    Magnesium           2.1 mg/dL           1.8-2.4        

 

                                        Lipid 1996 panel - 17 07:11       

  

 

                          Serum or plasma triglyceride measurement (mass/volume)

           79 mg/dL       

                                        <150        

 

                          Serum or plasma cholesterol measurement (mass/volume) 

          182 mg/dL       

                                        < 200        

 

                          Serum or plasma cholesterol in HDL measurement (mass/v

olume)           64 mg/dL 

                                        40-60        

 

                          Cholesterol in LDL [mass/volume] in serum or plasma by

 direct assay           98

 mg/dL                                  1-129        

 

                          Serum or plasma cholesterol in VLDL measurement (mass/

volume)           16 mg/dL

                                        5-40        

 

                                        Serum or plasma thyroxine (T4) free dima

urement (mass/volume) - 17 07:11  

       

 

                          Serum or plasma thyroxine (T4) free measurement (mass/

volume)           1.05 

ng/dL                                   0.70-1.48        

 

                                        Serum or plasma thyrotropin measurement 

by detection limit <=0.05 miu/l 

(units/volume) - 17 07:11         

 

                                        Serum or plasma thyrotropin measurement 

by detection limit <=0.05 miu/l 

(units/volume)            0.19 u[iU]/mL             0.35-4.94        

 

                                        Thyroid Stimulating Hormone - 18 1

4:30         

 

                    TSH                 0.22 mIU/mL           0.32-5.00        

 

                                        Stool leukocytes detection by light micr

oscopy - 18 08:30         

 

                    FECAL WBC RESULTS           FEW WBC'S OBSERVED ON DIRECT SME

AR            NRG   

     

 

                          FECAL NOTE                FECAL LEUKOCYTES MAY BE INTE

RMITTENTLY PRESENT OR          

                                        NRG        

 

                          FECAL NOTE                UNEVENLY DISTRIBUTED IN STOO

L SPECIMENS, AND WBC           

                                        NRG        

 

                    FECAL NOTE           MORPHOLOGY DEGRADES DURING TRANSPORT   

         NRG        

 

                    FECAL NOTE           NOTE:               NRG        

 

                                        C DIFFICILE AG + TOXIN A/B. - 18 0

8:30         

 

                    RESULTS             NEGATIVE FOR ANTIGEN AND TOXIN A/B      

      NRG        

 

                                        Stool bacteria identification by culture

 - 18 08:30         

 

                    Stool bacteria identification by culture           N2       

           NRG        

 

                                        Free T3 - 18 10:00         

 

                    Free T3             2.40 pg/ml           1.45-3.48        

 

                                        EKG - 18 12:51         

 

                    EKG                 Complete                     

 

                                        Complete blood count (CBC) with automate

d white blood cell (WBC) differential - 

18 12:16         

 

                          Blood leukocytes automated count (number/volume)      

     8.4 10*3/uL          

                                        4.3-11.0        

 

                          Blood erythrocytes automated count (number/volume)    

       3.61 10*6/uL       

                                        4.35-5.85        

 

                    Venous blood hemoglobin measurement (mass/volume)           

12.1 g/dL           

11.5-16.0        

 

                    Blood hematocrit (volume fraction)           35 %           

     35-52        

 

                    Automated erythrocyte mean corpuscular volume           96 [

foz_us]           

80-99        

 

                                        Automated erythrocyte mean corpuscular h

emoglobin (mass per erythrocyte)        

                          34 pg                     25-34        

 

                                        Automated erythrocyte mean corpuscular h

emoglobin concentration measurement 

(mass/volume)             35 g/dL                   32-36        

 

                    Automated erythrocyte distribution width ratio           13.

2 %              10.0-

14.5        

 

                    Automated blood platelet count (count/volume)           332 

10*3/uL           

130-400        

 

                          Automated blood platelet mean volume measurement      

     9.0 [foz_us]         

                                        7.4-10.4        

 

                    Automated blood neutrophils/100 leukocytes           67 %   

             42-75       

 

 

                    Automated blood lymphocytes/100 leukocytes           21 %   

             12-44       

 

 

                    Blood monocytes/100 leukocytes           10 %               

 0-12        

 

                    Automated blood eosinophils/100 leukocytes           2 %    

             0-10        

 

                    Automated blood basophils/100 leukocytes           1 %      

           0-10        

 

                    Blood neutrophils automated count (number/volume)           

5.6 10*3            

1.8-7.8        

 

                    Blood lymphocytes automated count (number/volume)           

1.7 10*3            

1.0-4.0        

 

                    Blood monocytes automated count (number/volume)           0.

8 10*3            

0.0-1.0        

 

                    Automated eosinophil count           0.2 10*3/uL           0

.0-0.3        

 

                    Automated blood basophil count (count/volume)           0.1 

10*3/uL           

0.0-0.1        

 

                                        PT panel in platelet poor plasma by coag

ulation assay - 18 12:16         

 

                          Prothrombin time (PT) in platelet poor plasma by coagu

lation assay           

34.0 s                                  12.2-14.7        

 

                          INR in platelet poor plasma or blood by coagulation as

say           3.3         

                                        0.8-1.4        

 

                                        Activated partial thromboplastin time (a

PTT) in platelet poor plasma 

bycoagulation assay - 18 12:16         

 

                                        Activated partial thromboplastin time (a

PTT) in platelet poor plasma 

bycoagulation assay           72 s                      24-35        

 

                                        Comprehensive metabolic panel - 18

 12:16         

 

                          Serum or plasma sodium measurement (moles/volume)     

      130 mmol/L          

                                        135-145        

 

                          Serum or plasma potassium measurement (moles/volume)  

         4.0 mmol/L       

                                        3.6-5.0        

 

                          Serum or plasma chloride measurement (moles/volume)   

        97 mmol/L         

                                                

 

                    Carbon dioxide           22 mmol/L           21-32        

 

                          Serum or plasma anion gap determination (moles/volume)

           11 mmol/L      

                                        5-14        

 

                          Serum or plasma urea nitrogen measurement (mass/volume

)           11 mg/dL      

                                        7-18        

 

                          Serum or plasma creatinine measurement (mass/volume)  

         0.79 mg/dL       

                                        0.60-1.30        

 

                    Serum or plasma urea nitrogen/creatinine mass ratio         

  14                  NRG 

       

 

                                        Serum or plasma creatinine measurement w

ith calculation of estimated glomerular 

filtration rate           >                         NRG        

 

                    Serum or plasma glucose measurement (mass/volume)           

115 mg/dL           

        

 

                    Serum or plasma calcium measurement (mass/volume)           

9.4 mg/dL           

8.5-10.1        

 

                          Serum or plasma total bilirubin measurement (mass/volu

me)           0.6 mg/dL   

                                        0.1-1.0        

 

                                        Serum or plasma alkaline phosphatase freya

surement (enzymatic activity/volume)    

                          64 U/L                            

 

                                        Serum or plasma aspartate aminotransfera

se measurement (enzymatic 

activity/volume)           23 U/L                    5-34        

 

                                        Serum or plasma alanine aminotransferase

 measurement (enzymatic activity/volume)

                          9 U/L                     0-55        

 

                    Serum or plasma protein measurement (mass/volume)           

6.9 g/dL            

6.4-8.2        

 

                    Serum or plasma albumin measurement (mass/volume)           

4.2 g/dL            

3.2-4.5        

 

                    CALCIUM CORRECTED           9.2 mg/dL           8.5-10.1    

    

 

                                        Magnesium - 18 12:16         

 

                    Magnesium           2.0 mg/dL           1.8-2.4        

 

                                        Serum or plasma troponin i.cardiac measu

rement (mass/volume) - 18 12:16   

      

 

                          Serum or plasma troponin i.cardiac measurement (mass/v

olume)           < ng/mL  

                                        <0.30        

 

                                        Myoglobin, serum - 18 12:16       

  

 

                    Myoglobin, serum           32.4 ng/mL           10.0-92.0   

     

 

                                        Serum or plasma thyroxine (T4) free dima

urement (mass/volume) - 18 12:16  

       

 

                          Serum or plasma thyroxine (T4) free measurement (mass/

volume)           1.12 

ng/dL                                   0.70-1.48        

 

                                        Serum or plasma thyrotropin measurement 

by detection limit <=0.05 miu/l 

(units/volume) - 18 12:16         

 

                                        Serum or plasma thyrotropin measurement 

by detection limit <=0.05 miu/l 

(units/volume)            0.33 u[iU]/mL             0.35-4.94        

 

                                        Serum or plasma troponin i.cardiac measu

rement (mass/volume) - 18 20:15   

      

 

                          Serum or plasma troponin i.cardiac measurement (mass/v

olume)           < ng/mL  

                                        <0.30        

 

                                        Complete blood count (CBC) with automate

d white blood cell (WBC) differential - 

18 03:00         

 

                          Blood leukocytes automated count (number/volume)      

     5.5 10*3/uL          

                                        4.3-11.0        

 

                          Blood erythrocytes automated count (number/volume)    

       3.32 10*6/uL       

                                        4.35-5.85        

 

                    Venous blood hemoglobin measurement (mass/volume)           

11.0 g/dL           

11.5-16.0        

 

                    Blood hematocrit (volume fraction)           33 %           

     35-52        

 

                    Automated erythrocyte mean corpuscular volume           98 [

foz_us]           

80-99        

 

                                        Automated erythrocyte mean corpuscular h

emoglobin (mass per erythrocyte)        

                          33 pg                     25-34        

 

                                        Automated erythrocyte mean corpuscular h

emoglobin concentration measurement 

(mass/volume)             34 g/dL                   32-36        

 

                    Automated erythrocyte distribution width ratio           13.

2 %              10.0-

14.5        

 

                    Automated blood platelet count (count/volume)           235 

10*3/uL           

130-400        

 

                          Automated blood platelet mean volume measurement      

     9.6 [foz_us]         

                                        7.4-10.4        

 

                    Automated blood neutrophils/100 leukocytes           61 %   

             42-75       

 

 

                    Automated blood lymphocytes/100 leukocytes           26 %   

             12-44       

 

 

                    Blood monocytes/100 leukocytes           10 %               

 0-12        

 

                    Automated blood eosinophils/100 leukocytes           3 %    

             0-10        

 

                    Automated blood basophils/100 leukocytes           1 %      

           0-10        

 

                    Blood neutrophils automated count (number/volume)           

3.3 10*3            

1.8-7.8        

 

                    Blood lymphocytes automated count (number/volume)           

1.4 10*3            

1.0-4.0        

 

                    Blood monocytes automated count (number/volume)           0.

6 10*3            

0.0-1.0        

 

                    Automated eosinophil count           0.2 10*3/uL           0

.0-0.3        

 

                    Automated blood basophil count (count/volume)           0.0 

10*3/uL           

0.0-0.1        

 

                                        Whole blood basic metabolic panel -  03:00         

 

                          Serum or plasma sodium measurement (moles/volume)     

      137 mmol/L          

                                        135-145        

 

                          Serum or plasma potassium measurement (moles/volume)  

         3.9 mmol/L       

                                        3.6-5.0        

 

                          Serum or plasma chloride measurement (moles/volume)   

        108 mmol/L        

                                                

 

                    Carbon dioxide           20 mmol/L           21-32        

 

                          Serum or plasma anion gap determination (moles/volume)

           9 mmol/L       

                                        5-14        

 

                          Serum or plasma urea nitrogen measurement (mass/volume

)           7 mg/dL       

                                        7-18        

 

                          Serum or plasma creatinine measurement (mass/volume)  

         0.60 mg/dL       

                                        0.60-1.30        

 

                    Serum or plasma urea nitrogen/creatinine mass ratio         

  12                  NRG 

       

 

                                        Serum or plasma creatinine measurement w

ith calculation of estimated glomerular 

filtration rate           >                         NRG        

 

                    Serum or plasma glucose measurement (mass/volume)           

85 mg/dL            

        

 

                    Serum or plasma calcium measurement (mass/volume)           

8.8 mg/dL           

8.5-10.1        

 

                                        Lipid 1996 panel - 18 03:00       

  

 

                          Serum or plasma triglyceride measurement (mass/volume)

           74 mg/dL       

                                        <150        

 

                          Serum or plasma cholesterol measurement (mass/volume) 

          150 mg/dL       

                                        < 200        

 

                          Serum or plasma cholesterol in HDL measurement (mass/v

olume)           49 mg/dL 

                                        40-60        

 

                          Cholesterol in LDL [mass/volume] in serum or plasma by

 direct assay           92

 mg/dL                                  1-129        

 

                          Serum or plasma cholesterol in VLDL measurement (mass/

volume)           15 mg/dL

                                        5-40        

 

                                        Total T3 - 19 10:50         

 

                    Total T3            0.76 ng/mL           0.58-1.59        

 

                                        Complete blood count (CBC) with automate

d white blood cell (WBC) differential - 

19 18:12         

 

                          Blood leukocytes automated count (number/volume)      

     7.9 10*3/uL          

                                        4.3-11.0        

 

                          Blood erythrocytes automated count (number/volume)    

       3.90 10*6/uL       

                                        4.35-5.85        

 

                    Venous blood hemoglobin measurement (mass/volume)           

12.7 g/dL           

11.5-16.0        

 

                    Blood hematocrit (volume fraction)           38 %           

     35-52        

 

                    Automated erythrocyte mean corpuscular volume           96 [

foz_us]           

80-99        

 

                                        Automated erythrocyte mean corpuscular h

emoglobin (mass per erythrocyte)        

                          33 pg                     25-34        

 

                                        Automated erythrocyte mean corpuscular h

emoglobin concentration measurement 

(mass/volume)             34 g/dL                   32-36        

 

                    Automated erythrocyte distribution width ratio           12.

1 %              10.0-

14.5        

 

                    Automated blood platelet count (count/volume)           299 

10*3/uL           

130-400        

 

                          Automated blood platelet mean volume measurement      

     10.0 [foz_us]        

                                        7.4-10.4        

 

                    Automated blood neutrophils/100 leukocytes           60 %   

             42-75       

 

 

                    Automated blood lymphocytes/100 leukocytes           27 %   

             12-44       

 

 

                    Blood monocytes/100 leukocytes           8 %                

 0-12        

 

                    Automated blood eosinophils/100 leukocytes           5 %    

             0-10        

 

                    Automated blood basophils/100 leukocytes           1 %      

           0-10        

 

                    Blood neutrophils automated count (number/volume)           

4.7 10*3            

1.8-7.8        

 

                    Blood lymphocytes automated count (number/volume)           

2.1 10*3            

1.0-4.0        

 

                    Blood monocytes automated count (number/volume)           0.

6 10*3            

0.0-1.0        

 

                    Automated eosinophil count           0.4 10*3/uL           0

.0-0.3        

 

                    Automated blood basophil count (count/volume)           0.1 

10*3/uL           

0.0-0.1        

 

                                        PT panel in platelet poor plasma by coag

ulation assay - 19 18:12         

 

                          Prothrombin time (PT) in platelet poor plasma by coagu

lation assay           

30.2 s                                  12.2-14.7        

 

                          INR in platelet poor plasma or blood by coagulation as

say           2.7         

                                        0.8-1.4        

 

                                        Activated partial thromboplastin time (a

PTT) in platelet poor plasma 

bycoagulation assay - 19 18:12         

 

                                        Activated partial thromboplastin time (a

PTT) in platelet poor plasma 

bycoagulation assay           50 s                      24-35        

 

                                        Comprehensive metabolic panel - 19

 18:12         

 

                          Serum or plasma sodium measurement (moles/volume)     

      137 mmol/L          

                                        135-145        

 

                          Serum or plasma potassium measurement (moles/volume)  

         4.6 mmol/L       

                                        3.6-5.0        

 

                          Serum or plasma chloride measurement (moles/volume)   

        103 mmol/L        

                                                

 

                    Carbon dioxide           24 mmol/L           21-32        

 

                          Serum or plasma anion gap determination (moles/volume)

           10 mmol/L      

                                        5-14        

 

                          Serum or plasma urea nitrogen measurement (mass/volume

)           12 mg/dL      

                                        7-18        

 

                          Serum or plasma creatinine measurement (mass/volume)  

         0.75 mg/dL       

                                        0.60-1.30        

 

                    Serum or plasma urea nitrogen/creatinine mass ratio         

  16                  NRG 

       

 

                                        Serum or plasma creatinine measurement w

ith calculation of estimated glomerular 

filtration rate           >                         NRG        

 

                    Serum or plasma glucose measurement (mass/volume)           

118 mg/dL           

        

 

                    Serum or plasma calcium measurement (mass/volume)           

9.2 mg/dL           

8.5-10.1        

 

                          Serum or plasma total bilirubin measurement (mass/volu

me)           0.4 mg/dL   

                                        0.1-1.0        

 

                                        Serum or plasma alkaline phosphatase freya

surement (enzymatic activity/volume)    

                          90 U/L                            

 

                                        Serum or plasma aspartate aminotransfera

se measurement (enzymatic 

activity/volume)           24 U/L                    5-34        

 

                                        Serum or plasma alanine aminotransferase

 measurement (enzymatic activity/volume)

                          < U/L                     0-55        

 

                    Serum or plasma protein measurement (mass/volume)           

7.3 g/dL            

6.4-8.2        

 

                    Serum or plasma albumin measurement (mass/volume)           

4.0 g/dL            

3.2-4.5        

 

                    CALCIUM CORRECTED           9.2 mg/dL           8.5-10.1    

    

 

                                        Magnesium - 19 18:12         

 

                    Magnesium           1.5 mg/dL           1.6-2.4        

 

                                        Serum or plasma troponin i.cardiac measu

rement (mass/volume) - 19 18:12   

      

 

                          Serum or plasma troponin i.cardiac measurement (mass/v

olume)           < ng/mL  

                                        <0.028        

 

                                        Myoglobin, serum - 19 18:12       

  

 

                    Myoglobin, serum           59.7 ng/mL           10.0-92.0   

     

 

                                        Serum or plasma lithium measurement (mol

es/volume) - 19 18:12         

 

                    BNP PT              385.6 pg/mL           <100.0        

 

                                        Automated blood complete blood count (he

mogram) panel - 19 12:15         

 

                          Blood leukocytes automated count (number/volume)      

     6.7 10*3/uL          

                                        4.3-11.0        

 

                          Blood erythrocytes automated count (number/volume)    

       4.01 10*6/uL       

                                        4.35-5.85        

 

                    Venous blood hemoglobin measurement (mass/volume)           

12.8 g/dL           

11.5-16.0        

 

                    Blood hematocrit (volume fraction)           39 %           

     35-52        

 

                    Automated erythrocyte mean corpuscular volume           96 [

foz_us]           

80-99        

 

                                        Automated erythrocyte mean corpuscular h

emoglobin (mass per erythrocyte)        

                          32 pg                     25-34        

 

                                        Automated erythrocyte mean corpuscular h

emoglobin concentration measurement 

(mass/volume)             33 g/dL                   32-36        

 

                    Automated erythrocyte distribution width ratio           12.

3 %              10.0-

14.5        

 

                    Automated blood platelet count (count/volume)           290 

10*3/uL           

130-400        

 

                          Automated blood platelet mean volume measurement      

     9.2 [foz_us]         

                                        7.4-10.4        

 

                                        PT panel in platelet poor plasma by coag

ulation assay - 19 12:15         

 

                          Prothrombin time (PT) in platelet poor plasma by coagu

lation assay           

21.6 s                                  12.2-14.7        

 

                          INR in platelet poor plasma or blood by coagulation as

say           1.8         

                                        0.8-1.4        

 

                                        Activated partial thromboplastin time (a

PTT) in platelet poor plasma 

bycoagulation assay - 19 12:15         

 

                                        Activated partial thromboplastin time (a

PTT) in platelet poor plasma 

bycoagulation assay           47 s                      24-35        

 

                                        Comprehensive metabolic panel - 19

 12:15         

 

                          Serum or plasma sodium measurement (moles/volume)     

      139 mmol/L          

                                        135-145        

 

                          Serum or plasma potassium measurement (moles/volume)  

         4.2 mmol/L       

                                        3.6-5.0        

 

                          Serum or plasma chloride measurement (moles/volume)   

        104 mmol/L        

                                                

 

                    Carbon dioxide           24 mmol/L           21-32        

 

                          Serum or plasma anion gap determination (moles/volume)

           11 mmol/L      

                                        5-14        

 

                          Serum or plasma urea nitrogen measurement (mass/volume

)           9 mg/dL       

                                        7-18        

 

                          Serum or plasma creatinine measurement (mass/volume)  

         0.72 mg/dL       

                                        0.60-1.30        

 

                    Serum or plasma urea nitrogen/creatinine mass ratio         

  13                  NRG 

       

 

                                        Serum or plasma creatinine measurement w

ith calculation of estimated glomerular 

filtration rate           >                         NRG        

 

                    Serum or plasma glucose measurement (mass/volume)           

81 mg/dL            

        

 

                    Serum or plasma calcium measurement (mass/volume)           

9.4 mg/dL           

8.5-10.1        

 

                          Serum or plasma total bilirubin measurement (mass/volu

me)           0.6 mg/dL   

                                        0.1-1.0        

 

                                        Serum or plasma alkaline phosphatase freya

surement (enzymatic activity/volume)    

                          83 U/L                            

 

                                        Serum or plasma aspartate aminotransfera

se measurement (enzymatic 

activity/volume)           17 U/L                    5-34        

 

                                        Serum or plasma alanine aminotransferase

 measurement (enzymatic activity/volume)

                          < U/L                     0-55        

 

                    Serum or plasma protein measurement (mass/volume)           

7.3 g/dL            

6.4-8.2        

 

                    Serum or plasma albumin measurement (mass/volume)           

4.3 g/dL            

3.2-4.5        

 

                    CALCIUM CORRECTED           9.2 mg/dL           8.5-10.1    

    

 

                                        Lipid 1996 panel - 19 12:15       

  

 

                          Serum or plasma triglyceride measurement (mass/volume)

           76 mg/dL       

                                        <150        

 

                          Serum or plasma cholesterol measurement (mass/volume) 

          191 mg/dL       

                                        < 200        

 

                          Serum or plasma cholesterol in HDL measurement (mass/v

olume)           55 mg/dL 

                                        40-60        

 

                          Cholesterol in LDL [mass/volume] in serum or plasma by

 direct assay           

129 mg/dL                               1-129        

 

                          Serum or plasma cholesterol in VLDL measurement (mass/

volume)           15 mg/dL

                                        5-40        

 

                                        Methicillin resistant Staphylococcus aur

eus (MRSA) screening culture - 19 

12:15         

 

                          Methicillin resistant Staphylococcus aureus (MRSA) scr

eening culture           

NEG                                     NRG        

 

                                        Comprehensive metabolic panel - 20

 08:52         

 

                          Serum or plasma sodium measurement (moles/volume)     

      131 mmol/L          

                                        135-145        

 

                          Serum or plasma potassium measurement (moles/volume)  

         5.2 mmol/L       

                                        3.6-5.0        

 

                          Serum or plasma chloride measurement (moles/volume)   

        99 mmol/L         

                                                

 

                    Carbon dioxide           22 mmol/L           21-32        

 

                          Serum or plasma anion gap determination (moles/volume)

           10 mmol/L      

                                        5-14        

 

                          Serum or plasma urea nitrogen measurement (mass/volume

)           10 mg/dL      

                                        7-18        

 

                          Serum or plasma creatinine measurement (mass/volume)  

         0.74 mg/dL       

                                        0.60-1.30        

 

                    Serum or plasma urea nitrogen/creatinine mass ratio         

  14                  NRG 

       

 

                                        Serum or plasma creatinine measurement w

ith calculation of estimated glomerular 

filtration rate           >                         NRG        

 

                    Serum or plasma glucose measurement (mass/volume)           

105 mg/dL           

        

 

                    Serum or plasma calcium measurement (mass/volume)           

9.3 mg/dL           

8.5-10.1        

 

                          Serum or plasma total bilirubin measurement (mass/volu

me)           0.6 mg/dL   

                                        0.1-1.0        

 

                                        Serum or plasma alkaline phosphatase freya

surement (enzymatic activity/volume)    

                          94 U/L                            

 

                                        Serum or plasma aspartate aminotransfera

se measurement (enzymatic 

activity/volume)           24 U/L                    5-34        

 

                                        Serum or plasma alanine aminotransferase

 measurement (enzymatic activity/volume)

                          15 U/L                    0-55        

 

                    Serum or plasma protein measurement (mass/volume)           

7.1 g/dL            

6.4-8.2        

 

                    Serum or plasma albumin measurement (mass/volume)           

4.0 g/dL            

3.2-4.5        

 

                    CALCIUM CORRECTED           9.3 mg/dL           8.5-10.1    

    

 

                                        Serum or plasma thyroxine (T4) free dima

urement (mass/volume) - 20 08:52  

       

 

                          Serum or plasma thyroxine (T4) free measurement (mass/

volume)           0.99 

ng/dL                                   0.70-1.48        

 

                                        TRIIODOTHYRONINE TOTAL T3 - 20 08:

52         

 

                    TRIIODOTHYRONINE T3 TOTAL           0.73 %              0.60

-1.80        

 

                                        TRIIODOTHYRONINE TOTAL T3 - 20 15:

10         

 

                    TRIIODOTHYRONINE T3 TOTAL           0.65 %              0.60

-1.80        

 

                                        Complete blood count (CBC) with automate

d white blood cell (WBC) differential - 

20 15:14         

 

                          Blood leukocytes automated count (number/volume)      

     11.5 10*3/uL         

                                        4.3-11.0        

 

                          Blood erythrocytes automated count (number/volume)    

       3.60 10*6/uL       

                                        4.35-5.85        

 

                    Venous blood hemoglobin measurement (mass/volume)           

11.3 g/dL           

11.5-16.0        

 

                    Blood hematocrit (volume fraction)           34 %           

     35-52        

 

                    Automated erythrocyte mean corpuscular volume           93 [

foz_us]           

80-99        

 

                                        Automated erythrocyte mean corpuscular h

emoglobin (mass per erythrocyte)        

                          31 pg                     25-34        

 

                                        Automated erythrocyte mean corpuscular h

emoglobin concentration measurement 

(mass/volume)             34 g/dL                   32-36        

 

                    Automated erythrocyte distribution width ratio           12.

3 %              10.0-

14.5        

 

                    Automated blood platelet count (count/volume)           461 

10*3/uL           

130-400        

 

                          Automated blood platelet mean volume measurement      

     8.7 [foz_us]         

                                        7.4-10.4        

 

                    Automated blood neutrophils/100 leukocytes           94 %   

             42-75       

 

 

                    Automated blood lymphocytes/100 leukocytes           3 %    

             12-44        

 

                    Blood monocytes/100 leukocytes           3 %                

 0-12        

 

                    Automated blood eosinophils/100 leukocytes           0 %    

             0-10        

 

                    Automated blood basophils/100 leukocytes           0 %      

           0-10        

 

                    Blood neutrophils automated count (number/volume)           

10.8 10*3           

1.8-7.8        

 

                    Blood lymphocytes automated count (number/volume)           

0.4 10*3            

1.0-4.0        

 

                    Blood monocytes automated count (number/volume)           0.

3 10*3            

0.0-1.0        

 

                    Automated eosinophil count           0.0 10*3/uL           0

.0-0.3        

 

                    Automated blood basophil count (count/volume)           0.0 

10*3/uL           

0.0-0.1        

 

                                        Comprehensive metabolic panel - 20

 15:14         

 

                          Serum or plasma sodium measurement (moles/volume)     

      130 mmol/L          

                                        135-145        

 

                          Serum or plasma potassium measurement (moles/volume)  

         4.7 mmol/L       

                                        3.6-5.0        

 

                          Serum or plasma chloride measurement (moles/volume)   

        96 mmol/L         

                                                

 

                    Carbon dioxide           23 mmol/L           21-32        

 

                          Serum or plasma anion gap determination (moles/volume)

           11 mmol/L      

                                        5-14        

 

                          Serum or plasma urea nitrogen measurement (mass/volume

)           14 mg/dL      

                                        7-18        

 

                          Serum or plasma creatinine measurement (mass/volume)  

         0.74 mg/dL       

                                        0.60-1.30        

 

                    Serum or plasma urea nitrogen/creatinine mass ratio         

  19                  NRG 

       

 

                                        Serum or plasma creatinine measurement w

ith calculation of estimated glomerular 

filtration rate           >                         NRG        

 

                    Serum or plasma glucose measurement (mass/volume)           

138 mg/dL           

        

 

                    Serum or plasma calcium measurement (mass/volume)           

9.3 mg/dL           

8.5-10.1        

 

                          Serum or plasma total bilirubin measurement (mass/volu

me)           0.5 mg/dL   

                                        0.1-1.0        

 

                                        Serum or plasma alkaline phosphatase freya

surement (enzymatic activity/volume)    

                          95 U/L                            

 

                                        Serum or plasma aspartate aminotransfera

se measurement (enzymatic 

activity/volume)           21 U/L                    5-34        

 

                                        Serum or plasma alanine aminotransferase

 measurement (enzymatic activity/volume)

                          12 U/L                    0-55        

 

                    Serum or plasma protein measurement (mass/volume)           

7.2 g/dL            

6.4-8.2        

 

                    Serum or plasma albumin measurement (mass/volume)           

3.9 g/dL            

3.2-4.5        

 

                    CALCIUM CORRECTED           9.4 mg/dL           8.5-10.1    

    

 

                                        Magnesium - 20 15:14         

 

                    Magnesium           1.9 mg/dL           1.6-2.4        

 

                                        Manual absolute plasma cell count -  15:14         

 

                    Blood monocytes/100 leukocytes           3 %                

 NRG        

 

                    Manual blood segmented neutrophils/100 leukocytes           

92 %                NRG  

      

 

                    Blood band neutrophils/100 leukocytes           0 %         

        NRG        

 

                    Manual blood lymphocytes/100 leukocytes           5 %       

          NRG        

 

                    Manual eosinophils/100 leukocytes in nose           0 %     

            NRG        

 

                    Manual blood basophils/100 leukocytes           0 %         

        NRG        

 

                    Blood erythrocyte morphology finding identification         

  NORMAL              

NRG        

 

                                        Serum or plasma troponin i.cardiac measu

rement (mass/volume) - 20 15:14   

      

 

                          Serum or plasma troponin i.cardiac measurement (mass/v

olume)           < ng/mL  

                                        <0.028        

 

                                        THYROID STIMULATING HORMONE - 20 1

5:14         

 

                    THYROID STIMULATING HORMONE           0.03 u[iU]/mL         

  0.35-4.94        

 

                                        Serum or plasma thyroxine (T4) free dima

urement (mass/volume) - 20 15:14  

       

 

                          Serum or plasma thyroxine (T4) free measurement (mass/

volume)           1.36 

ng/dL                                   0.70-1.48        

 

                                        Triiodothyronine (T3) - 20 16:46  

       

 

                    Triiodothyronine (T3)           72 ng/dL              

      

 

                                        Thyroid Stimulating Hormone - 20 1

6:46         

 

                    TSH                 7.39 mIU/mL           0.32-5.00        

 

                                        Total T3 - 20 16:46         

 

                    TRIIODOTHYRONINE (T3)           72 NG/DL              

      

 

                                        Thyroid Stimulating Hormone - 20 0

9:45         

 

                    TSH                 0.47 mIU/mL           0.32-5.00        

 

                                        Free T4 - 20 09:45         

 

                    Free T4             1.57 ng/dL           0.81-1.61        

 

                                        Free T3 - 20 09:45         

 

                    T3, FREE, DIALYSIS, LC/MS-MS           1.86 PG/ML           

         

 

                                        Complete blood count (CBC) with automate

d white blood cell (WBC) differential - 

20 15:15         

 

                          Blood leukocytes automated count (number/volume)      

     7.8 10*3/uL          

                                        4.3-11.0        

 

                          Blood erythrocytes automated count (number/volume)    

       3.59 10*6/uL       

                                        4.35-5.85        

 

                    Venous blood hemoglobin measurement (mass/volume)           

11.8 g/dL           

11.5-16.0        

 

                    Blood hematocrit (volume fraction)           33 %           

     35-52        

 

                    Automated erythrocyte mean corpuscular volume           93 [

foz_us]           

80-99        

 

                                        Automated erythrocyte mean corpuscular h

emoglobin (mass per erythrocyte)        

                          33 pg                     25-34        

 

                                        Automated erythrocyte mean corpuscular h

emoglobin concentration measurement 

(mass/volume)             35 g/dL                   32-36        

 

                    Automated erythrocyte distribution width ratio           12.

8 %              10.0-

14.5        

 

                    Automated blood platelet count (count/volume)           234 

10*3/uL           

130-400        

 

                          Automated blood platelet mean volume measurement      

     9.3 [foz_us]         

                                        7.4-10.4        

 

                    Automated blood neutrophils/100 leukocytes           85 %   

             42-75       

 

 

                    Automated blood lymphocytes/100 leukocytes           6 %    

             12-44        

 

                    Blood monocytes/100 leukocytes           8 %                

 0-12        

 

                    Automated blood eosinophils/100 leukocytes           1 %    

             0-10        

 

                    Automated blood basophils/100 leukocytes           0 %      

           0-10        

 

                    Blood neutrophils automated count (number/volume)           

6.6 10*3            

1.8-7.8        

 

                    Blood lymphocytes automated count (number/volume)           

0.5 10*3            

1.0-4.0        

 

                    Blood monocytes automated count (number/volume)           0.

6 10*3            

0.0-1.0        

 

                    Automated eosinophil count           0.0 10*3/uL           0

.0-0.3        

 

                    Automated blood basophil count (count/volume)           0.0 

10*3/uL           

0.0-0.1        

 

                                        Comprehensive metabolic panel - 20

 15:15         

 

                          Serum or plasma sodium measurement (moles/volume)     

      124 mmol/L          

                                        135-145        

 

                          Serum or plasma potassium measurement (moles/volume)  

         4.3 mmol/L       

                                        3.6-5.0        

 

                          Serum or plasma chloride measurement (moles/volume)   

        92 mmol/L         

                                                

 

                    Carbon dioxide           21 mmol/L           21-32        

 

                          Serum or plasma anion gap determination (moles/volume)

           11 mmol/L      

                                        5-14        

 

                          Serum or plasma urea nitrogen measurement (mass/volume

)           8 mg/dL       

                                        7-18        

 

                          Serum or plasma creatinine measurement (mass/volume)  

         0.66 mg/dL       

                                        0.60-1.30        

 

                    Serum or plasma urea nitrogen/creatinine mass ratio         

  12                  NRG 

       

 

                                        Serum or plasma creatinine measurement w

ith calculation of estimated glomerular 

filtration rate           >                         NRG        

 

                    Serum or plasma glucose measurement (mass/volume)           

100 mg/dL           

        

 

                    Serum or plasma calcium measurement (mass/volume)           

8.6 mg/dL           

8.5-10.1        

 

                          Serum or plasma total bilirubin measurement (mass/volu

me)           0.6 mg/dL   

                                        0.1-1.0        

 

                                        Serum or plasma alkaline phosphatase freya

surement (enzymatic activity/volume)    

                          64 U/L                            

 

                                        Serum or plasma aspartate aminotransfera

se measurement (enzymatic 

activity/volume)           23 U/L                    5-34        

 

                                        Serum or plasma alanine aminotransferase

 measurement (enzymatic activity/volume)

                          8 U/L                     0-55        

 

                    Serum or plasma protein measurement (mass/volume)           

6.5 g/dL            

6.4-8.2        

 

                    Serum or plasma albumin measurement (mass/volume)           

3.9 g/dL            

3.2-4.5        

 

                    CALCIUM CORRECTED           8.7 mg/dL           8.5-10.1    

    

 

                                        Serum or plasma troponin i.cardiac measu

rement (mass/volume) - 20 15:15   

      

 

                          Serum or plasma troponin i.cardiac measurement (mass/v

olume)           < ng/mL  

                                        <0.028        

 

                                        Manual absolute plasma cell count -  15:15         

 

                    Blood monocytes/100 leukocytes           6 %                

 NRG        

 

                    Manual blood segmented neutrophils/100 leukocytes           

87 %                NRG  

      

 

                    Manual blood lymphocytes/100 leukocytes           6 %       

          NRG        

 

                    Manual eosinophils/100 leukocytes in nose           1 %     

            NRG        

 

                    Blood erythrocyte morphology finding identification         

  NORMAL              

NRG        

 

                                        Serum or plasma thyrotropin measurement 

by detection limit <=0.05 miu/l 

(units/volume) - 20 15:15         

 

                                        Serum or plasma thyrotropin measurement 

by detection limit <=0.05 miu/l 

(units/volume)            0.85 u[iU]/mL             0.35-4.94        

 

                                        Complete urinalysis with reflex to cultu

re - 20 15:30         

 

                    Urine color determination           YELLOW              NRG 

       

 

                    Urine clarity determination           CLEAR               NR

G        

 

                    Urine pH measurement by test strip           7.5            

     5-9        

 

                    Specific gravity of urine by test strip           1.010     

          1.016-1.022  

      

 

                          Urine protein assay by test strip, semi-quantitative  

         NEGATIVE         

                                        NEGATIVE        

 

                    Urine glucose detection by automated test strip           NE

GATIVE            

NEGATIVE        

 

                          Erythrocytes detection in urine sediment by light micr

oscopy           NEGATIVE 

                                        NEGATIVE        

 

                    Urine ketones detection by automated test strip           NE

GATIVE            

NEGATIVE        

 

                    Urine nitrite detection by test strip           NEGATIVE    

        NEGATIVE    

    

 

                    Urine total bilirubin detection by test strip           NEGA

TIVE            

NEGATIVE        

 

                          Urine urobilinogen measurement by automated test strip

 (mass/volume)           

1.0 mg/dL                               < = 1.0        

 

                    Urine leukocyte esterase detection by dipstick           NEG

ATIVE            

NEGATIVE        

 

                                        Automated urine sediment erythrocyte cou

nt by microscopy (number/high power 

field)                    NONE                      NRG        

 

                                        Automated urine sediment leukocyte count

 by microscopy (number/high power field)

                           [HPF]                    NRG        

 

                          Bacteria detection in urine sediment by light microsco

py           TRACE        

                                        NRG        

 

                                        Squamous epithelial cells detection in u

rine sediment by light microscopy       

                          2-5                       NRG        

 

                          Crystals detection in urine sediment by light microsco

py           NONE         

                                        NRG        

 

                    Casts detection in urine sediment by light microscopy       

    NONE                

NRG        

 

                          Mucus detection in urine sediment by light microscopy 

          NEGATIVE        

                                        NRG        

 

                    Complete urinalysis with reflex to culture           NO     

             NRG        

 

                                        Complete blood count (CBC) with automate

d white blood cell (WBC) differential - 

20 07:48         

 

                          Blood leukocytes automated count (number/volume)      

     8.0 10*3/uL          

                                        4.3-11.0        

 

                          Blood erythrocytes automated count (number/volume)    

       3.94 10*6/uL       

                                        4.35-5.85        

 

                    Venous blood hemoglobin measurement (mass/volume)           

12.6 g/dL           

11.5-16.0        

 

                    Blood hematocrit (volume fraction)           37 %           

     35-52        

 

                    Automated erythrocyte mean corpuscular volume           93 [

foz_us]           

80-99        

 

                                        Automated erythrocyte mean corpuscular h

emoglobin (mass per erythrocyte)        

                          32 pg                     25-34        

 

                                        Automated erythrocyte mean corpuscular h

emoglobin concentration measurement 

(mass/volume)             35 g/dL                   32-36        

 

                    Automated erythrocyte distribution width ratio           13.

0 %              10.0-

14.5        

 

                    Automated blood platelet count (count/volume)           275 

10*3/uL           

130-400        

 

                          Automated blood platelet mean volume measurement      

     9.2 [foz_us]         

                                        7.4-10.4        

 

                    Automated blood neutrophils/100 leukocytes           85 %   

             42-75       

 

 

                    Automated blood lymphocytes/100 leukocytes           6 %    

             12-44        

 

                    Blood monocytes/100 leukocytes           8 %                

 0-12        

 

                    Automated blood eosinophils/100 leukocytes           0 %    

             0-10        

 

                    Automated blood basophils/100 leukocytes           0 %      

           0-10        

 

                    Blood neutrophils automated count (number/volume)           

6.8 10*3            

1.8-7.8        

 

                    Blood lymphocytes automated count (number/volume)           

0.5 10*3            

1.0-4.0        

 

                    Blood monocytes automated count (number/volume)           0.

6 10*3            

0.0-1.0        

 

                    Automated eosinophil count           0.0 10*3/uL           0

.0-0.3        

 

                    Automated blood basophil count (count/volume)           0.0 

10*3/uL           

0.0-0.1        

 

                                        Comprehensive metabolic panel - 20

 07:48         

 

                          Serum or plasma sodium measurement (moles/volume)     

      126 mmol/L          

                                        135-145        

 

                          Serum or plasma potassium measurement (moles/volume)  

         4.3 mmol/L       

                                        3.6-5.0        

 

                          Serum or plasma chloride measurement (moles/volume)   

        95 mmol/L         

                                                

 

                    Carbon dioxide           23 mmol/L           21-32        

 

                          Serum or plasma anion gap determination (moles/volume)

           8 mmol/L       

                                        5-14        

 

                          Serum or plasma urea nitrogen measurement (mass/volume

)           9 mg/dL       

                                        7-18        

 

                          Serum or plasma creatinine measurement (mass/volume)  

         0.67 mg/dL       

                                        0.60-1.30        

 

                    Serum or plasma urea nitrogen/creatinine mass ratio         

  13                  NRG 

       

 

                                        Serum or plasma creatinine measurement w

ith calculation of estimated glomerular 

filtration rate           >                         NRG        

 

                    Serum or plasma glucose measurement (mass/volume)           

116 mg/dL           

        

 

                    Serum or plasma calcium measurement (mass/volume)           

8.9 mg/dL           

8.5-10.1        

 

                          Serum or plasma total bilirubin measurement (mass/volu

me)           0.5 mg/dL   

                                        0.1-1.0        

 

                                        Serum or plasma alkaline phosphatase freya

surement (enzymatic activity/volume)    

                          64 U/L                            

 

                                        Serum or plasma aspartate aminotransfera

se measurement (enzymatic 

activity/volume)           21 U/L                    5-34        

 

                                        Serum or plasma alanine aminotransferase

 measurement (enzymatic activity/volume)

                          9 U/L                     0-55        

 

                    Serum or plasma protein measurement (mass/volume)           

6.9 g/dL            

6.4-8.2        

 

                    Serum or plasma albumin measurement (mass/volume)           

4.1 g/dL            

3.2-4.5        

 

                    CALCIUM CORRECTED           8.8 mg/dL           8.5-10.1    

    

 

                                        Serum or plasma C reactive protein measu

rement (mass/volume) - 20 07:48   

      

 

                          Serum or plasma C reactive protein measurement (mass/v

olume)           0.16 

mg/dL                                   0.00-0.50        

 

                                        Serum or plasma salicylates measurement 

(mass/volume) - 20 07:48         

 

                          Serum or plasma salicylates measurement (mass/volume) 

          < mg/dL         

                                        5.0-20.0        

 

                                        Serum or plasma acetaminophen measuremen

t (mass/volume) - 20 07:48        

 

 

                          Serum or plasma acetaminophen measurement (mass/volume

)           19 ug/mL      

                                        10-30        

 

                                        Manual absolute plasma cell count -  07:48         

 

                    Blood monocytes/100 leukocytes           6 %                

 NRG        

 

                    Manual blood segmented neutrophils/100 leukocytes           

85 %                NRG  

      

 

                    Manual blood lymphocytes/100 leukocytes           6 %       

          NRG        

 

                    Manual eosinophils/100 leukocytes in nose           3 %     

            NRG        

 

                    Manual blood basophils/100 leukocytes           0 %         

        NRG        

 

                    Blood erythrocyte morphology finding identification         

  NORMAL              

NRG        

 

                                        Urine creatinine measurement (mass/volum

e) - 06/10/20 00:25         

 

                    Urine creatinine measurement (mass/volume)           24 mg/d

L              

      

 

                                        Urine osmolality - 06/10/20 00:25       

  

 

                    Urine osmolality           191 %               250-1200     

   

 

                                        SODIUM URINE RANDOM - 06/10/20 00:25    

     

 

                    [id number]             26 %                NRG        

 

                                        Complete blood count (CBC) with automate

d white blood cell (WBC) differential - 

06/10/20 05:36         

 

                          Blood leukocytes automated count (number/volume)      

     10.7 10*3/uL         

                                        4.3-11.0        

 

                          Blood erythrocytes automated count (number/volume)    

       3.77 10*6/uL       

                                        4.35-5.85        

 

                    Venous blood hemoglobin measurement (mass/volume)           

12.2 g/dL           

11.5-16.0        

 

                    Blood hematocrit (volume fraction)           34 %           

     35-52        

 

                    Automated erythrocyte mean corpuscular volume           91 [

foz_us]           

80-99        

 

                                        Automated erythrocyte mean corpuscular h

emoglobin (mass per erythrocyte)        

                          32 pg                     25-34        

 

                                        Automated erythrocyte mean corpuscular h

emoglobin concentration measurement 

(mass/volume)             36 g/dL                   32-36        

 

                    Automated erythrocyte distribution width ratio           12.

7 %              10.0-

14.5        

 

                    Automated blood platelet count (count/volume)           265 

10*3/uL           

130-400        

 

                          Automated blood platelet mean volume measurement      

     9.2 [foz_us]         

                                        7.4-10.4        

 

                    Automated blood neutrophils/100 leukocytes           89 %   

             42-75       

 

 

                    Automated blood lymphocytes/100 leukocytes           3 %    

             12-44        

 

                    Blood monocytes/100 leukocytes           8 %                

 0-12        

 

                    Automated blood eosinophils/100 leukocytes           0 %    

             0-10        

 

                    Automated blood basophils/100 leukocytes           0 %      

           0-10        

 

                    Blood neutrophils automated count (number/volume)           

9.5 10*3            

1.8-7.8        

 

                    Blood lymphocytes automated count (number/volume)           

0.4 10*3            

1.0-4.0        

 

                    Blood monocytes automated count (number/volume)           0.

8 10*3            

0.0-1.0        

 

                    Automated eosinophil count           0.0 10*3/uL           0

.0-0.3        

 

                    Automated blood basophil count (count/volume)           0.0 

10*3/uL           

0.0-0.1        

 

                                        Serum or plasma lithium measurement (mol

es/volume) - 06/10/20 05:36         

 

                    BNP PT              189.5 pg/mL           <100.0        

 

                                        Complete urinalysis with reflex to cultu

re - 06/10/20 05:49         

 

                    Urine color determination           YELLOW              NRG 

       

 

                    Urine clarity determination           CLEAR               NR

G        

 

                    Urine pH measurement by test strip           7.0            

     5-9        

 

                    Specific gravity of urine by test strip           1.015     

          1.016-1.022  

      

 

                          Urine protein assay by test strip, semi-quantitative  

         NEGATIVE         

                                        NEGATIVE        

 

                    Urine glucose detection by automated test strip           NE

GATIVE            

NEGATIVE        

 

                          Erythrocytes detection in urine sediment by light micr

oscopy           NEGATIVE 

                                        NEGATIVE        

 

                    Urine ketones detection by automated test strip           1+

                  NEGATIVE

        

 

                    Urine nitrite detection by test strip           NEGATIVE    

        NEGATIVE    

    

 

                    Urine total bilirubin detection by test strip           NEGA

TIVE            

NEGATIVE        

 

                          Urine urobilinogen measurement by automated test strip

 (mass/volume)           

1.0 mg/dL                               < = 1.0        

 

                    Urine leukocyte esterase detection by dipstick           NEG

ATIVE            

NEGATIVE        

 

                                        Automated urine sediment erythrocyte cou

nt by microscopy (number/high power 

field)                    NONE                      NRG        

 

                                        Automated urine sediment leukocyte count

 by microscopy (number/high power field)

                          NONE                      NRG        

 

                          Bacteria detection in urine sediment by light microsco

py           NEGATIVE     

                                        NRG        

 

                                        Squamous epithelial cells detection in u

rine sediment by light microscopy       

                          0-2                       NRG        

 

                          Crystals detection in urine sediment by light microsco

py           NONE         

                                        NRG        

 

                          Casts detection in urine sediment by light microscopy 

          PRESENT         

                                        NRG        

 

                          Mucus detection in urine sediment by light microscopy 

          NEGATIVE        

                                        NRG        

 

                    Complete urinalysis with reflex to culture           NO     

             NRG        

 

                          Granular casts detection in urine sediment by light mi

croscopy           RARE   

                                        NRG        

 

                                        Blood lactic acid measurement (moles/vol

ume) - 06/10/20 05:57         

 

                    Blood lactic acid measurement (moles/volume)           1.24 

mmol/L           

0.50-2.00        

 

                                        Bacterial blood culture - 06/10/20 05:57

         

 

                    QUANTITY OF GROWTH           .                   NRG        

 

                    Bacterial blood culture           [id number]            NRG  

      

 

                                        Serum or plasma C reactive protein measu

rement (mass/volume) - 06/10/20 06:12   

      

 

                          Serum or plasma C reactive protein measurement (mass/v

olume)           0.24 

mg/dL                                   0.00-0.50        

 

                                        PT panel in platelet poor plasma by coag

ulation assay - 06/10/20 06:13         

 

                          Prothrombin time (PT) in platelet poor plasma by coagu

lation assay           

34.3 s                                  12.2-14.7        

 

                          INR in platelet poor plasma or blood by coagulation as

say           3.2         

                                        0.8-1.4        

 

                                        Activated partial thromboplastin time (a

PTT) in platelet poor plasma 

bycoagulation assay - 06/10/20 06:13         

 

                                        Activated partial thromboplastin time (a

PTT) in platelet poor plasma 

bycoagulation assay           49 s                      24-35        

 

                                        Comprehensive metabolic panel - 06/10/20

 06:13         

 

                          Serum or plasma sodium measurement (moles/volume)     

      121 mmol/L          

                                        135-145        

 

                          Serum or plasma potassium measurement (moles/volume)  

         3.9 mmol/L       

                                        3.6-5.0        

 

                          Serum or plasma chloride measurement (moles/volume)   

        87 mmol/L         

                                                

 

                    Carbon dioxide           22 mmol/L           21-32        

 

                          Serum or plasma anion gap determination (moles/volume)

           12 mmol/L      

                                        5-14        

 

                          Serum or plasma urea nitrogen measurement (mass/volume

)           9 mg/dL       

                                        7-18        

 

                          Serum or plasma creatinine measurement (mass/volume)  

         0.61 mg/dL       

                                        0.60-1.30        

 

                    Serum or plasma urea nitrogen/creatinine mass ratio         

  15                  NRG 

       

 

                                        Serum or plasma creatinine measurement w

ith calculation of estimated glomerular 

filtration rate           >                         NRG        

 

                    Serum or plasma glucose measurement (mass/volume)           

105 mg/dL           

        

 

                    Serum or plasma calcium measurement (mass/volume)           

8.6 mg/dL           

8.5-10.1        

 

                          Serum or plasma total bilirubin measurement (mass/volu

me)           0.7 mg/dL   

                                        0.1-1.0        

 

                                        Serum or plasma alkaline phosphatase freya

surement (enzymatic activity/volume)    

                          69 U/L                            

 

                                        Serum or plasma aspartate aminotransfera

se measurement (enzymatic 

activity/volume)           25 U/L                    5-34        

 

                                        Serum or plasma alanine aminotransferase

 measurement (enzymatic activity/volume)

                          12 U/L                    0-55        

 

                    Serum or plasma protein measurement (mass/volume)           

6.8 g/dL            

6.4-8.2        

 

                    Serum or plasma albumin measurement (mass/volume)           

4.1 g/dL            

3.2-4.5        

 

                    CALCIUM CORRECTED           8.5 mg/dL           8.5-10.1    

    

 

                                        Magnesium - 06/10/20 06:13         

 

                    Magnesium           1.6 mg/dL           1.6-2.4        

 

                                        Serum or plasma amylase measurement (enz

ymatic activity/volume) - 06/10/20 06:13

         

 

                          Serum or plasma amylase measurement (enzymatic activit

y/volume)           38 U/L

                                                

 

                                        Lipase - 06/10/20 06:13         

 

                    Lipase              7 U/L               8-78        

 

                                        Bacterial blood culture - 06/10/20 06:13

         

 

                    Bacterial blood culture           NG                  NRG   

     

 

                                        Influenza virus A and B antigen detectio

n - 06/10/20 07:50         

 

                    FLU RESULT           NEGATIVE FOR INFLUENZA A AND B ANTIGENS

 BY IA            

NR        

 

                                        Coronavirus SARS-CoV-2 SO 2019 - 06/10/2

0 07:50         

 

                    Coronavirus Ab [Units/volume] in Serum           Negative   

         Negative   

     

 

                                        Serum or plasma sodium measurement (mole

s/volume) - 06/10/20 16:15         

 

                          Serum or plasma sodium measurement (moles/volume)     

      123 mmol/L          

                                        135-145        

 

                                        THYROID STIMULATING HORMONE - 06/10/20 1

6:15         

 

                    THYROID STIMULATING HORMONE           1.17 u[iU]/mL         

  0.35-4.94        

 

                                        Blood CBC with ordered manual differenti

al panel - 20 07:16         

 

                          Blood leukocytes automated count (number/volume)      

     9.5 10*3/uL          

                                        4.3-11.0        

 

                          Blood erythrocytes automated count (number/volume)    

       3.41 10*6/uL       

                                        4.35-5.85        

 

                    Venous blood hemoglobin measurement (mass/volume)           

11.2 g/dL           

11.5-16.0        

 

                    Blood hematocrit (volume fraction)           32 %           

     35-52        

 

                    Automated erythrocyte mean corpuscular volume           93 [

foz_us]           

80-99        

 

                                        Automated erythrocyte mean corpuscular h

emoglobin (mass per erythrocyte)        

                          33 pg                     25-34        

 

                                        Automated erythrocyte mean corpuscular h

emoglobin concentration measurement 

(mass/volume)             35 g/dL                   32-36        

 

                    Automated erythrocyte distribution width ratio           12.

9 %              10.0-

14.5        

 

                    Automated blood platelet count (count/volume)           248 

10*3/uL           

130-400        

 

                          Automated blood platelet mean volume measurement      

     9.3 [foz_us]         

                                        7.4-10.4        

 

                    Automated blood neutrophils/100 leukocytes           88 %   

             42-75       

 

 

                    Automated blood lymphocytes/100 leukocytes           4 %    

             12-44        

 

                    Blood monocytes/100 leukocytes           7 %                

 NRG        

 

                    Automated blood eosinophils/100 leukocytes           0 %    

             0-10        

 

                    Automated blood basophils/100 leukocytes           0 %      

           0-10        

 

                    Blood neutrophils automated count (number/volume)           

8.3 10*3            

1.8-7.8        

 

                    Blood lymphocytes automated count (number/volume)           

0.4 10*3            

1.0-4.0        

 

                    Blood monocytes automated count (number/volume)           0.

8 10*3            

0.0-1.0        

 

                    Automated eosinophil count           0.0 10*3/uL           0

.0-0.3        

 

                    Automated blood basophil count (count/volume)           0.0 

10*3/uL           

0.0-0.1        

 

                    Manual blood segmented neutrophils/100 leukocytes           

89 %                NRG  

      

 

                    Blood band neutrophils/100 leukocytes           0 %         

        NRG        

 

                    Manual blood lymphocytes/100 leukocytes           4 %       

          NRG        

 

                    Manual eosinophils/100 leukocytes in nose           0 %     

            NRG        

 

                    Manual blood basophils/100 leukocytes           0 %         

        NRG        

 

                    Blood erythrocyte morphology finding identification         

  NORMAL              

NRG        

 

                                        Whole blood basic metabolic panel -  07:16         

 

                          Serum or plasma sodium measurement (moles/volume)     

      131 mmol/L          

                                        135-145        

 

                          Serum or plasma potassium measurement (moles/volume)  

         3.8 mmol/L       

                                        3.6-5.0        

 

                          Serum or plasma chloride measurement (moles/volume)   

        101 mmol/L        

                                                

 

                    Carbon dioxide           19 mmol/L           21-32        

 

                          Serum or plasma anion gap determination (moles/volume)

           11 mmol/L      

                                        5-14        

 

                          Serum or plasma urea nitrogen measurement (mass/volume

)           5 mg/dL       

                                        7-18        

 

                          Serum or plasma creatinine measurement (mass/volume)  

         0.56 mg/dL       

                                        0.60-1.30        

 

                    Serum or plasma urea nitrogen/creatinine mass ratio         

  9                   NRG  

      

 

                                        Serum or plasma creatinine measurement w

ith calculation of estimated glomerular 

filtration rate           >                         NRG        

 

                    Serum or plasma glucose measurement (mass/volume)           

84 mg/dL            

        

 

                    Serum or plasma calcium measurement (mass/volume)           

7.9 mg/dL           

8.5-10.1        

 

                                        Magnesium - 20 07:16         

 

                    Magnesium           2.3 mg/dL           1.6-2.4        

 

                                        Capillary blood glucose measurement by g

lucometer (mass/volume) - 20 05:29

         

 

                          Capillary blood glucose measurement by glucometer (mas

s/volume)           89 

mg/dL                                           

 

                                        Whole blood basic metabolic panel -  10:30         

 

                          Serum or plasma sodium measurement (moles/volume)     

      132 mmol/L          

                                        135-145        

 

                          Serum or plasma potassium measurement (moles/volume)  

         3.5 mmol/L       

                                        3.6-5.0        

 

                          Serum or plasma chloride measurement (moles/volume)   

        102 mmol/L        

                                                

 

                    Carbon dioxide           22 mmol/L           21-32        

 

                          Serum or plasma anion gap determination (moles/volume)

           8 mmol/L       

                                        5-14        

 

                          Serum or plasma urea nitrogen measurement (mass/volume

)           6 mg/dL       

                                        7-18        

 

                          Serum or plasma creatinine measurement (mass/volume)  

         0.60 mg/dL       

                                        0.60-1.30        

 

                    Serum or plasma urea nitrogen/creatinine mass ratio         

  10                  NRG 

       

 

                                        Serum or plasma creatinine measurement w

ith calculation of estimated glomerular 

filtration rate           >                         NRG        

 

                    Serum or plasma glucose measurement (mass/volume)           

94 mg/dL            

        

 

                    Serum or plasma calcium measurement (mass/volume)           

8.1 mg/dL           

8.5-10.1        

 

                                        Complete blood count (CBC) with automate

d white blood cell (WBC) differential - 

20 14:20         

 

                          Blood leukocytes automated count (number/volume)      

     12.5 10*3/uL         

                                        4.3-11.0        

 

                          Blood erythrocytes automated count (number/volume)    

       2.63 10*6/uL       

                                        4.35-5.85        

 

                    Venous blood hemoglobin measurement (mass/volume)           

8.2 g/dL            

11.5-16.0        

 

                    Blood hematocrit (volume fraction)           25 %           

     35-52        

 

                    Automated erythrocyte mean corpuscular volume           94 [

foz_us]           

80-99        

 

                                        Automated erythrocyte mean corpuscular h

emoglobin (mass per erythrocyte)        

                          31 pg                     25-34        

 

                                        Automated erythrocyte mean corpuscular h

emoglobin concentration measurement 

(mass/volume)             33 g/dL                   32-36        

 

                    Automated erythrocyte distribution width ratio           14.

1 %              10.0-

14.5        

 

                    Automated blood platelet count (count/volume)           545 

10*3/uL           

130-400        

 

                          Automated blood platelet mean volume measurement      

     8.7 [foz_us]         

                                        7.4-10.4        

 

                    Automated blood neutrophils/100 leukocytes           74 %   

             42-75       

 

 

                    Automated blood lymphocytes/100 leukocytes           14 %   

             12-44       

 

 

                    Blood monocytes/100 leukocytes           11 %               

 0-12        

 

                    Automated blood eosinophils/100 leukocytes           1 %    

             0-10        

 

                    Automated blood basophils/100 leukocytes           0 %      

           0-10        

 

                    Blood neutrophils automated count (number/volume)           

9.3 10*3            

1.8-7.8        

 

                    Blood lymphocytes automated count (number/volume)           

1.7 10*3            

1.0-4.0        

 

                    Blood monocytes automated count (number/volume)           1.

4 10*3            

0.0-1.0        

 

                    Automated eosinophil count           0.1 10*3/uL           0

.0-0.3        

 

                    Automated blood basophil count (count/volume)           0.0 

10*3/uL           

0.0-0.1        

 

                                        Comprehensive metabolic panel - 20

 14:20         

 

                          Serum or plasma sodium measurement (moles/volume)     

      128 mmol/L          

                                        135-145        

 

                          Serum or plasma potassium measurement (moles/volume)  

         4.1 mmol/L       

                                        3.6-5.0        

 

                          Serum or plasma chloride measurement (moles/volume)   

        96 mmol/L         

                                                

 

                    Carbon dioxide           18 mmol/L           21-32        

 

                          Serum or plasma anion gap determination (moles/volume)

           14 mmol/L      

                                        5-14        

 

                          Serum or plasma urea nitrogen measurement (mass/volume

)           12 mg/dL      

                                        7-18        

 

                          Serum or plasma creatinine measurement (mass/volume)  

         0.77 mg/dL       

                                        0.60-1.30        

 

                    Serum or plasma urea nitrogen/creatinine mass ratio         

  16                  NRG 

       

 

                                        Serum or plasma creatinine measurement w

ith calculation of estimated glomerular 

filtration rate           >                         NRG        

 

                    Serum or plasma glucose measurement (mass/volume)           

186 mg/dL           

        

 

                    Serum or plasma calcium measurement (mass/volume)           

8.2 mg/dL           

8.5-10.1        

 

                          Serum or plasma total bilirubin measurement (mass/volu

me)           0.5 mg/dL   

                                        0.1-1.0        

 

                                        Serum or plasma alkaline phosphatase freya

surement (enzymatic activity/volume)    

                          113 U/L                           

 

                                        Serum or plasma aspartate aminotransfera

se measurement (enzymatic 

activity/volume)           67 U/L                    5-34        

 

                                        Serum or plasma alanine aminotransferase

 measurement (enzymatic activity/volume)

                          37 U/L                    0-55        

 

                    Serum or plasma protein measurement (mass/volume)           

5.8 g/dL            

6.4-8.2        

 

                    Serum or plasma albumin measurement (mass/volume)           

3.1 g/dL            

3.2-4.5        

 

                    CALCIUM CORRECTED           8.9 mg/dL           8.5-10.1    

    

 

                                        PT panel in platelet poor plasma by coag

ulation assay - 20 14:20         

 

                          Prothrombin time (PT) in platelet poor plasma by coagu

lation assay           

29.5 s                                  12.2-14.7        

 

                          INR in platelet poor plasma or blood by coagulation as

say           2.8         

                                        0.8-1.4        

 

                                        Blood lactic acid measurement (moles/vol

ume) - 20 14:20         

 

                    Blood lactic acid measurement (moles/volume)           2.33 

mmol/L           

0.50-2.00        

 

                                        Complete urinalysis with reflex to cultu

re - 20 14:20         

 

                    Urine color determination           YELLOW              NRG 

       

 

                    Urine clarity determination           CLEAR               NR

G        

 

                    Urine pH measurement by test strip           6.0            

     5-9        

 

                    Specific gravity of urine by test strip           1.010     

          1.016-1.022  

      

 

                          Urine protein assay by test strip, semi-quantitative  

         NEGATIVE         

                                        NEGATIVE        

 

                    Urine glucose detection by automated test strip           NE

GATIVE            

NEGATIVE        

 

                          Erythrocytes detection in urine sediment by light micr

oscopy           NEGATIVE 

                                        NEGATIVE        

 

                    Urine ketones detection by automated test strip           NE

GATIVE            

NEGATIVE        

 

                    Urine nitrite detection by test strip           NEGATIVE    

        NEGATIVE    

    

 

                    Urine total bilirubin detection by test strip           NEGA

TIVE            

NEGATIVE        

 

                          Urine urobilinogen measurement by automated test strip

 (mass/volume)           

0.2 mg/dL                               < = 1.0        

 

                    Urine leukocyte esterase detection by dipstick           TRA

CE               

NEGATIVE        

 

                                        Automated urine sediment erythrocyte cou

nt by microscopy (number/high power 

field)                    NONE                      NRG        

 

                                        Automated urine sediment leukocyte count

 by microscopy (number/high power field)

                           [HPF]                    NRG        

 

                          Bacteria detection in urine sediment by light microsco

py           TRACE        

                                        NRG        

 

                          Crystals detection in urine sediment by light microsco

py           PRESENT      

                                        NRG        

 

                          Casts detection in urine sediment by light microscopy 

          PRESENT         

                                        NRG        

 

                          Mucus detection in urine sediment by light microscopy 

          NEGATIVE        

                                        NRG        

 

                    Complete urinalysis with reflex to culture           NO     

             NRG        

 

                          Amorphous sediment detection in urine sediment by ligh

t microscopy           FEW

 KAELYN URATES                            NRG        

 

                          Hyaline casts detection in urine sediment by light lacie

roscopy           5-10    

                                        NRG        

 

                                        Serum or plasma lithium measurement (mol

es/volume) - 20 14:20         

 

                    BNP PT              186.2 pg/mL           <100.0        

 

                                        Serum or plasma troponin i.cardiac measu

rement (mass/volume) - 20 14:20   

      

 

                          Serum or plasma troponin i.cardiac measurement (mass/v

olume)           < ng/mL  

                                        <0.028        



                                                                                
                                                                                
                                                              



Encounters

      



                ACCT No.           Visit Date/Time           Discharge          

 Status         

             Pt. Type           Provider           Facility           Loc./Unit 

          

Complaint        

 

             [id number]           2020 00:06:00                         

            

Document Registration                                                           

         

 

                    [id number]           06/10/2020 10:00:00           

020 14:22:00        

                DIS             Outpatient           CORBY CALLE, KEDAR RESENDIZ         

  [facility]ia [facility]           4TH                       ABDOMINAL PAIN;HYPONATR

EMIA        

 

                    [id number]           2020 07:26:00            12:57:00        

                DIS             Emergency           BOBO CALLE, GARRET GONZALEZ      

     Via 

[facility]           ER                        WEAKNESS       

 

 

                    [id number]           2020 14:30:00            16:41:00        

                DIS             Emergency           TUCKERALEJANDRA           Via

 [facility]           ER                        WEAKNESS        

 

                    [id number]           2019 09:16:00            00:01:00        

                DIS             Outpatient           ELIOT EDMONDSON APRN   

        Via 

[facility]           PULM                      COPD        

 

                    [id number]           2020 14:50:00            17:21:00        

                DIS             Emergency           ANNABELLE CALLE, ROSLYN TOLLIVER    

       Via 

[facility]           ER                        FEELS LIKE A-FI

B        

 

                    [id number]           2020 08:44:00            23:59:59        

                CLS             Outpatient           LEO NESBITT APRN     

      Via 

[facility]           LAB                       HYPERTHYROIDISM

        

 

                    [id number]           2020 13:32:00            23:59:59        

                CLS             Outpatient           ELIOT EDMONDSON   

        Via 

[facility]           RAD                       COPD,ATRIAL FIB

,ALLERGIC 

RHINITIS,DYSPNEA        

 

                    [id number]           10/23/2019 10:35:00           10/23/2

019 11:35:00        

                DIS             Outpatient           DEQUAN CALLE, SALVADOR KOEHLER       

    Via [facility]           REHAB                     GENERALIZED WEAKNESS A

ND 

SYSTEMIC SCLEROSIS        

 

                    [id number]           2019 08:08:00            12:48:00        

                DIS             Emergency           SEPIDEH CALLE, CARMELO RAMIREZ          

 Via [facility]           ER                        RECENT HEART CATH,BLEED

ING FROM 

WOUND,DIZZINESS        

 

                    [id number]           2019 11:35:00            17:50:00        

                DIS             Outpatient           TIM CALLE FACC, NICOLE ROWAN CC

DS           

[facility]ia [facility]           CATH                      

ANGINA,SOB,FATIGUE,COPD        

 

                    [id number]           2019 18:04:00            20:41:00        

                DIS             Emergency           GARRET BROUSSARD MD      

     Via 

[facility]           ER                        CHEST PAINS    

    

 

                    [id number]           2019 10:13:00           03/29/2

019 23:59:59        

                CLS             Outpatient           SALVADOR BAIRES MD       

    [facility]ia [facility]           RAD                       ASPIRATION        

 

                    [id number]           2018 14:09:00            13:12:00        

                DIS             Inpatient           MICHAEL CALLE, GERALD RESENDIZ     

      Via 

[facility]           ICU                       CHEST PAIN;RAFATQ

UENT PVCS,HX

 AFIB        

 

                    [id number]           2018 07:43:00           

018 23:59:59        

                CLS             Outpatient           SALVADOR BAIRES MD       

    [facility]ia [facility]           RAD                       LEFT SCREEN BREAST CA,

OSTEOPOROSIS

        

 

                    [id number]           2018 11:13:00            23:59:59        

                CLS             Preadmit           ELIOT EDMONDSON     

      Via 

[facility]           RAD                       ALLERGIC RHINIT

IS,COPD, 

DYSPNEA,EMPHYSEMA        

 

                    [id number]           2018 14:34:00            23:59:59        

                CLS             Preadmit           SALVADOR BAIRES MD         

  [facility]ia [facility]           RAD                       SCREENING /OSTEOPOROSIS

        

 

                    [id number]           2018 08:25:00           

018 23:59:59        

                CLS             Outpatient           ELIOT LONG 

          Via 

[facility]           CARD                      ATRIAL FIBRILLA

TION       

 

 

                    [id number]           2018 13:00:00           

018 23:59:59        

                CLS             Outpatient           SALVADOR BAIRES MD       

    [facility]ia [facility]           RAD                       FOOT PAIN, SWELLING   

     

 

                    [id number]           2018 11:31:00            23:59:59        

                CLS             Outpatient           SALVADOR BAIRES MD       

    [facility]ia [facility]           CARD                      CHRONIC DIARRHEA ABD P

AIN        

 

                    [id number]           2018 08:43:00            23:59:59        

                CLS             Outpatient           SALVADOR BAIRES MD       

    [facility]ia [facility]           LAB                       R19.7        

 

                    [id number]           2018 09:30:00            23:59:59        

                CLS             Preadmit           ELIOT EDMONDSON     

      Via 

[facility]           PULM                      COPD        

 

                    [id number]           2018 10:00:00            00:01:00        

                DIS             Outpatient           ELIOT EDMONDSON APRN   

        Via 

[facility]           PULM                      COPD        

 

                    [id number]           2017 08:59:00           

017 23:59:59        

                CLS             Outpatient           ELIOT EDMONDSON APRN   

        Via 

[facility]           RAD                       J44.9 R06.00 J4

3.9        

 

                    [id number]           2017 15:00:00           

017 23:59:59        

                CLS             Preadmit           ELIOT EDMONDSON APRN     

      Via 

[facility]           RAD                       COPD J44.9     

   

 

                    [id number]           2017 08:00:00           

017 23:59:59        

                CLS             Outpatient           ELIOT EDMONDSON APRN   

        Via 

[facility]           RT                        J44.9 COPD     

   

 

                    [id number]           2017 06:58:00           

017 23:59:59        

                CLS             Outpatient           RAHUL GUAN DO        

   [facility]ia [facility]           LAB                       A-FIB        

 

                    [id number]           2017 06:55:00           

017 23:59:59        

                CLS             Outpatient           ELIOT EDMONDSON APRN   

        Via 

[facility]           RAD                       J44.9 R06.00 J4

3.9        

 

                    [id number]           2016 11:15:00           

016 13:50:00        

                DIS             Outpatient           DEQUAN CALLE, SALVADOR KOEHLER       

    [facility]ia [facility]           REHAB                     BILATERAL LE PAIN/WEAK

NESS      

  

 

                    [id number]           2016 10:21:00           

016 23:59:59        

                CLS             Outpatient           NAYELI CALLE, DELGADO COPELAND     

      Via 

[facility]           CARD                      CP        

 

                    [id number]           01/10/2016 20:09:00           01/10/2

016 21:12:00        

                DIS             Emergency           ALICIA CHIANG MD          

 Via [facility]           ER                        POSS REPEAT MINI-STROKE

 (SAME 

SYMPTOMS)        

 

                    [id number]           2015 10:39:00           

015 16:08:00        

                DIS             Emergency           ANNABELLE CALLE, ROSLYN TOLLIVER    

       Via 

[facility]           ER                        NUMBNESS/CHEST 

PAIN        

 

                    [id number]           2015 10:44:00            23:59:59        

                CLS             Outpatient           SALVADOR BAIRES MD       

    [facility]ia [facility]           LAB                       SCHLERODERMA PROGRESSI

VE        

 

                    [id number]           2015 12:38:00            23:59:59        

                CLS             Outpatient           RAYMUNDO BEDOYA MD         

  [facility]ia [facility]           CARD                      SOB,SCLERODERMA        

 

                    [id number]           06/10/2015 12:12:00           06/10/2

015 23:59:59        

                CLS             Outpatient           SIN BERRIOS DO         

  [facility]ia [facility]           RAD                       SHORTNESS OF BREATH    

    

 

                    [id number]           04/15/2015 08:51:00           04/15/2

015 23:59:59        

                CLS             Outpatient           SYLVIA SEQUEIRA DO    

       Via 

[facility]           RT                        SOB,CP        

 

                    [id number]           2015 13:02:00            23:59:59        

                CLS             Outpatient           SYLVIA SEQUEIRA DO    

       Via 

[facility]           CARD                      SOB, CHEST PAIN

, WEAKNESS 

       

 

                    [id number]           02/10/2015 12:39:00           02/10/2

015 23:59:59        

                CLS             Outpatient           SYLVIA SEQUEIRA DO    

       Via 

[facility]           RAD                       CLAUDICATION   

     

 

             [id number]           2020 15:38:00                        A

CT           

Inpatient                 SELINA JASMINE MD           Via [facility]                 ICU                       AMS        

 

             [id number]           2020 09:30:00                         

            

Document Registration                                                           

         

 

             [id number]           2020 01:37:00                         

            

Document Registration                                                           

         

 

             [id number]           2015 17:38:00                         

            

Document Registration                                                           

         

 

             [id number]           2015 12:40:00                         

            

Document Registration                                                           

         

 

             [id number]           2011 07:22:00                         

            

Document Registration                                                           

         

 

             [id number]           2011 10:45:00                         

            

Document Registration                                                           

         

 

             [id number]           2010 21:45:00                         

            

Document Registration                                                           

         

 

             [id number]           2010 07:54:00                         

            

Document Registration                                                           

         

 

                [id number]           2020 14:32:00           2020 23:59

:00           

DIS             Outpatient           SALVADOR BAIRES                            

      

                                                 

 

                [id number]           2020 16:53:00           2020 23:59

:00           

DIS             Outpatient           Sybil Villalobosela                             

      

                                                 

 

                [id number]           2020 13:34:00           2020 23:59

:00           

DIS             Outpatient           SALVADOR BAIRES                            

      

                                                 

 

                [id number]           2020 10:51:00           2020 23:59

:00           

DIS             Outpatient           SALVADOR BAIRES                            

      

                                                 

 

                [id number]           2020 17:24:00           2020 23:59

:00           

DIS             Outpatient           Karon Villalobos                             

      

                                                 

 

                [id number]           2020 10:54:00           2020 23:59

:00           

DIS             Outpatient           Zach Arango                             

      

                                                 

 

                [id number]           2020 17:34:00           2020 23:59

:00           

DIS             Outpatient           SALVADOR BAIRES                            

      

                                                 

 

                [id number]           2020 16:45:00           2020 23:59

:00           

DIS             Outpatient           SALVADOR BAIRES                            

      

                                                 

 

                [id number]           2019 00:00:00           2019 23:59

:00           

DIS             Outpatient           AGUSTINA GRIMES                            

      

                                                 

 

                [id number]           2019 10:00:00           2019 23:59:

00           DIS

                Outpatient           SALVADOR BAIRES                            

         

                                                 

 

                [id number]           2019 12:29:00           2019 23:59:

00           DIS

                Outpatient           SALVADOR BAIRES                            

         

                                                 

 

                [id number]           2019 12:28:00           2019 23:59:

00           DIS

                Outpatient           SALVADOR BAIRES                            

         

                                                 

 

                [id number]           2019 15:00:00           2019 23:59:

00           DIS

                Outpatient           SALVADOR BAIRES                            

         

                                                 

 

                [id number]           2019 12:28:00           2019 23:59:

00           DIS

                Outpatient           ASLVADOR BAIRES                            

         

                                                 

 

                [id number]           2019 11:44:00           2019 23:59:

00           DIS

                Outpatient           SALVADOR BAIRES                            

         

                                                 

 

                [id number]           01/15/2019 08:41:00           01/15/2019 23:59:

00           DIS

                Outpatient           JOSE L SUÁREZ                             

         

                                                 

 

                [id number]           2018 10:08:00           2018 23:59:

00           DIS

                Outpatient           Jules Griggs                       

         

                                                 

 

                [id number]           2018 12:45:00           2018 23:59:

00           DIS

                Outpatient           BAIRES SALVADOR                            

         

                                                 

 

                [id number]           2018 12:13:00           2018 23:59:

00           DIS

                Outpatient           SALVADOR BAIRES                            

         

                                                 

 

                [id number]           2018 09:20:00           2018 23:59:

00           DIS

                Outpatient           SALVADOR BAIRES                            

         

                                                 

 

                [id number]           2018 16:59:00           2018 23:59:

00           DIS

                Outpatient           SALVADOR BAIRES                            

         

                                                 

 

                [id number]           10/24/2017 10:32:00           10/24/2017 23:59:

00           DIS

                Outpatient           SALVADOR BAIRES                            

         

                                                 

 

                [id number]           2017 15:35:00           2017 23:59:

00           DIS

                Outpatient           SALVADOR BAIRES                            

         

                                                 

 

             [id number]           2019 10:29:00                               

      Document 

Registration                                                                    

 

             [id number]           10/08/2018 09:30:00                               

      Document 

Registration                                                                    

 

             [id number]           2018 13:50:00                               

      Document 

Registration                                                                    

 

             [id number]           2018 13:00:00                               

      Document 

Registration                                                                    

 

             [id number]           2018 10:00:00                               

      Document 

Registration                                                                    

 

             [id number]           2018 14:10:00                               

      Document 

Registration                                                                    

 

             [id number]           2018 14:50:00                               

      Document 

Registration                                                                    

 

             [id number]           10/24/2017 15:00:00                               

      Document 

Registration                                                                    

 

                [id number]            2020 09:44:17           2020 23:59:5

9           [facility]  

             Outpatient                                                         

  

 

             [id number]           2018 16:59:00                               

      Document 

Registration                                                                    

 

                [id number]           06/15/2018 19:20:00           06/15/2018 23:59:

59           [facility]

                [facility]                                           [facility]

## 2020-07-27 NOTE — DIAGNOSTIC IMAGING REPORT
INDICATION: Shortness of air.



COMPARISON: 01/24/2020



FINDINGS: Single frontal radiographic view of the chest was

obtained and demonstrates mild bibasilar pleural effusions. There

is asymmetric prominent opacity within the right base, which is

partially obscured, but measures 3.8 cm in diameter. There is no

pneumothorax on either side. Cardiac silhouette and pulmonary

vasculature are within normal limits. Calcified aortic

atherosclerosis is noted. Osseous structures show no gross acute

abnormalities.



IMPRESSION:

1. Persistent mild bilateral pleural effusions.

2. Asymmetric rounded atelectasis versus partially obscured mass

in the right base.



Dictated by: 



  Dictated on workstation # [id number]

## 2020-07-27 NOTE — ED GENERAL
General


Stated Complaint:  AMS


Source of Information:  EMS, Nursing Home Records


Exam Limitations:  No Limitations





History of Present Illness


Date Seen by Provider:  Jul 27, 2020


Time Seen by Provider:  14:20


Initial Comments


To ER by EMS from nursing home with reports of altered mental status and 

hypoxia. She was fine at lunchtime, up walking around and ate lunch normally. 

Nursing staff found all or in her room minimally responsive with a brief period 

of apnea. EMS arrived and found oxygen saturation to be 60% on room air, she was

placed on a nonrebreather with increased to 100%. She just finished antibiotics 

for a pneumonia 2-3 days ago. Follow-up with Dr. Bustillo from cardiology in 

[place] in regards to moderate to severe aortic stenosis. history of atrial 

fibrillation for which she is on Eliquis. COPD, recurrent right pleural 

effusion.


Timing/Duration:  1-2 Days


Severity:  Moderate


Associated Systoms:  Shortness of Air





Allergies and Home Medications


Allergies


Coded Allergies:  


     pregabalin (Verified  Allergy, Unknown, 6/8/20)





Home Medications


Acetaminophen 325 Mg Tablet, 325-650 MG PO Q6H PRN for PAIN-MODERATE (5-7), 

(Reported)


Amiodarone HCl 200 Mg Tablet, 200 MG PO BID, (Reported)


Amlodipine Besylate 10 Mg Tablet, 10 MG PO DAILY


   Prescribed by: KEDAR TAVAREZ on 6/11/20 1432


Aspirin 81 Mg Tablet.dr, 81 MG PO DAILY, (Reported)


Gabapentin 800 Mg Tablet, 800 MG PO HS, (Reported)


Levothyroxine Sodium 50 Mcg Tablet, 50 MCG PO DAILY, (Reported)


Losartan Potassium 100 Mg Tablet, 100 MG PO DAILY, (Reported)


Polyethylene Glycol 3350 17 Gm Powd.pack, 17 GM PO DAILY PRN for CONSTIPATION-

2ND LINE, (Reported)


Pravastatin Sodium 20 Mg Tablet, 20 MG PO HS, (Reported)


Rivaroxaban 20 Mg Tablet, 20 MG PO DAILY, (Reported)


Tramadol HCl 50 Mg Tablet, 50 MG PO Q6H PRN for PAIN-MODERATE (5-7), (Reported)





Patient Home Medication List


Home Medication List Reviewed:  Yes





Review of Systems


Review of Systems


Constitutional:  see HPI


EENTM:  see HPI


Respiratory:  see HPI, dyspnea on exertion, short of breath


Cardiovascular:  no symptoms reported


Genitourinary:  no symptoms reported


Musculoskeletal:  no symptoms reported


Skin:  no symptoms reported


Psychiatric/Neurological:  No Symptoms Reported


Hematologic/Lymphatic:  No Symptoms Reported





Past Medical-Social-Family Hx


Patient Social History


Alcohol Beverage of Choice:  Beer


Type Used:  Cigarettes


Former Smoker, Quit:  Feb 1, 1980


2nd Hand Smoke Exposure:  Yes (1980 QUITE)





Immunizations Up To Date


Tetanus Booster (TDap):  Unknown


Date of Pneumonia Vaccine:  Steven 10, 2018


Date of Influenza Vaccine:  Sep 12, 2019





Seasonal Allergies


Seasonal Allergies:  No





Past Medical History


Surgeries:  Yes


Eye Surgery, Orthopedic


Respiratory:  Yes


COPD


Currently Using CPAP:  No


Currently Using BIPAP:  No


Cardiac:  Yes


Atrial Fibrillation, Hypertension, Peripheral Vascular, Valvular Heart Disease


Neurological:  Yes


Neuropathy, TIA


Reproductive Disorders:  No


GYN History:  Menopausal


Genitourinary:  No


Gastrointestinal:  Yes (dysphagia)


Gastroesophageal Reflux


Musculoskeletal:  Yes (scleroderma)


Rheumatoid Arthritis


Endocrine:  Yes


Hyperthyroidism


Cataract


Cancer:  No


Psychosocial:  No


Integumentary:  Yes (SCLERODERMA)


Blood Disorders:  No


Adverse Reaction/Blood Tranf:  No





Family Medical History


No Pertinent Family Hx





Physical Exam


Vital Signs





Vital Signs - First Documented








 7/27/20





 14:12


 


Temp 36.5


 


Pulse 72


 


Resp 38


 


B/P (MAP) 153/56 (88)


 


Pulse Ox 100


 


O2 Delivery Non Rebreather


 


O2 Flow Rate 10.00





Capillary Refill :


Height, Weight, BMI


Height: 5'3.00"


Weight: 112lbs. 0.0oz. 50.053609lw; 17.85 BMI


Method:Stated


General Appearance:  Chronically ill, Moderate Distress (moaning, tachypneic), 

Severe Distress


Eyes:  Bilateral Eye Normal Inspection, Bilateral Eye PERRL


HEENT:  PERRL/EOMI


Neck:  Full Range of Motion, Normal Inspection


Respiratory:  No Accessory Muscle Use, No Respiratory Distress, Crackles


Cardiovascular:  Regular Rate, Rhythm, Normal Peripheral Pulses


Gastrointestinal:  Normal Bowel Sounds, Non Tender, Soft


Extremity:  Normal Capillary Refill, Pedal Edema (2+ bilateral lower 

extremities)





Focused Exam


Lactate Level


7/27/20 14:20: 





Lactic Acid Level





Laboratory Tests








Test


 7/27/20


14:20











Progress/Results/Core Measures


Suspected Sepsis


SIRS


Temperature: 


Pulse:  


Respiratory Rate: 


 


Laboratory Tests


7/27/20 14:20: White Blood Count 12.5H


Blood Pressure  / 


Mean: 


 





7/27/20 14:20: 








Laboratory Tests


7/27/20 14:20: Platelet Count 545H





Results/Orders


Lab Results





Laboratory Tests








Test


 7/27/20


14:20 Range/Units


 


 


White Blood Count


 12.5 H


 4.3-11.0


10^3/uL


 


Red Blood Count


 2.63 L


 4.35-5.85


10^6/uL


 


Hemoglobin 8.2 L 11.5-16.0  G/DL


 


Hematocrit 25 L 35-52  %


 


Mean Corpuscular Volume 94  80-99  FL


 


Mean Corpuscular Hemoglobin 31  25-34  PG


 


Mean Corpuscular Hemoglobin


Concent 33 


 32-36  G/DL





 


Red Cell Distribution Width 14.1  10.0-14.5  %


 


Platelet Count


 545 H


 130-400


10^3/uL


 


Mean Platelet Volume 8.7  7.4-10.4  FL


 


Neutrophils (%) (Auto) 74  42-75  %


 


Lymphocytes (%) (Auto) 14  12-44  %


 


Monocytes (%) (Auto) 11  0-12  %


 


Eosinophils (%) (Auto) 1  0-10  %


 


Basophils (%) (Auto) 0  0-10  %


 


Neutrophils # (Auto) 9.3 H 1.8-7.8  X 10^3


 


Lymphocytes # (Auto) 1.7  1.0-4.0  X 10^3


 


Monocytes # (Auto) 1.4 H 0.0-1.0  X 10^3


 


Eosinophils # (Auto)


 0.1 


 0.0-0.3


10^3/uL


 


Basophils # (Auto)


 0.0 


 0.0-0.1


10^3/uL








My Orders





Orders - LAMONT SMITH


Cbc With Automated Diff (7/27/20 14:17)


Comprehensive Metabolic Panel (7/27/20 14:17)


Protime With Inr (7/27/20 14:17)


Ct Head Wo (7/27/20 14:17)


Chest 1 View, Ap/Pa Only (7/27/20 14:17)


Blood Culture (7/27/20 14:17)


Lactic Acid Analyzer (7/27/20 14:17)


BNP (7/27/20 14:17)


Ekg Tracing (7/27/20 14:17)


Ua Culture If Indicated (7/27/20 14:17)


Morphine  Injection (Morphine  Injection (7/27/20 14:28)


Palliative Care Consult (7/27/20 14:32)





Vital Signs/I&O











 7/27/20





 14:12


 


Temp 36.5


 


Pulse 72


 


Resp 38


 


B/P (MAP) 153/56 (88)


 


Pulse Ox 100


 


O2 Delivery Non Rebreather


 


O2 Flow Rate 10.00





Capillary Refill :





Departure


Communication (Admissions)


1430-spoke with the patient's son about wishes given her distress and possible 

need for ventilator but her advanced age and multiple comorbidities. Advised him

that if she goes on the ventilator she will not likely come off of it. All are 

comfortable he'll be here to see her shortly.





Impression





   Primary Impression:  


   Respiratory distress


   Additional Impression:  


   Aortic stenosis


   Qualified Codes:  I35.0 - Nonrheumatic aortic (valve) stenosis


Disposition:  09 ADMITTED AS INPATIENT


Condition:  Critical





Admissions


Decision to Admit Reason:  Admit from ER (General)


Decision to Admit/Date:  Jul 27, 2020


Time/Decision to Admit Time:  14:32





Departure-Patient Inst.


Referrals:  


SALVADOR BAIRES MD (PCP/Family)


Primary Care Physician











LAMONT SMITH             Jul 27, 2020 14:22

## 2020-07-27 NOTE — OPERATIVE REPORT
DATE OF SERVICE:  07/27/2020



PREOPERATIVE DIAGNOSES:

Severe abdominal pain, anemia, hypoxia.



POSTOPERATIVE DIAGNOSES:

Bleeding colon mass, anemia, abdominal pain.



PROCEDURE:

Diagnostic laparoscopy switched to open laparotomy with right colon resection.



SURGEON:

Rhett Catherine DO



FIRST ASSISTANT:

Levi Decker DO



ANESTHESIA:

General endotracheal tube.



SPECIMEN:

Portion of cecum, ascending colon, terminal ileum and appendix.



BLOOD LOSS:

Scant.



FLUIDS:

Per anesthesia.



POSTOPERATIVE CONDITION:

Stable.



INDICATION FOR PROCEDURE:

The patient is an 79-year-old female who was doing fine this morning, but then

found down in her bathroom hypoxic, tachypneic, brought to the emergency room. 

Once she got, they got her oxygenation up.  She started complaining of severe

abdominal pain, would not stop moaning.  She was also found to be anemic, which

was 8 and she was 12, 30 days ago.



FINDINGS:

The patient had a bleeding colonic mass with a lot of blood seen in the rest of

the colon, had this removed and sent to pathology.



PROCEDURE NOTE:

After informed consent was obtained, the patient was brought to the operating

room, placed on table in supine position.  She was sterilely prepped and draped

in normal fashion.  I started with a small incision under the umbilicus.  First

infiltrated the skin with local, then made an incision with #11 blade, carried

down through the skin into subcutaneous tissue, then deepened down to

subcutaneous tissue with Bovie electrocautery down to fascia.  Fascia incised

with cautery and then bluntly entered the abdomen, placed limited trocar port

under direct visualization.  Created pneumoperitoneum and upon entry, noted

adhesions above the liver and then some bruising along the cecum, also appeared

to be some blood outside of the colon and actually what looked to be like blood

throughout the entire colon.  We first saw the appendix, was also very dilated,

but it looked like it had some blood in it.  At this point, then I had placed

another 5 mm port suprapubically with #11 blade for stab incision and the

VersaStep system all done under direct visualization.  Did this to move the 
intestine 

around, did not see any obvious ischemic bowel except for this large bruising 
along the 

cecum and what looked like blood in the rest of the colon.  At this point, 
elected to 

go to an  open incision, went from the umbilicus to pubic bone .  I then 
connected 

these incisions connecting the skin using the Bovie electrocautery going

through the skin and then down through the subcutaneous tissue to the fascia.  I

then put my finger into the abdomen and protecting the abdomen and going along

the fascia and opening the fascia.  Once able to do this and able to deliver the

cecum out of the abdominal cavity, there was bruising and felt like a mass, did

a colotomy and saw a mass that had been bleeding right in the cecum close to the

terminal ileum.  At this point, then proceeded with a right colon resection.  I

made a small defect in the mesentery of the terminal ileum and came into this

with a LUCILLE, clamped and fired, thereby transecting this and then went along the

white line of Toldt, freeing up the ascending colon and the lateral colonic

attachments with the Bovie electrocautery and then the LigaSure to be able to

rotate this into the middle to be able to easily bring this up, then elected to

do a 2-step anastomosis, made a defect in the colon, in the tenia and then in

the antimesenteric border of the small bowel, placed on either side of the LUCILLE,

into this LUCILLE-75 was used and then clamped and held for 30 seconds and then

fired thereby creating a side-to-side functional end-to-end anastomosis, then

used another LUCILLE-75 reload to close this enterocolotomy.  Once this was closed,

then used the LigaSure to start going through the mesentery clamping,

coagulating and transecting and in a stepwise fashion, taking off approximately

half of the ascending colon, the cecum and about 4 to 5 cm of terminal ileum as

well as the appendix.  This was then passed off the table to be sent to

pathology.  Copiously irrigated with 3 liters of warm normal saline.  There did

not appear to be any bleeding in the abdomen.  At this point, there did appear

to be what looked like blood in the rest of the large colon.  At this point,

then brought the omentum down over the small intestine.  I elected to close the

incision, closed the fascia with a #1 double stranded PDS suture running from

superior portion to inferior portion tying to itself, irrigating the midline

again with saline and then hemostasis obtained using Bovie electrocautery, then

closed the skin with staples.  Dressing was placed as well as then a pressure

and abdominal binder.  Sponge, instrument and needle count correct at the end of

the case.  The patient tolerated the procedure and transferred back to the ICU. 

Dr. Decker assisted in this case helping to make incisions, close incisions,

identify anatomy, hold anatomy out of the way.





Job ID: [id number]

DocumentID: [id number]

Dictated Date:  07/27/2020 20:44:49

Transcription Date: 07/27/2020 22:00:00

Dictated By: DO TRAVIS MORRISON

## 2020-07-27 NOTE — HISTORY & PHYSICAL-HOSPITALIST
History of Present Illness


HPI/Chief Complaint


Pt is an 79yoCF with a PMH of aortic stenosis, COPD, CAD with recent stenting, 

HTN, HLD, hypothyroidism, paroxysmal atrial fibrillation on chronic 

anticoagulation who presented to the ER due to hypoxia. She is unable to provide

much history and onlyanswered a few yes or no questions for me. She told me she 

was sick and that she was not having pain. Otherwise all history obtained from 

family and records. She was apparently in her normal state this morning 

(ambulatory and alert and oriented x4). She spoke to her son and then had lunch.

Sometime after lunch they found her in her room minimallyresponsive. There was c

oncern for an episode of apnea as well. EMS was summoned and she was found to 

have oxygen saturations in the 60%. She was placed on a nonrebreather which 

brought her oxygen saturations up. She was taken for CT head as she is on 

Xarelto.


Source:  patient


Date Seen


7/27/20


Time Seen by a Provider:  15:40


Attending Physician


Selina Singh Rachel L MD


Referring Physician





Date of Admission








Home Medications & Allergies


Home Medications


Reviewed patient Home Medication Reconciliation performed by pharmacy medication

reconciliations technician and/or nursing.


Patients Allergies have been reviewed.





Allergies





Allergies


Coded Allergies


  pregabalin (Verified Allergy, Unknown, 6/8/20)








Past Medical-Social-Family Hx


Past Med/Social Hx:  Reviewed Nursing Past Med/Soc Hx


Patient Social History


Alcohol Use:  Denies Use


Alcohol Beverage of Choice:  Beer


Recreational Drug Use:  No


Smoking Status:  Former Smoker


Former Smoker, Quit:  Feb 1, 1980


Type Used:  Cigarettes


2nd Hand Smoke Exposure:  Yes (1980 QUITE)


Recent Foreign Travel:  No


Contact w/other who traveled:  No


Recent Infectious Disease Expo:  No





Immunizations Up To Date


Tetanus Booster (TDap):  Unknown


Date of Pneumonia Vaccine:  Steven 10, 2018


Date of Influenza Vaccine:  Sep 12, 2019





Seasonal Allergies


Seasonal Allergies:  No





Past Medical History


Surgeries:  Eye Surgery, Orthopedic


Currently Using CPAP:  No


Currently Using BIPAP:  No


Cardiac:  Atrial Fibrillation, Hypertension, Peripheral Vascular, Valvular Heart

Disease


Neurological:  Neuropathy, TIA


Reproductive:  No


Menopausal


Gastrointestinal:  Gastroesophageal Reflux


Musculoskeletal:  Rheumatoid Arthritis


Endocrine:  Hyperthyroidism


HEENT:  Cataract


History of Blood Disorders:  No


Adverse Reaction to Blood Quintanilla:  No





Family History


No Pertinent Family Hx





Physical Exam


Physical Exam


Vital Signs





Vital Signs - First Documented








 7/27/20 7/27/20





 14:12 20:00


 


Temp 36.5 


 


Pulse 72 


 


Resp 38 


 


B/P (MAP) 153/56 (88) 


 


Pulse Ox 100 


 


O2 Delivery Non Rebreather 


 


O2 Flow Rate 10.00 


 


FiO2  50





Capillary Refill : Less Than 3 Seconds


Height, Weight, BMI


Height: 5'3.00"


Weight: 112lbs. 0.0oz. 50.371499xb; 23.00 BMI


Method:Stated





Results


Results/Procedures


Labs


Laboratory Tests


7/29/20 03:30








7/30/20 03:29








7/30/20 16:25








Patient resulted labs reviewed.





Assessment/Plan


Admission Diagnosis


Acute Hypoxic Respiratory Failure


Admission Status:  Inpatient Order (span 2 midnights)


Reason for Inpatient Admission:  


see below





Assessment and Plan


Acute Hypoxic Respiratory Failure


Recently treated pneumonia


   Etiology unclear at this time


   CT Head pending


   CT Chest/Abd/Pelvis ordered


   Troponin pending


   Procal pending


   





CAD


Aortic Stenosis


   Son reports she recently had a stent placed by Dr Dixon


   Will need to resume DAPT when CT results back





COPD


   Resume home inhalers


   MAT protocol





Anemia


   New onset


   Hemoglobin 11-12 last month











SELINA SINGH MD              Jul 27, 2020 15:49

## 2020-07-27 NOTE — PROGRESS NOTE-POST OPERATIVE
Post-Operative Progess Note


Surgeon (s)/Assistant (s)


Surgeon


DAKSHA VELOZ DO


Assistant:  none





Pre-Operative Diagnosis


Hypotension, Venous Insufficiency and need for Pressure support





Post-Operative Diagnosis





Same





Procedure & Operative Findings


Date of Procedure


7/27/20


Procedure Performed/Findings


PROCEDURE:


[Left] internal jugular central line placement using ultrasound guidance. 


 


COMPLICATIONS:


None. 


 


INDICATIONS:


The patient is an 79 year old female [with hypotension, venous 


insufficiency and need for IV access for pressure support]. 


This was emergent and pt unable to sign.





 


PROCEDURE:


The patient was in her bed in the ICU, was prepped and


draped in the sterile fashion. A surgical pause was performed.


Ultrasound was used to locate the internal jugular vein and once


located anesthetic was infiltrated above it.  Then advanced 18 gauge finder


needle with negative inspiration and watched with US as the right 


internal vein was accessed. Dark nonpulsatile blood was withdrawn. 


The wire was inserted.  US assured proper placement of the guidewire. 


The needle was removed.  A [#11] blade scalpel was used to make 


an incision along the guidewire.  Then advanced dilator using 


the seldinger technique and removed it. Next placed triple lumen


over guidewire and advanced using the Seldinger technique. The guidewire


was removed without difficulty.  Locking ports had been placed over all


3 ports; easily aspirated and then flushed with sterile saline in 


each port. The central line was then sutured in place with 3-0 Silk 


on a Esteban needle.  The area was then washed and dried.  Sterile dressing plac

ed.


Anesthesia Type


none, pt sedated on vent





Estimated Blood Loss


Estimated blood loss (mL):  scant





Specimens/Packing


Specimens Removed


none











DAKSHA VELOZ DO               Jul 27, 2020 21:52

## 2020-07-27 NOTE — XMS REPORT
Continuity of Care Document

                             Created on: 2020



ED [facility]IPS

External Reference #: [id number]

: 1940

Sex: Female



Demographics





                          Home Phone                [phone]

 

                          Preferred Language        Unknown

 

                          Marital Status            Unknown

 

                          Mandaeism Affiliation     Unknown

 

                          Race                      Unknown

 

                          Ethnic Group              Unknown





Author





                          Organization              Unknown

 

                          Address                   Unknown

 

                          Phone                     Unavailable



              



Allergies

      



             Active           Description           Code           Type         

  Severity   

                Reaction           Onset           Reported/Identified          

 

Relationship to Patient                 Clinical Status        

 

             Yes           ALBUTEROL SULFATE                                    

 SEVERE        

             DIZZINESS                                                   

      

 

             Yes           ALBUTEROL SULFATE                                    

 SEVERE        

             SEVERE                                                      

   

 

           Yes           DICYCLOMINE                                   SEVERE   

        

OTHER                                                               

 

           Yes           DICYCLOMINE                                   SEVERE   

        

SEVERE                                                               

 

             Yes           FLONASE ALLERGY RELIEF                               

      UNKNOWN  

             UNKNOWN                                                     

    

 

           Yes           PENICILLINS                                   UNKNOWN  

         

UNKNOWN                                                               

 

           Yes           PREDNISONE                                   UNKNOWN   

        

DIZZINESS                                                               

 

           Yes           PREDNISONE                                   UNKNOWN   

        

UNKNOWN                                                               

 

                Yes             STATINS-HMG-COA REDUCTASE INHIBITORS            

                      

                UNKNOWN           GI PROBLEMS - DIARRH                          

         

                                                             

 

                Yes             STATINS-HMG-COA REDUCTASE INHIBITORS            

                      

             UNKNOWN           UNKNOWN                                   

          

                                                 

 

             Yes           fluticasone           [id number]           Drug Aller

gy           

Unknown           N/A                        2015                         

  

     

 

             Yes           Lactase           [id number]           Drug Allergy  

         

Unknown           N/A                        2015                         

  

     

 

             Yes           Penicillins           [id number]           Drug Aller

gy           

Unknown           N/A                        2015                         

  

     

 

                Yes             No Known Drug Allergies           [id number]    

       Drug 

Allergy           Unknown           N/A                             2019  

      

                                                             

 

             Yes           pregabalin           [id number]           Drug Allerg

y           

Unknown           N/A                        2020                         

  

     



                                              



Medications

      



There is no data.                  



Problems

      



             Date Dx Coded           Attending           Type           Code    

       

Diagnosis                               Diagnosed By        

 

                1134           SALVADOR BAIRES MD           Ot          

    M34.9          

                          SYSTEMIC SCLEROSIS, UNSPECIFIED                    

 

                1134           SALVADOR BAIRES MD           Ot          

    R53.1          

                          WEAKNESS                           

 

           2010                       Ot           784.2                  

           

  

 

           2010                       Ot           786.50                 

           

   

 

           2010                       Ot           789.06                 

           

   

 

           2010                       Ot           E946.6                 

           

   

 

           2015                       Ot           285.9                  

           

  

 

           2015                       Ot           401.9                  

           

  

 

           2015                       Ot           786.09                 

           

   

 

           2015                       Ot           401.9                  

           

  

 

           2015                       Ot           786.09                 

           

   

 

           02/10/2015                       Ot           285.9                  

           

  

 

           02/10/2015                       Ot           401.9                  

           

  

 

           02/10/2015                       Ot           786.09                 

           

   

 

           02/10/2015                       Ot           401.9                  

           

  

 

           02/10/2015                       Ot           786.09                 

           

   

 

           2015                       Ot           787.20                 

           

   

 

                2015           LIZETTBAANNIE DO, SYLVIA D           Ot       

       780.79      

                                                             

 

                2015           DEFMARCELABAANNIE DO, SYLVIA D           Ot       

       786.05      

                                                             

 

                2015           GURJITFENBAUGH DO, SYLVIA D           Ot       

       786.50      

                                                             

 

                2015           DEFFENBAUGH DO, SYLVIA D           Ot       

       780.79      

                                                             

 

                2015           DEFFENBAUGH DO, SYLVIA D           Ot       

       786.05      

                                                             

 

                2015           LIZETTBAUGH DO, SYLVIA D           Ot       

       786.50      

                                                             

 

                2015           GURJITFENBAUGH DO, SYLVIA D           Ot       

       787.99      

                                                             

 

           06/10/2015                       Ot           285.9                  

           

  

 

           06/10/2015                       Ot           401.9                  

           

  

 

           06/10/2015                       Ot           786.09                 

           

   

 

           06/10/2015                       Ot           401.9                  

           

  

 

           06/10/2015                       Ot           786.09                 

           

   

 

                06/10/2015           LIZETTBAANNIE DO, SYLVIA D           Ot       

       440.20      

                                                             

 

           06/10/2015                       Ot           787.20                 

           

   

 

                06/10/2015           LIZETTBAANNIE DO, SYLVIA D           Ot       

       780.79      

                                                             

 

                06/10/2015           GURJITFENBAUGH DO, SYLVIA D           Ot       

       786.05      

                                                             

 

                06/10/2015           GURJITFENBAUGH DO, SYLVIA D           Ot       

       786.50      

                                                             

 

                06/10/2015           GURJITFENBAUGH DO, SYLVIA D           Ot       

       780.79      

                                                             

 

                06/10/2015           LIZETTBAUGH DO, SYLVIA D           Ot       

       786.05      

                                                             

 

                06/10/2015           GURJITFENBAUGH DO, SYLVIA D           Ot       

       786.50      

                                                             

 

                06/10/2015           LIZETTBAUGH DO, SYLVIA D           Ot       

       787.99      

                                                             

 

                06/15/2015           LIZETTBAANNIE DO, SYLVIA D           Ot       

       780.79      

                                                             

 

                06/15/2015           LIZETTBAANNIE DO, SYLVIA D           Ot       

       786.05      

                                                             

 

                06/15/2015           GURJITFENBAUGH DO, SYLVIA D           Ot       

       786.50      

                                                             

 

             2015           SIN BERRIOS DO           Ot           492.

8            

                                                 

 

             2015           SIN BERRIOS DO           Ot           710.

1            

                                                 

 

             2015           RAYMUNDO BEDOYA MD           Ot           710.

1            

                                                 

 

             2015           RAYMUNDO BEDOYA MD           Ot           786.

05           

                                                 

 

           2015                       Ot           285.9                  

           

  

 

           2015                       Ot           401.9                  

           

  

 

           2015                       Ot           786.09                 

           

   

 

           2015                       Ot           401.9                  

           

  

 

           2015                       Ot           786.09                 

           

   

 

                2015           FABBY DO, SYLVIA D           Ot       

       440.20      

                                                             

 

           2015                       Ot           787.20                 

           

   

 

                2015           FABBY DO, SYLVIA D           Ot       

       780.79      

                                                             

 

                2015           FABBY DO, SYLVIA D           Ot       

       786.05      

                                                             

 

                2015           FABBY DO, SYLVIA D           Ot       

       786.50      

                                                             

 

                2015           FABBY DO, SYLVIA D           Ot       

       780.79      

                                                             

 

                2015           FABBY DO, SYLVIA D           Ot       

       786.05      

                                                             

 

                2015           FABBY DO, SYLVIA D           Ot       

       786.50      

                                                             

 

                2015           FABBY DO, SYLVIA D           Ot       

       787.99      

                                                             

 

             2015           SIN BERRIOS DO           Ot           492.

8            

                                                 

 

             2015           SIN BERRIOS DO           Ot           710.

1            

                                                 

 

             2015           RAYMUNDO BEDOYA MD           Ot           710.

1            

                                                 

 

             2015           RAYMUNDO BEDOYA MD           Ot           786.

05           

                                                 

 

                2015           ROSLYN ALANIS MD           Ot      

        F17.211    

                          NICOTINE DEPENDENCE, CIGARETTES, IN CASSIDY              

      

 

                2015           ROSLYN ALANIS MD           Ot      

        I48.91     

                          UNSPECIFIED ATRIAL FIBRILLATION                    

 

                2015           ROSLYN ALANIS MD           Ot      

        M34.9      

                          SYSTEMIC SCLEROSIS, UNSPECIFIED                    

 

                2015           ROSLYN ALAINS MD           Ot      

        R07.89     

                          OTHER CHEST PAIN                    

 

                2015           ROSLYN ALANIS MD           Ot      

        R20.2      

                          PARESTHESIA OF SKIN                    

 

                2015           ROSLYN ALANIS MD           Ot      

        Z79.02     

                          LONG TERM (CURRENT) USE OF ANTITHROMBOTI              

      

 

                2015           ROSLYN ALANIS MD           Ot      

        Z79.82     

                          LONG TERM (CURRENT) USE OF ASPIRIN                    

 

             01/10/2016           ALICIA CHIANG MD           Ot           G45.

9           

TRANSIENT CEREBRAL ISCHEMIC ATTACK, UNSP                    

 

             01/10/2016           ALICIA CHIANG MD           Ot           Z79.

01           

LONG TERM (CURRENT) USE OF ANTICOAGULANT                    

 

                2016           SALVADOR BAIRES MD           Ot          

    M34.9          

                                                             

 

                2016           NAYELI CALLE, DELGADO COPELAND           Ot        

      R07.9        

                                                             

 

                2016           SALVADOR BAIRES MD           Ot          

    M62.81         

                          MUSCLE WEAKNESS (GENERALIZED)                    

 

                2016           DEQUAN CALLE, SALVADOR L           Ot          

    M79.604        

                          PAIN IN RIGHT LEG                    

 

                2016           DEQUAN CALLE, SALVADOR KOEHLER           Ot          

    M79.605        

                          PAIN IN LEFT LEG                    

 

                2016           DEQUAN CALLE, SALVADOR KOEHLER           Ot          

    M62.81         

                          MUSCLE WEAKNESS (GENERALIZED)                    

 

                2016           DEQUAN CALLE, SALVADOR KOEHLER           Ot          

    M79.604        

                          PAIN IN RIGHT LEG                    

 

                2016           DEQUAN CALLE, SALVADOR KOEHLER           Ot          

    M79.605        

                          PAIN IN LEFT LEG                    

 

             10/24/2017           SALVADOR BAIRES           A            401.0  

         

MALIGNANT ESSENTIAL HYPERTENSION                    

 

             10/24/2017           SALVADOR BAIRES           A            I10    

       

ESSENTIAL (PRIMARY) HYPERTENSION                    

 

             10/24/2017           SALVADOR BAIRES           W            272.4  

         OTHER

AND UNSPECIFIED HYPERLIPIDEMIA                    

 

             10/24/2017           SALVADOR BAIRES           A            401.0  

         

MALIGNANT ESSENTIAL HYPERTENSION                    

 

             10/24/2017           SALVADOR BAIRES           W            E78.5  

         

HYPERLIPIDEMIA, UNSPECIFIED                      

 

             10/24/2017           SALVADOR BAIRES           A            I10    

       

ESSENTIAL (PRIMARY) HYPERTENSION                    

 

             10/24/2017           SALVADOR BAIRES           W            272.4  

         OTHER

AND UNSPECIFIED HYPERLIPIDEMIA                    

 

             10/24/2017           SALVADOR BAIRES           A            401.0  

         

MALIGNANT ESSENTIAL HYPERTENSION                    

 

             10/24/2017           SALVADOR BAIRES           W            427.31 

          

ATRIAL FIBRILLATION                              

 

             10/24/2017           SALVADOR BAIRES           W            E78.5  

         

HYPERLIPIDEMIA, UNSPECIFIED                      

 

             10/24/2017           SALVADOR BAIRES           A            I10    

       

ESSENTIAL (PRIMARY) HYPERTENSION                    

 

             10/24/2017           SALVADOR BAIRES           W            I48.0  

         

PAROXYSMAL ATRIAL FIBRILLATION                    

 

             10/24/2017                        W            272.4           OTHE

R AND 

UNSPECIFIED HYPERLIPIDEMIA                       

 

             10/24/2017                        A            401.0           MICHAEL

GNANT ESSENTIAL

HYPERTENSION                                     

 

             10/24/2017                        W            427.31           ATR

IAL 

FIBRILLATION                                     

 

             10/24/2017                        W            696.1           OTHE

R PSORIASIS    

                                                 

 

             10/24/2017                        W            715.16           OST

EOARTHROSIS, 

LOCALIZED, PRIMARY, INVOLVING LOWER LEG                    

 

             10/24/2017                        W            E78.5           HYPE

RLIPIDEMIA, 

UNSPECIFIED                                      

 

             10/24/2017                        A            I10           ESSENT

IAL (PRIMARY) 

HYPERTENSION                                     

 

             10/24/2017                        W            I48.0           PARO

XYSMAL ATRIAL 

FIBRILLATION                                     

 

             10/24/2017                        W            L40.0           PSOR

IASIS VULGARIS 

                                                 

 

             10/24/2017                        W            M17.11           UNI

LATERAL PRIMARY

OSTEOARTHRITIS, RIGHT KNEE                       

 

             10/24/2017           SALVADOR BAIRES           W            272.4  

         OTHER

AND UNSPECIFIED HYPERLIPIDEMIA                    

 

             10/24/2017           SALVADOR BAIRES           A            401.0  

         

MALIGNANT ESSENTIAL HYPERTENSION                    

 

             10/24/2017           RITU BAIRESHEL           ADAM            427.31 

          

ATRIAL FIBRILLATION                              

 

             10/24/2017           RITU BAIRESNIKKIE MEDINA            E78.5  

         

HYPERLIPIDEMIA, UNSPECIFIED                      

 

             10/24/2017           SALVADOR BAIRES            I10    

       

ESSENTIAL (PRIMARY) HYPERTENSION                    

 

             10/24/2017           SALVADOR BAIRES           ADAM            I48.0  

         

PAROXYSMAL ATRIAL FIBRILLATION                    

 

                2017           SYLVIA SEQUEIRA DO           Ot       

       440.20      

                          ATHEROSCLEROSIS NATIVE ARTERIES EXTREMIT              

      

 

             2017                        Ot           787.20           DYS

PHAGIA, 

UNSPECIFIED                                      

 

                2017           FABBY GAMEZ SYLVIA D           Ot       

       780.79      

                          OTH MALAISE FATIGUE                    

 

                2017           ALEJANDRO SEQUEIRA DORY D           Ot       

       786.05      

                          SHORTNESS OF BREATH                    

 

                2017           ALEJANDRO SEQUEIRA DORY D           Ot       

       786.50      

                          CHEST PAIN NOS                     

 

                2017           ALEJANDRO SEQUEIRA DORY D           Ot       

       780.79      

                          OTH MALAISE FATIGUE                    

 

                2017           ALEJANDRO SEQUEIRA DORY D           Ot       

       786.05      

                          SHORTNESS OF BREATH                    

 

                2017           FABBY GAMEZ SYLVIA D           Ot       

       786.50      

                          CHEST PAIN NOS                     

 

                2017           FABBY GAMEZ SYLVIA D           Ot       

       787.99      

                          OTHER GI SYSTEM SYMPTOMS                    

 

             2017           SIN BERRIOS DO           Ot           492.

8           

EMPHYSEMA NEC                                    

 

             2017           SIN BERRIOS DO           Ot           710.

1           

SYSTEMIC SCLEROSIS                               

 

             2017           RAYMUNDO BEDOYA MD           Ot           710.

1           

SYSTEMIC SCLEROSIS                               

 

             2017           RAYMUNDO BEDOYA MD           Ot           786.

05           

SHORTNESS OF BREATH                              

 

                2017           SALVADOR BAIRES MD           Ot          

    M34.9          

                          SYSTEMIC SCLEROSIS, UNSPECIFIED                    

 

                2017           NAYELI CALLE, DELGADO COPELAND           Ot        

      R07.9        

                          CHEST PAIN, UNSPECIFIED                    

 

                2017           ELIOT EDMONDSON           Ot      

        E78.2      

                          MIXED HYPERLIPIDEMIA                    

 

                2017           ELIOT EDMONDSON           Ot      

        I49.9      

                          CARDIAC ARRHYTHMIA, UNSPECIFIED                    

 

                2017           RAHUL GUAN DO           Ot           

   I48.91          

                          UNSPECIFIED ATRIAL FIBRILLATION                    

 

             2017           RAHUL GUAN DO           Ot           R31

.9           

HEMATURIA, UNSPECIFIED                           

 

                2017           DEBO, ELIOT E APRN           Ot      

        J43.9      

                          EMPHYSEMA, UNSPECIFIED                    

 

                2017           DEBO, ELIOT E APRN           Ot      

        J43.9      

                          EMPHYSEMA, UNSPECIFIED                    

 

                2017           DEBO, ELIOT E APRN           Ot      

        R06.00     

                          DYSPNEA, UNSPECIFIED                    

 

             2018                        A            564.1           IRRI

TABLE BOWEL 

SYNDROME                                         

 

             2018                        A            K58.9           IRRI

TABLE BOWEL 

SYNDROME WITHOUT DIARRHEA                        

 

                2018           DEBO, ELIOT E APRN           Ot      

        J44.9      

                          CHRONIC OBSTRUCTIVE PULMONARY DISEASE, U              

      

 

                2018           DEBO, ELIOT E APRN           Ot      

        J43.9      

                          EMPHYSEMA, UNSPECIFIED                    

 

                2018           DEBO, ELIOT E APRN           Ot      

        R06.00     

                          DYSPNEA, UNSPECIFIED                    

 

                2018           DEBO, ELIOT E APRN           Ot      

        J43.9      

                          EMPHYSEMA, UNSPECIFIED                    

 

                2018           DEBO, ELIOT E APRN           Ot      

        R06.00     

                          DYSPNEA, UNSPECIFIED                    

 

                2018           DEBO, ELIOT E APRN           Ot      

        J43.9      

                          EMPHYSEMA, UNSPECIFIED                    

 

                2018           DEBO, ELIOT E APRN           Ot      

        R06.00     

                          DYSPNEA, UNSPECIFIED                    

 

                2018           DEBO, ELIOT E APRN           Ot      

        J43.9      

                          EMPHYSEMA, UNSPECIFIED                    

 

                2018           DEBO, ELIOT E APRN           Ot      

        R06.00     

                          DYSPNEA, UNSPECIFIED                    

 

                04/10/2018           DEBO, ELIOT E APRN           Ot      

        J43.9      

                          EMPHYSEMA, UNSPECIFIED                    

 

                04/10/2018           DEBO, ELIOT E APRN           Ot      

        R06.00     

                          DYSPNEA, UNSPECIFIED                    

 

                2018           DEBO, ELIOT E APRN           Ot      

        J43.9      

                          EMPHYSEMA, UNSPECIFIED                    

 

                2018           DEBO, ELIOT E APRN           Ot      

        R06.00     

                          DYSPNEA, UNSPECIFIED                    

 

                2018           DEBO, ELIOT E APRN           Ot      

        J43.9      

                          EMPHYSEMA, UNSPECIFIED                    

 

                2018           DEBO, ELIOT E APRN           Ot      

        R06.00     

                          DYSPNEA, UNSPECIFIED                    

 

                2018           DEBO, ELIOT E APRN           Ot      

        J43.9      

                          EMPHYSEMA, UNSPECIFIED                    

 

                2018           DEBO, ELIOT E APRN           Ot      

        R06.00     

                          DYSPNEA, UNSPECIFIED                    

 

                2018           DEBO, ELIOT E APRN           Ot      

        J43.9      

                          EMPHYSEMA, UNSPECIFIED                    

 

                2018           DEBO, ELIOT E APRN           Ot      

        R06.00     

                          DYSPNEA, UNSPECIFIED                    

 

                2018           DEBO, ELIOT E APRN           Ot      

        J43.9      

                          EMPHYSEMA, UNSPECIFIED                    

 

                2018           DEBO, ELIOT E APRN           Ot      

        R06.00     

                          DYSPNEA, UNSPECIFIED                    

 

                2018           DEBO ELIOT E APRN           Ot      

        J43.9      

                          EMPHYSEMA, UNSPECIFIED                    

 

                2018           DEBO ELIOT E APRN           Ot      

        R06.00     

                          DYSPNEA, UNSPECIFIED                    

 

                2018           DEBO ELIOT E APRN           Ot      

        J43.9      

                          EMPHYSEMA, UNSPECIFIED                    

 

                2018           DEBO ELIOT E APRN           Ot      

        R06.00     

                          DYSPNEA, UNSPECIFIED                    

 

                2018           DEBO ELIOT E APRN           Ot      

        J43.9      

                          EMPHYSEMA, UNSPECIFIED                    

 

                2018           DEBO ELIOT E APRN           Ot      

        R06.00     

                          DYSPNEA, UNSPECIFIED                    

 

             2018           DEQUAN, SALVADOR           W            401.0  

         

MALIGNANT ESSENTIAL HYPERTENSION                    

 

             2018           BAIRES, SALVADOR           A            787.91 

          

DIARRHEA                                         

 

             2018           BAIRES, SALVADOR           W            I10    

       

ESSENTIAL (PRIMARY) HYPERTENSION                    

 

             2018           BAIRES, SALVADOR           A            R19.7  

         

DIARRHEA, UNSPECIFIED                            

 

             2018           BAIRES, SALVADOR           W            244.9  

         

UNSPECIFIED HYPOTHYROIDISM                       

 

             2018           BAIRES, SALVADOR           W            401.0  

         

MALIGNANT ESSENTIAL HYPERTENSION                    

 

             2018           BAIRES, SALVADOR           A            787.91 

          

DIARRHEA                                         

 

             2018           BAIRES, SALVADOR           W            E03.9  

         

HYPOTHYROIDISM, UNSPECIFIED                      

 

             2018           BAIRES, SLAVADOR           W            I10    

       

ESSENTIAL (PRIMARY) HYPERTENSION                    

 

             2018           BAIRES, SALVADOR           A            R19.7  

         

DIARRHEA, UNSPECIFIED                            

 

             2018           BAIRES, SALVADOR           W            244.9  

         

UNSPECIFIED HYPOTHYROIDISM                       

 

             2018           BAIRES, SALVADOR           W            401.0  

         

MALIGNANT ESSENTIAL HYPERTENSION                    

 

             2018           BAIRES, SALVADOR           A            787.91 

          

DIARRHEA                                         

 

             2018           BAIRES, SALVADOR           W            E03.9  

         

HYPOTHYROIDISM, UNSPECIFIED                      

 

             2018           BAIRES, SALVADOR           W            I10    

       

ESSENTIAL (PRIMARY) HYPERTENSION                    

 

             2018           BAIRES, SALVADOR           A            R19.7  

         

DIARRHEA, UNSPECIFIED                            

 

             2018                        W            244.9           UNSP

ECIFIED 

HYPOTHYROIDISM                                   

 

             2018                        W            401.0           MICHAEL

GNANT ESSENTIAL

HYPERTENSION                                     

 

             2018                        A            787.91           LULI

RRHEA          

                                                 

 

             2018                        W            E03.9           HYPO

THYROIDISM, 

UNSPECIFIED                                      

 

             2018                        W            I10           ESSENT

IAL (PRIMARY) 

HYPERTENSION                                     

 

             2018                        A            R19.7           DIAR

GRISELDA, 

UNSPECIFIED                                      

 

             2018           DEQUAN, SALVADOR           W            244.9  

         

UNSPECIFIED HYPOTHYROIDISM                       

 

             2018           BAIRES, SALVADOR           W            401.0  

         

MALIGNANT ESSENTIAL HYPERTENSION                    

 

             2018           BAIRES, SALVADOR           A            787.91 

          

DIARRHEA                                         

 

             2018           BAIRES, SALVADOR           W            E03.9  

         

HYPOTHYROIDISM, UNSPECIFIED                      

 

             2018           BAIRES, SALVADOR           W            I10    

       

ESSENTIAL (PRIMARY) HYPERTENSION                    

 

             2018           BAIRES, SALVADOR           A            R19.7  

         

DIARRHEA, UNSPECIFIED                            

 

             2018           BAIRES, SALVADOR           A            787.91 

          

DIARRHEA                                         

 

             2018           BAIRES, SALVADOR           A            R19.7  

         

DIARRHEA, UNSPECIFIED                            

 

             2018           BAIRES, SALVADOR           A            787.91 

          

DIARRHEA                                         

 

             2018           BAIRES, SALVADOR           A            R19.7  

         

DIARRHEA, UNSPECIFIED                            

 

                2018           DEQUAN CALLE, SALVADOR L           Ot          

    R19.7          

                          DIARRHEA, UNSPECIFIED                    

 

                2018           DEQUAN CALLE, SALVADOR L           Ot          

    K52.9          

                          NONINFECTIVE GASTROENTERITIS AND COLITIS              

      

 

                2018           DEQUAN CALLE, SALVADOR L           Ot          

    R19.7          

                          DIARRHEA, UNSPECIFIED                    

 

             2018           BAIRES, SALVADOR           A            696.1  

         OTHER

PSORIASIS                                        

 

             2018           BAIRES, SALVADOR           W            729.5  

         PAIN 

IN LIMB                                          

 

             2018           BAIRES, SALVADOR           W            782.3  

         EDEMA

                                                 

 

             2018           BAIRES, SALVADOR           A            L40.0  

         

PSORIASIS VULGARIS                               

 

             2018           BAIRES, SALVADOR           W            M79.673

           

PAIN IN UNSPECIFIED FOOT                         

 

             2018           BAIRES, SALVADOR           W            R60.0  

         

LOCALIZED EDEMA                                  

 

             2018           DEQUAN SALVADOR           A            696.1  

         OTHER

PSORIASIS                                        

 

             2018           DEQUAN SALVADOR           W            729.5  

         PAIN 

IN LIMB                                          

 

             2018           BAIRES, SALVADOR           W            782.3  

         EDEMA

                                                 

 

             2018           BAIRES, SALVADOR           W            787.91 

          

DIARRHEA                                         

 

             2018           BAIRES, SALVADOR           W            K52.9  

         

NONINFECTIVE GASTROENTERITIS AND COLITIS, UNSPECIFIED                    

 

             2018           SALVADOR BAIRES           A            L40.0  

         

PSORIASIS VULGARIS                               

 

             2018           SALVADOR BAIRES           W            M79.673

           

PAIN IN UNSPECIFIED FOOT                         

 

             2018           SALVADOR BAIRES           W            R60.0  

         

LOCALIZED EDEMA                                  

 

             2018           SALVADOR BAIRES           A            696.1  

         OTHER

PSORIASIS                                        

 

             2018           RITU BAIRESHEL           W            729.5  

         PAIN 

IN LIMB                                          

 

             2018           RITU BAIRESHEL           W            782.3  

         EDEMA

                                                 

 

             2018           DEQUAN, SALVADOR           W            787.91 

          

DIARRHEA                                         

 

             2018           SALVADOR BAIRES           W            K52.9  

         

NONINFECTIVE GASTROENTERITIS AND COLITIS, UNSPECIFIED                    

 

             2018           SALVADOR BAIRES           A            L40.0  

         

PSORIASIS VULGARIS                               

 

             2018           SALVADOR BAIRES           W            M79.673

           

PAIN IN UNSPECIFIED FOOT                         

 

             2018           SALVADOR BAIRES           W            R60.0  

         

LOCALIZED EDEMA                                  

 

             2018           SALVADOR BAIRES           A            696.1  

         OTHER

PSORIASIS                                        

 

             2018           SALVADOR BAIRES           W            729.5  

         PAIN 

IN LIMB                                          

 

             2018           RITU BAIRESHEL           W            782.3  

         EDEMA

                                                 

 

             2018           DEQUAN, SALVADOR           W            787.91 

          

DIARRHEA                                         

 

             2018           RITU BAIRESHEL           W            794.5  

         

NONSPECIFIC ABNORMAL RESULTS OF FUNCTION STUDY OF THYROID                    

 

             2018           SALVADOR BAIRES           W            K52.9  

         

NONINFECTIVE GASTROENTERITIS AND COLITIS, UNSPECIFIED                    

 

             2018           SALVADOR BAIRES           A            L40.0  

         

PSORIASIS VULGARIS                               

 

             2018           SALVADOR BAIRES           W            M79.673

           

PAIN IN UNSPECIFIED FOOT                         

 

             2018           SALVADOR BAIRES           W            R60.0  

         

LOCALIZED EDEMA                                  

 

             2018           SALVADOR BAIRES           W            R94.6  

         

ABNORMAL RESULTS OF THYROID FUNCTION STUDIES                    

 

             2018                        A            696.1           OTHE

R PSORIASIS    

                                                 

 

             2018                        W            729.5           PAIN

 IN LIMB       

                                                 

 

           2018                       W           782.3           EDEMA   

           

     

 

             2018                        W            787.91           LULI

RRHEA          

                                                 

 

             2018                        W            794.5           NONS

PECIFIC 

ABNORMAL RESULTS OF FUNCTION STUDY OF THYROID                    

 

             2018                        W            K52.9           PAXTON

NFECTIVE 

GASTROENTERITIS AND COLITIS, UNSPECIFIED                    

 

             2018                        A            L40.0           PSOR

IASIS VULGARIS 

                                                 

 

             2018                        W            M79.673           PA

IN IN 

UNSPECIFIED FOOT                                 

 

             2018                        W            R60.0           LOCA

LIZED EDEMA    

                                                 

 

             2018                        W            R94.6           ABNO

RMAL RESULTS OF

THYROID FUNCTION STUDIES                         

 

             2018           RITU BAIRESHEL           A            696.1  

         OTHER

PSORIASIS                                        

 

             2018           SALVADOR BAIRES           W            729.5  

         PAIN 

IN LIMB                                          

 

             2018           SALVADOR BAIRES           W            782.3  

         EDEMA

                                                 

 

             2018           SALVADOR BAIRES           W            787.91 

          

DIARRHEA                                         

 

             2018           SALVADOR BAIRES           W            794.5  

         

NONSPECIFIC ABNORMAL RESULTS OF FUNCTION STUDY OF THYROID                    

 

             2018           SALVADOR BAIRES           W            K52.9  

         

NONINFECTIVE GASTROENTERITIS AND COLITIS, UNSPECIFIED                    

 

             2018           BAIRESRITUSALVADOR           A            L40.0  

         

PSORIASIS VULGARIS                               

 

             2018           SALVADOR BAIRES           W            M79.673

           

PAIN IN UNSPECIFIED FOOT                         

 

             2018           RITU BAIRESHEL           W            R60.0  

         

LOCALIZED EDEMA                                  

 

             2018           SALVADOR BAIRES           W            R94.6  

         

ABNORMAL RESULTS OF THYROID FUNCTION STUDIES                    

 

                2018           SALVADOR BAIRES MD L           Ot          

    L29.8          

                          OTHER PRURITUS                     

 

                2018           SALVADOR BAIRES MD L           Ot          

    Z87.2          

                          PERSONAL HISTORY OF DISEASES OF THE SKIN              

      

 

                2018           SALVADOR BAIRES MD L           Ot          

    K52.9          

                          NONINFECTIVE GASTROENTERITIS AND COLITIS              

      

 

                07/10/2018           SALVADOR BAIRES MD L           Ot          

    L29.8          

                          OTHER PRURITUS                     

 

                07/10/2018           SALVADOR BAIRES MD L           Ot          

    Z87.2          

                          PERSONAL HISTORY OF DISEASES OF THE SKIN              

      

 

                2018           ELIOT LONG APRN           Ot    

          I48.91   

                          UNSPECIFIED ATRIAL FIBRILLATION                    

 

             2018                        W            356.9           UNSP

ECIFIED 

IDIOPATHIC PERIPHERAL NEUROPATHY                    

 

             2018                        A            427.32           ATR

IAL FLUTTER    

                                                 

 

             2018                        W            G64           OTHER 

DISORDERS OF 

PERIPHERAL NERVOUS SYSTEM                        

 

             2018                        A            I48.2           

SANJUANA ATRIAL 

FIBRILLATION                                     

 

             2018                        W            272.4           OTHE

R AND 

UNSPECIFIED HYPERLIPIDEMIA                       

 

             2018                        W            401.0           MICHAEL

GNANT ESSENTIAL

HYPERTENSION                                     

 

             2018                        W            427.31           ATR

IAL 

FIBRILLATION                                     

 

             2018                        W            E78.5           HYPE

RLIPIDEMIA, 

UNSPECIFIED                                      

 

             2018                        W            I10           ESSENT

IAL (PRIMARY) 

HYPERTENSION                                     

 

             2018                        W            I48.0           PARO

XYSMAL ATRIAL 

FIBRILLATION                                     

 

                2018           SALVADOR BAIRES MD           Ot          

    M81.0          

                          AGE-RELATED OSTEOPOROSIS W/O CURRENT PAT              

      

 

                2018           SALVADOR BAIRES MD           Ot          

    M85.88         

                          OT DISRD OF BONE DENSITY AND STRUCTURE,              

      

 

                2018           SALVADOR BAIRES MD           Ot          

    Z12.31         

                          ENCNTR SCREEN MAMMOGRAM FOR MALIGNANT NE              

      

 

                2018           SALVADOR BAIRES MD           Ot          

    Z13.820        

                          ENCOUNTER FOR SCREENING FOR OSTEOPOROSIS              

      

 

                2018           SALVADOR BAIRES MD           Ot          

    Z78.0          

                          ASYMPTOMATIC MENOPAUSAL STATE                    

 

                2018           SALVADOR BAIRES MD           Ot          

    M81.0          

                          AGE-RELATED OSTEOPOROSIS W/O CURRENT PAT              

      

 

                2018           SALVADOR BAIRES MD           Ot          

    M85.88         

                          OT DISRD OF BONE DENSITY AND STRUCTURE,              

      

 

                2018           SALVADOR BAIRES MD           Ot          

    Z12.31         

                          ENCNTR SCREEN MAMMOGRAM FOR MALIGNANT NE              

      

 

                2018           SALVADOR BAIRES MD           Ot          

    Z13.820        

                          ENCOUNTER FOR SCREENING FOR OSTEOPOROSIS              

      

 

                2018           SALVADOR BAIRES MD           Ot          

    Z78.0          

                          ASYMPTOMATIC MENOPAUSAL STATE                    

 

                2018           SALVADOR BAIRES MD           Ot          

    M81.0          

                          AGE-RELATED OSTEOPOROSIS W/O CURRENT PAT              

      

 

                2018           SALVADOR BAIRES MD           Ot          

    M85.88         

                          OT DISRD OF BONE DENSITY AND STRUCTURE,              

      

 

                2018           SALVADOR BAIRES MD           Ot          

    Z12.31         

                          ENCNTR SCREEN MAMMOGRAM FOR MALIGNANT NE              

      

 

                2018           SALVADOR BAIRES MD           Ot          

    Z13.820        

                          ENCOUNTER FOR SCREENING FOR OSTEOPOROSIS              

      

 

                2018           SALVADOR BAIRES MD           Ot          

    Z78.0          

                          ASYMPTOMATIC MENOPAUSAL STATE                    

 

             10/08/2018                        W            427.32           ATR

IAL FLUTTER    

                                                 

 

             10/08/2018                        W            496           CHRONI

C AIRWAY 

OBSTRUCTION, NOT ELSEWHERE CLASSIFIED                    

 

             10/08/2018                        W            I48.2           

SANJUANA ATRIAL 

FIBRILLATION                                     

 

             10/08/2018                        W            J44.9           

SANJUANA OBSTRUCTIVE

PULMONARY DISEASE, UNSPECIFIED                    

 

                2018           SYLVIA SEQUEIRA DO           Ot       

       440.20      

                          ATHEROSCLEROSIS NATIVE ARTERIES EXTREMIT              

      

 

             2018                        Ot           787.20           DYS

PHAGIA, 

UNSPECIFIED                                      

 

                2018           FABBY GAMEZALEJANDRORY D           Ot       

       780.79      

                          OTH MALAISE FATIGUE                    

 

                2018           DEFFENBAANNIE GAMEZ, SYLVIA D           Ot       

       786.05      

                          SHORTNESS OF BREATH                    

 

                2018           DEFFENBAANNIE DOALEJANDRORY D           Ot       

       786.50      

                          CHEST PAIN NOS                     

 

                2018           GURJITFENBAANNIE DOALEJANDRORY D           Ot       

       780.79      

                          OTH MALAISE FATIGUE                    

 

                2018           GURJITFENBAANNIE DOALEJANDRORY D           Ot       

       786.05      

                          SHORTNESS OF BREATH                    

 

                2018           DEFFENBAUGH DO, SYLVIA D           Ot       

       786.50      

                          CHEST PAIN NOS                     

 

                2018           GURJITFENBAALEJANDRO VALENCIA DORY D           Ot       

       787.99      

                          OTHER GI SYSTEM SYMPTOMS                    

 

             2018           SIN BERRIOS DO           Ot           492.

8           

EMPHYSEMA NEC                                    

 

             2018           SIN BERRIOS DO           Ot           710.

1           

SYSTEMIC SCLEROSIS                               

 

             2018           AURELIANO CALLE, RAYMUNDO HARDEN           Ot           710.

1           

SYSTEMIC SCLEROSIS                               

 

             2018           AURELIANO CALLE, RAYMUNDO HARDEN           Ot           786.

05           

SHORTNESS OF BREATH                              

 

                2018           DEQUAN CALLE, SALVADOR KOEHLER           Ot          

    M34.9          

                          SYSTEMIC SCLEROSIS, UNSPECIFIED                    

 

                2018           NAYELI CALLE, DELGADO COPELAND           Ot        

      R07.9        

                          CHEST PAIN, UNSPECIFIED                    

 

                2018           ELIOT EDMONDSON APRN           Ot      

        J43.9      

                          EMPHYSEMA, UNSPECIFIED                    

 

                2018           ELIOT EDMONDSON APRN           Ot      

        R06.00     

                          DYSPNEA, UNSPECIFIED                    

 

                2018           ELIOT EDMONDSON APRN           Ot      

        J43.9      

                          EMPHYSEMA, UNSPECIFIED                    

 

                2018           LANGEVIN DO, RAHUL           Ot           

   I48.91          

                          UNSPECIFIED ATRIAL FIBRILLATION                    

 

             2018           LANGEVIN DO, RAHUL           Ot           R31

.9           

HEMATURIA, UNSPECIFIED                           

 

                2018           ELIOT EDMONDSON APRN           Ot      

        J44.9      

                          CHRONIC OBSTRUCTIVE PULMONARY DISEASE, U              

      

 

                2018           ELIOT EDMONDSON           Ot      

        J43.9      

                          EMPHYSEMA, UNSPECIFIED                    

 

                2018           ELIOT EDMONDSON APRN           Ot      

        R06.00     

                          DYSPNEA, UNSPECIFIED                    

 

                2018           DEQUAN CALLE, SALVADOR KOEHLER           Ot          

    R19.7          

                          DIARRHEA, UNSPECIFIED                    

 

                2018           DEQUAN CALLE, SALVADOR KOEHLER           Ot          

    K52.9          

                          NONINFECTIVE GASTROENTERITIS AND COLITIS              

      

 

                2018           SALVADOR BAIRES MD           Ot          

    L29.8          

                          OTHER PRURITUS                     

 

                2018           SALVADOR BAIRES MD, Ot          

    Z87.2          

                          PERSONAL HISTORY OF DISEASES OF THE SKIN              

      

 

                2018           ELIOT LONG DANIS           Ot    

          I48.91   

                          UNSPECIFIED ATRIAL FIBRILLATION                    

 

                2018           SALVADOR BAIRES MD           Ot          

    M81.0          

                          AGE-RELATED OSTEOPOROSIS W/O CURRENT PAT              

      

 

                2018           SALVADOR BAIRES MD, Ot          

    M85.88         

                          OTH DISRD OF BONE DENSITY AND STRUCTURE,              

      

 

                2018           SALVADOR BAIRES MD           Ot          

    Z12.31         

                          ENCNTR SCREEN MAMMOGRAM FOR MALIGNANT NE              

      

 

                2018           SALVADOR BAIRES MD, Ot          

    Z13.820        

                          ENCOUNTER FOR SCREENING FOR OSTEOPOROSIS              

      

 

                2018           SALVADOR BAIRES MD           Ot          

    Z78.0          

                          ASYMPTOMATIC MENOPAUSAL STATE                    

 

                2018           GERALD RODRIGUEZ MD           Ot       

       E87.1       

                          HYPO-OSMOLALITY AND HYPONATREMIA                    

 

                2018           GERALD RODRIGUEZ MD           Ot       

       I10         

                          ESSENTIAL (PRIMARY) HYPERTENSION                    

 

                2018           GERALD RODRIGUEZ MD           Ot       

       I27.20      

                          PULMONARY HYPERTENSION, UNSPECIFIED                   

 

 

                2018           GERALD RODRIGUEZ MD           Ot       

       I48.0       

                          PAROXYSMAL ATRIAL FIBRILLATION                    

 

                2018           GERALD RODRIGUEZ MD           Ot       

       I73.9       

                          PERIPHERAL VASCULAR DISEASE, UNSPECIFIED              

      

 

                2018           GERALD RODRIGUEZ MD           Ot       

       J44.9       

                          CHRONIC OBSTRUCTIVE PULMONARY DISEASE, U              

      

 

                2018           GERALD RODRIGUEZ MD           Ot       

       M06.9       

                          RHEUMATOID ARTHRITIS, UNSPECIFIED                    

 

                2018           GERALD RODRIGUEZ MD, Ot       

       M34.9       

                          SYSTEMIC SCLEROSIS, UNSPECIFIED                    

 

                2018           GERALD RODRIGUEZ MD           Ot       

       R00.1       

                          BRADYCARDIA, UNSPECIFIED                    

 

                2018           GERALD RODRIGUEZ MD           Ot       

       R13.10      

                          DYSPHAGIA, UNSPECIFIED                    

 

                2018           GERALD RODRIGUEZ MD           Ot       

       Z79.82      

                          LONG TERM (CURRENT) USE OF ASPIRIN                    

 

                2018           GERALD RODRIGUEZ MD           Ot       

       Z79.899     

                          OTHER LONG TERM (CURRENT) DRUG THERAPY                

    

 

                2018           GERALD RODRIGUEZ MD, Ot       

       Z87.891     

                          PERSONAL HISTORY OF NICOTINE DEPENDENCE               

     

 

             2019           SALVADOR BAIRES           W            401.0  

         

MALIGNANT ESSENTIAL HYPERTENSION                    

 

             2019           SALVADOR BAIRES           W            780.93 

          

MEMORY LOSS                                      

 

             2019           SALVADOR BAIRES           W            I10    

       

ESSENTIAL (PRIMARY) HYPERTENSION                    

 

             2019           SALVADOR BAIRES           W            R41.3  

         OTHER

AMNESIA                                          

 

             2019           SALVADOR BAIRES           W            244.9  

         

UNSPECIFIED HYPOTHYROIDISM                       

 

             2019           SALVADOR BAIRES           W            401.0  

         

MALIGNANT ESSENTIAL HYPERTENSION                    

 

             2019           SALVADOR BAIRES           W            780.93 

          

MEMORY LOSS                                      

 

             2019           SALVADOR BAIRES           W            E03.9  

         

HYPOTHYROIDISM, UNSPECIFIED                      

 

             2019           SALVADOR BAIRES           W            I10    

       

ESSENTIAL (PRIMARY) HYPERTENSION                    

 

             2019           SALVADOR BAIRES           W            R41.3  

         OTHER

AMNESIA                                          

 

             2019                        W            244.9           UNSP

ECIFIED 

HYPOTHYROIDISM                                   

 

             2019                        W            272.8           OTHE

R DISORDERS OF 

LIPOID METABOLISM                                

 

             2019                        W            401.0           MICHAEL

GNANT ESSENTIAL

HYPERTENSION                                     

 

             2019                        W            728.3           OTHE

R SPECIFIC 

MUSCLE DISORDERS                                 

 

             2019                        W            780.93           MEM

ORY LOSS       

                                                 

 

             2019                        W            924.00           CON

TUSION OF THIGH

                                                 

 

             2019                        W            E03.9           HYPO

THYROIDISM, 

UNSPECIFIED                                      

 

             2019                        W            I10           ESSENT

IAL (PRIMARY) 

HYPERTENSION                                     

 

             2019                        W            M62.89           OTH

ER SPECIFIED 

DISORDERS OF MUSCLE                              

 

             2019                        W            M79.89           OTH

ER SPECIFIED 

SOFT TISSUE DISORDERS                            

 

             2019                        W            R41.3           OTHE

R AMNESIA      

                                                 

 

             2019                        W            S70.10XA           C

ONTUSION OF 

UNSPECIFIED THIGH, INITIAL ENCOUNTER                    

 

             2019           RITU BAIRESHEL           W            244.9  

         

UNSPECIFIED HYPOTHYROIDISM                       

 

             2019           SALVADOR BAIRES           W            401.0  

         

MALIGNANT ESSENTIAL HYPERTENSION                    

 

             2019           SALVADOR BAIRES           W            780.93 

          

MEMORY LOSS                                      

 

             2019           SALVADOR BAIRES           W            E03.9  

         

HYPOTHYROIDISM, UNSPECIFIED                      

 

             2019           SALVADOR BAIRES           W            I10    

       

ESSENTIAL (PRIMARY) HYPERTENSION                    

 

             2019           SALVADOR BAIRES           W            R41.3  

         OTHER

AMNESIA                                          

 

             2019           SALVADOR BAIRES           W            728.3  

         OTHER

SPECIFIC MUSCLE DISORDERS                        

 

             2019           RITU BAIRESHEL           W            924.00 

          

CONTUSION OF THIGH                               

 

             2019           SALVADOR BAIRES           W            M62.89 

          

OTHER SPECIFIED DISORDERS OF MUSCLE                    

 

             2019           SALVADOR BAIRES           W            S70.10X

A           

CONTUSION OF UNSPECIFIED THIGH, INITIAL ENCOUNTER                    

 

             2019           BAIRESSALVADOR           W            728.3  

         OTHER

SPECIFIC MUSCLE DISORDERS                        

 

             2019           SALVADOR BAIRES           W            924.00 

          

CONTUSION OF THIGH                               

 

             2019           SALVADOR BAIRES           W            M62.89 

          

OTHER SPECIFIED DISORDERS OF MUSCLE                    

 

             2019           SALVADOR BAIRES           ADAM            S70.10X

A           

CONTUSION OF UNSPECIFIED THIGH, INITIAL ENCOUNTER                    

 

                2019           DEQUAN CALLE, SALVADOR KOEHLER           Ot          

    T17.920A       

                          FOOD IN RESP TRACT, PART UNSP CAUSING AS              

      

 

                2019           SALVADOR BAIRES MD           Ot          

    T17.920A       

                          FOOD IN RESP TRACT, PART UNSP CAUSING AS              

      

 

                2019           GARRET BROUSSARD MD, Ot        

      G62.9        

                          POLYNEUROPATHY, UNSPECIFIED                    

 

                2019           GARRET BROUSSARD MD, Ot        

      I10          

                          ESSENTIAL (PRIMARY) HYPERTENSION                    

 

                2019           GARRET BROUSSARD MD, Ot        

      I48.2        

                          CHRONIC ATRIAL FIBRILLATION                    

 

                2019           GARRET BROUSSARD MD, Ot        

      J44.9        

                          CHRONIC OBSTRUCTIVE PULMONARY DISEASE, U              

      

 

                2019           GARRET BROUSSARD MD, Ot        

      M06.9        

                          RHEUMATOID ARTHRITIS, UNSPECIFIED                    

 

                2019           GARRET BROUSSARD MD, Ot        

      R07.9        

                          CHEST PAIN, UNSPECIFIED                    

 

                2019           GARRET BROUSSARD MD           Ot        

      Z79.01       

                          LONG TERM (CURRENT) USE OF ANTICOAGULANT              

      

 

                2019           GARRET BROUSSARD MD           Ot        

      Z86.73       

                          PRSNL HX OF TIA (TIA), AND CEREB INFRC W              

      

 

                2019           GARRET BROUSSARD MD, Ot        

      Z87.891      

                          PERSONAL HISTORY OF NICOTINE DEPENDENCE               

     

 

                2019           GARRET BROUSSARD MD, Ot        

      Z88.0        

                          ALLERGY STATUS TO PENICILLIN                    

 

                2019           GARRET BROUSSARD MD, Ot        

      Z88.8        

                          ALLERGY STATUS TO OTH DRUG/MEDS/BIOL SUB              

      

 

                2019           GARRET BROUSSARD MD, Ot        

      G62.9        

                          POLYNEUROPATHY, UNSPECIFIED                    

 

                2019           GARRET BROUSSARD MD           Ot        

      I10          

                          ESSENTIAL (PRIMARY) HYPERTENSION                    

 

                2019           GARRET BROUSSARD MD           Ot        

      I48.2        

                          CHRONIC ATRIAL FIBRILLATION                    

 

                2019           GARRET BROUSSARD MD           Ot        

      J44.9        

                          CHRONIC OBSTRUCTIVE PULMONARY DISEASE, U              

      

 

                2019           GARRET BROUSSARD MD, Ot        

      M06.9        

                          RHEUMATOID ARTHRITIS, UNSPECIFIED                    

 

                2019           GARRET BROUSSARD MD           Ot        

      R07.9        

                          CHEST PAIN, UNSPECIFIED                    

 

                2019           GARRET BROUSSARD MD           Ot        

      Z79.01       

                          LONG TERM (CURRENT) USE OF ANTICOAGULANT              

      

 

                2019           GARRET BROUSSARD MD           Ot        

      Z86.73       

                          PRSNL HX OF TIA (TIA), AND CEREB INFRC W              

      

 

                2019           GARRET BROUSSARD MD, Ot        

      Z87.891      

                          PERSONAL HISTORY OF NICOTINE DEPENDENCE               

     

 

                2019           GARRET BROUSSARD MD, Ot        

      Z88.0        

                          ALLERGY STATUS TO PENICILLIN                    

 

                2019           GARRET BROUSSARD MD, Ot        

      Z88.8        

                          ALLERGY STATUS TO OTH DRUG/MEDS/BIOL SUB              

      

 

                2019           ELIOT EDMONDSON APRN           Ot      

        J43.9      

                          EMPHYSEMA, UNSPECIFIED                    

 

                2019           ELIOT EDMONDSON APRN           Ot      

        R06.00     

                          DYSPNEA, UNSPECIFIED                    

 

                2019           ELIOT EDMONDSON APRN           Ot      

        J43.9      

                          EMPHYSEMA, UNSPECIFIED                    

 

                2019           ELIOT EDMONDSON APRN           Ot      

        R06.00     

                          DYSPNEA, UNSPECIFIED                    

 

                2019           TIM CALLE FACC, NICOLE FACP CCDS           Ot 

             I25.10

                          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               

      

 

                2019           TIM CALLE FACC, ALI FACP CCDS           Ot 

             I27.0 

                          PRIMARY PULMONARY HYPERTENSION                    

 

                2019           TIM CALLE FACC, ALI FACP CCDS           Ot 

             I48.91

                          UNSPECIFIED ATRIAL FIBRILLATION                    

 

                2019           TIM CALLE FACC, ALI FACP CCDS           Ot 

             I65.29

                          OCCLUSION AND STENOSIS OF UNSPECIFIED CA              

      

 

                2019           TIM CALLE FACC, ALI FACP CCDS           Ot 

             J44.9 

                          CHRONIC OBSTRUCTIVE PULMONARY DISEASE, U              

      

 

                2019           TIM CALLE FACC, ALI FACP CCDS           Ot 

             M79.89

                          OTHER SPECIFIED SOFT TISSUE DISORDERS                 

   

 

                2019           TIM CALLE FACC, NICOLE FACP CCDS           Ot 

             Z79.01

                          LONG TERM (CURRENT) USE OF ANTICOAGULANT              

      

 

                2019           TIM CALLE FACC, ALI FACP CCDS           Ot 

             Z79.82

                          LONG TERM (CURRENT) USE OF ASPIRIN                    

 

                2019           TIM CALLE FACC, ALI FACP CCDS           Ot 

             

Z79.899                   OTHER LONG TERM (CURRENT) DRUG THERAPY                

    

 

                2019           TIM CALLE FACC, ALI FACP CCDS           Ot 

             Z82.49

                          FAMILY HX OF ISCHEM HEART DIS AND OTH DI              

      

 

                2019           TIM CALLE FACC, ALI FACP CCDS           Ot 

             Z83.6 

                          FAMILY HISTORY OF OTHER DISEASES OF THE               

      

 

                2019           TIM CALLE FACC, ALI FACP CCDS           Ot 

             Z84.0 

                          FAMILY HISTORY OF DISEASES OF THE SKIN,               

      

 

                2019           TIM CALLE FACC, ALI FACP CCDS           Ot 

             

Z87.891                   PERSONAL HISTORY OF NICOTINE DEPENDENCE               

     

 

                2019           TIM CALLE FACC, ALI FACP CCDS           Ot 

             I25.10

                          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               

      

 

                2019           TIM CALLE FACC, ALI FACP CCDS           Ot 

             I27.0 

                          PRIMARY PULMONARY HYPERTENSION                    

 

                2019           TIM CALLE FACC, ALI FACP CCDS           Ot 

             I48.91

                          UNSPECIFIED ATRIAL FIBRILLATION                    

 

                2019           TIM CALLE FACC, ALI FACP CCDS           Ot 

             I65.29

                          OCCLUSION AND STENOSIS OF UNSPECIFIED CA              

      

 

                2019           TIM CALLE FACC, ALI FACP CCDS           Ot 

             J44.9 

                          CHRONIC OBSTRUCTIVE PULMONARY DISEASE, U              

      

 

                2019           TIM CALLE FACC, ALI FACP CCDS           Ot 

             M79.89

                          OTHER SPECIFIED SOFT TISSUE DISORDERS                 

   

 

                2019           TIM CALLE FACC, ALI FACP CCDS           Ot 

             Z79.01

                          LONG TERM (CURRENT) USE OF ANTICOAGULANT              

      

 

                2019           TIM CALLE FACC, ALI FACP CCDS           Ot 

             Z79.82

                          LONG TERM (CURRENT) USE OF ASPIRIN                    

 

                2019           TIM CALLE FACC, ALI FACP CCDS           Ot 

             

Z79.899                   OTHER LONG TERM (CURRENT) DRUG THERAPY                

    

 

                2019           TIM CALLE FACC, ALI FACP CCDS           Ot 

             Z82.49

                          FAMILY HX OF ISCHEM HEART DIS AND OTH DI              

      

 

                2019           TIM CALLE FACC, ALI FACP CCDS           Ot 

             Z83.6 

                          FAMILY HISTORY OF OTHER DISEASES OF THE               

      

 

                2019           TIM CALLE FACC, ALI FACP CCDS           Ot 

             Z84.0 

                          FAMILY HISTORY OF DISEASES OF THE SKIN,               

      

 

                2019           TIM CALLE FAC, NICOLE ROWAN CCDS           Ot 

             

Z87.891                   PERSONAL HISTORY OF NICOTINE DEPENDENCE               

     

 

             2019           SALVADOR BAIRES           W            241.0  

         

NONTOXIC UNINODULAR GOITER                       

 

             2019           SALVADOR BAIRES           W            242.90 

          

THYROTOXICOSIS WITHOUT MENTION OF GOITER OR OTHER CAUSE, AND WITHOUT MENTION OF 
THYROTOXIC CRISIS OR STORM                       

 

             2019           SALVADOR BAIRES           W            E04.1  

         

NONTOXIC SINGLE THYROID NODULE                    

 

             2019           SALVADOR BAIRES           W            E05.90 

          

THYROTOXICOSIS, UNSPECIFIED WITHOUT THYROTOXIC CRISIS OR STORM                  

 

 

             2019           SALVADOR BAIRES           W            241.0  

         

NONTOXIC UNINODULAR GOITER                       

 

             2019           SALVADOR BAIRES           W            242.90 

          

THYROTOXICOSIS WITHOUT MENTION OF GOITER OR OTHER CAUSE, AND WITHOUT MENTION OF 
THYROTOXIC CRISIS OR STORM                       

 

             2019           SALVADOR BAIRES           W            998.11 

          

HEMORRHAGE COMPLICATING A PROCEDURE                    

 

             2019           SALVADOR BAIRES           W            E04.1  

         

NONTOXIC SINGLE THYROID NODULE                    

 

             2019           SALVADOR BAIRES           W            E05.90 

          

THYROTOXICOSIS, UNSPECIFIED WITHOUT THYROTOXIC CRISIS OR STORM                  

 

 

             2019           SALVADOR BAIRES           W            I97.610

           

POSTPROCEDURAL HEMORRHAGE AND HEMATOMA OF A CIRCULATORY SYSTEM ORGAN OR 
STRUCTURE FOLLOWING A CARDIAC CATHETERIZATION                    

 

             2019           SALVADOR BAIRES           W            241.0  

         

NONTOXIC UNINODULAR GOITER                       

 

             2019           SALVADOR BAIRES           W            242.90 

          

THYROTOXICOSIS WITHOUT MENTION OF GOITER OR OTHER CAUSE, AND WITHOUT MENTION OF 
THYROTOXIC CRISIS OR STORM                       

 

             2019           SALVADOR BAIRES           W            998.11 

          

HEMORRHAGE COMPLICATING A PROCEDURE                    

 

             2019           SALVADOR BAIRES           W            E04.1  

         

NONTOXIC SINGLE THYROID NODULE                    

 

             2019           SALVADOR BAIRES           W            E05.90 

          

THYROTOXICOSIS, UNSPECIFIED WITHOUT THYROTOXIC CRISIS OR STORM                  

 

 

             2019           SALVADOR BAIRES           W            I97.610

           

POSTPROCEDURAL HEMORRHAGE AND HEMATOMA OF A CIRCULATORY SYSTEM ORGAN OR 
STRUCTURE FOLLOWING A CARDIAC CATHETERIZATION                    

 

             2019           SALVADOR BAIRES           W            241.0  

         

NONTOXIC UNINODULAR GOITER                       

 

             2019           SALVADOR BAIRES           W            242.90 

          

THYROTOXICOSIS WITHOUT MENTION OF GOITER OR OTHER CAUSE, AND WITHOUT MENTION OF 
THYROTOXIC CRISIS OR STORM                       

 

             2019           SALVADOR BAIRES           W            998.11 

          

HEMORRHAGE COMPLICATING A PROCEDURE                    

 

             2019           SALVADOR BAIRES           W            E04.1  

         

NONTOXIC SINGLE THYROID NODULE                    

 

             2019           SALVADOR BAIRES           W            E05.90 

          

THYROTOXICOSIS, UNSPECIFIED WITHOUT THYROTOXIC CRISIS OR STORM                  

 

 

             2019           SALVADOR BAIRES           W            I97.610

           

POSTPROCEDURAL HEMORRHAGE AND HEMATOMA OF A CIRCULATORY SYSTEM ORGAN OR 
STRUCTURE FOLLOWING A CARDIAC CATHETERIZATION                    

 

             2019           SALVADOR BAIRES           W            241.0  

         

NONTOXIC UNINODULAR GOITER                       

 

             2019           SALVADOR BAIRES           W            E04.1  

         

NONTOXIC SINGLE THYROID NODULE                    

 

             2019           SALVADOR BAIRES           W            241.0  

         

NONTOXIC UNINODULAR GOITER                       

 

             2019           SALVADOR BAIRES           W            242.90 

          

THYROTOXICOSIS WITHOUT MENTION OF GOITER OR OTHER CAUSE, AND WITHOUT MENTION OF 
THYROTOXIC CRISIS OR STORM                       

 

             2019           SALVADOR BAIRES           W            E04.1  

         

NONTOXIC SINGLE THYROID NODULE                    

 

             2019           SALVADOR BAIRES           W            E05.90 

          

THYROTOXICOSIS, UNSPECIFIED WITHOUT THYROTOXIC CRISIS OR STORM                  

 

 

             2019           SALVADOR BAIRES           W            241.0  

         

NONTOXIC UNINODULAR GOITER                       

 

             2019           SALVADOR BAIRES           W            242.90 

          

THYROTOXICOSIS WITHOUT MENTION OF GOITER OR OTHER CAUSE, AND WITHOUT MENTION OF 
THYROTOXIC CRISIS OR STORM                       

 

             2019           SALVADOR BAIRES           W            E04.1  

         

NONTOXIC SINGLE THYROID NODULE                    

 

             2019           SALVADOR BAIRES           W            E05.90 

          

THYROTOXICOSIS, UNSPECIFIED WITHOUT THYROTOXIC CRISIS OR STORM                  

 

 

             2019           SEPIDEH CALLE, CARMELO RAMIREZ           Ot           G62.

9           

POLYNEUROPATHY, UNSPECIFIED                      

 

             2019           CARMELO BUNN MD           Ot           I10 

          

ESSENTIAL (PRIMARY) HYPERTENSION                    

 

             2019           CARMELO BUNN MD           Ot           I48.

91           

UNSPECIFIED ATRIAL FIBRILLATION                    

 

             2019           CARMELO BUNN MD           Ot           I73.

9           

PERIPHERAL VASCULAR DISEASE, UNSPECIFIED                    

 

                2019           CARMELO BUNN MD           Ot            

  I97.630          

                          POSTPROC HEMATOMA OF A CIRC SYS ORG FOLL              

      

 

             2019           CARMELO BUNN MD           Ot           J44.

9           

CHRONIC OBSTRUCTIVE PULMONARY DISEASE, U                    

 

             2019           CARMELO BUNN MD           Ot           K21.

9           

GASTRO-ESOPHAGEAL REFLUX DISEASE WITHOUT                    

 

             2019           CARMELO BUNN MD           Ot           M06.

9           

RHEUMATOID ARTHRITIS, UNSPECIFIED                    

 

             2019           CARMELO BUNN MD           Ot           M79.

81           

NONTRAUMATIC HEMATOMA OF SOFT TISSUE                    

 

             2019           CARMELO BUNN MD           Ot           Z79.

82           

LONG TERM (CURRENT) USE OF ASPIRIN                    

 

             2019           CARMELO BUNN MD           Ot           Z86.

73           

PRSNL HX OF TIA (TIA), AND CEREB INFRC W                    

 

                2019           CARMELO BUNN MD           Ot            

  Z87.891          

                          PERSONAL HISTORY OF NICOTINE DEPENDENCE               

     

 

             2019           CARMELO BUNN MD           Ot           Z95.

9           

PRESENCE OF CARDIAC AND VASCULAR IMPLANT                    

 

             2019           SALVADOR BAIRES           W            710.1  

         

SYSTEMIC SCLEROSIS                               

 

             2019           SALVADOR BAIRES           W            M34.9  

         

SYSTEMIC SCLEROSIS, UNSPECIFIED                    

 

             2019           SALVADOR BAIRES           W            710.1  

         

SYSTEMIC SCLEROSIS                               

 

             2019           SALVADOR BAIRES           W            M34.9  

         

SYSTEMIC SCLEROSIS, UNSPECIFIED                    

 

             2019           SALVADOR BAIRES           W            242.90 

          

THYROTOXICOSIS WITHOUT MENTION OF GOITER OR OTHER CAUSE, AND WITHOUT MENTION OF 
THYROTOXIC CRISIS OR STORM                       

 

             2019           SALVADOR BAIRES           W            710.1  

         

SYSTEMIC SCLEROSIS                               

 

             2019           SALVADOR BAIRES           W            E05.90 

          

THYROTOXICOSIS, UNSPECIFIED WITHOUT THYROTOXIC CRISIS OR STORM                  

 

 

             2019           SALVADOR BAIRES           W            M34.9  

         

SYSTEMIC SCLEROSIS, UNSPECIFIED                    

 

             2019           SALVADOR BAIRES           W            242.90 

          

THYROTOXICOSIS WITHOUT MENTION OF GOITER OR OTHER CAUSE, AND WITHOUT MENTION OF 
THYROTOXIC CRISIS OR STORM                       

 

             2019           SALVADOR BAIRES           W            710.1  

         

SYSTEMIC SCLEROSIS                               

 

             2019           SALVADOR BAIRES           W            E05.90 

          

THYROTOXICOSIS, UNSPECIFIED WITHOUT THYROTOXIC CRISIS OR STORM                  

 

 

             2019           SALVADOR BAIRES           W            M34.9  

         

SYSTEMIC SCLEROSIS, UNSPECIFIED                    

 

             2019           SALVADOR BAIRES           W            241.1  

         

NONTOXIC MULTINODULAR GOITER                     

 

             2019           SALVADOR BAIRES           W            242.90 

          

THYROTOXICOSIS WITHOUT MENTION OF GOITER OR OTHER CAUSE, AND WITHOUT MENTION OF 
THYROTOXIC CRISIS OR STORM                       

 

             2019           SALVADOR BAIRES           W            710.1  

         

SYSTEMIC SCLEROSIS                               

 

             2019           DEQUAN SALVADOR           W            E01.1  

         

IODINE-DEFICIENCY RELATED MULTINODULAR (ENDEMIC) GOITER                    

 

             2019           RITU BAIRESHEL           W            E05.90 

          

THYROTOXICOSIS, UNSPECIFIED WITHOUT THYROTOXIC CRISIS OR STORM                  

 

 

             2019           DEQUAN SALVADOR           W            M34.9  

         

SYSTEMIC SCLEROSIS, UNSPECIFIED                    

 

             2019           BAIRES, SALVADOR           W            241.1  

         

NONTOXIC MULTINODULAR GOITER                     

 

             2019           DEQUAN SALVADOR           W            242.90 

          

THYROTOXICOSIS WITHOUT MENTION OF GOITER OR OTHER CAUSE, AND WITHOUT MENTION OF 
THYROTOXIC CRISIS OR STORM                       

 

             2019           SALVADOR BAIRES           W            710.1  

         

SYSTEMIC SCLEROSIS                               

 

             2019           SALVADOR BAIRES           W            E01.1  

         

IODINE-DEFICIENCY RELATED MULTINODULAR (ENDEMIC) GOITER                    

 

             2019           RITU BAIRESHEL           W            E05.90 

          

THYROTOXICOSIS, UNSPECIFIED WITHOUT THYROTOXIC CRISIS OR STORM                  

 

 

             2019           SALVADOR BAIRES           W            M34.9  

         

SYSTEMIC SCLEROSIS, UNSPECIFIED                    

 

             2019           SALVADOR BAIRES           W            241.1  

         

NONTOXIC MULTINODULAR GOITER                     

 

             2019           RITU BAIRESHEL           W            242.90 

          

THYROTOXICOSIS WITHOUT MENTION OF GOITER OR OTHER CAUSE, AND WITHOUT MENTION OF 
THYROTOXIC CRISIS OR STORM                       

 

             2019           SALVADOR BAIRES           W            710.1  

         

SYSTEMIC SCLEROSIS                               

 

             2019           RITU BAIRESHEL           W            E01.1  

         

IODINE-DEFICIENCY RELATED MULTINODULAR (ENDEMIC) GOITER                    

 

             2019           DEQUAN SALVADOR           W            E05.90 

          

THYROTOXICOSIS, UNSPECIFIED WITHOUT THYROTOXIC CRISIS OR STORM                  

 

 

             2019           SALVADOR BAIRES           W            M34.9  

         

SYSTEMIC SCLEROSIS, UNSPECIFIED                    

 

             2019           DEQUAN SALVADOR           W            241.0  

         

NONTOXIC UNINODULAR GOITER                       

 

             2019           DEQUAN SALVADOR           W            241.1  

         

NONTOXIC MULTINODULAR GOITER                     

 

             2019           BAIRES, SALVADOR           W            242.90 

          

THYROTOXICOSIS WITHOUT MENTION OF GOITER OR OTHER CAUSE, AND WITHOUT MENTION OF 
THYROTOXIC CRISIS OR STORM                       

 

             2019           SALVADOR BAIRES           W            710.1  

         

SYSTEMIC SCLEROSIS                               

 

             2019           DEQUAN SALVADOR           W            E01.1  

         

IODINE-DEFICIENCY RELATED MULTINODULAR (ENDEMIC) GOITER                    

 

             2019           SALVADOR BAIRES           W            E04.1  

         

NONTOXIC SINGLE THYROID NODULE                    

 

             2019           RITU BAIRESHEL           W            E05.90 

          

THYROTOXICOSIS, UNSPECIFIED WITHOUT THYROTOXIC CRISIS OR STORM                  

 

 

             2019           SALVADOR BAIRES            M34.9  

         

SYSTEMIC SCLEROSIS, UNSPECIFIED                    

 

             2019           BAIRESRITUSALVADOR           W            241.0  

         

NONTOXIC UNINODULAR GOITER                       

 

             2019           BAIRES, SALVADOR           W            241.1  

         

NONTOXIC MULTINODULAR GOITER                     

 

             2019           BAIRES, SALVADOR           W            242.90 

          

THYROTOXICOSIS WITHOUT MENTION OF GOITER OR OTHER CAUSE, AND WITHOUT MENTION OF 
THYROTOXIC CRISIS OR STORM                       

 

             2019           BAIRESSALVADOR AGUILAR           W            710.1  

         

SYSTEMIC SCLEROSIS                               

 

             2019           BAIRESSALVADOR AGUILAR           W            E01.1  

         

IODINE-DEFICIENCY RELATED MULTINODULAR (ENDEMIC) GOITER                    

 

             2019           BAIRESSALVADOR AGUILAR           W            E04.1  

         

NONTOXIC SINGLE THYROID NODULE                    

 

             2019           BAIRES, SALVADOR           W            E05.90 

          

THYROTOXICOSIS, UNSPECIFIED WITHOUT THYROTOXIC CRISIS OR STORM                  

 

 

             2019           SALVADOR BAIRES           W            M34.9  

         

SYSTEMIC SCLEROSIS, UNSPECIFIED                    

 

                10/10/2019           DEQUAN CALLE, SALVADOR L           Ot          

    M34.9          

                          SYSTEMIC SCLEROSIS, UNSPECIFIED                    

 

                10/10/2019           DEQUAN CALLE, SALVADOR L           Ot          

    R53.1          

                          WEAKNESS                           

 

                10/23/2019           DEQUAN CALLE, SALVADOR L           Ot          

    M34.9          

                          SYSTEMIC SCLEROSIS, UNSPECIFIED                    

 

                10/23/2019           DEQUAN CALLE, SALVADOR L           Ot          

    R53.1          

                          WEAKNESS                           

 

                10/28/2019           DEQUAN CALLE, SALVADOR L           Ot          

    M34.9          

                          SYSTEMIC SCLEROSIS, UNSPECIFIED                    

 

                10/28/2019           DEQUAN CALLE, SALVADOR L           Ot          

    R53.1          

                          WEAKNESS                           

 

                2019           SYLVIA SEQUEIRA DO           Ot       

       440.20      

                          ATHEROSCLEROSIS NATIVE ARTERIES EXTREMIT              

      

 

             2019                        Ot           787.20           DYS

PHAGIA, 

UNSPECIFIED                                      

 

                2019           SYLVIA SEQUEIRA DO           Ot       

       780.79      

                          OTH MALAISE FATIGUE                    

 

                2019           SYLVIA SEQUEIRA DO           Ot       

       786.05      

                          SHORTNESS OF BREATH                    

 

                2019           SYLVIA SEQUEIRA DO           Ot       

       786.50      

                          CHEST PAIN NOS                     

 

                2019           SYLVIA SEQUEIRA DO           Ot       

       780.79      

                          OTH MALAISE FATIGUE                    

 

                2019           SYLVIA SEQUEIRA DO           Ot       

       786.05      

                          SHORTNESS OF BREATH                    

 

                2019           SYLVIA SEQUEIRA DO           Ot       

       786.50      

                          CHEST PAIN NOS                     

 

                2019           SYLVIA SEQUEIRA DO           Ot       

       787.99      

                          OTHER GI SYSTEM SYMPTOMS                    

 

             2019           SIN BERRIOS DO           Ot           492.

8           

EMPHYSEMA NEC                                    

 

             2019           SIN BERRIOS DO           Ot           710.

1           

SYSTEMIC SCLEROSIS                               

 

             2019           RAYMUNDO BEDOYA MD           Ot           710.

1           

SYSTEMIC SCLEROSIS                               

 

             2019           RAYMUNDO BEDOYA MD           Ot           786.

05           

SHORTNESS OF BREATH                              

 

                2019           DEQUAN CALLE, SALVADOR KOEHLER           Ot          

    M34.9          

                          SYSTEMIC SCLEROSIS, UNSPECIFIED                    

 

                2019           NAYELI CALLE, DELGADO COPELAND           Ot        

      R07.9        

                          CHEST PAIN, UNSPECIFIED                    

 

                2019           ELIOT EDMONDSON APRN           Ot      

        J43.9      

                          EMPHYSEMA, UNSPECIFIED                    

 

                2019           ELIOT EDMONDSON APRN           Ot      

        R06.00     

                          DYSPNEA, UNSPECIFIED                    

 

                2019           ELIOT EDMONDSON APRN           Ot      

        J43.9      

                          EMPHYSEMA, UNSPECIFIED                    

 

                2019           LANGEVIN DO, RAHUL           Ot           

   I48.91          

                          UNSPECIFIED ATRIAL FIBRILLATION                    

 

             2019           LANGEVIN DO, RAHUL           Ot           R31

.9           

HEMATURIA, UNSPECIFIED                           

 

                2019           ELIOT EDMONDSON APRN           Ot      

        J44.9      

                          CHRONIC OBSTRUCTIVE PULMONARY DISEASE, U              

      

 

                2019           ELIOT EDMONDSON APRN           Ot      

        J43.9      

                          EMPHYSEMA, UNSPECIFIED                    

 

                2019           ELIOT EDMONDSON APRN           Ot      

        R06.00     

                          DYSPNEA, UNSPECIFIED                    

 

                2019           DEQUAN CALLE, SALVADOR KOEHLER           Ot          

    R19.7          

                          DIARRHEA, UNSPECIFIED                    

 

                2019           DEQUAN CALLE, SALVADOR KOEHLER           Ot          

    K52.9          

                          NONINFECTIVE GASTROENTERITIS AND COLITIS              

      

 

                2019           SALVADOR BAIRES MD           Ot          

    L29.8          

                          OTHER PRURITUS                     

 

                2019           DEQUAN CALLE, SALVADOR KOEHLER           Ot          

    Z87.2          

                          PERSONAL HISTORY OF DISEASES OF THE SKIN              

      

 

                2019           ELIOT LONG           Ot    

          I48.91   

                          UNSPECIFIED ATRIAL FIBRILLATION                    

 

                2019           SALVADOR BAIRES MD           Ot          

    M81.0          

                          AGE-RELATED OSTEOPOROSIS W/O CURRENT PAT              

      

 

                2019           SALVADOR BAIRES MD           Ot          

    M85.88         

                          OT DISRD OF BONE DENSITY AND STRUCTURE,              

      

 

                2019           SALVADOR BAIRES MD, Ot          

    Z12.31         

                          ENCNTR SCREEN MAMMOGRAM FOR MALIGNANT NE              

      

 

                2019           SALVADOR BAIRES MD, Ot          

    Z13.820        

                          ENCOUNTER FOR SCREENING FOR OSTEOPOROSIS              

      

 

                2019           SALVADOR BAIRES MD           Ot          

    Z78.0          

                          ASYMPTOMATIC MENOPAUSAL STATE                    

 

                2019           SALVADOR BAIRES MD, Ot          

    T17.920A       

                          FOOD IN RESP TRACT, PART UNSP CAUSING AS              

      

 

                2019           ELIOT EDMONDSON APRN           Ot      

        J44.9      

                          CHRONIC OBSTRUCTIVE PULMONARY DISEASE, U              

      

 

                2020           ELIOT EDMONDSON APRN           Ot      

        I48.0      

                          PAROXYSMAL ATRIAL FIBRILLATION                    

 

                2020           ELIOT EDMONDSON APRN           Ot      

        J30.9      

                          ALLERGIC RHINITIS, UNSPECIFIED                    

 

                2020           ELIOT EDMONDSON APRN           Ot      

        J43.8      

                          OTHER EMPHYSEMA                    

 

                2020           ELIOT EDMONDSON APRN           Ot      

        M34.9      

                          SYSTEMIC SCLEROSIS, UNSPECIFIED                    

 

                2020           DEFFENBASYLVIA VALENCIA DO           Ot       

       440.20      

                          ATHEROSCLEROSIS NATIVE ARTERIES EXTREMIT              

      

 

             2020                        Ot           787.20           DYS

PHAGIA, 

UNSPECIFIED                                      

 

                2020           DEFFENBASYLVIA VALENCIA DO D           Ot       

       780.79      

                          OTH MALAISE FATIGUE                    

 

                2020           LIZETTBASYLVIA VALENCIA DO           Ot       

       786.05      

                          SHORTNESS OF BREATH                    

 

                2020           DEFFENBASYLVIA VALENCIA DO D           Ot       

       786.50      

                          CHEST PAIN NOS                     

 

                2020           DEFFENBASYLVIA VALENCIA DO D           Ot       

       780.79      

                          OTH MALAISE FATIGUE                    

 

                2020           GURJITFENBASYLVIA VALENCIA DO           Ot       

       786.05      

                          SHORTNESS OF BREATH                    

 

                2020           DEFFENBASYLVIA VALENCIA DO D           Ot       

       786.50      

                          CHEST PAIN NOS                     

 

                2020           DEFFENBASYLVIA VALENCIA DO D           Ot       

       787.99      

                          OTHER GI SYSTEM SYMPTOMS                    

 

             2020           SIN BERRIOS DO           Ot           492.

8           

EMPHYSEMA NEC                                    

 

             2020           SIN BERRIOS DO           Ot           710.

1           

SYSTEMIC SCLEROSIS                               

 

             2020           RAYMUNDO BEDOYA MD           Ot           710.

1           

SYSTEMIC SCLEROSIS                               

 

             2020           RAYMUNDO BEDOYA MD           Ot           786.

05           

SHORTNESS OF BREATH                              

 

                2020           SALVADOR BAIRES MD L           Ot          

    M34.9          

                          SYSTEMIC SCLEROSIS, UNSPECIFIED                    

 

                2020           NAYELI CALLE, DELGADO COPELAND           Ot        

      R07.9        

                          CHEST PAIN, UNSPECIFIED                    

 

                2020           ELIOT EDMONDSON           Ot      

        J43.9      

                          EMPHYSEMA, UNSPECIFIED                    

 

                2020           ELIOT EDMONDSON           Ot      

        R06.00     

                          DYSPNEA, UNSPECIFIED                    

 

                2020           ELIOT EDMONDSON APRN           Ot      

        J43.9      

                          EMPHYSEMA, UNSPECIFIED                    

 

                2020           LANGEVIN DO, RAHUL           Ot           

   I48.91          

                          UNSPECIFIED ATRIAL FIBRILLATION                    

 

             2020           LANGEVIN DO, RAHUL           Ot           R31

.9           

HEMATURIA, UNSPECIFIED                           

 

                2020           ELIOT EDMONDSON           Ot      

        J44.9      

                          CHRONIC OBSTRUCTIVE PULMONARY DISEASE, U              

      

 

                2020           ELIOT EDMONDSON APRN           Ot      

        J43.9      

                          EMPHYSEMA, UNSPECIFIED                    

 

                2020           ELIOT EDMONDSON APRN           Ot      

        R06.00     

                          DYSPNEA, UNSPECIFIED                    

 

                2020           DEQUAN CALLE, SALVADOR KOEHLER           Ot          

    R19.7          

                          DIARRHEA, UNSPECIFIED                    

 

                2020           DEQUAN CALLE, SALVADOR KOEHLER           Ot          

    K52.9          

                          NONINFECTIVE GASTROENTERITIS AND COLITIS              

      

 

                2020           SALVADOR BAIRES MD           Ot          

    L29.8          

                          OTHER PRURITUS                     

 

                2020           SALVADOR BAIRES MD, Ot          

    Z87.2          

                          PERSONAL HISTORY OF DISEASES OF THE SKIN              

      

 

                2020           ELIOT LONG           Ot    

          I48.91   

                          UNSPECIFIED ATRIAL FIBRILLATION                    

 

                2020           SALVADOR BAIRES MD           Ot          

    M81.0          

                          AGE-RELATED OSTEOPOROSIS W/O CURRENT PAT              

      

 

                2020           SALVADOR BAIRES MD           Ot          

    M85.88         

                          OT DISRD OF BONE DENSITY AND STRUCTURE,              

      

 

                2020           SALVADOR BAIRES MD           Ot          

    Z12.31         

                          ENCNTR SCREEN MAMMOGRAM FOR MALIGNANT NE              

      

 

                2020           SALVADOR BAIRES MD, Ot          

    Z13.820        

                          ENCOUNTER FOR SCREENING FOR OSTEOPOROSIS              

      

 

                2020           SALVADOR BAIRES MD           Ot          

    Z78.0          

                          ASYMPTOMATIC MENOPAUSAL STATE                    

 

                2020           SALVADOR BAIRES MD, Ot          

    T17.920A       

                          FOOD IN RESP TRACT, PART UNSP CAUSING AS              

      

 

                2020           ELIOT EDMONDSON           Ot      

        I48.0      

                          PAROXYSMAL ATRIAL FIBRILLATION                    

 

                2020           ELIOT EDMONDSON APRN           Ot      

        J30.9      

                          ALLERGIC RHINITIS, UNSPECIFIED                    

 

                2020           DEBOELIOT CATHERINE APRN           Ot      

        J43.8      

                          OTHER EMPHYSEMA                    

 

                2020           ELIOT EDMONDSON APRN           Ot      

        M34.9      

                          SYSTEMIC SCLEROSIS, UNSPECIFIED                    

 

                2020           DEBOELIOT CATHERINE APRN           Ot      

        J44.9      

                          CHRONIC OBSTRUCTIVE PULMONARY DISEASE, U              

      

 

                2020           LEO NESBITT APRN           Ot        

      E05.90       

                          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               

      

 

                2020           DEBOANTONY CATHERINEINE DINORAH APRN           Ot      

        J44.9      

                          CHRONIC OBSTRUCTIVE PULMONARY DISEASE, U              

      

 

                2020           DEBOELIOT CATHERINE APRN           Ot      

        J44.9      

                          CHRONIC OBSTRUCTIVE PULMONARY DISEASE, U              

      

 

                2020           DEBOELIOT CATHERINE APRN           Ot      

        J44.9      

                          CHRONIC OBSTRUCTIVE PULMONARY DISEASE, U              

      

 

                2020           DEBOELIOT CATHERINE APRN           Ot      

        J44.9      

                          CHRONIC OBSTRUCTIVE PULMONARY DISEASE, U              

      

 

                2020           ELIOT EDMONDSON APRN           Ot      

        J44.9      

                          CHRONIC OBSTRUCTIVE PULMONARY DISEASE, U              

      

 

                2020           ELIOT EDMONDSON APRN           Ot      

        J44.9      

                          CHRONIC OBSTRUCTIVE PULMONARY DISEASE, U              

      

 

                2020           ROSLYN ALANIS MD           Ot      

        E05.90     

                          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               

      

 

                2020           ROSLYN ALANIS MD           Ot      

        G62.9      

                          POLYNEUROPATHY, UNSPECIFIED                    

 

                2020           ROSLYN ALANIS MD           Ot      

        I10        

                          ESSENTIAL (PRIMARY) HYPERTENSION                    

 

                2020           ROSLYN ALANIS MD           Ot      

        I48.91     

                          UNSPECIFIED ATRIAL FIBRILLATION                    

 

                2020           ROSLYN ALANIS MD, Ot      

        J44.9      

                          CHRONIC OBSTRUCTIVE PULMONARY DISEASE, U              

      

 

                2020           ROSLYN ALANIS MD           Ot      

        K21.9      

                          GASTRO-ESOPHAGEAL REFLUX DISEASE WITHOUT              

      

 

                2020           ROSLYN ALANIS MD           Ot      

        M06.9      

                          RHEUMATOID ARTHRITIS, UNSPECIFIED                    

 

                2020           ROSLYN ALANIS MD           Ot      

        R91.8      

                          OTHER NONSPECIFIC ABNORMAL FINDING OF KALEY              

      

 

                2020           ROSLYN ALANIS MD           Ot      

        Z77.22     

                          CNTCT W AND EXPSR TO ENVIRON TOBACCO SMO              

      

 

                2020           ROSLYN ALANIS MD, Ot      

        Z79.01     

                          LONG TERM (CURRENT) USE OF ANTICOAGULANT              

      

 

                2020           ROSLYN ALANIS MD           Ot      

        Z79.82     

                          LONG TERM (CURRENT) USE OF ASPIRIN                    

 

                2020           ROSLYN ALANIS MD, Ot      

        Z86.73     

                          PRSNL HX OF TIA (TIA), AND CEREB INFRC W              

      

 

                2020           ROSLYN ALANIS MD, Ot      

        Z87.891    

                          PERSONAL HISTORY OF NICOTINE DEPENDENCE               

     

 

                2020           ROSLYN ALANIS MD           Ot      

        E05.90     

                          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               

      

 

                2020           ROSLYN ALANIS MD, Ot      

        G62.9      

                          POLYNEUROPATHY, UNSPECIFIED                    

 

                2020           ROSLYN ALANIS MD, Ot      

        I10        

                          ESSENTIAL (PRIMARY) HYPERTENSION                    

 

                2020           ROSLYN ALANIS MD           Ot      

        I48.91     

                          UNSPECIFIED ATRIAL FIBRILLATION                    

 

                2020           ROSLYN ALANIS MD           Ot      

        J44.9      

                          CHRONIC OBSTRUCTIVE PULMONARY DISEASE, U              

      

 

                2020           ROSLYN ALANIS MD, Ot      

        K21.9      

                          GASTRO-ESOPHAGEAL REFLUX DISEASE WITHOUT              

      

 

                2020           ROSLYN ALANIS MD           Ot      

        M06.9      

                          RHEUMATOID ARTHRITIS, UNSPECIFIED                    

 

                2020           ROSLYN ALANIS MD           Ot      

        R91.8      

                          OTHER NONSPECIFIC ABNORMAL FINDING OF KALEY              

      

 

                2020           ROSLYN ALANIS MD, Ot      

        Z77.22     

                          CNTCT W AND EXPSR TO ENVIRON TOBACCO SMO              

      

 

                2020           ROSLYN ALANIS MD           Ot      

        Z79.01     

                          LONG TERM (CURRENT) USE OF ANTICOAGULANT              

      

 

                2020           ROSLYN ALANIS MD           Ot      

        Z79.82     

                          LONG TERM (CURRENT) USE OF ASPIRIN                    

 

                2020           ROSLYN ALANIS MD, Ot      

        Z86.73     

                          PRSNL HX OF TIA (TIA), AND CEREB INFRC W              

      

 

                2020           ROSLYN ALANIS MD           Ot      

        Z87.891    

                          PERSONAL HISTORY OF NICOTINE DEPENDENCE               

     

 

                2020           ELIOT EDMONDSON           Ot      

        I48.0      

                          PAROXYSMAL ATRIAL FIBRILLATION                    

 

                2020           ELIOT EDMONDSON           Ot      

        J30.9      

                          ALLERGIC RHINITIS, UNSPECIFIED                    

 

                2020           ELIOT EDMONDSON           Ot      

        J43.8      

                          OTHER EMPHYSEMA                    

 

                2020           ELIOT EDMONDSON           Ot      

        M34.9      

                          SYSTEMIC SCLEROSIS, UNSPECIFIED                    

 

                2020           ELIOT EDMONDSON           Ot      

        J44.9      

                          CHRONIC OBSTRUCTIVE PULMONARY DISEASE, U              

      

 

                2020           ELIOT EDMONDSON           Ot      

        J44.9      

                          CHRONIC OBSTRUCTIVE PULMONARY DISEASE, U              

      

 

                2020           LEO NESBITT APRN           Ot        

      E05.90       

                          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               

      

 

             2020                        Ot           E04.2           NONT

OXIC 

MULTINODULAR GOITER                              

 

                2020           ELIOT EDMONDSON           Ot      

        J44.9      

                          CHRONIC OBSTRUCTIVE PULMONARY DISEASE, U              

      

 

                2020           ROSLYN ALANIS MD           Ot      

        E05.90     

                          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               

      

 

                2020           ROSLYN ALANIS MD, Ot      

        G62.9      

                          POLYNEUROPATHY, UNSPECIFIED                    

 

                2020           ROSLYN ALANIS MD           Ot      

        I10        

                          ESSENTIAL (PRIMARY) HYPERTENSION                    

 

                2020           ROSLYN ALANIS MD           Ot      

        I48.91     

                          UNSPECIFIED ATRIAL FIBRILLATION                    

 

                2020           ROSLYN ALANIS MD, Ot      

        J44.9      

                          CHRONIC OBSTRUCTIVE PULMONARY DISEASE, U              

      

 

                2020           ROSLYN ALANIS MD, Ot      

        K21.9      

                          GASTRO-ESOPHAGEAL REFLUX DISEASE WITHOUT              

      

 

                2020           ROSLYN ALANIS MD           Ot      

        M06.9      

                          RHEUMATOID ARTHRITIS, UNSPECIFIED                    

 

                2020           ROSLYN ALANIS MD           Ot      

        R91.8      

                          OTHER NONSPECIFIC ABNORMAL FINDING OF KALEY              

      

 

                2020           ROSLYN ALANIS MD, Ot      

        Z77.22     

                          CNTCT W AND EXPSR TO ENVIRON TOBACCO SMO              

      

 

                2020           ROSLYN ALANIS MD, Ot      

        Z79.01     

                          LONG TERM (CURRENT) USE OF ANTICOAGULANT              

      

 

                2020           ROSLYN ALANIS MD, Ot      

        Z79.82     

                          LONG TERM (CURRENT) USE OF ASPIRIN                    

 

                2020           ROSLYN ALANIS MD, Ot      

        Z86.73     

                          PRSNL HX OF TIA (TIA), AND CEREB INFRC W              

      

 

                2020           ROSLYN ALANIS MD           Ot      

        Z87.891    

                          PERSONAL HISTORY OF NICOTINE DEPENDENCE               

     

 

                2020           ELIOT EDMONDSON APRN           Ot      

        J44.9      

                          CHRONIC OBSTRUCTIVE PULMONARY DISEASE, U              

      

 

                2020           ELIOT EDMONDSON APRN           Ot      

        J44.9      

                          CHRONIC OBSTRUCTIVE PULMONARY DISEASE, U              

      

 

                2020           ELIOT EDMONDSON APRN           Ot      

        J44.9      

                          CHRONIC OBSTRUCTIVE PULMONARY DISEASE, U              

      

 

                2020           ELIOT EDMONDSON APRN           Ot      

        J44.9      

                          CHRONIC OBSTRUCTIVE PULMONARY DISEASE, U              

      

 

                2020           ELIOT EDMONDSON APRN           Ot      

        J44.9      

                          CHRONIC OBSTRUCTIVE PULMONARY DISEASE, U              

      

 

                2020           DEBOELIOT CATHERINE APRN           Ot      

        J44.9      

                          CHRONIC OBSTRUCTIVE PULMONARY DISEASE, U              

      

 

                2020           ELIOT EDMONDSON APRN           Ot      

        J44.9      

                          CHRONIC OBSTRUCTIVE PULMONARY DISEASE, U              

      

 

             2020                        Ot           E04.2           NONT

OXIC 

MULTINODULAR GOITER                              

 

                03/10/2020           ELIOT EDMONDSON APRN           Ot      

        J44.9      

                          CHRONIC OBSTRUCTIVE PULMONARY DISEASE, U              

      

 

                2020           ELIOT EDMONDSON APRN           Ot      

        J44.9      

                          CHRONIC OBSTRUCTIVE PULMONARY DISEASE, U              

      

 

                2020           ELIOT EDMONDSON APRN           Ot      

        J44.9      

                          CHRONIC OBSTRUCTIVE PULMONARY DISEASE, U              

      

 

                2020           ELIOT EDMONDSON APRN           Ot      

        J44.9      

                          CHRONIC OBSTRUCTIVE PULMONARY DISEASE, U              

      

 

             2020           TUCKER, ALEJANDRA ARNP           Ot           E87.1   

        HYPO-

OSMOLALITY AND HYPONATREMIA                      

 

             2020           TUCKER ALEJANDRA ARNP           Ot           G62.9   

        

POLYNEUROPATHY, UNSPECIFIED                      

 

             2020           TUCKER, ALEJANDRA ARNP           Ot           I10     

      

ESSENTIAL (PRIMARY) HYPERTENSION                    

 

             2020           TUCKER, ALEJANDRA ARNP           Ot           I48.91  

         

UNSPECIFIED ATRIAL FIBRILLATION                    

 

             2020           TUCKER ALEJANDRA ARNP           Ot           R53.1   

        

WEAKNESS                                         

 

             2020           ALEJANDRA CEBALLOSP           Ot           R55     

      SYNCOPE

AND COLLAPSE                                     

 

             2020           ALEJANDRA CEBALLOS ARNP           Ot           Z79.01  

         LONG

TERM (CURRENT) USE OF ANTICOAGULANT                    

 

             2020           TUCKER, ALEJANDRA ARNP           Ot           Z79.82  

         LONG

TERM (CURRENT) USE OF ASPIRIN                    

 

             2020           TUCKER, ALEJANDRA ARNP           Ot           Z86.73  

         

PRSNL HX OF TIA (TIA), AND CEREB INFRC W                    

 

             2020           TUCKER, ALEJANDRA ARNP           Ot           Z87.891 

          

PERSONAL HISTORY OF NICOTINE DEPENDENCE                    

 

                2020           ELIOT EDMONDSON           Ot      

        J43.9      

                          EMPHYSEMA, UNSPECIFIED                    

 

                2020           ELIOT EDMONDSON           Ot      

        R06.00     

                          DYSPNEA, UNSPECIFIED                    

 

             2020                        Ot           J44.9           

SANJUANA 

OBSTRUCTIVE PULMONARY DISEASE, U                    

 

                2020           GARRET BROUSSARD MD, Ot        

      G62.9        

                          POLYNEUROPATHY, UNSPECIFIED                    

 

                2020           GARRET BROUSSARD MD           Ot        

      I10          

                          ESSENTIAL (PRIMARY) HYPERTENSION                    

 

                2020           GARRET BROUSSARD MD           Ot        

      I48.91       

                          UNSPECIFIED ATRIAL FIBRILLATION                    

 

                2020           GARRET BROUSSARD MD           Ot        

      I73.9        

                          PERIPHERAL VASCULAR DISEASE, UNSPECIFIED              

      

 

                2020           GARRET BROSUSARD MD, Ot        

      K21.9        

                          GASTRO-ESOPHAGEAL REFLUX DISEASE WITHOUT              

      

 

                2020           GARRET BROUSSARD MD           Ot        

      R10.13       

                          EPIGASTRIC PAIN                    

 

                2020           GARRET BROUSSARD MD           Ot        

      R53.1        

                          WEAKNESS                           

 

                2020           GARRET BROUSSARD MD, Ot        

      Z79.01       

                          LONG TERM (CURRENT) USE OF ANTICOAGULANT              

      

 

                2020           GARRET BROUSSARD MD           Ot        

      Z79.82       

                          LONG TERM (CURRENT) USE OF ASPIRIN                    

 

                2020           GARRET BROUSSARD MD           Ot        

      Z86.73       

                          PRSNL HX OF TIA (TIA), AND CEREB INFRC W              

      

 

                2020           GARRET BROUSSARD MD, Ot        

      Z87.891      

                          PERSONAL HISTORY OF NICOTINE DEPENDENCE               

     

 

                2020           GARRET BROUSSARD MD, Ot        

      Z88.8        

                          ALLERGY STATUS TO OT DRUG/MEDS/BIOL SUB              

      

 

             06/10/2020           TUCKERALEJANDRAP           Ot           E87.1   

        HYPO-

OSMOLALITY AND HYPONATREMIA                      

 

             06/10/2020           TUCKERALEJANDRA COPELAND           Ot           G62.9   

        

POLYNEUROPATHY, UNSPECIFIED                      

 

             06/10/2020           TUCKERALEJANDRA COPELANDP           Ot           I10     

      

ESSENTIAL (PRIMARY) HYPERTENSION                    

 

             06/10/2020           TUCKERALEJANDRA COPELANDP           Ot           I48.91  

         

UNSPECIFIED ATRIAL FIBRILLATION                    

 

             06/10/2020           TUCKERALEJANDRAP           Ot           R53.1   

        

WEAKNESS                                         

 

             06/10/2020           TUCKER, ALEJANDRA PORTILLOP           Ot           R55     

      SYNCOPE

AND COLLAPSE                                     

 

             06/10/2020           ALEJANDRA CEBALLOS           Ot           Z79.01  

         LONG

TERM (CURRENT) USE OF ANTICOAGULANT                    

 

             06/10/2020           TUCKER ALEJANDRA PATEL           Ot           Z79.82  

         LONG

TERM (CURRENT) USE OF ASPIRIN                    

 

             06/10/2020           TUCKER ALEJANDRA PATEL           Ot           Z86.73  

         

PRSNL HX OF TIA (TIA), AND CEREB INFRC W                    

 

             06/10/2020           TUCKER ALEJANDRA PATEL           Ot           Z87.891 

          

PERSONAL HISTORY OF NICOTINE DEPENDENCE                    

 

             2020           KEDAR TAVAREZ MD           Ot           E03.

9           

HYPOTHYROIDISM, UNSPECIFIED                      

 

             2020           KEDAR TAVAREZ MD           Ot           E78.

5           

HYPERLIPIDEMIA, UNSPECIFIED                      

 

             2020           KEDAR TAVAREZ MD           Ot           E86.

1           

HYPOVOLEMIA                                      

 

             2020           KEDAR TAVAREZ MD           Ot           E87.

1           

HYPO-OSMOLALITY AND HYPONATREMIA                    

 

             2020           KEDAR TAVAREZ MD           Ot           G62.

9           

POLYNEUROPATHY, UNSPECIFIED                      

 

             2020           KEDAR TAVAREZ MD           Ot           I10 

          

ESSENTIAL (PRIMARY) HYPERTENSION                    

 

             2020           KEDAR TAVAREZ MD           Ot           I25.

10           

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                     

 

             2020           KEDAR TAVAREZ MD           Ot           I35.

0           

NONRHEUMATIC AORTIC (VALVE) STENOSIS                    

 

             2020           KEDAR TAVAREZ MD           Ot           I48.

0           

PAROXYSMAL ATRIAL FIBRILLATION                    

 

             2020           KEDAR TAVAREZ MD           Ot           I73.

9           

PERIPHERAL VASCULAR DISEASE, UNSPECIFIED                    

 

             2020           KEDAR TAVAREZ MD           Ot           J44.

1           

CHRONIC OBSTRUCTIVE PULMONARY DISEASE W                     

 

             2020           KEDAR TAVAREZ MD           Ot           J90 

          

PLEURAL EFFUSION, NOT ELSEWHERE CLASSIFI                    

 

             2020           KEDAR TAVAREZ MD           Ot           K21.

9           

GASTRO-ESOPHAGEAL REFLUX DISEASE WITHOUT                    

 

             2020           KEDAR TAVAREZ MD           Ot           K59.

00           

CONSTIPATION, UNSPECIFIED                        

 

             2020           KEDAR TAVAREZ MD           Ot           M06.

9           

RHEUMATOID ARTHRITIS, UNSPECIFIED                    

 

             2020           KEDAR TAVAREZ MD           Ot           M34.

9           

SYSTEMIC SCLEROSIS, UNSPECIFIED                    

 

             2020           KEDAR TAVAREZ MD           Ot           R05 

          

COUGH                                            

 

             2020           KEDAR TAVAREZ MD           Ot           R09.

02           

HYPOXEMIA                                        

 

             2020           KEDAR TAVAREZ MD           Ot           R13.

10           

DYSPHAGIA, UNSPECIFIED                           

 

             2020           KEDAR TAVAREZ MD           Ot           R41.

0           

DISORIENTATION, UNSPECIFIED                      

 

             2020           KEDAR TAVAREZ MD           Ot           R53.

1           

WEAKNESS                                         

 

                2020           KEDAR TAVAREZ MD           Ot            

  Z20.828          

                          CONTACT W AND EXPOSURE TO OTH VIRAL COMM              

      

 

             2020           KEDAR TAVAREZ MD           Ot           Z79.

01           

LONG TERM (CURRENT) USE OF ANTICOAGULANT                    

 

             2020           KEDAR TAVAREZ MD           Ot           Z86.

73           

PRSNL HX OF TIA (TIA), AND CEREB INFRC W                    

 

                2020           KEDAR TAVAREZ MD           Ot            

  Z87.891          

                          PERSONAL HISTORY OF NICOTINE DEPENDENCE               

     

 

             2020           TUCKER, ALEJANDRA ARNP           Ot           E87.1   

        HYPO-

OSMOLALITY AND HYPONATREMIA                      

 

             2020           TUCKER, ALEJANDRA ARNP           Ot           G62.9   

        

POLYNEUROPATHY, UNSPECIFIED                      

 

             2020           TUCKER, ALEJANDRA ARNP           Ot           I10     

      

ESSENTIAL (PRIMARY) HYPERTENSION                    

 

             2020           TUCKER, ALEJANDRA ARNP           Ot           I48.91  

         

UNSPECIFIED ATRIAL FIBRILLATION                    

 

             2020           TUCKER, ALEJANDRA ARNP           Ot           R53.1   

        

WEAKNESS                                         

 

             2020           TUCKER, ALEJANDRA ARNP           Ot           R55     

      SYNCOPE

AND COLLAPSE                                     

 

             2020           TUCKER, ALEJANDRA ARNP           Ot           Z79.01  

         LONG

TERM (CURRENT) USE OF ANTICOAGULANT                    

 

             2020           TUCKER, ALEJANDRA ARNP           Ot           Z79.82  

         LONG

TERM (CURRENT) USE OF ASPIRIN                    

 

             2020           TUCKER, ALEJANDRA ARNP           Ot           Z86.73  

         

PRSNL HX OF TIA (TIA), AND CEREB INFRC W                    

 

             2020           TUCKER, ALEJANDRA ARNP           Ot           Z87.891 

          

PERSONAL HISTORY OF NICOTINE DEPENDENCE                    

 

                2020           GARRET BROUSSARD MD           Ot        

      G62.9        

                          POLYNEUROPATHY, UNSPECIFIED                    

 

                2020           GARRET BROUSSARD MD           Ot        

      I10          

                          ESSENTIAL (PRIMARY) HYPERTENSION                    

 

                2020           GARRET BROUSSARD MD           Ot        

      I48.91       

                          UNSPECIFIED ATRIAL FIBRILLATION                    

 

                2020           GARRET BROUSSARD MD           Ot        

      I73.9        

                          PERIPHERAL VASCULAR DISEASE, UNSPECIFIED              

      

 

                2020           GARRET BROUSSARD MD           Ot        

      K21.9        

                          GASTRO-ESOPHAGEAL REFLUX DISEASE WITHOUT              

      

 

                2020           GARRET BROUSSARD MD           Ot        

      R10.13       

                          EPIGASTRIC PAIN                    

 

                2020           GARRET BROUSSARD MD, Ot        

      R53.1        

                          WEAKNESS                           

 

                2020           GARRET BROUSSARD MD, Ot        

      Z79.01       

                          LONG TERM (CURRENT) USE OF ANTICOAGULANT              

      

 

                2020           GARRET BROUSSARD MD           Ot        

      Z79.82       

                          LONG TERM (CURRENT) USE OF ASPIRIN                    

 

                2020           GARRET BROUSSARD MD, Ot        

      Z86.73       

                          PRSNL HX OF TIA (TIA), AND CEREB INFRC W              

      

 

                2020           GARRET BROUSSARD MD, Ot        

      Z87.891      

                          PERSONAL HISTORY OF NICOTINE DEPENDENCE               

     

 

                2020           GARRET BROUSSARD MD, Ot        

      Z88.8        

                          ALLERGY STATUS TO OTH DRUG/MEDS/BIOL SUB              

      

 

             2020                        W            E03.8           Othe

r specified 

hypothyroidism                          Trista Montague        

 

             2020                        W            I10           Essent

ial (primary) 

hypertension                            Trista Montague        

 

             2020                        W            I25.10           CAD

 (coronary 

artery disease)                         Trista Montague        

 

             2020                        W            I50.9           CHF 

(congestive 

heart failure)                          Trista Montague        

 

             2020                        W            J44.9           COPD

 (chronic 

obstructive pulmonary disease)           Trista Montague        

 

             2020                        W            E03.8           Othe

r specified 

hypothyroidism                          Trista Montague        

 

             2020                        W            I10           Essent

ial (primary) 

hypertension                            Trista Montague        

 

             2020                        W            I25.10           CAD

 (coronary 

artery disease)                         Trista Montague        

 

             2020                        W            I50.9           CHF 

(congestive 

heart failure)                          Trista Montague        

 

             2020                        W            J44.9           COPD

 (chronic 

obstructive pulmonary disease)           Trista Montague        

 

             2020                        W            Z09           Hospit

al discharge 

follow-up                               Trista Montague        

 

             2020                        W            Z09           Hospit

al discharge 

follow-up                               Trista Montague        



                                                                                
                                                                                
                                                                                
                                                                                
                                                                                
                                                                                
                                                                                
                                                                                
                                                                                
                                                                                
                                                                                
                                                                                
                                                                                
                                                                                
                                                                                
                                                                                
                                                                                
                                                                                
                                                                



Procedures

      



There is no data.                  



Results

      



                    Test                Result              Range        

 

                                        Thyroid Stimulating Hormone - 17 1

5:35         

 

                    TSH                 0.36 mIU/mL           0.32-5.00        

 

                                        Lipid Panel - 10/24/17 10:33         

 

                    C/HDL               3.5                 3.7-6.7        

 

                    Cholesterol           172 mg/dL           100-240        

 

                    HDL                 49 mg/dL            30-85        

 

                    LDL-Calculated           105 mg/dL           0-100        

 

                    Trig                91 mg/dL                    

 

                    VLDL                18 mg/dL            0-42        

 

                                        Automated blood complete blood count (he

mogram) panel - 17 07:11         

 

                          Blood leukocytes automated count (number/volume)      

     9.9 10*3/uL          

                                        4.3-11.0        

 

                          Blood erythrocytes automated count (number/volume)    

       3.90 10*6/uL       

                                        4.35-5.85        

 

                    Venous blood hemoglobin measurement (mass/volume)           

12.8 g/dL           

11.5-16.0        

 

                    Blood hematocrit (volume fraction)           37 %           

     35-52        

 

                    Automated erythrocyte mean corpuscular volume           96 [

foz_us]           

80-99        

 

                                        Automated erythrocyte mean corpuscular h

emoglobin (mass per erythrocyte)        

                          33 pg                     25-34        

 

                                        Automated erythrocyte mean corpuscular h

emoglobin concentration measurement 

(mass/volume)             34 g/dL                   32-36        

 

                    Automated erythrocyte distribution width ratio           12.

0 %              10.0-

14.5        

 

                    Automated blood platelet count (count/volume)           314 

10*3/uL           

130-400        

 

                          Automated blood platelet mean volume measurement      

     9.1 [foz_us]         

                                        7.4-10.4        

 

                                        Comprehensive metabolic panel - 17

 07:11         

 

                          Serum or plasma sodium measurement (moles/volume)     

      136 mmol/L          

                                        135-145        

 

                          Serum or plasma potassium measurement (moles/volume)  

         4.1 mmol/L       

                                        3.6-5.0        

 

                          Serum or plasma chloride measurement (moles/volume)   

        101 mmol/L        

                                                

 

                    Carbon dioxide           27 mmol/L           21-32        

 

                          Serum or plasma anion gap determination (moles/volume)

           8 mmol/L       

                                        5-14        

 

                          Serum or plasma urea nitrogen measurement (mass/volume

)           14 mg/dL      

                                        7-18        

 

                          Serum or plasma creatinine measurement (mass/volume)  

         0.72 mg/dL       

                                        0.60-1.30        

 

                    Serum or plasma urea nitrogen/creatinine mass ratio         

  19                  NRG 

       

 

                                        Serum or plasma creatinine measurement w

ith calculation of estimated glomerular 

filtration rate           >                         NRG        

 

                    Serum or plasma glucose measurement (mass/volume)           

86 mg/dL            

        

 

                    Serum or plasma calcium measurement (mass/volume)           

9.1 mg/dL           

8.5-10.1        

 

                          Serum or plasma total bilirubin measurement (mass/volu

me)           0.8 mg/dL   

                                        0.1-1.0        

 

                                        Serum or plasma alkaline phosphatase freya

surement (enzymatic activity/volume)    

                          57 U/L                            

 

                                        Serum or plasma aspartate aminotransfera

se measurement (enzymatic 

activity/volume)           17 U/L                    5-34        

 

                                        Serum or plasma alanine aminotransferase

 measurement (enzymatic activity/volume)

                          10 U/L                    0-55        

 

                    Serum or plasma protein measurement (mass/volume)           

6.6 g/dL            

6.4-8.2        

 

                    Serum or plasma albumin measurement (mass/volume)           

3.8 g/dL            

3.2-4.5        

 

                                        Liver function panel (serum or plasma al

k phos, alb, total and direct bili, 

total protein, ALT, AST) - 17 07:11         

 

                    Bilirubin direct           0.3 mg/dL           0.0-0.3      

  

 

                          Serum or plasma indirect bilirubin measurement (mass/v

olume)           0.5 mg/dL

                                        NRG        

 

                                        Magnesium - 17 07:11         

 

                    Magnesium           2.1 mg/dL           1.8-2.4        

 

                                        Lipid 1996 panel - 17 07:11       

  

 

                          Serum or plasma triglyceride measurement (mass/volume)

           79 mg/dL       

                                        <150        

 

                          Serum or plasma cholesterol measurement (mass/volume) 

          182 mg/dL       

                                        < 200        

 

                          Serum or plasma cholesterol in HDL measurement (mass/v

olume)           64 mg/dL 

                                        40-60        

 

                          Cholesterol in LDL [mass/volume] in serum or plasma by

 direct assay           98

 mg/dL                                  1-129        

 

                          Serum or plasma cholesterol in VLDL measurement (mass/

volume)           16 mg/dL

                                        5-40        

 

                                        Serum or plasma thyroxine (T4) free dima

urement (mass/volume) - 17 07:11  

       

 

                          Serum or plasma thyroxine (T4) free measurement (mass/

volume)           1.05 

ng/dL                                   0.70-1.48        

 

                                        Serum or plasma thyrotropin measurement 

by detection limit <=0.05 miu/l 

(units/volume) - 17 07:11         

 

                                        Serum or plasma thyrotropin measurement 

by detection limit <=0.05 miu/l 

(units/volume)            0.19 u[iU]/mL             0.35-4.94        

 

                                        Thyroid Stimulating Hormone - 18 1

4:30         

 

                    TSH                 0.22 mIU/mL           0.32-5.00        

 

                                        Stool leukocytes detection by light micr

oscopy - 18 08:30         

 

                    FECAL WBC RESULTS           FEW WBC'S OBSERVED ON DIRECT SME

AR            NRG   

     

 

                          FECAL NOTE                FECAL LEUKOCYTES MAY BE INTE

RMITTENTLY PRESENT OR          

                                        NRG        

 

                          FECAL NOTE                UNEVENLY DISTRIBUTED IN STOO

L SPECIMENS, AND WBC           

                                        NRG        

 

                    FECAL NOTE           MORPHOLOGY DEGRADES DURING TRANSPORT   

         NRG        

 

                    FECAL NOTE           NOTE:               NRG        

 

                                        C DIFFICILE AG + TOXIN A/B. - 18 0

8:30         

 

                    RESULTS             NEGATIVE FOR ANTIGEN AND TOXIN A/B      

      NRG        

 

                                        Stool bacteria identification by culture

 - 18 08:30         

 

                    Stool bacteria identification by culture           N2       

           NRG        

 

                                        Free T3 - 18 10:00         

 

                    Free T3             2.40 pg/ml           1.45-3.48        

 

                                        EKG - 18 12:51         

 

                    EKG                 Complete                     

 

                                        Complete blood count (CBC) with automate

d white blood cell (WBC) differential - 

18 12:16         

 

                          Blood leukocytes automated count (number/volume)      

     8.4 10*3/uL          

                                        4.3-11.0        

 

                          Blood erythrocytes automated count (number/volume)    

       3.61 10*6/uL       

                                        4.35-5.85        

 

                    Venous blood hemoglobin measurement (mass/volume)           

12.1 g/dL           

11.5-16.0        

 

                    Blood hematocrit (volume fraction)           35 %           

     35-52        

 

                    Automated erythrocyte mean corpuscular volume           96 [

foz_us]           

80-99        

 

                                        Automated erythrocyte mean corpuscular h

emoglobin (mass per erythrocyte)        

                          34 pg                     25-34        

 

                                        Automated erythrocyte mean corpuscular h

emoglobin concentration measurement 

(mass/volume)             35 g/dL                   32-36        

 

                    Automated erythrocyte distribution width ratio           13.

2 %              10.0-

14.5        

 

                    Automated blood platelet count (count/volume)           332 

10*3/uL           

130-400        

 

                          Automated blood platelet mean volume measurement      

     9.0 [foz_us]         

                                        7.4-10.4        

 

                    Automated blood neutrophils/100 leukocytes           67 %   

             42-75       

 

 

                    Automated blood lymphocytes/100 leukocytes           21 %   

             12-44       

 

 

                    Blood monocytes/100 leukocytes           10 %               

 0-12        

 

                    Automated blood eosinophils/100 leukocytes           2 %    

             0-10        

 

                    Automated blood basophils/100 leukocytes           1 %      

           0-10        

 

                    Blood neutrophils automated count (number/volume)           

5.6 10*3            

1.8-7.8        

 

                    Blood lymphocytes automated count (number/volume)           

1.7 10*3            

1.0-4.0        

 

                    Blood monocytes automated count (number/volume)           0.

8 10*3            

0.0-1.0        

 

                    Automated eosinophil count           0.2 10*3/uL           0

.0-0.3        

 

                    Automated blood basophil count (count/volume)           0.1 

10*3/uL           

0.0-0.1        

 

                                        PT panel in platelet poor plasma by coag

ulation assay - 18 12:16         

 

                          Prothrombin time (PT) in platelet poor plasma by coagu

lation assay           

34.0 s                                  12.2-14.7        

 

                          INR in platelet poor plasma or blood by coagulation as

say           3.3         

                                        0.8-1.4        

 

                                        Activated partial thromboplastin time (a

PTT) in platelet poor plasma 

bycoagulation assay - 18 12:16         

 

                                        Activated partial thromboplastin time (a

PTT) in platelet poor plasma 

bycoagulation assay           72 s                      24-35        

 

                                        Comprehensive metabolic panel - 18

 12:16         

 

                          Serum or plasma sodium measurement (moles/volume)     

      130 mmol/L          

                                        135-145        

 

                          Serum or plasma potassium measurement (moles/volume)  

         4.0 mmol/L       

                                        3.6-5.0        

 

                          Serum or plasma chloride measurement (moles/volume)   

        97 mmol/L         

                                                

 

                    Carbon dioxide           22 mmol/L           21-32        

 

                          Serum or plasma anion gap determination (moles/volume)

           11 mmol/L      

                                        5-14        

 

                          Serum or plasma urea nitrogen measurement (mass/volume

)           11 mg/dL      

                                        7-18        

 

                          Serum or plasma creatinine measurement (mass/volume)  

         0.79 mg/dL       

                                        0.60-1.30        

 

                    Serum or plasma urea nitrogen/creatinine mass ratio         

  14                  NRG 

       

 

                                        Serum or plasma creatinine measurement w

ith calculation of estimated glomerular 

filtration rate           >                         NRG        

 

                    Serum or plasma glucose measurement (mass/volume)           

115 mg/dL           

        

 

                    Serum or plasma calcium measurement (mass/volume)           

9.4 mg/dL           

8.5-10.1        

 

                          Serum or plasma total bilirubin measurement (mass/volu

me)           0.6 mg/dL   

                                        0.1-1.0        

 

                                        Serum or plasma alkaline phosphatase freya

surement (enzymatic activity/volume)    

                          64 U/L                            

 

                                        Serum or plasma aspartate aminotransfera

se measurement (enzymatic 

activity/volume)           23 U/L                    5-34        

 

                                        Serum or plasma alanine aminotransferase

 measurement (enzymatic activity/volume)

                          9 U/L                     0-55        

 

                    Serum or plasma protein measurement (mass/volume)           

6.9 g/dL            

6.4-8.2        

 

                    Serum or plasma albumin measurement (mass/volume)           

4.2 g/dL            

3.2-4.5        

 

                    CALCIUM CORRECTED           9.2 mg/dL           8.5-10.1    

    

 

                                        Magnesium - 18 12:16         

 

                    Magnesium           2.0 mg/dL           1.8-2.4        

 

                                        Serum or plasma troponin i.cardiac measu

rement (mass/volume) - 18 12:16   

      

 

                          Serum or plasma troponin i.cardiac measurement (mass/v

olume)           < ng/mL  

                                        <0.30        

 

                                        Myoglobin, serum - 18 12:16       

  

 

                    Myoglobin, serum           32.4 ng/mL           10.0-92.0   

     

 

                                        Serum or plasma thyroxine (T4) free dima

urement (mass/volume) - 18 12:16  

       

 

                          Serum or plasma thyroxine (T4) free measurement (mass/

volume)           1.12 

ng/dL                                   0.70-1.48        

 

                                        Serum or plasma thyrotropin measurement 

by detection limit <=0.05 miu/l 

(units/volume) - 18 12:16         

 

                                        Serum or plasma thyrotropin measurement 

by detection limit <=0.05 miu/l 

(units/volume)            0.33 u[iU]/mL             0.35-4.94        

 

                                        Serum or plasma troponin i.cardiac measu

rement (mass/volume) - 18 20:15   

      

 

                          Serum or plasma troponin i.cardiac measurement (mass/v

olume)           < ng/mL  

                                        <0.30        

 

                                        Complete blood count (CBC) with automate

d white blood cell (WBC) differential - 

18 03:00         

 

                          Blood leukocytes automated count (number/volume)      

     5.5 10*3/uL          

                                        4.3-11.0        

 

                          Blood erythrocytes automated count (number/volume)    

       3.32 10*6/uL       

                                        4.35-5.85        

 

                    Venous blood hemoglobin measurement (mass/volume)           

11.0 g/dL           

11.5-16.0        

 

                    Blood hematocrit (volume fraction)           33 %           

     35-52        

 

                    Automated erythrocyte mean corpuscular volume           98 [

foz_us]           

80-99        

 

                                        Automated erythrocyte mean corpuscular h

emoglobin (mass per erythrocyte)        

                          33 pg                     25-34        

 

                                        Automated erythrocyte mean corpuscular h

emoglobin concentration measurement 

(mass/volume)             34 g/dL                   32-36        

 

                    Automated erythrocyte distribution width ratio           13.

2 %              10.0-

14.5        

 

                    Automated blood platelet count (count/volume)           235 

10*3/uL           

130-400        

 

                          Automated blood platelet mean volume measurement      

     9.6 [foz_us]         

                                        7.4-10.4        

 

                    Automated blood neutrophils/100 leukocytes           61 %   

             42-75       

 

 

                    Automated blood lymphocytes/100 leukocytes           26 %   

             12-44       

 

 

                    Blood monocytes/100 leukocytes           10 %               

 0-12        

 

                    Automated blood eosinophils/100 leukocytes           3 %    

             0-10        

 

                    Automated blood basophils/100 leukocytes           1 %      

           0-10        

 

                    Blood neutrophils automated count (number/volume)           

3.3 10*3            

1.8-7.8        

 

                    Blood lymphocytes automated count (number/volume)           

1.4 10*3            

1.0-4.0        

 

                    Blood monocytes automated count (number/volume)           0.

6 10*3            

0.0-1.0        

 

                    Automated eosinophil count           0.2 10*3/uL           0

.0-0.3        

 

                    Automated blood basophil count (count/volume)           0.0 

10*3/uL           

0.0-0.1        

 

                                        Whole blood basic metabolic panel -  03:00         

 

                          Serum or plasma sodium measurement (moles/volume)     

      137 mmol/L          

                                        135-145        

 

                          Serum or plasma potassium measurement (moles/volume)  

         3.9 mmol/L       

                                        3.6-5.0        

 

                          Serum or plasma chloride measurement (moles/volume)   

        108 mmol/L        

                                                

 

                    Carbon dioxide           20 mmol/L           21-32        

 

                          Serum or plasma anion gap determination (moles/volume)

           9 mmol/L       

                                        5-14        

 

                          Serum or plasma urea nitrogen measurement (mass/volume

)           7 mg/dL       

                                        7-18        

 

                          Serum or plasma creatinine measurement (mass/volume)  

         0.60 mg/dL       

                                        0.60-1.30        

 

                    Serum or plasma urea nitrogen/creatinine mass ratio         

  12                  NRG 

       

 

                                        Serum or plasma creatinine measurement w

ith calculation of estimated glomerular 

filtration rate           >                         NRG        

 

                    Serum or plasma glucose measurement (mass/volume)           

85 mg/dL            

        

 

                    Serum or plasma calcium measurement (mass/volume)           

8.8 mg/dL           

8.5-10.1        

 

                                        Lipid 1996 panel - 18 03:00       

  

 

                          Serum or plasma triglyceride measurement (mass/volume)

           74 mg/dL       

                                        <150        

 

                          Serum or plasma cholesterol measurement (mass/volume) 

          150 mg/dL       

                                        < 200        

 

                          Serum or plasma cholesterol in HDL measurement (mass/v

olume)           49 mg/dL 

                                        40-60        

 

                          Cholesterol in LDL [mass/volume] in serum or plasma by

 direct assay           92

 mg/dL                                  1-129        

 

                          Serum or plasma cholesterol in VLDL measurement (mass/

volume)           15 mg/dL

                                        5-40        

 

                                        Total T3 - 19 10:50         

 

                    Total T3            0.76 ng/mL           0.58-1.59        

 

                                        Complete blood count (CBC) with automate

d white blood cell (WBC) differential - 

19 18:12         

 

                          Blood leukocytes automated count (number/volume)      

     7.9 10*3/uL          

                                        4.3-11.0        

 

                          Blood erythrocytes automated count (number/volume)    

       3.90 10*6/uL       

                                        4.35-5.85        

 

                    Venous blood hemoglobin measurement (mass/volume)           

12.7 g/dL           

11.5-16.0        

 

                    Blood hematocrit (volume fraction)           38 %           

     35-52        

 

                    Automated erythrocyte mean corpuscular volume           96 [

foz_us]           

80-99        

 

                                        Automated erythrocyte mean corpuscular h

emoglobin (mass per erythrocyte)        

                          33 pg                     25-34        

 

                                        Automated erythrocyte mean corpuscular h

emoglobin concentration measurement 

(mass/volume)             34 g/dL                   32-36        

 

                    Automated erythrocyte distribution width ratio           12.

1 %              10.0-

14.5        

 

                    Automated blood platelet count (count/volume)           299 

10*3/uL           

130-400        

 

                          Automated blood platelet mean volume measurement      

     10.0 [foz_us]        

                                        7.4-10.4        

 

                    Automated blood neutrophils/100 leukocytes           60 %   

             42-75       

 

 

                    Automated blood lymphocytes/100 leukocytes           27 %   

             12-44       

 

 

                    Blood monocytes/100 leukocytes           8 %                

 0-12        

 

                    Automated blood eosinophils/100 leukocytes           5 %    

             0-10        

 

                    Automated blood basophils/100 leukocytes           1 %      

           0-10        

 

                    Blood neutrophils automated count (number/volume)           

4.7 10*3            

1.8-7.8        

 

                    Blood lymphocytes automated count (number/volume)           

2.1 10*3            

1.0-4.0        

 

                    Blood monocytes automated count (number/volume)           0.

6 10*3            

0.0-1.0        

 

                    Automated eosinophil count           0.4 10*3/uL           0

.0-0.3        

 

                    Automated blood basophil count (count/volume)           0.1 

10*3/uL           

0.0-0.1        

 

                                        PT panel in platelet poor plasma by coag

ulation assay - 19 18:12         

 

                          Prothrombin time (PT) in platelet poor plasma by coagu

lation assay           

30.2 s                                  12.2-14.7        

 

                          INR in platelet poor plasma or blood by coagulation as

say           2.7         

                                        0.8-1.4        

 

                                        Activated partial thromboplastin time (a

PTT) in platelet poor plasma 

bycoagulation assay - 19 18:12         

 

                                        Activated partial thromboplastin time (a

PTT) in platelet poor plasma 

bycoagulation assay           50 s                      24-35        

 

                                        Comprehensive metabolic panel - 19

 18:12         

 

                          Serum or plasma sodium measurement (moles/volume)     

      137 mmol/L          

                                        135-145        

 

                          Serum or plasma potassium measurement (moles/volume)  

         4.6 mmol/L       

                                        3.6-5.0        

 

                          Serum or plasma chloride measurement (moles/volume)   

        103 mmol/L        

                                                

 

                    Carbon dioxide           24 mmol/L           21-32        

 

                          Serum or plasma anion gap determination (moles/volume)

           10 mmol/L      

                                        5-14        

 

                          Serum or plasma urea nitrogen measurement (mass/volume

)           12 mg/dL      

                                        7-18        

 

                          Serum or plasma creatinine measurement (mass/volume)  

         0.75 mg/dL       

                                        0.60-1.30        

 

                    Serum or plasma urea nitrogen/creatinine mass ratio         

  16                  NRG 

       

 

                                        Serum or plasma creatinine measurement w

ith calculation of estimated glomerular 

filtration rate           >                         NRG        

 

                    Serum or plasma glucose measurement (mass/volume)           

118 mg/dL           

        

 

                    Serum or plasma calcium measurement (mass/volume)           

9.2 mg/dL           

8.5-10.1        

 

                          Serum or plasma total bilirubin measurement (mass/volu

me)           0.4 mg/dL   

                                        0.1-1.0        

 

                                        Serum or plasma alkaline phosphatase freya

surement (enzymatic activity/volume)    

                          90 U/L                            

 

                                        Serum or plasma aspartate aminotransfera

se measurement (enzymatic 

activity/volume)           24 U/L                    5-34        

 

                                        Serum or plasma alanine aminotransferase

 measurement (enzymatic activity/volume)

                          < U/L                     0-55        

 

                    Serum or plasma protein measurement (mass/volume)           

7.3 g/dL            

6.4-8.2        

 

                    Serum or plasma albumin measurement (mass/volume)           

4.0 g/dL            

3.2-4.5        

 

                    CALCIUM CORRECTED           9.2 mg/dL           8.5-10.1    

    

 

                                        Magnesium - 19 18:12         

 

                    Magnesium           1.5 mg/dL           1.6-2.4        

 

                                        Serum or plasma troponin i.cardiac measu

rement (mass/volume) - 19 18:12   

      

 

                          Serum or plasma troponin i.cardiac measurement (mass/v

olume)           < ng/mL  

                                        <0.028        

 

                                        Myoglobin, serum - 19 18:12       

  

 

                    Myoglobin, serum           59.7 ng/mL           10.0-92.0   

     

 

                                        Serum or plasma lithium measurement (mol

es/volume) - 19 18:12         

 

                    BNP PT              385.6 pg/mL           <100.0        

 

                                        Automated blood complete blood count (he

mogram) panel - 19 12:15         

 

                          Blood leukocytes automated count (number/volume)      

     6.7 10*3/uL          

                                        4.3-11.0        

 

                          Blood erythrocytes automated count (number/volume)    

       4.01 10*6/uL       

                                        4.35-5.85        

 

                    Venous blood hemoglobin measurement (mass/volume)           

12.8 g/dL           

11.5-16.0        

 

                    Blood hematocrit (volume fraction)           39 %           

     35-52        

 

                    Automated erythrocyte mean corpuscular volume           96 [

foz_us]           

80-99        

 

                                        Automated erythrocyte mean corpuscular h

emoglobin (mass per erythrocyte)        

                          32 pg                     25-34        

 

                                        Automated erythrocyte mean corpuscular h

emoglobin concentration measurement 

(mass/volume)             33 g/dL                   32-36        

 

                    Automated erythrocyte distribution width ratio           12.

3 %              10.0-

14.5        

 

                    Automated blood platelet count (count/volume)           290 

10*3/uL           

130-400        

 

                          Automated blood platelet mean volume measurement      

     9.2 [foz_us]         

                                        7.4-10.4        

 

                                        PT panel in platelet poor plasma by coag

ulation assay - 19 12:15         

 

                          Prothrombin time (PT) in platelet poor plasma by coagu

lation assay           

21.6 s                                  12.2-14.7        

 

                          INR in platelet poor plasma or blood by coagulation as

say           1.8         

                                        0.8-1.4        

 

                                        Activated partial thromboplastin time (a

PTT) in platelet poor plasma 

bycoagulation assay - 19 12:15         

 

                                        Activated partial thromboplastin time (a

PTT) in platelet poor plasma 

bycoagulation assay           47 s                      24-35        

 

                                        Comprehensive metabolic panel - 19

 12:15         

 

                          Serum or plasma sodium measurement (moles/volume)     

      139 mmol/L          

                                        135-145        

 

                          Serum or plasma potassium measurement (moles/volume)  

         4.2 mmol/L       

                                        3.6-5.0        

 

                          Serum or plasma chloride measurement (moles/volume)   

        104 mmol/L        

                                                

 

                    Carbon dioxide           24 mmol/L           21-32        

 

                          Serum or plasma anion gap determination (moles/volume)

           11 mmol/L      

                                        5-14        

 

                          Serum or plasma urea nitrogen measurement (mass/volume

)           9 mg/dL       

                                        7-18        

 

                          Serum or plasma creatinine measurement (mass/volume)  

         0.72 mg/dL       

                                        0.60-1.30        

 

                    Serum or plasma urea nitrogen/creatinine mass ratio         

  13                  NRG 

       

 

                                        Serum or plasma creatinine measurement w

ith calculation of estimated glomerular 

filtration rate           >                         NRG        

 

                    Serum or plasma glucose measurement (mass/volume)           

81 mg/dL            

        

 

                    Serum or plasma calcium measurement (mass/volume)           

9.4 mg/dL           

8.5-10.1        

 

                          Serum or plasma total bilirubin measurement (mass/volu

me)           0.6 mg/dL   

                                        0.1-1.0        

 

                                        Serum or plasma alkaline phosphatase freya

surement (enzymatic activity/volume)    

                          83 U/L                            

 

                                        Serum or plasma aspartate aminotransfera

se measurement (enzymatic 

activity/volume)           17 U/L                    5-34        

 

                                        Serum or plasma alanine aminotransferase

 measurement (enzymatic activity/volume)

                          < U/L                     0-55        

 

                    Serum or plasma protein measurement (mass/volume)           

7.3 g/dL            

6.4-8.2        

 

                    Serum or plasma albumin measurement (mass/volume)           

4.3 g/dL            

3.2-4.5        

 

                    CALCIUM CORRECTED           9.2 mg/dL           8.5-10.1    

    

 

                                        Lipid 1996 panel - 19 12:15       

  

 

                          Serum or plasma triglyceride measurement (mass/volume)

           76 mg/dL       

                                        <150        

 

                          Serum or plasma cholesterol measurement (mass/volume) 

          191 mg/dL       

                                        < 200        

 

                          Serum or plasma cholesterol in HDL measurement (mass/v

olume)           55 mg/dL 

                                        40-60        

 

                          Cholesterol in LDL [mass/volume] in serum or plasma by

 direct assay           

129 mg/dL                               1-129        

 

                          Serum or plasma cholesterol in VLDL measurement (mass/

volume)           15 mg/dL

                                        5-40        

 

                                        Methicillin resistant Staphylococcus aur

eus (MRSA) screening culture - 19 

12:15         

 

                          Methicillin resistant Staphylococcus aureus (MRSA) scr

eening culture           

NEG                                     NRG        

 

                                        Comprehensive metabolic panel - 20

 08:52         

 

                          Serum or plasma sodium measurement (moles/volume)     

      131 mmol/L          

                                        135-145        

 

                          Serum or plasma potassium measurement (moles/volume)  

         5.2 mmol/L       

                                        3.6-5.0        

 

                          Serum or plasma chloride measurement (moles/volume)   

        99 mmol/L         

                                                

 

                    Carbon dioxide           22 mmol/L           21-32        

 

                          Serum or plasma anion gap determination (moles/volume)

           10 mmol/L      

                                        5-14        

 

                          Serum or plasma urea nitrogen measurement (mass/volume

)           10 mg/dL      

                                        7-18        

 

                          Serum or plasma creatinine measurement (mass/volume)  

         0.74 mg/dL       

                                        0.60-1.30        

 

                    Serum or plasma urea nitrogen/creatinine mass ratio         

  14                  NRG 

       

 

                                        Serum or plasma creatinine measurement w

ith calculation of estimated glomerular 

filtration rate           >                         NRG        

 

                    Serum or plasma glucose measurement (mass/volume)           

105 mg/dL           

        

 

                    Serum or plasma calcium measurement (mass/volume)           

9.3 mg/dL           

8.5-10.1        

 

                          Serum or plasma total bilirubin measurement (mass/volu

me)           0.6 mg/dL   

                                        0.1-1.0        

 

                                        Serum or plasma alkaline phosphatase freya

surement (enzymatic activity/volume)    

                          94 U/L                            

 

                                        Serum or plasma aspartate aminotransfera

se measurement (enzymatic 

activity/volume)           24 U/L                    5-34        

 

                                        Serum or plasma alanine aminotransferase

 measurement (enzymatic activity/volume)

                          15 U/L                    0-55        

 

                    Serum or plasma protein measurement (mass/volume)           

7.1 g/dL            

6.4-8.2        

 

                    Serum or plasma albumin measurement (mass/volume)           

4.0 g/dL            

3.2-4.5        

 

                    CALCIUM CORRECTED           9.3 mg/dL           8.5-10.1    

    

 

                                        Serum or plasma thyroxine (T4) free dima

urement (mass/volume) - 20 08:52  

       

 

                          Serum or plasma thyroxine (T4) free measurement (mass/

volume)           0.99 

ng/dL                                   0.70-1.48        

 

                                        TRIIODOTHYRONINE TOTAL T3 - 20 08:

52         

 

                    TRIIODOTHYRONINE T3 TOTAL           0.73 %              0.60

-1.80        

 

                                        TRIIODOTHYRONINE TOTAL T3 - 20 15:

10         

 

                    TRIIODOTHYRONINE T3 TOTAL           0.65 %              0.60

-1.80        

 

                                        Complete blood count (CBC) with automate

d white blood cell (WBC) differential - 

20 15:14         

 

                          Blood leukocytes automated count (number/volume)      

     11.5 10*3/uL         

                                        4.3-11.0        

 

                          Blood erythrocytes automated count (number/volume)    

       3.60 10*6/uL       

                                        4.35-5.85        

 

                    Venous blood hemoglobin measurement (mass/volume)           

11.3 g/dL           

11.5-16.0        

 

                    Blood hematocrit (volume fraction)           34 %           

     35-52        

 

                    Automated erythrocyte mean corpuscular volume           93 [

foz_us]           

80-99        

 

                                        Automated erythrocyte mean corpuscular h

emoglobin (mass per erythrocyte)        

                          31 pg                     25-34        

 

                                        Automated erythrocyte mean corpuscular h

emoglobin concentration measurement 

(mass/volume)             34 g/dL                   32-36        

 

                    Automated erythrocyte distribution width ratio           12.

3 %              10.0-

14.5        

 

                    Automated blood platelet count (count/volume)           461 

10*3/uL           

130-400        

 

                          Automated blood platelet mean volume measurement      

     8.7 [foz_us]         

                                        7.4-10.4        

 

                    Automated blood neutrophils/100 leukocytes           94 %   

             42-75       

 

 

                    Automated blood lymphocytes/100 leukocytes           3 %    

             12-44        

 

                    Blood monocytes/100 leukocytes           3 %                

 0-12        

 

                    Automated blood eosinophils/100 leukocytes           0 %    

             0-10        

 

                    Automated blood basophils/100 leukocytes           0 %      

           0-10        

 

                    Blood neutrophils automated count (number/volume)           

10.8 10*3           

1.8-7.8        

 

                    Blood lymphocytes automated count (number/volume)           

0.4 10*3            

1.0-4.0        

 

                    Blood monocytes automated count (number/volume)           0.

3 10*3            

0.0-1.0        

 

                    Automated eosinophil count           0.0 10*3/uL           0

.0-0.3        

 

                    Automated blood basophil count (count/volume)           0.0 

10*3/uL           

0.0-0.1        

 

                                        Comprehensive metabolic panel - 20

 15:14         

 

                          Serum or plasma sodium measurement (moles/volume)     

      130 mmol/L          

                                        135-145        

 

                          Serum or plasma potassium measurement (moles/volume)  

         4.7 mmol/L       

                                        3.6-5.0        

 

                          Serum or plasma chloride measurement (moles/volume)   

        96 mmol/L         

                                                

 

                    Carbon dioxide           23 mmol/L           21-32        

 

                          Serum or plasma anion gap determination (moles/volume)

           11 mmol/L      

                                        5-14        

 

                          Serum or plasma urea nitrogen measurement (mass/volume

)           14 mg/dL      

                                        7-18        

 

                          Serum or plasma creatinine measurement (mass/volume)  

         0.74 mg/dL       

                                        0.60-1.30        

 

                    Serum or plasma urea nitrogen/creatinine mass ratio         

  19                  NRG 

       

 

                                        Serum or plasma creatinine measurement w

ith calculation of estimated glomerular 

filtration rate           >                         NRG        

 

                    Serum or plasma glucose measurement (mass/volume)           

138 mg/dL           

        

 

                    Serum or plasma calcium measurement (mass/volume)           

9.3 mg/dL           

8.5-10.1        

 

                          Serum or plasma total bilirubin measurement (mass/volu

me)           0.5 mg/dL   

                                        0.1-1.0        

 

                                        Serum or plasma alkaline phosphatase freya

surement (enzymatic activity/volume)    

                          95 U/L                            

 

                                        Serum or plasma aspartate aminotransfera

se measurement (enzymatic 

activity/volume)           21 U/L                    5-34        

 

                                        Serum or plasma alanine aminotransferase

 measurement (enzymatic activity/volume)

                          12 U/L                    0-55        

 

                    Serum or plasma protein measurement (mass/volume)           

7.2 g/dL            

6.4-8.2        

 

                    Serum or plasma albumin measurement (mass/volume)           

3.9 g/dL            

3.2-4.5        

 

                    CALCIUM CORRECTED           9.4 mg/dL           8.5-10.1    

    

 

                                        Magnesium - 20 15:14         

 

                    Magnesium           1.9 mg/dL           1.6-2.4        

 

                                        Manual absolute plasma cell count -  15:14         

 

                    Blood monocytes/100 leukocytes           3 %                

 NRG        

 

                    Manual blood segmented neutrophils/100 leukocytes           

92 %                NRG  

      

 

                    Blood band neutrophils/100 leukocytes           0 %         

        NRG        

 

                    Manual blood lymphocytes/100 leukocytes           5 %       

          NRG        

 

                    Manual eosinophils/100 leukocytes in nose           0 %     

            NRG        

 

                    Manual blood basophils/100 leukocytes           0 %         

        NRG        

 

                    Blood erythrocyte morphology finding identification         

  NORMAL              

NRG        

 

                                        Serum or plasma troponin i.cardiac measu

rement (mass/volume) - 20 15:14   

      

 

                          Serum or plasma troponin i.cardiac measurement (mass/v

olume)           < ng/mL  

                                        <0.028        

 

                                        THYROID STIMULATING HORMONE - 20 1

5:14         

 

                    THYROID STIMULATING HORMONE           0.03 u[iU]/mL         

  0.35-4.94        

 

                                        Serum or plasma thyroxine (T4) free dima

urement (mass/volume) - 20 15:14  

       

 

                          Serum or plasma thyroxine (T4) free measurement (mass/

volume)           1.36 

ng/dL                                   0.70-1.48        

 

                                        Triiodothyronine (T3) - 20 16:46  

       

 

                    Triiodothyronine (T3)           72 ng/dL              

      

 

                                        Thyroid Stimulating Hormone - 20 1

6:46         

 

                    TSH                 7.39 mIU/mL           0.32-5.00        

 

                                        Total T3 - 20 16:46         

 

                    TRIIODOTHYRONINE (T3)           72 NG/DL              

      

 

                                        Thyroid Stimulating Hormone - 20 0

9:45         

 

                    TSH                 0.47 mIU/mL           0.32-5.00        

 

                                        Free T4 - 20 09:45         

 

                    Free T4             1.57 ng/dL           0.81-1.61        

 

                                        Free T3 - 20 09:45         

 

                    T3, FREE, DIALYSIS, LC/MS-MS           1.86 PG/ML           

         

 

                                        Complete blood count (CBC) with automate

d white blood cell (WBC) differential - 

20 15:15         

 

                          Blood leukocytes automated count (number/volume)      

     7.8 10*3/uL          

                                        4.3-11.0        

 

                          Blood erythrocytes automated count (number/volume)    

       3.59 10*6/uL       

                                        4.35-5.85        

 

                    Venous blood hemoglobin measurement (mass/volume)           

11.8 g/dL           

11.5-16.0        

 

                    Blood hematocrit (volume fraction)           33 %           

     35-52        

 

                    Automated erythrocyte mean corpuscular volume           93 [

foz_us]           

80-99        

 

                                        Automated erythrocyte mean corpuscular h

emoglobin (mass per erythrocyte)        

                          33 pg                     25-34        

 

                                        Automated erythrocyte mean corpuscular h

emoglobin concentration measurement 

(mass/volume)             35 g/dL                   32-36        

 

                    Automated erythrocyte distribution width ratio           12.

8 %              10.0-

14.5        

 

                    Automated blood platelet count (count/volume)           234 

10*3/uL           

130-400        

 

                          Automated blood platelet mean volume measurement      

     9.3 [foz_us]         

                                        7.4-10.4        

 

                    Automated blood neutrophils/100 leukocytes           85 %   

             42-75       

 

 

                    Automated blood lymphocytes/100 leukocytes           6 %    

             12-44        

 

                    Blood monocytes/100 leukocytes           8 %                

 0-12        

 

                    Automated blood eosinophils/100 leukocytes           1 %    

             0-10        

 

                    Automated blood basophils/100 leukocytes           0 %      

           0-10        

 

                    Blood neutrophils automated count (number/volume)           

6.6 10*3            

1.8-7.8        

 

                    Blood lymphocytes automated count (number/volume)           

0.5 10*3            

1.0-4.0        

 

                    Blood monocytes automated count (number/volume)           0.

6 10*3            

0.0-1.0        

 

                    Automated eosinophil count           0.0 10*3/uL           0

.0-0.3        

 

                    Automated blood basophil count (count/volume)           0.0 

10*3/uL           

0.0-0.1        

 

                                        Comprehensive metabolic panel - 20

 15:15         

 

                          Serum or plasma sodium measurement (moles/volume)     

      124 mmol/L          

                                        135-145        

 

                          Serum or plasma potassium measurement (moles/volume)  

         4.3 mmol/L       

                                        3.6-5.0        

 

                          Serum or plasma chloride measurement (moles/volume)   

        92 mmol/L         

                                                

 

                    Carbon dioxide           21 mmol/L           21-32        

 

                          Serum or plasma anion gap determination (moles/volume)

           11 mmol/L      

                                        5-14        

 

                          Serum or plasma urea nitrogen measurement (mass/volume

)           8 mg/dL       

                                        7-18        

 

                          Serum or plasma creatinine measurement (mass/volume)  

         0.66 mg/dL       

                                        0.60-1.30        

 

                    Serum or plasma urea nitrogen/creatinine mass ratio         

  12                  NRG 

       

 

                                        Serum or plasma creatinine measurement w

ith calculation of estimated glomerular 

filtration rate           >                         NRG        

 

                    Serum or plasma glucose measurement (mass/volume)           

100 mg/dL           

        

 

                    Serum or plasma calcium measurement (mass/volume)           

8.6 mg/dL           

8.5-10.1        

 

                          Serum or plasma total bilirubin measurement (mass/volu

me)           0.6 mg/dL   

                                        0.1-1.0        

 

                                        Serum or plasma alkaline phosphatase freya

surement (enzymatic activity/volume)    

                          64 U/L                            

 

                                        Serum or plasma aspartate aminotransfera

se measurement (enzymatic 

activity/volume)           23 U/L                    5-34        

 

                                        Serum or plasma alanine aminotransferase

 measurement (enzymatic activity/volume)

                          8 U/L                     0-55        

 

                    Serum or plasma protein measurement (mass/volume)           

6.5 g/dL            

6.4-8.2        

 

                    Serum or plasma albumin measurement (mass/volume)           

3.9 g/dL            

3.2-4.5        

 

                    CALCIUM CORRECTED           8.7 mg/dL           8.5-10.1    

    

 

                                        Serum or plasma troponin i.cardiac measu

rement (mass/volume) - 20 15:15   

      

 

                          Serum or plasma troponin i.cardiac measurement (mass/v

olume)           < ng/mL  

                                        <0.028        

 

                                        Manual absolute plasma cell count -  15:15         

 

                    Blood monocytes/100 leukocytes           6 %                

 NRG        

 

                    Manual blood segmented neutrophils/100 leukocytes           

87 %                NRG  

      

 

                    Manual blood lymphocytes/100 leukocytes           6 %       

          NRG        

 

                    Manual eosinophils/100 leukocytes in nose           1 %     

            NRG        

 

                    Blood erythrocyte morphology finding identification         

  NORMAL              

NRG        

 

                                        Serum or plasma thyrotropin measurement 

by detection limit <=0.05 miu/l 

(units/volume) - 20 15:15         

 

                                        Serum or plasma thyrotropin measurement 

by detection limit <=0.05 miu/l 

(units/volume)            0.85 u[iU]/mL             0.35-4.94        

 

                                        Complete urinalysis with reflex to cultu

re - 20 15:30         

 

                    Urine color determination           YELLOW              NRG 

       

 

                    Urine clarity determination           CLEAR               NR

G        

 

                    Urine pH measurement by test strip           7.5            

     5-9        

 

                    Specific gravity of urine by test strip           1.010     

          1.016-1.022  

      

 

                          Urine protein assay by test strip, semi-quantitative  

         NEGATIVE         

                                        NEGATIVE        

 

                    Urine glucose detection by automated test strip           NE

GATIVE            

NEGATIVE        

 

                          Erythrocytes detection in urine sediment by light micr

oscopy           NEGATIVE 

                                        NEGATIVE        

 

                    Urine ketones detection by automated test strip           NE

GATIVE            

NEGATIVE        

 

                    Urine nitrite detection by test strip           NEGATIVE    

        NEGATIVE    

    

 

                    Urine total bilirubin detection by test strip           NEGA

TIVE            

NEGATIVE        

 

                          Urine urobilinogen measurement by automated test strip

 (mass/volume)           

1.0 mg/dL                               < = 1.0        

 

                    Urine leukocyte esterase detection by dipstick           NEG

ATIVE            

NEGATIVE        

 

                                        Automated urine sediment erythrocyte cou

nt by microscopy (number/high power 

field)                    NONE                      NRG        

 

                                        Automated urine sediment leukocyte count

 by microscopy (number/high power field)

                           [HPF]                    NRG        

 

                          Bacteria detection in urine sediment by light microsco

py           TRACE        

                                        NRG        

 

                                        Squamous epithelial cells detection in u

rine sediment by light microscopy       

                          2-5                       NRG        

 

                          Crystals detection in urine sediment by light microsco

py           NONE         

                                        NRG        

 

                    Casts detection in urine sediment by light microscopy       

    NONE                

NRG        

 

                          Mucus detection in urine sediment by light microscopy 

          NEGATIVE        

                                        NRG        

 

                    Complete urinalysis with reflex to culture           NO     

             NRG        

 

                                        Complete blood count (CBC) with automate

d white blood cell (WBC) differential - 

20 07:48         

 

                          Blood leukocytes automated count (number/volume)      

     8.0 10*3/uL          

                                        4.3-11.0        

 

                          Blood erythrocytes automated count (number/volume)    

       3.94 10*6/uL       

                                        4.35-5.85        

 

                    Venous blood hemoglobin measurement (mass/volume)           

12.6 g/dL           

11.5-16.0        

 

                    Blood hematocrit (volume fraction)           37 %           

     35-52        

 

                    Automated erythrocyte mean corpuscular volume           93 [

foz_us]           

80-99        

 

                                        Automated erythrocyte mean corpuscular h

emoglobin (mass per erythrocyte)        

                          32 pg                     25-34        

 

                                        Automated erythrocyte mean corpuscular h

emoglobin concentration measurement 

(mass/volume)             35 g/dL                   32-36        

 

                    Automated erythrocyte distribution width ratio           13.

0 %              10.0-

14.5        

 

                    Automated blood platelet count (count/volume)           275 

10*3/uL           

130-400        

 

                          Automated blood platelet mean volume measurement      

     9.2 [foz_us]         

                                        7.4-10.4        

 

                    Automated blood neutrophils/100 leukocytes           85 %   

             42-75       

 

 

                    Automated blood lymphocytes/100 leukocytes           6 %    

             12-44        

 

                    Blood monocytes/100 leukocytes           8 %                

 0-12        

 

                    Automated blood eosinophils/100 leukocytes           0 %    

             0-10        

 

                    Automated blood basophils/100 leukocytes           0 %      

           0-10        

 

                    Blood neutrophils automated count (number/volume)           

6.8 10*3            

1.8-7.8        

 

                    Blood lymphocytes automated count (number/volume)           

0.5 10*3            

1.0-4.0        

 

                    Blood monocytes automated count (number/volume)           0.

6 10*3            

0.0-1.0        

 

                    Automated eosinophil count           0.0 10*3/uL           0

.0-0.3        

 

                    Automated blood basophil count (count/volume)           0.0 

10*3/uL           

0.0-0.1        

 

                                        Comprehensive metabolic panel - 20

 07:48         

 

                          Serum or plasma sodium measurement (moles/volume)     

      126 mmol/L          

                                        135-145        

 

                          Serum or plasma potassium measurement (moles/volume)  

         4.3 mmol/L       

                                        3.6-5.0        

 

                          Serum or plasma chloride measurement (moles/volume)   

        95 mmol/L         

                                                

 

                    Carbon dioxide           23 mmol/L           21-32        

 

                          Serum or plasma anion gap determination (moles/volume)

           8 mmol/L       

                                        5-14        

 

                          Serum or plasma urea nitrogen measurement (mass/volume

)           9 mg/dL       

                                        7-18        

 

                          Serum or plasma creatinine measurement (mass/volume)  

         0.67 mg/dL       

                                        0.60-1.30        

 

                    Serum or plasma urea nitrogen/creatinine mass ratio         

  13                  NRG 

       

 

                                        Serum or plasma creatinine measurement w

ith calculation of estimated glomerular 

filtration rate           >                         NRG        

 

                    Serum or plasma glucose measurement (mass/volume)           

116 mg/dL           

        

 

                    Serum or plasma calcium measurement (mass/volume)           

8.9 mg/dL           

8.5-10.1        

 

                          Serum or plasma total bilirubin measurement (mass/volu

me)           0.5 mg/dL   

                                        0.1-1.0        

 

                                        Serum or plasma alkaline phosphatase freya

surement (enzymatic activity/volume)    

                          64 U/L                            

 

                                        Serum or plasma aspartate aminotransfera

se measurement (enzymatic 

activity/volume)           21 U/L                    5-34        

 

                                        Serum or plasma alanine aminotransferase

 measurement (enzymatic activity/volume)

                          9 U/L                     0-55        

 

                    Serum or plasma protein measurement (mass/volume)           

6.9 g/dL            

6.4-8.2        

 

                    Serum or plasma albumin measurement (mass/volume)           

4.1 g/dL            

3.2-4.5        

 

                    CALCIUM CORRECTED           8.8 mg/dL           8.5-10.1    

    

 

                                        Serum or plasma C reactive protein measu

rement (mass/volume) - 20 07:48   

      

 

                          Serum or plasma C reactive protein measurement (mass/v

olume)           0.16 

mg/dL                                   0.00-0.50        

 

                                        Serum or plasma salicylates measurement 

(mass/volume) - 20 07:48         

 

                          Serum or plasma salicylates measurement (mass/volume) 

          < mg/dL         

                                        5.0-20.0        

 

                                        Serum or plasma acetaminophen measuremen

t (mass/volume) - 20 07:48        

 

 

                          Serum or plasma acetaminophen measurement (mass/volume

)           19 ug/mL      

                                        10-30        

 

                                        Manual absolute plasma cell count -  07:48         

 

                    Blood monocytes/100 leukocytes           6 %                

 NRG        

 

                    Manual blood segmented neutrophils/100 leukocytes           

85 %                NRG  

      

 

                    Manual blood lymphocytes/100 leukocytes           6 %       

          NRG        

 

                    Manual eosinophils/100 leukocytes in nose           3 %     

            NRG        

 

                    Manual blood basophils/100 leukocytes           0 %         

        NRG        

 

                    Blood erythrocyte morphology finding identification         

  NORMAL              

NRG        

 

                                        Urine creatinine measurement (mass/volum

e) - 06/10/20 00:25         

 

                    Urine creatinine measurement (mass/volume)           24 mg/d

L              

      

 

                                        Urine osmolality - 06/10/20 00:25       

  

 

                    Urine osmolality           191 %               250-1200     

   

 

                                        SODIUM URINE RANDOM - 06/10/20 00:25    

     

 

                    [id number]             26 %                NRG        

 

                                        Complete blood count (CBC) with automate

d white blood cell (WBC) differential - 

06/10/20 05:36         

 

                          Blood leukocytes automated count (number/volume)      

     10.7 10*3/uL         

                                        4.3-11.0        

 

                          Blood erythrocytes automated count (number/volume)    

       3.77 10*6/uL       

                                        4.35-5.85        

 

                    Venous blood hemoglobin measurement (mass/volume)           

12.2 g/dL           

11.5-16.0        

 

                    Blood hematocrit (volume fraction)           34 %           

     35-52        

 

                    Automated erythrocyte mean corpuscular volume           91 [

foz_us]           

80-99        

 

                                        Automated erythrocyte mean corpuscular h

emoglobin (mass per erythrocyte)        

                          32 pg                     25-34        

 

                                        Automated erythrocyte mean corpuscular h

emoglobin concentration measurement 

(mass/volume)             36 g/dL                   32-36        

 

                    Automated erythrocyte distribution width ratio           12.

7 %              10.0-

14.5        

 

                    Automated blood platelet count (count/volume)           265 

10*3/uL           

130-400        

 

                          Automated blood platelet mean volume measurement      

     9.2 [foz_us]         

                                        7.4-10.4        

 

                    Automated blood neutrophils/100 leukocytes           89 %   

             42-75       

 

 

                    Automated blood lymphocytes/100 leukocytes           3 %    

             12-44        

 

                    Blood monocytes/100 leukocytes           8 %                

 0-12        

 

                    Automated blood eosinophils/100 leukocytes           0 %    

             0-10        

 

                    Automated blood basophils/100 leukocytes           0 %      

           0-10        

 

                    Blood neutrophils automated count (number/volume)           

9.5 10*3            

1.8-7.8        

 

                    Blood lymphocytes automated count (number/volume)           

0.4 10*3            

1.0-4.0        

 

                    Blood monocytes automated count (number/volume)           0.

8 10*3            

0.0-1.0        

 

                    Automated eosinophil count           0.0 10*3/uL           0

.0-0.3        

 

                    Automated blood basophil count (count/volume)           0.0 

10*3/uL           

0.0-0.1        

 

                                        Serum or plasma lithium measurement (mol

es/volume) - 06/10/20 05:36         

 

                    BNP PT              189.5 pg/mL           <100.0        

 

                                        Complete urinalysis with reflex to cultu

re - 06/10/20 05:49         

 

                    Urine color determination           YELLOW              NRG 

       

 

                    Urine clarity determination           CLEAR               NR

G        

 

                    Urine pH measurement by test strip           7.0            

     5-9        

 

                    Specific gravity of urine by test strip           1.015     

          1.016-1.022  

      

 

                          Urine protein assay by test strip, semi-quantitative  

         NEGATIVE         

                                        NEGATIVE        

 

                    Urine glucose detection by automated test strip           NE

GATIVE            

NEGATIVE        

 

                          Erythrocytes detection in urine sediment by light micr

oscopy           NEGATIVE 

                                        NEGATIVE        

 

                    Urine ketones detection by automated test strip           1+

                  NEGATIVE

        

 

                    Urine nitrite detection by test strip           NEGATIVE    

        NEGATIVE    

    

 

                    Urine total bilirubin detection by test strip           NEGA

TIVE            

NEGATIVE        

 

                          Urine urobilinogen measurement by automated test strip

 (mass/volume)           

1.0 mg/dL                               < = 1.0        

 

                    Urine leukocyte esterase detection by dipstick           NEG

ATIVE            

NEGATIVE        

 

                                        Automated urine sediment erythrocyte cou

nt by microscopy (number/high power 

field)                    NONE                      NRG        

 

                                        Automated urine sediment leukocyte count

 by microscopy (number/high power field)

                          NONE                      NRG        

 

                          Bacteria detection in urine sediment by light microsco

py           NEGATIVE     

                                        NRG        

 

                                        Squamous epithelial cells detection in u

rine sediment by light microscopy       

                          0-2                       NRG        

 

                          Crystals detection in urine sediment by light microsco

py           NONE         

                                        NRG        

 

                          Casts detection in urine sediment by light microscopy 

          PRESENT         

                                        NRG        

 

                          Mucus detection in urine sediment by light microscopy 

          NEGATIVE        

                                        NRG        

 

                    Complete urinalysis with reflex to culture           NO     

             NRG        

 

                          Granular casts detection in urine sediment by light mi

croscopy           RARE   

                                        NRG        

 

                                        Blood lactic acid measurement (moles/vol

ume) - 06/10/20 05:57         

 

                    Blood lactic acid measurement (moles/volume)           1.24 

mmol/L           

0.50-2.00        

 

                                        Bacterial blood culture - 06/10/20 05:57

         

 

                    QUANTITY OF GROWTH           .                   NRG        

 

                    Bacterial blood culture           [id number]            NRG  

      

 

                                        Serum or plasma C reactive protein measu

rement (mass/volume) - 06/10/20 06:12   

      

 

                          Serum or plasma C reactive protein measurement (mass/v

olume)           0.24 

mg/dL                                   0.00-0.50        

 

                                        PT panel in platelet poor plasma by coag

ulation assay - 06/10/20 06:13         

 

                          Prothrombin time (PT) in platelet poor plasma by coagu

lation assay           

34.3 s                                  12.2-14.7        

 

                          INR in platelet poor plasma or blood by coagulation as

say           3.2         

                                        0.8-1.4        

 

                                        Activated partial thromboplastin time (a

PTT) in platelet poor plasma 

bycoagulation assay - 06/10/20 06:13         

 

                                        Activated partial thromboplastin time (a

PTT) in platelet poor plasma 

bycoagulation assay           49 s                      24-35        

 

                                        Comprehensive metabolic panel - 06/10/20

 06:13         

 

                          Serum or plasma sodium measurement (moles/volume)     

      121 mmol/L          

                                        135-145        

 

                          Serum or plasma potassium measurement (moles/volume)  

         3.9 mmol/L       

                                        3.6-5.0        

 

                          Serum or plasma chloride measurement (moles/volume)   

        87 mmol/L         

                                                

 

                    Carbon dioxide           22 mmol/L           21-32        

 

                          Serum or plasma anion gap determination (moles/volume)

           12 mmol/L      

                                        5-14        

 

                          Serum or plasma urea nitrogen measurement (mass/volume

)           9 mg/dL       

                                        7-18        

 

                          Serum or plasma creatinine measurement (mass/volume)  

         0.61 mg/dL       

                                        0.60-1.30        

 

                    Serum or plasma urea nitrogen/creatinine mass ratio         

  15                  NRG 

       

 

                                        Serum or plasma creatinine measurement w

ith calculation of estimated glomerular 

filtration rate           >                         NRG        

 

                    Serum or plasma glucose measurement (mass/volume)           

105 mg/dL           

        

 

                    Serum or plasma calcium measurement (mass/volume)           

8.6 mg/dL           

8.5-10.1        

 

                          Serum or plasma total bilirubin measurement (mass/volu

me)           0.7 mg/dL   

                                        0.1-1.0        

 

                                        Serum or plasma alkaline phosphatase freya

surement (enzymatic activity/volume)    

                          69 U/L                            

 

                                        Serum or plasma aspartate aminotransfera

se measurement (enzymatic 

activity/volume)           25 U/L                    5-34        

 

                                        Serum or plasma alanine aminotransferase

 measurement (enzymatic activity/volume)

                          12 U/L                    0-55        

 

                    Serum or plasma protein measurement (mass/volume)           

6.8 g/dL            

6.4-8.2        

 

                    Serum or plasma albumin measurement (mass/volume)           

4.1 g/dL            

3.2-4.5        

 

                    CALCIUM CORRECTED           8.5 mg/dL           8.5-10.1    

    

 

                                        Magnesium - 06/10/20 06:13         

 

                    Magnesium           1.6 mg/dL           1.6-2.4        

 

                                        Serum or plasma amylase measurement (enz

ymatic activity/volume) - 06/10/20 06:13

         

 

                          Serum or plasma amylase measurement (enzymatic activit

y/volume)           38 U/L

                                                

 

                                        Lipase - 06/10/20 06:13         

 

                    Lipase              7 U/L               8-78        

 

                                        Bacterial blood culture - 06/10/20 06:13

         

 

                    Bacterial blood culture           NG                  NRG   

     

 

                                        Influenza virus A and B antigen detectio

n - 06/10/20 07:50         

 

                    FLU RESULT           NEGATIVE FOR INFLUENZA A AND B ANTIGENS

 BY IA            

NR        

 

                                        Coronavirus SARS-CoV-2 SO 2019 - 06/10/2

0 07:50         

 

                    Coronavirus Ab [Units/volume] in Serum           Negative   

         Negative   

     

 

                                        Serum or plasma sodium measurement (mole

s/volume) - 06/10/20 16:15         

 

                          Serum or plasma sodium measurement (moles/volume)     

      123 mmol/L          

                                        135-145        

 

                                        THYROID STIMULATING HORMONE - 06/10/20 1

6:15         

 

                    THYROID STIMULATING HORMONE           1.17 u[iU]/mL         

  0.35-4.94        

 

                                        Blood CBC with ordered manual differenti

al panel - 20 07:16         

 

                          Blood leukocytes automated count (number/volume)      

     9.5 10*3/uL          

                                        4.3-11.0        

 

                          Blood erythrocytes automated count (number/volume)    

       3.41 10*6/uL       

                                        4.35-5.85        

 

                    Venous blood hemoglobin measurement (mass/volume)           

11.2 g/dL           

11.5-16.0        

 

                    Blood hematocrit (volume fraction)           32 %           

     35-52        

 

                    Automated erythrocyte mean corpuscular volume           93 [

foz_us]           

80-99        

 

                                        Automated erythrocyte mean corpuscular h

emoglobin (mass per erythrocyte)        

                          33 pg                     25-34        

 

                                        Automated erythrocyte mean corpuscular h

emoglobin concentration measurement 

(mass/volume)             35 g/dL                   32-36        

 

                    Automated erythrocyte distribution width ratio           12.

9 %              10.0-

14.5        

 

                    Automated blood platelet count (count/volume)           248 

10*3/uL           

130-400        

 

                          Automated blood platelet mean volume measurement      

     9.3 [foz_us]         

                                        7.4-10.4        

 

                    Automated blood neutrophils/100 leukocytes           88 %   

             42-75       

 

 

                    Automated blood lymphocytes/100 leukocytes           4 %    

             12-44        

 

                    Blood monocytes/100 leukocytes           7 %                

 NRG        

 

                    Automated blood eosinophils/100 leukocytes           0 %    

             0-10        

 

                    Automated blood basophils/100 leukocytes           0 %      

           0-10        

 

                    Blood neutrophils automated count (number/volume)           

8.3 10*3            

1.8-7.8        

 

                    Blood lymphocytes automated count (number/volume)           

0.4 10*3            

1.0-4.0        

 

                    Blood monocytes automated count (number/volume)           0.

8 10*3            

0.0-1.0        

 

                    Automated eosinophil count           0.0 10*3/uL           0

.0-0.3        

 

                    Automated blood basophil count (count/volume)           0.0 

10*3/uL           

0.0-0.1        

 

                    Manual blood segmented neutrophils/100 leukocytes           

89 %                NRG  

      

 

                    Blood band neutrophils/100 leukocytes           0 %         

        NRG        

 

                    Manual blood lymphocytes/100 leukocytes           4 %       

          NRG        

 

                    Manual eosinophils/100 leukocytes in nose           0 %     

            NRG        

 

                    Manual blood basophils/100 leukocytes           0 %         

        NRG        

 

                    Blood erythrocyte morphology finding identification         

  NORMAL              

NRG        

 

                                        Whole blood basic metabolic panel -  07:16         

 

                          Serum or plasma sodium measurement (moles/volume)     

      131 mmol/L          

                                        135-145        

 

                          Serum or plasma potassium measurement (moles/volume)  

         3.8 mmol/L       

                                        3.6-5.0        

 

                          Serum or plasma chloride measurement (moles/volume)   

        101 mmol/L        

                                                

 

                    Carbon dioxide           19 mmol/L           21-32        

 

                          Serum or plasma anion gap determination (moles/volume)

           11 mmol/L      

                                        5-14        

 

                          Serum or plasma urea nitrogen measurement (mass/volume

)           5 mg/dL       

                                        7-18        

 

                          Serum or plasma creatinine measurement (mass/volume)  

         0.56 mg/dL       

                                        0.60-1.30        

 

                    Serum or plasma urea nitrogen/creatinine mass ratio         

  9                   NRG  

      

 

                                        Serum or plasma creatinine measurement w

ith calculation of estimated glomerular 

filtration rate           >                         NRG        

 

                    Serum or plasma glucose measurement (mass/volume)           

84 mg/dL            

        

 

                    Serum or plasma calcium measurement (mass/volume)           

7.9 mg/dL           

8.5-10.1        

 

                                        Magnesium - 20 07:16         

 

                    Magnesium           2.3 mg/dL           1.6-2.4        

 

                                        Capillary blood glucose measurement by g

lucometer (mass/volume) - 20 05:29

         

 

                          Capillary blood glucose measurement by glucometer (mas

s/volume)           89 

mg/dL                                           

 

                                        Whole blood basic metabolic panel -  10:30         

 

                          Serum or plasma sodium measurement (moles/volume)     

      132 mmol/L          

                                        135-145        

 

                          Serum or plasma potassium measurement (moles/volume)  

         3.5 mmol/L       

                                        3.6-5.0        

 

                          Serum or plasma chloride measurement (moles/volume)   

        102 mmol/L        

                                                

 

                    Carbon dioxide           22 mmol/L           21-32        

 

                          Serum or plasma anion gap determination (moles/volume)

           8 mmol/L       

                                        5-14        

 

                          Serum or plasma urea nitrogen measurement (mass/volume

)           6 mg/dL       

                                        7-18        

 

                          Serum or plasma creatinine measurement (mass/volume)  

         0.60 mg/dL       

                                        0.60-1.30        

 

                    Serum or plasma urea nitrogen/creatinine mass ratio         

  10                  NRG 

       

 

                                        Serum or plasma creatinine measurement w

ith calculation of estimated glomerular 

filtration rate           >                         NRG        

 

                    Serum or plasma glucose measurement (mass/volume)           

94 mg/dL            

        

 

                    Serum or plasma calcium measurement (mass/volume)           

8.1 mg/dL           

8.5-10.1        

 

                                        Complete blood count (CBC) with automate

d white blood cell (WBC) differential - 

20 14:20         

 

                          Blood leukocytes automated count (number/volume)      

     12.5 10*3/uL         

                                        4.3-11.0        

 

                          Blood erythrocytes automated count (number/volume)    

       2.63 10*6/uL       

                                        4.35-5.85        

 

                    Venous blood hemoglobin measurement (mass/volume)           

8.2 g/dL            

11.5-16.0        

 

                    Blood hematocrit (volume fraction)           25 %           

     35-52        

 

                    Automated erythrocyte mean corpuscular volume           94 [

foz_us]           

80-99        

 

                                        Automated erythrocyte mean corpuscular h

emoglobin (mass per erythrocyte)        

                          31 pg                     25-34        

 

                                        Automated erythrocyte mean corpuscular h

emoglobin concentration measurement 

(mass/volume)             33 g/dL                   32-36        

 

                    Automated erythrocyte distribution width ratio           14.

1 %              10.0-

14.5        

 

                    Automated blood platelet count (count/volume)           545 

10*3/uL           

130-400        

 

                          Automated blood platelet mean volume measurement      

     8.7 [foz_us]         

                                        7.4-10.4        

 

                    Automated blood neutrophils/100 leukocytes           74 %   

             42-75       

 

 

                    Automated blood lymphocytes/100 leukocytes           14 %   

             12-44       

 

 

                    Blood monocytes/100 leukocytes           11 %               

 0-12        

 

                    Automated blood eosinophils/100 leukocytes           1 %    

             0-10        

 

                    Automated blood basophils/100 leukocytes           0 %      

           0-10        

 

                    Blood neutrophils automated count (number/volume)           

9.3 10*3            

1.8-7.8        

 

                    Blood lymphocytes automated count (number/volume)           

1.7 10*3            

1.0-4.0        

 

                    Blood monocytes automated count (number/volume)           1.

4 10*3            

0.0-1.0        

 

                    Automated eosinophil count           0.1 10*3/uL           0

.0-0.3        

 

                    Automated blood basophil count (count/volume)           0.0 

10*3/uL           

0.0-0.1        

 

                                        Comprehensive metabolic panel - 20

 14:20         

 

                          Serum or plasma sodium measurement (moles/volume)     

      128 mmol/L          

                                        135-145        

 

                          Serum or plasma potassium measurement (moles/volume)  

         4.1 mmol/L       

                                        3.6-5.0        

 

                          Serum or plasma chloride measurement (moles/volume)   

        96 mmol/L         

                                                

 

                    Carbon dioxide           18 mmol/L           21-32        

 

                          Serum or plasma anion gap determination (moles/volume)

           14 mmol/L      

                                        5-14        

 

                          Serum or plasma urea nitrogen measurement (mass/volume

)           12 mg/dL      

                                        7-18        

 

                          Serum or plasma creatinine measurement (mass/volume)  

         0.77 mg/dL       

                                        0.60-1.30        

 

                    Serum or plasma urea nitrogen/creatinine mass ratio         

  16                  NRG 

       

 

                                        Serum or plasma creatinine measurement w

ith calculation of estimated glomerular 

filtration rate           >                         NRG        

 

                    Serum or plasma glucose measurement (mass/volume)           

186 mg/dL           

        

 

                    Serum or plasma calcium measurement (mass/volume)           

8.2 mg/dL           

8.5-10.1        

 

                          Serum or plasma total bilirubin measurement (mass/volu

me)           0.5 mg/dL   

                                        0.1-1.0        

 

                                        Serum or plasma alkaline phosphatase freya

surement (enzymatic activity/volume)    

                          113 U/L                           

 

                                        Serum or plasma aspartate aminotransfera

se measurement (enzymatic 

activity/volume)           67 U/L                    5-34        

 

                                        Serum or plasma alanine aminotransferase

 measurement (enzymatic activity/volume)

                          37 U/L                    0-55        

 

                    Serum or plasma protein measurement (mass/volume)           

5.8 g/dL            

6.4-8.2        

 

                    Serum or plasma albumin measurement (mass/volume)           

3.1 g/dL            

3.2-4.5        

 

                    CALCIUM CORRECTED           8.9 mg/dL           8.5-10.1    

    

 

                                        PT panel in platelet poor plasma by coag

ulation assay - 20 14:20         

 

                          Prothrombin time (PT) in platelet poor plasma by coagu

lation assay           

29.5 s                                  12.2-14.7        

 

                          INR in platelet poor plasma or blood by coagulation as

say           2.8         

                                        0.8-1.4        

 

                                        Blood lactic acid measurement (moles/vol

ume) - 20 14:20         

 

                    Blood lactic acid measurement (moles/volume)           2.33 

mmol/L           

0.50-2.00        

 

                                        Complete urinalysis with reflex to cultu

re - 20 14:20         

 

                    Urine color determination           YELLOW              NRG 

       

 

                    Urine clarity determination           CLEAR               NR

G        

 

                    Urine pH measurement by test strip           6.0            

     5-9        

 

                    Specific gravity of urine by test strip           1.010     

          1.016-1.022  

      

 

                          Urine protein assay by test strip, semi-quantitative  

         NEGATIVE         

                                        NEGATIVE        

 

                    Urine glucose detection by automated test strip           NE

GATIVE            

NEGATIVE        

 

                          Erythrocytes detection in urine sediment by light micr

oscopy           NEGATIVE 

                                        NEGATIVE        

 

                    Urine ketones detection by automated test strip           NE

GATIVE            

NEGATIVE        

 

                    Urine nitrite detection by test strip           NEGATIVE    

        NEGATIVE    

    

 

                    Urine total bilirubin detection by test strip           NEGA

TIVE            

NEGATIVE        

 

                          Urine urobilinogen measurement by automated test strip

 (mass/volume)           

0.2 mg/dL                               < = 1.0        

 

                    Urine leukocyte esterase detection by dipstick           TRA

CE               

NEGATIVE        

 

                                        Automated urine sediment erythrocyte cou

nt by microscopy (number/high power 

field)                    NONE                      NRG        

 

                                        Automated urine sediment leukocyte count

 by microscopy (number/high power field)

                           [HPF]                    NRG        

 

                          Bacteria detection in urine sediment by light microsco

py           TRACE        

                                        NRG        

 

                          Crystals detection in urine sediment by light microsco

py           PRESENT      

                                        NRG        

 

                          Casts detection in urine sediment by light microscopy 

          PRESENT         

                                        NRG        

 

                          Mucus detection in urine sediment by light microscopy 

          NEGATIVE        

                                        NRG        

 

                    Complete urinalysis with reflex to culture           NO     

             NRG        

 

                          Amorphous sediment detection in urine sediment by ligh

t microscopy           FEW

 KAELYN URATES                            NRG        

 

                          Hyaline casts detection in urine sediment by light lacie

roscopy           5-10    

                                        NRG        

 

                                        Serum or plasma lithium measurement (mol

es/volume) - 20 14:20         

 

                    BNP PT              186.2 pg/mL           <100.0        

 

                                        Serum or plasma troponin i.cardiac measu

rement (mass/volume) - 20 14:20   

      

 

                          Serum or plasma troponin i.cardiac measurement (mass/v

olume)           < ng/mL  

                                        <0.028        

 

                                        PROCALCITONIN (PCT) - 20 14:20    

     

 

                    PROCALCITONIN (PCT)           0.05 ng/mL           <0.10    

    

 

                                        Serum or plasma lactate measurement (mol

es/volume) - 20 16:37         

 

                          Serum or plasma lactate measurement (moles/volume)    

       1.51 mmol/L        

                                        0.50-2.00        



                                                                                
                                                                                
                                                                  



Encounters

      



                ACCT No.           Visit Date/Time           Discharge          

 Status         

             Pt. Type           Provider           Facility           Loc./Unit 

          

Complaint        

 

             [id number]           2020 00:06:00                         

            

Document Registration                                                           

         

 

                    [id number]           06/10/2020 10:00:00            14:22:00        

                DIS             Outpatient           CORBY CALLE, KEDAR RESENDIZ         

  [facility]           4TH                       ABDOMINAL PAIN;HYPONATR

EMIA        

 

                    [id number]           2020 07:26:00            12:57:00        

                DIS             Emergency           GARRET BROUSSARD MD      

     [facility]           ER                        WEAKNESS       

 

 

                    [id number]           2020 14:30:00            16:41:00        

                DIS             Emergency           ALEJANDRA CEBALLOS           [facility]           ER                        WEAKNESS        

 

                    [id number]           2019 09:16:00            00:01:00        

                DIS             Outpatient           ELIOT EDMONDSON APRN   

        [facility]           PULM                      COPD        

 

                    [id number]           2020 14:50:00            17:21:00        

                DIS             Emergency           ANNABELLE CALLE, ROSLYN TOLLIVER    

       [facility]           ER                        FEELS LIKE A-FI

B        

 

                    [id number]           2020 08:44:00            23:59:59        

                CLS             Outpatient           LEO NESBITT APRN     

      [facility]           LAB                       HYPERTHYROIDISM

        

 

                    [id number]           2020 13:32:00            23:59:59        

                CLS             Outpatient           ELIOT EDMONDSON   

        [facility]           RAD                       COPD,ATRIAL FIB

,ALLERGIC 

RHINITIS,DYSPNEA        

 

                    [id number]           10/23/2019 10:35:00           10/23/2

019 11:35:00        

                DIS             Outpatient           SALVADOR BAIRES MD       

    [facility]           REHAB                     GENERALIZED WEAKNESS A

ND 

SYSTEMIC SCLEROSIS        

 

                    [id number]           2019 08:08:00            12:48:00        

                DIS             Emergency           SEPIDEH CALLE, CARMELO RAMIREZ          

 [facility]           ER                        RECENT HEART CATH,BLEED

ING FROM 

WOUND,DIZZINESS        

 

                    [id number]           2019 11:35:00            17:50:00        

                DIS             Outpatient           TIM CALLE FACC, NICOLE ROWAN CC

DS           

[facility]           CATH                      

ANGINA,SOB,FATIGUE,COPD        

 

                    [id number]           2019 18:04:00           

019 20:41:00        

                DIS             Emergency           BOBO CALLE, GARRET GONZALEZ      

     [facility]           ER                        CHEST PAINS    

    

 

                    [id number]           2019 10:13:00           

019 23:59:59        

                CLS             Outpatient           SALVADOR BAIRES MD       

    [facility]           RAD                       ASPIRATION        

 

                    [id number]           2018 14:09:00           

018 13:12:00        

                DIS             Inpatient           MICHAEL CALLE, GERALD RESENDIZ     

      [facility]           ICU                       CHEST PAIN;FREQ

UENT PVCS,HX

 AFIB        

 

                    [id number]           2018 07:43:00            23:59:59        

                CLS             Outpatient           SALVADOR BAIRES MD       

    [facility]           RAD                       LEFT SCREEN BREAST CA,

OSTEOPOROSIS

        

 

                    [id number]           2018 11:13:00           

018 23:59:59        

                CLS             Preadmit           ELIOT EDMONDSON     

      [facility]           RAD                       ALLERGIC RHINIT

IS,COPD, 

DYSPNEA,EMPHYSEMA        

 

                    [id number]           2018 14:34:00           

018 23:59:59        

                CLS             Preadmit           SALVADOR BAIRES MD         

  [facility]           RAD                       SCREENING /OSTEOPOROSIS

        

 

                    [id number]           2018 08:25:00           

018 23:59:59        

                CLS             Outpatient           ELIOT LONG APRN 

          [facility]           CARD                      ATRIAL FIBRILLA

TION       

 

 

                    [id number]           2018 13:00:00           

018 23:59:59        

                CLS             Outpatient           SALVADOR BAIRES MD       

    [facility]           RAD                       FOOT PAIN, SWELLING   

     

 

                    [id number]           2018 11:31:00           

018 23:59:59        

                CLS             Outpatient           SALVADOR BAIRES MD       

    [facility]           CARD                      CHRONIC DIARRHEA ABD P

AIN        

 

                    [id number]           2018 08:43:00            23:59:59        

                CLS             Outpatient           SALVADOR BAIRES MD       

    [facility]           LAB                       R19.7        

 

                    [id number]           2018 09:30:00           

018 23:59:59        

                CLS             Preadmit           DEBO, ELIOT E APRN     

      [facility]           PULM                      COPD        

 

                    [id number]           2018 10:00:00           

018 00:01:00        

                DIS             Outpatient           DEBO, ELIOT E APRN   

        [facility]           PULM                      COPD        

 

                    [id number]           2017 08:59:00           

017 23:59:59        

                CLS             Outpatient           DEBO, ELIOT E APRN   

        [facility]           RAD                       J44.9 R06.00 J4

3.9        

 

                    [id number]           2017 15:00:00           

017 23:59:59        

                CLS             Preadmit           DEBO, ELIOT E APRN     

      [facility]           RAD                       COPD J44.9     

   

 

                    [id number]           2017 08:00:00           

017 23:59:59        

                CLS             Outpatient           DEBO, ELIOT E APRN   

        [facility]           RT                        J44.9 COPD     

   

 

                    [id number]           2017 06:58:00           

017 23:59:59        

                CLS             Outpatient           RAHUL GUAN DO        

   [facility]           LAB                       A-FIB        

 

                    [id number]           2017 06:55:00           

017 23:59:59        

                CLS             Outpatient           DEBO, ELIOT E APRN   

        [facility]           RAD                       J44.9 R06.00 J4

3.9        

 

                    [id number]           2016 11:15:00           

016 13:50:00        

                DIS             Outpatient           DEQUAN CALLE, SALVADOR KOEHLER       

    [facility]           REHAB                     BILATERAL LE PAIN/WEAK

NESS      

  

 

                    [id number]           2016 10:21:00           

016 23:59:59        

                CLS             Outpatient           NAYELI CALLE, DELGADO COPELAND     

      [facility]           CARD                      CP        

 

                    [id number]           01/10/2016 20:09:00           01/10/2

016 21:12:00        

                DIS             Emergency           ALICIA CHIANG MD          

 [facility]           ER                        POSS REPEAT MINI-STROKE

 (SAME 

SYMPTOMS)        

 

                    [id number]           2015 10:39:00            16:08:00        

                DIS             Emergency           ANNABELLE CALLE, ROSLYN TOLLIVER    

       [facility]           ER                        NUMBNESS/CHEST 

PAIN        

 

                    [id number]           2015 10:44:00            23:59:59        

                CLS             Outpatient           SALVADOR BAIRES MD       

    [facility]           LAB                       SCHLERODERMA PROGRESSI

VE        

 

                    [id number]           2015 12:38:00            23:59:59        

                CLS             Outpatient           RAYMUNDO BEDOYA MD         

  [facility]           CARD                      SOB,SCLERODERMA        

 

                    [id number]           06/10/2015 12:12:00           06/10/2

015 23:59:59        

                CLS             Outpatient           SIN BERRIOS DO         

  [facility]           RAD                       SHORTNESS OF BREATH    

    

 

                    [id number]           04/15/2015 08:51:00           04/15/2

015 23:59:59        

                CLS             Outpatient           SYLVIA SEQUEIRA DO    

       [facility]           RT                        SOB,CP        

 

                    [id number]           2015 13:02:00            23:59:59        

                CLS             Outpatient           SYLVIA SEQUEIRA DO    

       [facility]           CARD                      SOB, CHEST PAIN

, WEAKNESS 

       

 

                    [id number]           02/10/2015 12:39:00           02/10/2

015 23:59:59        

                CLS             Outpatient           SYLVIA SEQUEIRA DO    

       [facility]           RAD                       CLAUDICATION   

     

 

             [id number]           2020 15:38:00                        A

CT           

Inpatient                 SELINA JASMINE MD           [facility]                 ICU                       AMS        

 

             [id number]           2020 09:30:00                         

            

Document Registration                                                           

         

 

             [id number]           2020 01:37:00                         

            

Document Registration                                                           

         

 

             [id number]           2015 17:38:00                         

            

Document Registration                                                           

         

 

             [id number]           2015 12:40:00                         

            

Document Registration                                                           

         

 

             [id number]           2011 07:22:00                         

            

Document Registration                                                           

         

 

             [id number]           2011 10:45:00                         

            

Document Registration                                                           

         

 

             [id number]           2010 21:45:00                         

            

Document Registration                                                           

         

 

             [id number]           2010 07:54:00                         

            

Document Registration                                                           

         

 

                [id number]           2020 14:32:00           2020 23:59

:00           

DIS             Outpatient           SALVADOR BAIRES                            

      

                                                 

 

                [id number]           2020 16:53:00           2020 23:59

:00           

DIS             Outpatient           Karon Villalobos                             

      

                                                 

 

                [id number]           2020 13:34:00           2020 23:59

:00           

DIS             Outpatient           RITU BAIRESHEL                            

      

                                                 

 

                [id number]           2020 10:51:00           2020 23:59

:00           

DIS             Outpatient           RITU BAIRESHEL                            

      

                                                 

 

                [id number]           2020 17:24:00           2020 23:59

:00           

DIS             Outpatient           Karon Villalobos                             

      

                                                 

 

                [id number]           2020 10:54:00           2020 23:59

:00           

DIS             Outpatient           Zach Arango                             

      

                                                 

 

                [id number]           2020 17:34:00           2020 23:59

:00           

DIS             Outpatient           DEQUANSALVADOR                            

      

                                                 

 

                [id number]           2020 16:45:00           2020 23:59

:00           

DIS             Outpatient           RITU BAIRESHEL                            

      

                                                 

 

                [id number]           2019 00:00:00           2019 23:59

:00           

DIS             Outpatient           AGUSTINA GRIMES                            

      

                                                 

 

                [id number]           2019 10:00:00           2019 23:59:

00           DIS

                Outpatient           RITU BAIRESHEL                            

         

                                                 

 

                [id number]           2019 12:29:00           2019 23:59:

00           DIS

                Outpatient           BAIRESSALVADOR                            

         

                                                 

 

                [id number]           2019 12:28:00           2019 23:59:

00           DIS

                Outpatient           BAIRESSALVADOR                            

         

                                                 

 

                [id number]           2019 15:00:00           2019 23:59:

00           DIS

                Outpatient           SALVADOR BAIRES                            

         

                                                 

 

                [id number]           2019 12:28:00           2019 23:59:

00           DIS

                Outpatient           BAIRESSALVADOR                            

         

                                                 

 

                [id number]           2019 11:44:00           2019 23:59:

00           DIS

                Outpatient           SALVADOR BAIRES                            

         

                                                 

 

                [id number]           01/15/2019 08:41:00           01/15/2019 23:59:

00           DIS

                Outpatient           JOSE L SUÁREZ                             

         

                                                 

 

                [id number]           2018 10:08:00           2018 23:59:

00           DIS

                Outpatient           Jules Griggs KIM                       

         

                                                 

 

                [id number]           2018 12:45:00           2018 23:59:

00           DIS

                Outpatient           SALVADOR BAIRES                            

         

                                                 

 

                [id number]           2018 12:13:00           2018 23:59:

00           DIS

                Outpatient           [facility]                            

         

                                                 

 

                [id number]           2018 09:20:00           2018 23:59:

00           DIS

                Outpatient           [facility]                            

         

                                                 

 

                [id number]           2018 16:59:00           2018 23:59:

00           DIS

                Outpatient           [facility]                            

         

                                                 

 

                [id number]           10/24/2017 10:32:00           10/24/2017 23:59:

00           DIS

                Outpatient           [facility]                            

         

                                                 

 

                [id number]           2017 15:35:00           2017 23:59:

00           DIS

                Outpatient           [facility]                            

         

                                                 

 

             [id number]           2019 10:29:00                               

      Document 

Registration                                                                    

 

             [id number]           10/08/2018 09:30:00                               

      Document 

Registration                                                                    

 

             [id number]           2018 13:50:00                               

      Document 

Registration                                                                    

 

             [id number]           2018 13:00:00                               

      Document 

Registration                                                                    

 

             [id number]           2018 10:00:00                               

      Document 

Registration                                                                    

 

             [id number]           2018 14:10:00                               

      Document 

Registration                                                                    

 

             [id number]           2018 14:50:00                               

      Document 

Registration                                                                    

 

             [id number]           10/24/2017 15:00:00                               

      Document 

Registration                                                                    

 

                [id number]            2020 09:44:17           2020 23:59:5

9           [facility]  

             Outpatient                                                         

  

 

             [id number]           2018 16:59:00                               

      Document 

Registration                                                                    

 

                [id number]           06/15/2018 19:20:00           06/15/2018 23:59:

59           [facility]

                Outpatient                                           [facility]

 [facility]

## 2020-07-27 NOTE — DIAGNOSTIC IMAGING REPORT
PROCEDURE: CT head without contrast.



TECHNIQUE: Multiple contiguous axial images were obtained through

the brain without the use of intravenous contrast. Auto Exposure

Controls were utilized during the CT exam to meet ALARA standards

for radiation dose reduction. 



INDICATION: Altered mental status 01/10/2016.



FINDINGS:



Stable cerebral volume loss and chronic microvascular changes are

present. There is a new and/or increasing senescent

calcifications in the left basal ganglia and the left anterior

periventricular white matter. There is no focus of acute ischemia

or hemorrhage. There is no midline shift, mass effect, or

extra-axial fluid collection. There is no mass. The bony

calvarium, paranasal sinuses and mastoids are clear.



IMPRESSION: No acute intracranial abnormality.



Report was called to the Emergency Room.



Dictated by: 



  Dictated on workstation # [id number]

## 2020-07-28 VITALS — DIASTOLIC BLOOD PRESSURE: 41 MMHG | SYSTOLIC BLOOD PRESSURE: 115 MMHG

## 2020-07-28 VITALS — DIASTOLIC BLOOD PRESSURE: 50 MMHG | SYSTOLIC BLOOD PRESSURE: 106 MMHG

## 2020-07-28 VITALS — DIASTOLIC BLOOD PRESSURE: 39 MMHG | SYSTOLIC BLOOD PRESSURE: 76 MMHG

## 2020-07-28 VITALS — SYSTOLIC BLOOD PRESSURE: 92 MMHG | DIASTOLIC BLOOD PRESSURE: 42 MMHG

## 2020-07-28 VITALS — DIASTOLIC BLOOD PRESSURE: 49 MMHG | SYSTOLIC BLOOD PRESSURE: 151 MMHG

## 2020-07-28 VITALS — SYSTOLIC BLOOD PRESSURE: 141 MMHG | DIASTOLIC BLOOD PRESSURE: 36 MMHG

## 2020-07-28 VITALS — DIASTOLIC BLOOD PRESSURE: 53 MMHG | SYSTOLIC BLOOD PRESSURE: 106 MMHG

## 2020-07-28 VITALS — SYSTOLIC BLOOD PRESSURE: 146 MMHG | DIASTOLIC BLOOD PRESSURE: 37 MMHG

## 2020-07-28 VITALS — DIASTOLIC BLOOD PRESSURE: 42 MMHG | SYSTOLIC BLOOD PRESSURE: 90 MMHG

## 2020-07-28 VITALS — SYSTOLIC BLOOD PRESSURE: 103 MMHG | DIASTOLIC BLOOD PRESSURE: 41 MMHG

## 2020-07-28 VITALS — SYSTOLIC BLOOD PRESSURE: 114 MMHG | DIASTOLIC BLOOD PRESSURE: 32 MMHG

## 2020-07-28 VITALS — SYSTOLIC BLOOD PRESSURE: 115 MMHG | DIASTOLIC BLOOD PRESSURE: 36 MMHG

## 2020-07-28 VITALS — DIASTOLIC BLOOD PRESSURE: 46 MMHG | SYSTOLIC BLOOD PRESSURE: 104 MMHG

## 2020-07-28 VITALS — DIASTOLIC BLOOD PRESSURE: 53 MMHG | SYSTOLIC BLOOD PRESSURE: 84 MMHG

## 2020-07-28 VITALS — DIASTOLIC BLOOD PRESSURE: 40 MMHG | SYSTOLIC BLOOD PRESSURE: 92 MMHG

## 2020-07-28 VITALS — SYSTOLIC BLOOD PRESSURE: 80 MMHG | DIASTOLIC BLOOD PRESSURE: 35 MMHG

## 2020-07-28 VITALS — SYSTOLIC BLOOD PRESSURE: 84 MMHG | DIASTOLIC BLOOD PRESSURE: 53 MMHG

## 2020-07-28 VITALS — SYSTOLIC BLOOD PRESSURE: 130 MMHG | DIASTOLIC BLOOD PRESSURE: 49 MMHG

## 2020-07-28 VITALS — DIASTOLIC BLOOD PRESSURE: 36 MMHG | SYSTOLIC BLOOD PRESSURE: 82 MMHG

## 2020-07-28 VITALS — SYSTOLIC BLOOD PRESSURE: 102 MMHG | DIASTOLIC BLOOD PRESSURE: 50 MMHG

## 2020-07-28 VITALS — SYSTOLIC BLOOD PRESSURE: 124 MMHG | DIASTOLIC BLOOD PRESSURE: 36 MMHG

## 2020-07-28 VITALS — DIASTOLIC BLOOD PRESSURE: 33 MMHG | SYSTOLIC BLOOD PRESSURE: 115 MMHG

## 2020-07-28 VITALS — DIASTOLIC BLOOD PRESSURE: 46 MMHG | SYSTOLIC BLOOD PRESSURE: 88 MMHG

## 2020-07-28 VITALS — DIASTOLIC BLOOD PRESSURE: 40 MMHG | SYSTOLIC BLOOD PRESSURE: 93 MMHG

## 2020-07-28 VITALS — SYSTOLIC BLOOD PRESSURE: 75 MMHG | DIASTOLIC BLOOD PRESSURE: 40 MMHG

## 2020-07-28 VITALS — SYSTOLIC BLOOD PRESSURE: 122 MMHG | DIASTOLIC BLOOD PRESSURE: 47 MMHG

## 2020-07-28 VITALS — DIASTOLIC BLOOD PRESSURE: 51 MMHG | SYSTOLIC BLOOD PRESSURE: 126 MMHG

## 2020-07-28 VITALS — DIASTOLIC BLOOD PRESSURE: 41 MMHG | SYSTOLIC BLOOD PRESSURE: 103 MMHG

## 2020-07-28 VITALS — SYSTOLIC BLOOD PRESSURE: 123 MMHG | DIASTOLIC BLOOD PRESSURE: 60 MMHG

## 2020-07-28 VITALS — DIASTOLIC BLOOD PRESSURE: 50 MMHG | SYSTOLIC BLOOD PRESSURE: 143 MMHG

## 2020-07-28 VITALS — DIASTOLIC BLOOD PRESSURE: 43 MMHG | SYSTOLIC BLOOD PRESSURE: 112 MMHG

## 2020-07-28 VITALS — SYSTOLIC BLOOD PRESSURE: 125 MMHG | DIASTOLIC BLOOD PRESSURE: 34 MMHG

## 2020-07-28 VITALS — SYSTOLIC BLOOD PRESSURE: 132 MMHG | DIASTOLIC BLOOD PRESSURE: 43 MMHG

## 2020-07-28 VITALS — SYSTOLIC BLOOD PRESSURE: 79 MMHG | DIASTOLIC BLOOD PRESSURE: 41 MMHG

## 2020-07-28 LAB
ALBUMIN SERPL-MCNC: 1.8 GM/DL (ref 3.2–4.5)
ALBUMIN SERPL-MCNC: 2.1 GM/DL (ref 3.2–4.5)
ALP SERPL-CCNC: 64 U/L (ref 40–136)
ALP SERPL-CCNC: 81 U/L (ref 40–136)
ALT SERPL-CCNC: 29 U/L (ref 0–55)
ALT SERPL-CCNC: 34 U/L (ref 0–55)
ARTERIAL PATENCY WRIST A: (no result)
BASE EXCESS STD BLDA CALC-SCNC: -10.2 MMOL/L (ref -2.5–2.5)
BASE EXCESS STD BLDA CALC-SCNC: -2.4 MMOL/L (ref -2.5–2.5)
BASE EXCESS STD BLDA CALC-SCNC: -8.2 MMOL/L (ref -2.5–2.5)
BASOPHILS # BLD AUTO: 0 10^3/UL (ref 0–0.1)
BASOPHILS NFR BLD AUTO: 0 % (ref 0–10)
BDY SITE: (no result)
BILIRUB SERPL-MCNC: 0.5 MG/DL (ref 0.1–1)
BILIRUB SERPL-MCNC: 0.6 MG/DL (ref 0.1–1)
BODY TEMPERATURE: 35.3
BODY TEMPERATURE: 35.6
BODY TEMPERATURE: 35.7
BUN/CREAT SERPL: 19
BUN/CREAT SERPL: 22
CALCIUM SERPL-MCNC: 6.1 MG/DL (ref 8.5–10.1)
CALCIUM SERPL-MCNC: 7 MG/DL (ref 8.5–10.1)
CHLORIDE SERPL-SCNC: 100 MMOL/L (ref 98–107)
CHLORIDE SERPL-SCNC: 99 MMOL/L (ref 98–107)
CO2 BLDA CALC-SCNC: 20.2 MMOL/L (ref 21–31)
CO2 BLDA CALC-SCNC: 21.5 MMOL/L (ref 21–31)
CO2 BLDA CALC-SCNC: 23.6 MMOL/L (ref 21–31)
CO2 SERPL-SCNC: 16 MMOL/L (ref 21–32)
CO2 SERPL-SCNC: 17 MMOL/L (ref 21–32)
CREAT SERPL-MCNC: 0.87 MG/DL (ref 0.6–1.3)
CREAT SERPL-MCNC: 0.93 MG/DL (ref 0.6–1.3)
EOSINOPHIL # BLD AUTO: 0 10^3/UL (ref 0–0.3)
EOSINOPHIL NFR BLD AUTO: 0 % (ref 0–10)
ERYTHROCYTE [DISTWIDTH] IN BLOOD BY AUTOMATED COUNT: 14.8 % (ref 10–14.5)
ERYTHROCYTE [DISTWIDTH] IN BLOOD BY AUTOMATED COUNT: 15.8 % (ref 10–14.5)
GFR SERPLBLD BASED ON 1.73 SQ M-ARVRAT: 58 ML/MIN
GFR SERPLBLD BASED ON 1.73 SQ M-ARVRAT: > 60 ML/MIN
GLUCOSE SERPL-MCNC: 177 MG/DL (ref 70–105)
GLUCOSE SERPL-MCNC: 246 MG/DL (ref 70–105)
HCT VFR BLD CALC: 28 % (ref 35–52)
HCT VFR BLD CALC: 30 % (ref 35–52)
HGB BLD-MCNC: 10.2 G/DL (ref 11.5–16)
HGB BLD-MCNC: 9.5 G/DL (ref 11.5–16)
INHALED O2 FLOW RATE: (no result) L/MIN
INR PPP: 2.1 (ref 0.8–1.4)
LYMPHOCYTES # BLD AUTO: 0.5 X 10^3 (ref 1–4)
LYMPHOCYTES NFR BLD AUTO: 7 % (ref 12–44)
MAGNESIUM SERPL-MCNC: 1.3 MG/DL (ref 1.6–2.4)
MAGNESIUM SERPL-MCNC: 2.2 MG/DL (ref 1.6–2.4)
MANUAL DIFFERENTIAL PERFORMED BLD QL: NO
MCH RBC QN AUTO: 30 PG (ref 25–34)
MCH RBC QN AUTO: 30 PG (ref 25–34)
MCHC RBC AUTO-ENTMCNC: 34 G/DL (ref 32–36)
MCHC RBC AUTO-ENTMCNC: 34 G/DL (ref 32–36)
MCV RBC AUTO: 87 FL (ref 80–99)
MCV RBC AUTO: 90 FL (ref 80–99)
MONOCYTES # BLD AUTO: 0.3 X 10^3 (ref 0–1)
MONOCYTES NFR BLD AUTO: 5 % (ref 0–12)
NEUTROPHILS # BLD AUTO: 6.6 X 10^3 (ref 1.8–7.8)
NEUTROPHILS NFR BLD AUTO: 88 % (ref 42–75)
PCO2 BLDA: 39 MMHG (ref 35–45)
PCO2 BLDA: 58 MMHG (ref 35–45)
PCO2 BLDA: 59 MMHG (ref 35–45)
PH BLDA: 7.1 [PH] (ref 7.37–7.43)
PH BLDA: 7.14 [PH] (ref 7.37–7.43)
PH BLDA: 7.37 [PH] (ref 7.37–7.43)
PHOSPHATE SERPL-MCNC: 5.7 MG/DL (ref 2.3–4.7)
PLATELET # BLD: 240 10^3/UL (ref 130–400)
PLATELET # BLD: 264 10^3/UL (ref 130–400)
PMV BLD AUTO: 8.5 FL (ref 7.4–10.4)
PMV BLD AUTO: 8.8 FL (ref 7.4–10.4)
PO2 BLDA: 122 MMHG (ref 79–93)
PO2 BLDA: 124 MMHG (ref 79–93)
PO2 BLDA: 70 MMHG (ref 79–93)
POTASSIUM SERPL-SCNC: 3.2 MMOL/L (ref 3.6–5)
POTASSIUM SERPL-SCNC: 3.9 MMOL/L (ref 3.6–5)
PROT SERPL-MCNC: 3.3 GM/DL (ref 6.4–8.2)
PROT SERPL-MCNC: 4 GM/DL (ref 6.4–8.2)
PROTHROMBIN TIME: 23.9 SEC (ref 12.2–14.7)
SAO2 % BLDA FROM PO2: 96 % (ref 94–100)
SAO2 % BLDA FROM PO2: 97 % (ref 94–100)
SAO2 % BLDA FROM PO2: 98 % (ref 94–100)
SODIUM SERPL-SCNC: 126 MMOL/L (ref 135–145)
SODIUM SERPL-SCNC: 128 MMOL/L (ref 135–145)
VENTILATION MODE VENT: YES
WBC # BLD AUTO: 7.5 10^3/UL (ref 4.3–11)
WBC # BLD AUTO: 9.1 10^3/UL (ref 4.3–11)

## 2020-07-28 RX ADMIN — SODIUM CHLORIDE SCH MLS/HR: 4.5 INJECTION, SOLUTION INTRAVENOUS at 13:30

## 2020-07-28 RX ADMIN — PROPOFOL SCH MLS/HR: 10 INJECTION, EMULSION INTRAVENOUS at 09:55

## 2020-07-28 RX ADMIN — PANTOPRAZOLE SODIUM SCH MG: 40 INJECTION, POWDER, FOR SOLUTION INTRAVENOUS at 09:51

## 2020-07-28 RX ADMIN — SODIUM CHLORIDE, SODIUM LACTATE, POTASSIUM CHLORIDE, AND CALCIUM CHLORIDE SCH MLS/HR: 600; 310; 30; 20 INJECTION, SOLUTION INTRAVENOUS at 03:52

## 2020-07-28 RX ADMIN — Medication SCH MLS/HR: at 02:09

## 2020-07-28 RX ADMIN — Medication SCH MLS/HR: at 16:48

## 2020-07-28 RX ADMIN — SODIUM CHLORIDE, SODIUM LACTATE, POTASSIUM CHLORIDE, AND CALCIUM CHLORIDE SCH MLS/HR: 600; 310; 30; 20 INJECTION, SOLUTION INTRAVENOUS at 16:47

## 2020-07-28 RX ADMIN — MAGNESIUM SULFATE IN DEXTROSE SCH MLS/HR: 10 INJECTION, SOLUTION INTRAVENOUS at 04:35

## 2020-07-28 RX ADMIN — SODIUM CHLORIDE SCH MLS/HR: 4.5 INJECTION, SOLUTION INTRAVENOUS at 02:00

## 2020-07-28 RX ADMIN — MAGNESIUM SULFATE IN DEXTROSE SCH MLS/HR: 10 INJECTION, SOLUTION INTRAVENOUS at 07:00

## 2020-07-28 RX ADMIN — PROPOFOL SCH MLS/HR: 10 INJECTION, EMULSION INTRAVENOUS at 19:01

## 2020-07-28 RX ADMIN — HYDROCORTISONE SODIUM SUCCINATE SCH MG: 100 INJECTION, POWDER, FOR SOLUTION INTRAMUSCULAR; INTRAVENOUS at 14:23

## 2020-07-28 RX ADMIN — SODIUM CHLORIDE, SODIUM LACTATE, POTASSIUM CHLORIDE, AND CALCIUM CHLORIDE SCH MLS/HR: 600; 310; 30; 20 INJECTION, SOLUTION INTRAVENOUS at 07:49

## 2020-07-28 RX ADMIN — Medication SCH MLS/HR: at 00:19

## 2020-07-28 RX ADMIN — SODIUM CHLORIDE, SODIUM LACTATE, POTASSIUM CHLORIDE, AND CALCIUM CHLORIDE SCH MLS/HR: 600; 310; 30; 20 INJECTION, SOLUTION INTRAVENOUS at 23:10

## 2020-07-28 RX ADMIN — Medication SCH MLS/HR: at 06:00

## 2020-07-28 RX ADMIN — MAGNESIUM SULFATE IN DEXTROSE SCH MLS/HR: 10 INJECTION, SOLUTION INTRAVENOUS at 07:01

## 2020-07-28 RX ADMIN — VASOPRESSIN SCH MLS/HR: 20 INJECTION INTRAVENOUS at 09:23

## 2020-07-28 RX ADMIN — Medication SCH MLS/HR: at 10:49

## 2020-07-28 RX ADMIN — SODIUM CHLORIDE SCH MLS/HR: 4.5 INJECTION, SOLUTION INTRAVENOUS at 23:15

## 2020-07-28 RX ADMIN — HYDROCORTISONE SODIUM SUCCINATE SCH MG: 100 INJECTION, POWDER, FOR SOLUTION INTRAMUSCULAR; INTRAVENOUS at 23:14

## 2020-07-28 RX ADMIN — VASOPRESSIN SCH MLS/HR: 20 INJECTION INTRAVENOUS at 17:16

## 2020-07-28 RX ADMIN — POTASSIUM CHLORIDE SCH MLS/HR: 200 INJECTION, SOLUTION INTRAVENOUS at 04:29

## 2020-07-28 RX ADMIN — Medication SCH MLS/HR: at 04:07

## 2020-07-28 RX ADMIN — HYDROCORTISONE SODIUM SUCCINATE SCH MG: 100 INJECTION, POWDER, FOR SOLUTION INTRAMUSCULAR; INTRAVENOUS at 18:47

## 2020-07-28 RX ADMIN — MAGNESIUM SULFATE IN DEXTROSE SCH MLS/HR: 10 INJECTION, SOLUTION INTRAVENOUS at 04:29

## 2020-07-28 RX ADMIN — POTASSIUM CHLORIDE SCH MEQ: 1500 TABLET, EXTENDED RELEASE ORAL at 04:29

## 2020-07-28 NOTE — PULMONARY CONSULTATION
History of Present Illness


History of Present Illness


Date Seen by Provider:  2020


Time Seen by Provider:  06:22


Date of Admission








Allergies and Home Medications


Allergies


Coded Allergies:  


     pregabalin (Verified  Allergy, Unknown, 20)





Home Medications


Acetaminophen 325 Mg Tablet, 325-650 MG PO Q6H PRN for PAIN-MODERATE (5-7), 

(Reported)


Amiodarone HCl 200 Mg Tablet, 200 MG PO BID, (Reported)


Amlodipine Besylate 10 Mg Tablet, 10 MG PO DAILY


   Prescribed by: KEDAR TAVAREZ on 20 1432


Aspirin 81 Mg Tablet.dr, 81 MG PO DAILY, (Reported)


Gabapentin 800 Mg Tablet, 800 MG PO HS, (Reported)


Levothyroxine Sodium 50 Mcg Tablet, 50 MCG PO DAILY, (Reported)


Losartan Potassium 100 Mg Tablet, 100 MG PO DAILY, (Reported)


Polyethylene Glycol 3350 17 Gm Powd.pack, 17 GM PO DAILY PRN for CONSTIPATION-

2ND LINE, (Reported)


Pravastatin Sodium 20 Mg Tablet, 20 MG PO HS, (Reported)


Rivaroxaban 20 Mg Tablet, 20 MG PO DAILY, (Reported)


Tramadol HCl 50 Mg Tablet, 50 MG PO Q6H PRN for PAIN-MODERATE (5-7), (Reported)





Past Medical-Social-Family Hx


Patient Social History


Alcohol Use:  Denies Use


Number of Drinks Today:  AA


Alcohol Beverage of Choice:  Beer


Recreational Drug Use:  No


Smoking Status:  Former Smoker


Type Used:  Cigarettes


Former Smoker, Quit:  1980


2nd Hand Smoke Exposure:  Yes (1980)


Recent Foreign Travel:  No


Contact w/Someone Who Travel:  No


Recent Infectious Disease Expo:  No





Immunizations Up To Date


Tetanus Booster (TDap):  Unknown


Date of Pneumonia Vaccine:  Steven 10, 2018


Date of Influenza Vaccine:  Sep 12, 2019





Seasonal Allergies


Seasonal Allergies:  No





Past Medical History


Surgeries:  Yes


Eye Surgery, Orthopedic


Respiratory:  Yes (b/l pleural effusions with hx of thoracentesis)


COPD


Currently Using CPAP:  No


Currently Using BIPAP:  No


Cardiac:  Yes


Atrial Fibrillation, Hypertension, Peripheral Vascular, Valvular Heart Disease


Neurological:  Yes


Neuropathy, TIA


Pregnant:  No


Reproductive Disorders:  No


GYN History:  Menopausal


Genitourinary:  No


Gastrointestinal:  Yes (dysphagia)


Gastroesophageal Reflux


Musculoskeletal:  Yes (scleroderma)


Rheumatoid Arthritis


Endocrine:  Yes


Hyperthyroidism


Cataract


Cancer:  No


Psychosocial:  No


Integumentary:  Yes (SCLERODERMA)


Blood Disorders:  No


Adverse Reaction/Blood Tranf:  No





Family Medical History


Heart Disease (mother had afib), Stroke (mother  of stroke)





Review of Systems


Time Seen by Provider:  06:36





Sepsis Event


Evaluation


Height, Weight, BMI


Height: 5'3.00"


Weight: 112lbs. 0.0oz. 50.992150vw; 23.63 BMI


Method:Stated





Exam


Exam





Vital Signs








  Date Time  Temp Pulse Resp B/P (MAP) Pulse Ox O2 Delivery O2 Flow Rate FiO2


 


20 06:00  55 18 75/40 (52) 96 Mechanical Ventilator 40.00 


 


20 06:00  57  97/43    


 


20 05:05        40


 


20 05:00  69 18 106/50 (68) 100 Mechanical Ventilator 40.00 


 


20 04:07  65  95/50    


 


20 04:00  65 22 102/50 (67) 99 Mechanical Ventilator 45.00 


 


20 04:00      Mechanical Ventilator  50


 


20 04:00     97 Mechanical Ventilator  50


 


20 03:47      Mechanical Ventilator 45.00 


 


20 03:40        45


 


20 03:32 35.4 64 16 106/53 100 Mechanical Ventilator  50


 


20 03:05  62 28  99   50


 


20 03:00  62 20 84/53 (63) 100 Mechanical Ventilator 50.00 


 


20 02:15 36.0 62 22 79/41 98 Mechanical Ventilator  50


 


20 02:09  62  77/40    


 


20 02:08  62  77/40    


 


20 02:00  61 21 76/39 (51) 97 Mechanical Ventilator 50.00 


 


20 01:56 35.3 62 22 88/46 98 Mechanical Ventilator  50


 


20 01:25 35.6 65 21 104/46 99 Mechanical Ventilator  50


 


20 01:06  62      


 


20 01:00  61 23 92/42 (59) 94 Mechanical Ventilator 50.00 


 


20 00:19  61  80/35    


 


20 00:14 35.3 61 22 80/35 93 Mechanical Ventilator  50


 


20 00:03 35.3 62 22 82/36 98 Mechanical Ventilator  50


 


20 00:00      Mechanical Ventilator  50


 


20 00:00  63 26 82/36 (51) 98 Mechanical Ventilator 50.00 


 


20 00:00     97 Mechanical Ventilator  50


 


20 23:32  63 22  99   50


 


20 23:00  65 30 80/34 (49) 98 Mechanical Ventilator 50.00 


 


20 22:30  61 21 92/39 (56) 94 Mechanical Ventilator 50.00 


 


20 22:00  56 22 91/37 (55) 95 Mechanical Ventilator 50.00 


 


20 21:30      Mechanical Ventilator 10 


 


20 21:30  56 18 102/41 (61) 96 Mechanical Ventilator 50.00 


 


20 21:30     97 Mechanical Ventilator  50


 


20 21:25 36.1  14 104/45 (64) 97 Mechanical Ventilator  


 


20 21:15  55 18 113/47 (69) 94 Mechanical Ventilator 50.00 


 


20 21:15   14 109/49 (69) 95 Mechanical Ventilator  


 


20 21:08   14 112/45 (67) 93 Mechanical Ventilator  


 


20 21:05      Mechanical Ventilator 10 


 


20 21:01  55      


 


20 21:00  49 18 113/47 (69) 100 Mechanical Ventilator 50.00 


 


20 21:00   14 98/43 (61) 99 Mechanical Ventilator  


 


20 20:58  43  88/32    


 


20 20:55   14 71/33 (46) 100 Mechanical Ventilator  


 


20 20:50      Mechanical Ventilator 10 


 


20 20:45  60 14  99   50


 


20 20:45  39 7 61/23 (36) 100 Mechanical Ventilator 50.00 


 


20 20:45   14 62/27 (39) 100 Mechanical Ventilator  


 


20 20:40   12 69/29 (42) 100 Ambu Bag 10 


 


20 20:35      Ambu Bag 10 


 


20 20:35 36.1  12 78/35 (49) 100 Ambu Bag 10 


 


20 20:30    62/27 (39)  Mechanical Ventilator 50.00 


 


20 20:00      Nasal Cannula 4.00 


 


20 20:00      Mechanical Ventilator  50


 


20 19:00      Nasal Cannula 4.00 


 


20 17:45  56 22 134/48 (76) 100 Nasal Cannula 4.00 


 


20 17:30  54 36 123/43 (69) 98 Nasal Cannula 4.00 


 


20 17:27     98 Nasal Cannula 5.00 


 


20 17:25 36.5 72 38 153/56 100  10.00 





       10.00 


 


20 17:15  57 23   Nasal Cannula 4.00 


 


20 17:04  59 22 116/45 99 Nasal Cannula 3.00 


 


20 17:00    131/44 (73)    


 


20 17:00  55      


 


20 14:12 36.5 72 38 153/56 (88) 100 Non Rebreather 10.00 














I & O 


 


 20





 07:00


 


Intake Total 1510 ml


 


Output Total 420 ml


 


Balance 1090 ml








Height & Weight


Height: 5'3.00"


Weight: 112lbs. 0.0oz. 50.322696lb; 23.63 BMI


Method:Stated


General Appearance:  Chronically ill, Moderate Distress (moaning, tachypneic), 

Severe Distress


HEENT:  PERRL/EOMI


Neck:  Full Range of Motion, Normal Inspection


Respiratory:  No Accessory Muscle Use, No Respiratory Distress, Crackles


Cardiovascular:  Regular Rate, Rhythm, Normal Peripheral Pulses


Capillary Refill:  Less Than 3 Seconds


Gastrointestinal:  no organomegaly, distended, guarding, rebound, tenderness


Extremity:  Normal Capillary Refill, Pedal Edema (2+ bilateral lower extremiti

es)





Results


Lab


Laboratory Tests


20 14:20








20 22:00








20 03:10











Assessment/Plan


Assessment/Plan


Acute respiratory failure s/p surgery 


   -Continue vent 


      -Pt is not ready for extubation yet 


Severe Abd pain with acute GIB s/p ex lap 


Bleeding cecal mass s/p resection 


Anemia s/p 2 units of PRBC 


   -Monitor 


Hypotension 


   -Continue Levophed 


   -Check echo 


   -Give a liter bolus of LR 


Moderate bilateral pleural effusions


   -Monitor and await echo 


Metabolic acidosis 


   -1/2 NS with 2 amps of bicarb at 100cc/hr 


Hypoxia


Hx of Afib and aortic stenosis











SIN BERRIOS DO              2020 06:27

## 2020-07-28 NOTE — CONSULTATION-CARDIOLOGY
HPI-Cardiology


Cardiology Consultation:


Date of Consultation


20


Time Seen by a Provider:  15:30


Date of Admission





Attending Physician


Merly Singh MD


Admitting Physician


Saadia Cueva MD


Consulting Physician


NICOLE DUMONT MD, FACP, [facility]





HPI:


Chief Complaint:





Reason for consultation: CAD and recent cor stenting, acute abdomen, s/p 

hemicolectomy for bleeding cecal mass, GI bleed, respiratory failure, hypov

olemic shock








HPI


Ms. Rai is an 79 year old female who resides at a local long term care 

facility.  She is currently intubated and sedated.  Information has been 

obtained by review of the chart and Dr. Singh.  Per records she was brought to 

the ED with c/o abd pain and was found to have altered mental status and 

hypoxia.  She was taken to the OR and found to have a bleeding cecal mass for 

which she underwent hemicolectomy per Dr. Catherine.  She was found to be 

hypovolemic and progressed to resp failure for which she was placed on the vent.

 She has received transfusions.  Per conversation Dr. Singh had with her son she 

has had a recent coronary stent by Dr. Dixon at [facility] after which 

she was started on Effient and ASA.  She has been on Xarelto for stroke 

prophylaxis d/t PAF.





Review of Systems-Cardiology


Review of Systems


Constitutional:  other (she intubated, on mech vent, non-responsive; cannot 

provide ROS)





PMH-Social-Family Hx


Patient Social History


Alcohol Use:  Denies Use


Recreational Drug Use:  No


Smoking Status:  Former Smoker


Type Used:  Cigarettes


2nd Hand Smoke Exposure:  Yes (1980 QUITE)


Recent Foreign Travel:  No


Recent Infectious Disease Expo:  No


Hospitalization with Isolation:  Denies





Immunizations Up To Date


Tetanus Booster (TDap):  Unknown


Date of Pneumonia Vaccine:  Steven 10, 2018


Date of Influenza Vaccine:  Sep 12, 2019





Past Medical History


PMH


As described under Assessment.





Family Medical History


Family Medical History:  


She has reported in the past no known fam h/o early CAD or SCD





Allergies and Home Medications


Allergies


Coded Allergies:  


     pregabalin (Verified  Allergy, Unknown, 20)





Home Medications


Amiodarone HCl 200 Mg Tablet, 200 MG PO BID, (Reported)


Fluticasone/Umeclidin/Vilanter 1 Each Blst.w.dev, 1 EACH IH DAILY, (Reported)


Furosemide 40 Mg Tablet, 40 MG PO DAILY, (Reported)


L. Acidophilus/Pectin, Citrus 1 Each Capsule, 1 EACH PO TID, (Reported)


Losartan Potassium 100 Mg Tablet, 100 MG PO DAILY, (Reported)


   HOLD FOR SBP LOWER THAN 90 OR HR LOWER THAN 55 


Pantoprazole Sodium 40 Mg Tablet.dr, 40 MG PO BID, (Reported)


Potassium Chloride 20 Meq Tablet.er, 20 MEQ PO DAILY, (Reported)


Prasugrel HCl 10 Mg Tablet, 10 MG PO DAILY, (Reported)


Pravastatin Sodium 40 Mg Tablet, 40 MG PO HS, (Reported)


Rivaroxaban 20 Mg Tablet, 20 MG PO 1800, (Reported)





Patient Home Medication List


Home Medication List Reviewed:  Yes





Physical Exam-Cardiology


Physical Exam


Vital Signs/I&O











 20





 05:00 05:05 06:00 06:00


 


Pulse 69  57 55


 


Resp 18   18


 


B/P (MAP) 106/50 (68)  97/43 75/40 (52)


 


Pulse Ox 100   96


 


O2 Delivery Mechanical Ventilator   Mechanical Ventilator


 


O2 Flow Rate 40.00   40.00


 


FiO2  40  





 20





 06:44 07:00 07:48 08:00


 


Pulse 62 57 52 


 


Resp  19 28 


 


B/P (MAP)  93/40 (57)  


 


Pulse Ox  94 95 


 


O2 Delivery  Mechanical Ventilator  Mechanical Ventilator


 


O2 Flow Rate  40.00  


 


FiO2   45 50





 20





 08:00 08:00 09:00 09:20


 


Temp    35.7


 


Pulse  54 55 


 


Resp  17 24 


 


B/P (MAP)  112/43 (66) 143/50 (81) 


 


Pulse Ox 97 95 95 


 


O2 Delivery Mechanical Ventilator Mechanical Ventilator Mechanical Ventilator 


 


O2 Flow Rate  40.00 40.00 


 


FiO2 50   


 


    





 20





 09:23 09:55 10:00 10:49


 


Pulse 41 46 47 49


 


Resp   16 


 


B/P (MAP) 70/30 137/40 123/60 (81) 122/44


 


Pulse Ox   93 


 


O2 Delivery   Mechanical Ventilator 


 


O2 Flow Rate   40.00 





 20





 10:56 11:00 12:00 12:00


 


Temp   34.3 


 


Pulse 48 49  


 


Resp 30 14  


 


B/P (MAP)  151/49 (83)  


 


Pulse Ox 93 91  


 


O2 Delivery  Mechanical Ventilator  Mechanical Ventilator


 


O2 Flow Rate  40.00  


 


FiO2 45   50


 


    





 20





 12:00 12:41 13:00 13:32


 


Temp    36.0


 


Pulse 49 50 50 


 


Resp 13  10 


 


B/P (MAP) 122/47 (72)  126/51 (76) 


 


Pulse Ox 99  100 


 


O2 Delivery Mechanical Ventilator  Mechanical Ventilator 


 


O2 Flow Rate 40.00  40.00 


 


    





 20 





 14:00 15:00 15:29 


 


Pulse 51 52 53 


 


Resp 27 24 28 


 


B/P (MAP) 130/49 (76) 132/43 (72)  


 


Pulse Ox 100 100 99 


 


O2 Delivery Mechanical Ventilator Mechanical Ventilator  


 


O2 Flow Rate 40.00 40.00  


 


FiO2   45 














 20





 00:00


 


Intake Total 1010 ml


 


Output Total 420 ml


 


Balance 590 ml





Capillary Refill : Less Than 3 Seconds


Constitutional:  other (intabated and sedate)


Neck:  carotid bruit


Respiratory:  rhonchi (scattered), other (fair air entry, intubated)


Cardiovascular:  regular rate-rhythm, bradycardia, systolic murmur (2-3/6 MSM)


Gastrointestinal:  other (s/p abdominal surgery with dressing in place)


Extremities:  other (bilat pitting LE swelling)


Neurologic/Psychiatric:  other (intubated and sedated)


Lymphatic:  no adenopathy (neck, axilla or groin)





Data Review


Labs


Laboratory Tests


20 18:30: 


20 22:00: 


White Blood Count 11.6H, Red Blood Count 1.73L, Hemoglobin 5.4#*L, Hematocrit 

17*L, Mean Corpuscular Volume 96, Mean Corpuscular Hemoglobin 31, Mean 

Corpuscular Hemoglobin Concent 33, Red Cell Distribution Width 14.0, Platelet 

Count 395, Mean Platelet Volume 8.4, Neutrophils (%) (Auto) 93H, Lymphocytes (%)

(Auto) 3L, Monocytes (%) (Auto) 4, Eosinophils (%) (Auto) 0, Basophils (%) 

(Auto) 0, Neutrophils # (Auto) 10.8H, Lymphocytes # (Auto) 0.3L, Monocytes # 

(Auto) 0.4, Eosinophils # (Auto) 0.0, Basophils # (Auto) 0.0, Neutrophils % (Man

ual) 78, Lymphocytes % (Manual) 3, Monocytes % (Manual) 1, Band Neutrophils 18, 

Blood Morphology Comment NORMAL, Prothrombin Time 26.9H, INR Comment 2.4H, Blood

Gas Puncture Site ARTLINE, Blood Gas Patient Temperature 35.0, Arterial Blood pH

7.11*L, Arterial Blood Partial Pressure CO2 69H, Arterial Blood Partial Pressure

O2 100H, Arterial Blood HCO3 22L, Arterial Blood Total CO2 24.1, Arterial Blood 

Oxygen Saturation 96, Arterial Blood Base Excess -6.8L, Jorge Test ART LINE, 

Blood Gas Ventilator Setting YES, Blood Gas Inspired Oxygen 50%, Sodium Level 

131L, Potassium Level 3.9, Chloride Level 101, Carbon Dioxide Level 19L, Anion 

Gap 11, Blood Urea Nitrogen 16, Creatinine 0.70, Estimat Glomerular Filtration 

Rate > 60, BUN/Creatinine Ratio 23, Glucose Level 144H, Calcium Level 7.1L, 

Phosphorus Level 5.3H, Magnesium Level 1.4L


20 00:25: 


Blood Gas Puncture Site ARTLINE, Blood Gas Patient Temperature 35.3, Arterial 

Blood pH 7.10*L, Arterial Blood Partial Pressure CO2 59H, Arterial Blood Partial

Pressure O2 122H, Arterial Blood HCO3 18L, Arterial Blood Total CO2 20.2L, 

Arterial Blood Oxygen Saturation 97, Arterial Blood Base Excess -10.2L, Jorge 

Test ART LINE, Blood Gas Ventilator Setting YES, Blood Gas Inspired Oxygen 50%


20 03:10: 


White Blood Count 7.5, Red Blood Count 3.35L, Hemoglobin 10.2#L, Hematocrit 30L,

Mean Corpuscular Volume 90, Mean Corpuscular Hemoglobin 30, Mean Corpuscular 

Hemoglobin Concent 34, Red Cell Distribution Width 14.8H, Platelet Count 264, M

haris Platelet Volume 8.5, Neutrophils (%) (Auto) 88H, Lymphocytes (%) (Auto) 7L, 

Monocytes (%) (Auto) 5, Eosinophils (%) (Auto) 0, Basophils (%) (Auto) 0, Neut

rophils # (Auto) 6.6, Lymphocytes # (Auto) 0.5L, Monocytes # (Auto) 0.3, Eosino

phils # (Auto) 0.0, Basophils # (Auto) 0.0, Blood Gas Puncture Site ART LINE, 

Blood Gas Patient Temperature 35.6, Arterial Blood pH 7.14*L, Arterial Blood 

Partial Pressure CO2 58H, Arterial Blood Partial Pressure O2 124H, Arterial 

Blood HCO3 20L, Arterial Blood Total CO2 21.5, Arterial Blood Oxygen Saturation 

98, Arterial Blood Base Excess -8.2L, Jorge Test ART LINE, Blood Gas Ventilator 

Setting YES, Blood Gas Inspired Oxygen 50%, Sodium Level 128L, Potassium Level 

3.9, Chloride Level 99, Carbon Dioxide Level 16L, Anion Gap 13, Blood Urea 

Nitrogen 18, Creatinine 0.93, Estimat Glomerular Filtration Rate 58, 

BUN/Creatinine Ratio 19, Glucose Level 246H, Calcium Level 7.0L, Phosphorus 

Level 5.7H, Magnesium Level 1.3L, Corrected Calcium 8.5, Total Bilirubin 0.5, 

Aspartate Amino Transf (AST/SGOT) 57H, Alanine Aminotransferase (ALT/SGPT) 29, A

lkaline Phosphatase 81, Total Protein 4.0L, Albumin 2.1L


20 10:15: 


White Blood Count 9.1, Red Blood Count 3.17L, Hemoglobin 9.5L, Hematocrit 28L, 

Mean Corpuscular Volume 87, Mean Corpuscular Hemoglobin 30, Mean Corpuscular 

Hemoglobin Concent 34, Red Cell Distribution Width 15.8H, Platelet Count 240, 

Mean Platelet Volume 8.8, Prothrombin Time 23.9H, INR Comment 2.1H, Sodium Level

126L, Potassium Level 3.2L, Chloride Level 100, Carbon Dioxide Level 17L, Anion 

Gap 9, Blood Urea Nitrogen 19H, Creatinine 0.87, Estimat Glomerular Filtration R

ate > 60, BUN/Creatinine Ratio 22, Glucose Level 177H, Calcium Level 6.1L, 

Corrected Calcium 7.9L, Magnesium Level 2.2, Total Bilirubin 0.6, Aspartate 

Amino Transf (AST/SGOT) 72H, Alanine Aminotransferase (ALT/SGPT) 34, Alkaline 

Phosphatase 64, Total Protein 3.3L, Albumin 1.8L, Triglycerides Level 48


20 11:48: Lactic Acid Level 3.04*H


20 13:20: Lactic Acid Level 3.45*H





Microbiology


20 Blood Culture - Preliminary, Resulted


          No growth





Laboratory Tests


20 14:20








20 22:00








20 03:10








20 10:15











A/P-Cardiology


Assessment/Admission Diagnosis





Hypovolemic (blood loss) shock - received transfusion, requiring pressor support





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





Acute resp failure - intubated and sedated





CAD. Card cath of 19 showed angiographically mild CAD, LVEF 65%, LVEDP at 

top limit of normal.  Per Dr. Singh family is reporting recent coronary stenting 

by Dr. Dixon at [facility], exact details unknown, has been maintained 

on Effient and ASA.





Sinus node disfunction and PAF. On Xarelto for stroke prophylaxis





Echocardiogram of 2020 by Dr. Gamez showed LVEF 55-65%.  Grade 2 

diastolic dysfunction.  Mild mitral stenosis with mean gradient 2.2mHg, MVA 3.3 

cm2.  Mod calcified aortic valve with mod-severe stenosis, mean grad 26mmHg ASHELY 

1.1cm2, mild to mod regurg.  Severe TR.  PASP 65-70mmHg





H/o scleroderma and chronic dysphagia, followed and treated by her pcp





Quit smoking in 





Borderline low TSH on 11/3/18 (0.33), followed by her pcp Dr Cueva





Carotid u/s of 2019 showed 60-79% R ICA stenosis; less than 40% L ICA 

stenosis.





Incidental note of L thyroid nodules seen at time of carotid u/s of 2019 for

which f/u is with her PCP





Discussion and Recomendations





* Complex management


* Critically ill


   Hold off on Xarelto and ASA due to bleeding requiring surgery and leading to 

   hypovolemic shock that has required transfusion


* Because family reports h/o cor stent in 2020, she remains at high risk of

  stent thrombosis w/o any antiplatelet agents. This issue has to be weighed 

  carefully. If bleeding is showing signs of having stabilized, then it may be 

  reasonable to consider Plavix therapy. I discussed this issue with Dr Singh on 

  the phone this morning


* Monitor lab closely


* We would like to thank Dr. Singh for this consult





Clinical Quality Measures


DVT/VTE Risk/Contraindication:


Risk Factor Score Per Nursin


RFS Level Per Nursing on Admit:  4+=Very High











NICOLE DUMONT MD FACP [facility]   2020 16:42

## 2020-07-28 NOTE — PROGRESS NOTE - SURGERY
Subjective


Time Seen by a Provider:  07:33


Subjective/Events-last exam


Pt seen and examined, sedated on vent.


Review of Systems


pt on vent





Focused Exam


Lactate Level


20 14:20: Lactic Acid Level 2.33*H


20 16:37: Lactic Acid Level 1.51





Objective


Exam





Vital Signs








  Date Time  Temp Pulse Resp B/P (MAP) Pulse Ox O2 Delivery O2 Flow Rate FiO2


 


20 06:00  55 18 75/40 (52) 96 Mechanical Ventilator 40.00 


 


20 06:00  57  97/43    


 


20 05:05        40


 


20 05:00  69 18 106/50 (68) 100 Mechanical Ventilator 40.00 


 


20 04:07  65  95/50    


 


20 04:00  65 22 102/50 (67) 99 Mechanical Ventilator 45.00 


 


20 04:00      Mechanical Ventilator  50


 


20 04:00     97 Mechanical Ventilator  50


 


20 03:47      Mechanical Ventilator 45.00 


 


20 03:40        45


 


20 03:32 35.4 64 16 106/53 100 Mechanical Ventilator  50


 


20 03:05  62 28  99   50


 


20 03:00  62 20 84/53 (63) 100 Mechanical Ventilator 50.00 


 


20 02:15 36.0 62 22 79/41 98 Mechanical Ventilator  50


 


20 02:09  62  77/40    


 


20 02:08  62  77/40    


 


20 02:00  61 21 76/39 (51) 97 Mechanical Ventilator 50.00 


 


20 01:56 35.3 62 22 88/46 98 Mechanical Ventilator  50


 


20 01:25 35.6 65 21 104/46 99 Mechanical Ventilator  50


 


20 01:06  62      


 


20 01:00  61 23 92/42 (59) 94 Mechanical Ventilator 50.00 


 


20 00:19  61  80/35    


 


20 00:14 35.3 61 22 80/35 93 Mechanical Ventilator  50


 


20 00:03 35.3 62 22 82/36 98 Mechanical Ventilator  50


 


20 00:00      Mechanical Ventilator  50


 


20 00:00  63 26 82/36 (51) 98 Mechanical Ventilator 50.00 


 


20 00:00     97 Mechanical Ventilator  50


 


20 23:32  63 22  99   50


 


20 23:00  65 30 80/34 (49) 98 Mechanical Ventilator 50.00 


 


20 22:30  61 21 92/39 (56) 94 Mechanical Ventilator 50.00 


 


20 22:00  56 22 91/37 (55) 95 Mechanical Ventilator 50.00 


 


20 21:30      Mechanical Ventilator 10 


 


20 21:30  56 18 102/41 (61) 96 Mechanical Ventilator 50.00 


 


20 21:30     97 Mechanical Ventilator  50


 


20 21:25 36.1  14 104/45 (64) 97 Mechanical Ventilator  


 


20 21:15  55 18 113/47 (69) 94 Mechanical Ventilator 50.00 


 


20 21:15   14 109/49 (69) 95 Mechanical Ventilator  


 


20 21:08   14 112/45 (67) 93 Mechanical Ventilator  


 


20 21:05      Mechanical Ventilator 10 


 


20 21:01  55      


 


20 21:00  49 18 113/47 (69) 100 Mechanical Ventilator 50.00 


 


20 21:00   14 98/43 (61) 99 Mechanical Ventilator  


 


20 20:58  43  88/32    


 


20 20:55   14 71/33 (46) 100 Mechanical Ventilator  


 


20 20:50      Mechanical Ventilator 10 


 


20 20:45  60 14  99   50


 


20 20:45  39 7 61/23 (36) 100 Mechanical Ventilator 50.00 


 


20 20:45   14 62/27 (39) 100 Mechanical Ventilator  


 


20 20:40   12 69/29 (42) 100 Ambu Bag 10 


 


20 20:35      Ambu Bag 10 


 


20 20:35 36.1  12 78/35 (49) 100 Ambu Bag 10 


 


20 20:30    62/27 (39)  Mechanical Ventilator 50.00 


 


20 20:00      Nasal Cannula 4.00 


 


20 20:00      Mechanical Ventilator  50


 


20 19:00      Nasal Cannula 4.00 


 


20 17:45  56 22 134/48 (76) 100 Nasal Cannula 4.00 


 


20 17:30  54 36 123/43 (69) 98 Nasal Cannula 4.00 


 


20 17:27     98 Nasal Cannula 5.00 


 


20 17:25 36.5 72 38 153/56 100  10.00 





       10.00 


 


20 17:15  57 23   Nasal Cannula 4.00 


 


20 17:04  59 22 116/45 99 Nasal Cannula 3.00 


 


20 17:00    131/44 (73)    


 


20 17:00  55      


 


20 14:12 36.5 72 38 153/56 (88) 100 Non Rebreather 10.00 














I & O 


 


 20





 07:00


 


Intake Total 1510 ml


 


Output Total 520 ml


 


Balance 990 ml





Capillary Refill : Less Than 3 Seconds


General Appearance:  Chronically ill, Severe Distress


Respiratory:  No Accessory Muscle Use, No Respiratory Distress, Crackles


Cardiovascular:  Regular Rate, Rhythm


Gastrointestinal:  other (incision c/d/i)


Extremity:  Normal Capillary Refill, Pedal Edema (2+ bilateral lower 

extremities)





Results


Lab


Laboratory Tests


20 14:20: 


White Blood Count 12.5H, Red Blood Count 2.63L, Hemoglobin 8.2L, Hematocrit 25L,

Mean Corpuscular Volume 94, Mean Corpuscular Hemoglobin 31, Mean Corpuscular 

Hemoglobin Concent 33, Red Cell Distribution Width 14.1, Platelet Count 545H, M

haris Platelet Volume 8.7, Neutrophils (%) (Auto) 74, Lymphocytes (%) (Auto) 14, 

Monocytes (%) (Auto) 11, Eosinophils (%) (Auto) 1, Basophils (%) (Auto) 0, 

Neutrophils # (Auto) 9.3H, Lymphocytes # (Auto) 1.7, Monocytes # (Auto) 1.4H, 

Eosinophils # (Auto) 0.1, Basophils # (Auto) 0.0, Prothrombin Time 29.5H, INR 

Comment 2.8H, Urine Color YELLOW, Urine Clarity CLEAR, Urine pH 6.0, Urine 

Specific Gravity 1.010L, Urine Protein NEGATIVE, Urine Glucose (UA) NEGATIVE, 

Urine Ketones NEGATIVE, Urine Nitrite NEGATIVE, Urine Bilirubin NEGATIVE, Urine 

Urobilinogen 0.2, Urine Leukocyte Esterase TRACEH, Urine RBC (Auto) NEGATIVE, 

Urine RBC NONE, Urine WBC 0-2, Urine Crystals PRESENTH, Urine Amorphous Sediment

FEW KAELYN URATESH, Urine Bacteria TRACE, Urine Casts PRESENT, Urine Hyaline Casts

5-10H, Urine Mucus NEGATIVE, Urine Culture Indicated NO, Sodium Level 128L, 

Potassium Level 4.1, Chloride Level 96L, Carbon Dioxide Level 18L, Anion Gap 14,

Blood Urea Nitrogen 12, Creatinine 0.77, Estimat Glomerular Filtration Rate > 

60, BUN/Creatinine Ratio 16, Glucose Level 186H, Lactic Acid Level 2.33*H, 

Calcium Level 8.2L, Corrected Calcium 8.9, Total Bilirubin 0.5, Aspartate Amino 

Transf (AST/SGOT) 67H, Alanine Aminotransferase (ALT/SGPT) 37, Alkaline 

Phosphatase 113, Troponin I < 0.028, B-Type Natriuretic Peptide 186.2H, Total 

Protein 5.8L, Albumin 3.1L, Procalcitonin 0.05


20 16:37: Lactic Acid Level 1.51


20 18:30: 


20 22:00: 


White Blood Count 11.6H, Red Blood Count 1.73L, Hemoglobin 5.4#*L, Hematocrit 

17*L, Mean Corpuscular Volume 96, Mean Corpuscular Hemoglobin 31, Mean 

Corpuscular Hemoglobin Concent 33, Red Cell Distribution Width 14.0, Platelet 

Count 395, Mean Platelet Volume 8.4, Neutrophils (%) (Auto) 93H, Lymphocytes (%)

(Auto) 3L, Monocytes (%) (Auto) 4, Eosinophils (%) (Auto) 0, Basophils (%) 

(Auto) 0, Neutrophils # (Auto) 10.8H, Lymphocytes # (Auto) 0.3L, Monocytes # 

(Auto) 0.4, Eosinophils # (Auto) 0.0, Basophils # (Auto) 0.0, Prothrombin Time 

26.9H, INR Comment 2.4H, Sodium Level 131L, Potassium Level 3.9, Chloride Level 

101, Carbon Dioxide Level 19L, Anion Gap 11, Blood Urea Nitrogen 16, Creatinine 

0.70, Estimat Glomerular Filtration Rate > 60, BUN/Creatinine Ratio 23, Glucose 

Level 144H, Calcium Level 7.1L, Neutrophils % (Manual) 78, Lymphocytes % 

(Manual) 3, Monocytes % (Manual) 1, Band Neutrophils 18, Blood Morphology 

Comment NORMAL, Blood Gas Puncture Site ARTLINE, Blood Gas Patient Temperature 

35.0, Arterial Blood pH 7.11*L, Arterial Blood Partial Pressure CO2 69H, 

Arterial Blood Partial Pressure O2 100H, Arterial Blood HCO3 22L, Arterial Blood

Total CO2 24.1, Arterial Blood Oxygen Saturation 96, Arterial Blood Base Excess 

-6.8L, Jorge Test ART LINE, Blood Gas Ventilator Setting YES, Blood Gas Inspired

Oxygen 50%, Phosphorus Level 5.3H, Magnesium Level 1.4L


20 00:25: 


Blood Gas Puncture Site ARTLINE, Blood Gas Patient Temperature 35.3, Arterial 

Blood pH 7.10*L, Arterial Blood Partial Pressure CO2 59H, Arterial Blood Partial

Pressure O2 122H, Arterial Blood HCO3 18L, Arterial Blood Total CO2 20.2L, 

Arterial Blood Oxygen Saturation 97, Arterial Blood Base Excess -10.2L, Jorge 

Test ART LINE, Blood Gas Ventilator Setting YES, Blood Gas Inspired Oxygen 50%


20 03:10: 


Blood Gas Puncture Site ART LINE, Blood Gas Patient Temperature 35.6, Arterial 

Blood pH 7.14*L, Arterial Blood Partial Pressure CO2 58H, Arterial Blood Partial

Pressure O2 124H, Arterial Blood HCO3 20L, Arterial Blood Total CO2 21.5, 

Arterial Blood Oxygen Saturation 98, Arterial Blood Base Excess -8.2L, Jorge 

Test ART LINE, Blood Gas Ventilator Setting YES, Blood Gas Inspired Oxygen 50%, 

White Blood Count 7.5, Red Blood Count 3.35L, Hemoglobin 10.2#L, Hematocrit 30L,

Mean Corpuscular Volume 90, Mean Corpuscular Hemoglobin 30, Mean Corpuscular 

Hemoglobin Concent 34, Red Cell Distribution Width 14.8H, Platelet Count 264, 

Mean Platelet Volume 8.5, Neutrophils (%) (Auto) 88H, Lymphocytes (%) (Auto) 7L,

Monocytes (%) (Auto) 5, Eosinophils (%) (Auto) 0, Basophils (%) (Auto) 0, 

Neutrophils # (Auto) 6.6, Lymphocytes # (Auto) 0.5L, Monocytes # (Auto) 0.3, 

Eosinophils # (Auto) 0.0, Basophils # (Auto) 0.0, Sodium Level 128L, Potassium 

Level 3.9, Chloride Level 99, Carbon Dioxide Level 16L, Anion Gap 13, Blood Urea

Nitrogen 18, Creatinine 0.93, Estimat Glomerular Filtration Rate 58, BUN

/Creatinine Ratio 19, Glucose Level 246H, Calcium Level 7.0L, Corrected Calcium 

8.5, Phosphorus Level 5.7H, Magnesium Level 1.3L, Total Bilirubin 0.5, Aspartate

Amino Transf (AST/SGOT) 57H, Alanine Aminotransferase (ALT/SGPT) 29, Alkaline 

Phosphatase 81, Total Protein 4.0L, Albumin 2.1L





Assessment/Plan


S/P  Right Colon resection


Hx of Afib and aortic stenosis





Pt is sedated on vent after surgery last night, still requiring levophed and 

urine output is poor.  Hg up after transfusion of 2 units PRBC.  Continue max 

supportive care, try waking pt up to get weaning parameters.





Clinical Quality Measures


DVT/VTE Risk/Contraindication:


Risk Factor Score Per Nursin


RFS Level Per Nursing on Admit:  4+=Very High











DAKSHA VELOZ DO               2020 07:53

## 2020-07-28 NOTE — PROGRESS NOTE - HOSPITALIST
Subjective


HPI/CC On Admission


Date Seen by Provider:  2020


Time Seen by Provider:  07:58


Pt is an 79yoCF with a PMH of aortic stenosis, COPD, CAD with recent stenting, 

HTN, HLD, hypothyroidism, paroxysmal atrial fibrillation on chronic 

anticoagulation who presented to the ER due to hypoxia. She is unable to provide

much history and onlyanswered a few yes or no questions for me. She told me she 

was sick and that she was not having pain. Otherwise all history obtained from 

family and records. She was apparently in her normal state this morning 

(ambulatory and alert and oriented x4). She spoke to her son and then had lunch.

Sometime after lunch they found her in her room minimallyresponsive. There was 

concern for an episode of apnea as well. EMS was summoned and she was found to 

have oxygen saturations in the 60%. She was placed on a nonrebreather which 

brought her oxygen saturations up. She was taken for CT head as she is on 

Xarelto.


Subjective/Events-last exam


Pt is sedated and intubated. No ROS possible. Reviewed events from overnight. 

Went to OR for ex lap and found to have bleeding cecal mass. Developed severe 

anemia and transfused overnight and necessitated pressors.





Focused Exam


Lactate Level


20 14:20: Lactic Acid Level 2.33*H


20 16:37: Lactic Acid Level 1.51








Objective


Exam


Vital Signs





Vital Signs








  Date Time  Temp Pulse Resp B/P (MAP) Pulse Ox O2 Delivery O2 Flow Rate FiO2


 


20 07:48  52 28  95   45


 


20 06:00    75/40 (52)  Mechanical Ventilator 40.00 


 


20 03:32 35.4       





Capillary Refill : Less Than 3 Seconds


General Appearance:  Other (ill appearing, sedated on vent)


Neck:  Other (cental line in place)


Respiratory:  Decreased Breath Sounds (in cases), Other (on vent)


Cardiovascular:  Regular Rate, Rhythm, Systolic Murmur


Gastrointestinal:  Other (abdominal binder in place)


Extremity:  Swelling (2+ to ankles bilaterally)


Neurologic/Psychiatric:  Other (sedated, appears comfortable)





Results/Procedures


Lab


Laboratory Tests


20 14:20








20 22:00








20 03:10








Patient resulted labs reviewed.





Assessment/Plan


Assessment and Plan


Assess & Plan/Chief Complaint


Hemorrhagic shock


bleeding cecal mass


Severe anemia


   Went to ex lap last night and bleeding mass found


   Transfused 2 units pRBCs overnight


      Hgb from 5.4-> 10.2


   Received multiple doses of ephedrine by anesthesia


   Currently on levophed, wean as able





Acute Hypoxic Respiratory Failure


Recently treated pneumonia


   Maintain on vent


   Pulm/TeleICU consulted, appreciate recs


   


Mixed metabolic and respiratory acidosis


   vent setting per pulm


   Continue bicarb gtt


   Creatinine up a bit since last night (0.70->0.93), will trend





CAD


Aortic Stenosis


   Son reports she recently had a stent placed by Dr Dixon


   Request records


   Will have to unfortunately hold DAPT right now due to bleeding and anemia


   Consult cardiology for assistance


   


COPD


   Resume home inhalers


   MAT protocol





Critical Care


Critically Ill Patient





Diagnosis/Problems


Diagnosis/Problems





(1) Acute respiratory failure


Status:  Acute


Qualifiers:  


   Respiratory failure complication:  hypercapnia  Qualified Codes:  J96.02 - 

Acute respiratory failure with hypercapnia


(2) Hemorrhagic shock


Status:  Acute


(3) Hypomagnesemia


Status:  Acute


(4) Normocytic anemia


Status:  Acute


(5) COPD (chronic obstructive pulmonary disease)


Qualifiers:  


   COPD type:  unspecified COPD  Qualified Codes:  J44.9 - Chronic obstructive 

pulmonary disease, unspecified


(6) Hypothyroidism


Status:  Chronic


Qualifiers:  


   Hypothyroidism type:  unspecified  Qualified Codes:  E03.9 - Hypothyroidism, 

unspecified


(7) Metabolic acidosis


Status:  Acute


(8) Respiratory acidosis


Status:  Acute


(9) Aortic stenosis


Qualifiers:  


   Cardiac valve disease etiology:  etiology unspecified  Qualified Codes:  

I35.0 - Nonrheumatic aortic (valve) stenosis


(10) Atrial fibrillation


Status:  Acute


Qualifiers:  


   Atrial fibrillation type:  paroxysmal  Qualified Codes:  I48.0 - Paroxysmal 

atrial fibrillation


(11) CAD (coronary artery disease)


Status:  Acute


Qualifiers:  


   Coronary Disease-Associated Artery/Lesion type:  native artery  Native vs. 

transplanted heart:  native heart  Associated angina:  without angina  Qualified

Codes:  I25.10 - Atherosclerotic heart disease of native coronary artery without

angina pectoris





Clinical Quality Measures


DVT/VTE Risk/Contraindication:


Risk Factor Score Per Nursin


RFS Level Per Nursing on Admit:  4+=Very High











SELINA JASMINE MD              2020 08:09

## 2020-07-28 NOTE — NUR
Palliative Care RN in to see patient.  She is intubated and sedated.   Will wait to talk 
with family until we see the direction this hospitalization takes.

## 2020-07-28 NOTE — DIAGNOSTIC IMAGING REPORT
EXAMINATION: Chest radiograph, portable AP view.



DATE: 7/28/2020 2:26 AM hours.



INDICATION: 79-year-old female, dyspnea.



COMPARISON:  July 27, 2020.



FINDINGS: The endotracheal tube is approximately 3.9 cm above the

mirtha. The left internal jugular central venous line overlies

the mid SVC. The nasogastric tube is at the level of the distal

esophagus just slightly proximal to the gastroesophageal

junction. Advancement is recommended. Stable overall appearance

of the cardiomediastinal silhouette. There are vascular

calcifications. There is no identified pneumothorax. There is

nonspecific right mid and lower lung zone opacification with

probable right pleural effusion. There are streaky opacities in

the right upper lobe which are an interval change. There also are

streaky opacities in the left mid and upper lung zones which are

an interval change. There are bilateral glenohumeral arthritic

changes. There is a redemonstrated healed prior fracture

deformity of the middle third left clavicle. There are bilateral

carotid vascular calcifications.



IMPRESSION: 

1. Right-sided pleural effusion with nonspecific right mid and

lower lung zone consolidation which is unchanged since comparison

exam.

2. New streaky opacities in the right upper lobe and left mid to

upper lung zones which may reflect atelectasis and/or infiltrate.

3. Nasogastric tube side port at the level of the very distal

esophagus. Recommend advancement.





Dictated by: 



  Dictated on workstation # [id number]

## 2020-07-28 NOTE — CONSULTATION-CARDIOLOGY
HPI-Cardiology


Cardiology Consultation:


Date of Consultation


20


Time Seen by a Provider:  08:45


Date of Admission


2020


Attending Physician


Merly Singh MD


Admitting Physician


Saadia Cueva MD


Consulting Physician


Efrem Babcock MD





HPI:


Chief Complaint:


Respiratory failure


Hypovolemic shock


OAC


CAD


Ms. Phillips is an 79 year old female who resides at a local long term care 

facility.  She is currently intubated and sedated.  Information has been 

obtained by review of the chart and Dr. Singh.  Per records she was brought to 

the ED with c/o abd pain and was found to have altered mental status and 

hypoxia.  She was taken to the OR and found to have a bleeding cecal mass for 

which she underwent hemicolectomy per Dr. Veloz.  She was found to be 

hypovolemic and progressed to resp failure for which she was placed on the vent.

 She has received transfusions.  Per conversation Dr. Singh had with her son she 

has had a recent coronary stent by Dr. Dixon at [facility] after which 

she was started on Effient and ASA.  She has been on Xarelto for stroke 

prophylaxis d/t PAF.





Review of Systems-Cardiology


Review of Systems


Other comments


Unable to obtain d/t intubation and sedation





PMH-Social-Family Hx


Patient Social History


Alcohol Use:  Denies Use


Recreational Drug Use:  No


Smoking Status:  Former Smoker


Type Used:  Cigarettes


2nd Hand Smoke Exposure:  Yes (1980 QUITE)


Recent Foreign Travel:  No


Recent Infectious Disease Expo:  No


Hospitalization with Isolation:  Denies





Immunizations Up To Date


Tetanus Booster (TDap):  Unknown


Date of Pneumonia Vaccine:  Steven 10, 2018


Date of Influenza Vaccine:  Sep 12, 2019





Past Medical History


PMH


As described under Assessment.





Family Medical History


Family Medical History:  


She has reported in the past no known fam h/o early CAD or SCD





Allergies and Home Medications


Allergies


Coded Allergies:  


     pregabalin (Verified  Allergy, Unknown, 20)





Home Medications


Amiodarone HCl 200 Mg Tablet, 200 MG PO BID, (Reported)


Fluticasone/Umeclidin/Vilanter 1 Each Blst.w.dev, 1 EACH IH DAILY, (Reported)


Furosemide 40 Mg Tablet, 40 MG PO DAILY, (Reported)


L. Acidophilus/Pectin, Citrus 1 Each Capsule, 1 EACH PO TID, (Reported)


Losartan Potassium 100 Mg Tablet, 100 MG PO DAILY, (Reported)


   HOLD FOR SBP LOWER THAN 90 OR HR LOWER THAN 55 


Pantoprazole Sodium 40 Mg Tablet.dr, 40 MG PO BID, (Reported)


Potassium Chloride 20 Meq Tablet.er, 20 MEQ PO DAILY, (Reported)


Prasugrel HCl 10 Mg Tablet, 10 MG PO DAILY, (Reported)


Pravastatin Sodium 40 Mg Tablet, 40 MG PO HS, (Reported)


Rivaroxaban 20 Mg Tablet, 20 MG PO 1800, (Reported)





Physical Exam-Cardiology


Physical Exam


Vital Signs/I&O











 20





 21:00 22:00 22:25 23:00


 


Pulse 56 55 55 56


 


Resp  28


 


B/P (MAP) 125/34 (64) 114/32 (59)  146/37 (73)


 


Pulse Ox 100 100 100 100


 


O2 Delivery Mechanical Ventilator Mechanical Ventilator  Mechanical Ventilator


 


O2 Flow Rate 30.00 30.00  30.00


 


FiO2   30 





 20





 00:00 00:00 00:00 00:00


 


Temp    36.0


 


Pulse   56 


 


Resp   28 


 


B/P (MAP)   138/36 (70) 


 


Pulse Ox 95  100 


 


O2 Delivery Mechanical Ventilator Mechanical Ventilator Mechanical Ventilator 


 


O2 Flow Rate   30.00 


 


FiO2 30 50  


 


    





 20





 00:36 01:00 01:00 01:03


 


Pulse 54 56 56 56


 


Resp    28


 


B/P (MAP) 103/32 151/40 (77)  


 


Pulse Ox  100  98


 


O2 Delivery  Mechanical Ventilator  


 


O2 Flow Rate  30.00  


 


FiO2    30





 20





 02:00 03:00 03:44 04:00


 


Temp   35.9 


 


Pulse 55 54  


 


Resp   


 


B/P (MAP) 131/34 (66) 139/35 (69)  


 


Pulse Ox 99 100  


 


O2 Delivery Mechanical Ventilator Mechanical Ventilator  Mechanical Ventilator


 


O2 Flow Rate 30.00 30.00  


 


FiO2    50


 


    





 20





 04:00 04:00 05:00 05:14


 


Pulse  55 55 54


 


Resp  24 22 


 


B/P (MAP)  149/37 (74) 156/36 (76) 154/37


 


Pulse Ox 95 98 100 


 


O2 Delivery Mechanical Ventilator Mechanical Ventilator Mechanical Ventilator 


 


O2 Flow Rate  30.00 30.00 


 


FiO2 30   





 20





 06:00 07:32 07:48 08:30


 


Temp  36.3  


 


Pulse 54  49 49


 


Resp 27   


 


B/P (MAP) 145/36 (72)  119/33 119/33


 


Pulse Ox 99   


 


O2 Delivery Mechanical Ventilator   


 


O2 Flow Rate 30.00   


 


    





 20 





 08:31 08:44 08:46 


 


Temp 36.3 36.2 36.2 


 


Pulse 58 56 56 


 


Resp 25 24  


 


B/P (MAP) 140/38 124/34 126/35 


 


Pulse Ox 99 2 30 


 


O2 Delivery Mechanical Ventilator Mechanical Ventilator Mechanical Ventilator 


 


FiO2 30 30  














 20





 00:00


 


Intake Total 0 ml


 


Output Total 400 ml


 


Balance -400 ml





Capillary Refill : Less Than 3 Seconds


Constitutional:  other (intabated and sedate)


Neck:  carotid bruit


Respiratory:  rhonchi (scattered), other (fair air entry, intubated)


Cardiovascular:  regular rate-rhythm, bradycardia, systolic murmur (2-3/6 MSM)


Gastrointestinal:  other (s/p abdominal surgery with dressing in place)


Extremities:  other (bilat pitting LE swelling)


Neurologic/Psychiatric:  other (intubated and sedated)


Lymphatic:  no adenopathy (neck, axilla or groin)





Data Review


Labs


Laboratory Tests


20 10:15: 


White Blood Count 9.1, Red Blood Count 3.17L, Hemoglobin 9.5L, Hematocrit 28L, 

Mean Corpuscular Volume 87, Mean Corpuscular Hemoglobin 30, Mean Corpuscular 

Hemoglobin Concent 34, Red Cell Distribution Width 15.8H, Platelet Count 240, 

Mean Platelet Volume 8.8, Prothrombin Time 23.9H, INR Comment 2.1H, Sodium Level

126L, Potassium Level 3.2L, Chloride Level 100, Carbon Dioxide Level 17L, Anion 

Gap 9, Blood Urea Nitrogen 19H, Creatinine 0.87, Estimat Glomerular Filtration 

Rate > 60, BUN/Creatinine Ratio 22, Glucose Level 177H, Calcium Level 6.1L, 

Corrected Calcium 7.9L, Magnesium Level 2.2, Total Bilirubin 0.6, Aspartate 

Amino Transf (AST/SGOT) 72H, Alanine Aminotransferase (ALT/SGPT) 34, Alkaline 

Phosphatase 64, Total Protein 3.3L, Albumin 1.8L, Triglycerides Level 48


20 11:48: Lactic Acid Level 3.04*H


20 13:20: Lactic Acid Level 3.45*H


20 16:38: Lactic Acid Level 3.41*H


20 18:50: 


Blood Gas Puncture Site LEFT RADIAL, Blood Gas Patient Temperature 35.7, Ar

terial Blood pH 7.37, Arterial Blood Partial Pressure CO2 39, Arterial Blood 

Partial Pressure O2 70L, Arterial Blood HCO3 22L, Arterial Blood Total CO2 23.6,

Arterial Blood Oxygen Saturation 96, Arterial Blood Base Excess -2.4, Jorge Test

YES-POS, Blood Gas Ventilator Setting YES, Blood Gas Inspired Oxygen 30%, Lactic

Acid Level 3.40*H


20 20:55: Lactic Acid Level 3.38*H


20 22:55: Lactic Acid Level 3.58*H


20 01:15: Lactic Acid Level 3.80*H


20 03:30: 


White Blood Count 16.9H, Red Blood Count 2.49L, Hemoglobin 7.4#L, Hematocrit 21L

, Mean Corpuscular Volume 85, Mean Corpuscular Hemoglobin 30, Mean Corpuscular 

Hemoglobin Concent 35, Red Cell Distribution Width 16.2H, Platelet Count 171, 

Mean Platelet Volume 9.4, Neutrophils (%) (Auto) 92H, Lymphocytes (%) (Auto) 3L,

Monocytes (%) (Auto) 5, Eosinophils (%) (Auto) 0, Basophils (%) (Auto) 0, 

Neutrophils # (Auto) 15.6H, Lymphocytes # (Auto) 0.5L, Monocytes # (Auto) 0.8, 

Eosinophils # (Auto) 0.0, Basophils # (Auto) 0.0, Neutrophils % (Manual) 67, 

Lymphocytes % (Manual) 2, Monocytes % (Manual) 3, Metamyelocytes % 2, Band 

Neutrophils 26, Helmet Cells RARE, Crenated Cell MODERATE, Blood Gas Puncture Si

te ART LINE, Blood Gas Patient Temperature 35.9, Arterial Blood pH 7.45H, 

Arterial Blood Partial Pressure CO2 36, Arterial Blood Partial Pressure O2 73L, 

Arterial Blood HCO3 24, Arterial Blood Total CO2 25.5, Arterial Blood Oxygen 

Saturation 96, Arterial Blood Base Excess 0.5, Jorge Test ART LINE, Blood Gas 

Ventilator Setting YES, Blood Gas Inspired Oxygen 30%, Sodium Level 128L, 

Potassium Level 3.8, Chloride Level 95L, Carbon Dioxide Level 20L, Anion Gap 13,

Blood Urea Nitrogen 25H, Creatinine 0.85, Estimat Glomerular Filtration Rate > 

60, BUN/Creatinine Ratio 29, Glucose Level 119H, Lactic Acid Level 3.80*H, 

Calcium Level 6.9L, Phosphorus Level 4.6, Magnesium Level 2.1, Procalcitonin 7

.43H


20 05:25: Lactic Acid Level 3.90*H


20 06:35: 


Urine Color YELLOW, Urine Clarity SL CLOUDY, Urine pH 5.5, Urine Specific 

Gravity 1.020, Urine Protein NEGATIVE, Urine Glucose (UA) NEGATIVE, Urine 

Ketones NEGATIVE, Urine Nitrite NEGATIVE, Urine Bilirubin NEGATIVE, Urine 

Urobilinogen 0.2, Urine Leukocyte Esterase NEGATIVE, Urine RBC (Auto) 2+H, Urine

RBC 10-25H, Urine WBC RARE, Urine Crystals NONE, Urine Bacteria TRACE, Urine 

Casts NONE, Urine Mucus SMALLH, Urine Yeast LARGEH, Urine Culture Indicated NO





Microbiology


20 MRSA Screen - Final, Complete


          MRSA not isolated


20 Blood Culture - Preliminary, Resulted


          No growth








Radiology





NAME:   ED PHILLIPS


[facility] REC#:   [id number]


ACCOUNT#:   [id number]


PT STATUS:   ADM IN


:   1940


PHYSICIAN:   LAMONT SMITH


ADMIT DATE:   20/ICU


***Signed***


Date of Exam:20





CT CHEST/ABDOMEN/PELVIS W








PROCEDURE: CT chest, abdomen, and pelvis with contrast.





TECHNIQUE: Multiple contiguous axial images were obtained through


the chest, abdomen, and pelvis after the administration of


intravenous contrast. Auto Exposure Controls were utilized during


the CT exam to meet ALARA standards for radiation dose reduction.








INDICATION: Found down, mental status changes, confusion.





COMPARISON: None.





CT CHEST:


Moderate-sized bilateral pleural effusions are present with


dependent atelectasis. There is no focal infiltrate. No


pneumothorax is identified. The heart is slightly enlarged. There


is atherosclerotic disease seen throughout the coronary system.


There is advanced calcifications involving the mitral and aortic


valves. There is no pericardial effusion. Central airways are


grossly normal. Osseous structures are age-appropriate.





IMPRESSION: 


1. Moderate bilateral pleural effusions with dependent


atelectasis


2. Coronary artery, aortic and mitral valve disease.





CT ABDOMEN AND PELVIS.


Advanced atherosclerosis is seen throughout the abdominal aorta


and the origins of the visceral branch vessels. There is no


aneurysm. Suspect a degree of stenosis involving the bilateral


renal arteries and SMA. No obvious solid organ or bowel ischemia


is identified. The gallbladder retroperitoneum is grossly


unremarkable. The uterus is intact. Distal ureters and urinary


bladder are unremarkable. There is a Kim catheter within the


lumen of the bladder. The visualized osseous structures are


grossly unremarkable. There are no acute fractures.





IMPRESSION:


1. Advanced atherosclerotic disease. Stenosis is suspected


involving the origin of the SMA and bilateral renal arteries.


Please note, the study is limited due to motion number.


2. No solid organ or bowel ischemia.


3. No CT evidence for acute trauma within the abdomen or pelvis.





Dictated by: 





  Dictated on workstation # [id number]








Dict:   [id number]


Trans:   20 1703


[facility] [id number]





Interpreted by:     VENU LONGORIA


Electronically signed by: VENU LONGORIA 20 1703





NAME:   ED PHILLIPS


[facility] REC#:   [id number]


ACCOUNT#:   [id number]


PT STATUS:   ADM IN


:   1940


PHYSICIAN:   DAKSHA VELOZ DO


ADMIT DATE:   20/ICU


***Draft***


Date of Exam:20





CHEST 1 VIEW, AP/PA ONLY











EXAMINATION: Chest radiograph, portable AP view.





DATE: 2020 2:26 AM hours.





INDICATION: 79-year-old female, dyspnea.





COMPARISON:  2020.





FINDINGS: The endotracheal tube is approximately 3.9 cm above the


mirtha. The left internal jugular central venous line overlies


the mid SVC. The nasogastric tube is at the level of the distal


esophagus just slightly proximal to the gastroesophageal


junction. Advancement is recommended. Stable overall appearance


of the cardiomediastinal silhouette. There are vascular


calcifications. There is no identified pneumothorax. There is


nonspecific right mid and lower lung zone opacification with


probable right pleural effusion. There are streaky opacities in


the right upper lobe which are an interval change. There also are


streaky opacities in the left mid and upper lung zones which are


an interval change. There are bilateral glenohumeral arthritic


changes. There is a redemonstrated healed prior fracture


deformity of the middle third left clavicle. There are bilateral


carotid vascular calcifications.





IMPRESSION: 


1. Right-sided pleural effusion with nonspecific right mid and


lower lung zone consolidation which is unchanged since comparison


exam.


2. New streaky opacities in the right upper lobe and left mid to


upper lung zones which may reflect atelectasis and/or infiltrate.


3. Nasogastric tube side port at the level of the very distal


esophagus. Recommend advancement.











  Dictated on workstation # [id number]








Dict:   [id number]


Trans:   20 [id number]


[facility] [id number]





Interpreted by:     AISHWARYA ALVES MD


Electronically signed by:





ECG Impression


ECG


Initial ECG Rhythm:  Normal Sinus





A/P-Cardiology


Assessment/Admission Diagnosis


Hypovolemic shock - received transfusion, requiring pressor support





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





Acute resp failure - intubated and sedated





CAD. Card cath of 19 showed angiographically mild CAD, LVEF 65%, LVEDP at 

top limit of normal.  Per Dr. Singh family is reporting recent coronary stenting 

by Dr. Dixon at [facility], exact details unknown, has been maintained 

on Effient and ASA.





PAF. Recurrent A Fib after elec CV at [facility] by Dr Dixon on 18





Sinus node dysfunction: document sinus cindy, vent ectopy, and PAF





On Xarelto fro stroke prophylaxis





Echocardiogram of 2020 by Dr. Gamez showed LVEF 55-65%.  Grade 2 

diastolic dysfunction.  Mild mitral stenosis with mean gradient 2.2mHg, MVA 3.3 

cm2.  Mod calcified aortic valve with mod-severe stenosis, mean grad 26mmHg ASHELY 

1.1cm2, mild to mod regurg.  Severe TR.  PASP 65-70mmHg





Mod pulm HTN with PASP 50-55 mmHg on echo of 18. We advised continuing f/u 

with her pulmonologist, Dr Jensen





H/o scleroderma and chronic dysphagia, followed and treated by her pcp





Stress test with Dr Bustillo in [place] was normal in 2018, per patient report





Quit smoking in 





Borderline low TSH on 11/3/18 (0.33), followed by her pcp Dr Cueva





Carotid u/s of 2019 showed 60-79% R ICA stenosis; less than 40% L ICA 

stenosis.





Incidental note of L thyroid nodules seen at time of carotid u/s of 2019 for

which f/u with her PCP is advised





Discussion and Recomendations


Complex management


Critically ill


Hypovolemic shock requiring pressor support and transfusions - management per 

medical services/surgical services


Acute resp failure - intubated and sedated - management per pulmonary services


Family has reported recent coronary stent placement by Dr. Dixon at [facility] - details unknown - records requested - she has been on ASA and Effient

- advise continuation of Effient to protect recent stent placement


H/O PAF for which she has been maintained on Xarelto - currently SR to SB - hold

OAC d/t significant anemia


Continue pressor support


Monitor lab closely


Echocardiogram to re-eval LVEF and valvular status


We have spoken with Dr. Singh this morning


Further recs will be based on her hospital course


We would like to thank Dr. Singh for this consult





Clinical Quality Measures


DVT/VTE Risk/Contraindication:


Risk Factor Score Per Nursin


RFS Level Per Nursing on Admit:  4+=Very High











KIMMY SALAZAR           2020 09:50

## 2020-07-28 NOTE — ANESTHESIA-GENERAL POST-OP
General


Patient Condition


Mental Status/LOC:  Same as Preop


Cardiovascular:  Satisfactory


Nausea/Vomiting:  Absent


Respiratory:  Satisfactory


Pain:  Controlled


Complications:  Absent





Post Op Complications


Complications


None





Follow Up Care/Instructions


Patient Instructions


None needed.





Anesthesia/Patient Condition


Patient Condition


Patient is doing well, no complaints, stable vital signs, no apparent adverse 

anesthesia problems.   


No complications reported per nursing.











FORTUNATO GLYNN CRNA            Jul 28, 2020 09:22

## 2020-07-28 NOTE — NUR
ENTERED THE MED REC USING THE MAR FROM [facility] AND [facility]

## 2020-07-29 VITALS — DIASTOLIC BLOOD PRESSURE: 37 MMHG | SYSTOLIC BLOOD PRESSURE: 149 MMHG

## 2020-07-29 VITALS — DIASTOLIC BLOOD PRESSURE: 40 MMHG | SYSTOLIC BLOOD PRESSURE: 151 MMHG

## 2020-07-29 VITALS — SYSTOLIC BLOOD PRESSURE: 115 MMHG | DIASTOLIC BLOOD PRESSURE: 30 MMHG

## 2020-07-29 VITALS — SYSTOLIC BLOOD PRESSURE: 118 MMHG | DIASTOLIC BLOOD PRESSURE: 39 MMHG

## 2020-07-29 VITALS — DIASTOLIC BLOOD PRESSURE: 37 MMHG | SYSTOLIC BLOOD PRESSURE: 127 MMHG

## 2020-07-29 VITALS — SYSTOLIC BLOOD PRESSURE: 124 MMHG | DIASTOLIC BLOOD PRESSURE: 34 MMHG

## 2020-07-29 VITALS — DIASTOLIC BLOOD PRESSURE: 42 MMHG | SYSTOLIC BLOOD PRESSURE: 133 MMHG

## 2020-07-29 VITALS — DIASTOLIC BLOOD PRESSURE: 38 MMHG | SYSTOLIC BLOOD PRESSURE: 113 MMHG

## 2020-07-29 VITALS — SYSTOLIC BLOOD PRESSURE: 158 MMHG | DIASTOLIC BLOOD PRESSURE: 46 MMHG

## 2020-07-29 VITALS — DIASTOLIC BLOOD PRESSURE: 36 MMHG | SYSTOLIC BLOOD PRESSURE: 138 MMHG

## 2020-07-29 VITALS — SYSTOLIC BLOOD PRESSURE: 126 MMHG | DIASTOLIC BLOOD PRESSURE: 39 MMHG

## 2020-07-29 VITALS — SYSTOLIC BLOOD PRESSURE: 115 MMHG | DIASTOLIC BLOOD PRESSURE: 38 MMHG

## 2020-07-29 VITALS — SYSTOLIC BLOOD PRESSURE: 131 MMHG | DIASTOLIC BLOOD PRESSURE: 34 MMHG

## 2020-07-29 VITALS — SYSTOLIC BLOOD PRESSURE: 126 MMHG | DIASTOLIC BLOOD PRESSURE: 40 MMHG

## 2020-07-29 VITALS — SYSTOLIC BLOOD PRESSURE: 133 MMHG | DIASTOLIC BLOOD PRESSURE: 38 MMHG

## 2020-07-29 VITALS — DIASTOLIC BLOOD PRESSURE: 37 MMHG | SYSTOLIC BLOOD PRESSURE: 116 MMHG

## 2020-07-29 VITALS — SYSTOLIC BLOOD PRESSURE: 125 MMHG | DIASTOLIC BLOOD PRESSURE: 40 MMHG

## 2020-07-29 VITALS — DIASTOLIC BLOOD PRESSURE: 38 MMHG | SYSTOLIC BLOOD PRESSURE: 114 MMHG

## 2020-07-29 VITALS — SYSTOLIC BLOOD PRESSURE: 139 MMHG | DIASTOLIC BLOOD PRESSURE: 35 MMHG

## 2020-07-29 VITALS — DIASTOLIC BLOOD PRESSURE: 35 MMHG | SYSTOLIC BLOOD PRESSURE: 126 MMHG

## 2020-07-29 VITALS — DIASTOLIC BLOOD PRESSURE: 44 MMHG | SYSTOLIC BLOOD PRESSURE: 162 MMHG

## 2020-07-29 VITALS — DIASTOLIC BLOOD PRESSURE: 36 MMHG | SYSTOLIC BLOOD PRESSURE: 156 MMHG

## 2020-07-29 VITALS — SYSTOLIC BLOOD PRESSURE: 120 MMHG | DIASTOLIC BLOOD PRESSURE: 40 MMHG

## 2020-07-29 VITALS — DIASTOLIC BLOOD PRESSURE: 41 MMHG | SYSTOLIC BLOOD PRESSURE: 147 MMHG

## 2020-07-29 VITALS — SYSTOLIC BLOOD PRESSURE: 140 MMHG | DIASTOLIC BLOOD PRESSURE: 38 MMHG

## 2020-07-29 VITALS — DIASTOLIC BLOOD PRESSURE: 36 MMHG | SYSTOLIC BLOOD PRESSURE: 145 MMHG

## 2020-07-29 VITALS — SYSTOLIC BLOOD PRESSURE: 103 MMHG | DIASTOLIC BLOOD PRESSURE: 41 MMHG

## 2020-07-29 VITALS — DIASTOLIC BLOOD PRESSURE: 48 MMHG | SYSTOLIC BLOOD PRESSURE: 164 MMHG

## 2020-07-29 VITALS — DIASTOLIC BLOOD PRESSURE: 40 MMHG | SYSTOLIC BLOOD PRESSURE: 119 MMHG

## 2020-07-29 VITALS — SYSTOLIC BLOOD PRESSURE: 163 MMHG | DIASTOLIC BLOOD PRESSURE: 43 MMHG

## 2020-07-29 VITALS — DIASTOLIC BLOOD PRESSURE: 36 MMHG | SYSTOLIC BLOOD PRESSURE: 139 MMHG

## 2020-07-29 VITALS — SYSTOLIC BLOOD PRESSURE: 121 MMHG | DIASTOLIC BLOOD PRESSURE: 42 MMHG

## 2020-07-29 VITALS — SYSTOLIC BLOOD PRESSURE: 141 MMHG | DIASTOLIC BLOOD PRESSURE: 35 MMHG

## 2020-07-29 LAB
APTT PPP: YELLOW S
ARTERIAL PATENCY WRIST A: (no result)
BACTERIA #/AREA URNS HPF: (no result) /HPF
BASE EXCESS STD BLDA CALC-SCNC: 0.5 MMOL/L (ref -2.5–2.5)
BASOPHILS # BLD AUTO: 0 10^3/UL (ref 0–0.1)
BASOPHILS NFR BLD AUTO: 0 % (ref 0–10)
BDY SITE: (no result)
BILIRUB UR QL STRIP: NEGATIVE
BODY TEMPERATURE: 35.9
BUN/CREAT SERPL: 29
BURR CELLS/RBC NFR CSF MANUAL: (no result) %
CALCIUM SERPL-MCNC: 6.9 MG/DL (ref 8.5–10.1)
CHLORIDE SERPL-SCNC: 95 MMOL/L (ref 98–107)
CO2 BLDA CALC-SCNC: 25.5 MMOL/L (ref 21–31)
CO2 SERPL-SCNC: 20 MMOL/L (ref 21–32)
CREAT SERPL-MCNC: 0.85 MG/DL (ref 0.6–1.3)
EOSINOPHIL # BLD AUTO: 0 10^3/UL (ref 0–0.3)
EOSINOPHIL NFR BLD AUTO: 0 % (ref 0–10)
ERYTHROCYTE [DISTWIDTH] IN BLOOD BY AUTOMATED COUNT: 16.2 % (ref 10–14.5)
FIBRINOGEN PPP-MCNC: (no result) MG/DL
GFR SERPLBLD BASED ON 1.73 SQ M-ARVRAT: > 60 ML/MIN
GLUCOSE SERPL-MCNC: 119 MG/DL (ref 70–105)
GLUCOSE UR STRIP-MCNC: NEGATIVE MG/DL
HCT VFR BLD CALC: 21 % (ref 35–52)
HELMET CELLS BLD QL SMEAR: (no result)
HGB BLD-MCNC: 7.4 G/DL (ref 11.5–16)
INHALED O2 FLOW RATE: (no result) L/MIN
KETONES UR QL STRIP: NEGATIVE
LEUKOCYTE ESTERASE UR QL STRIP: NEGATIVE
LYMPHOCYTES # BLD AUTO: 0.5 X 10^3 (ref 1–4)
LYMPHOCYTES NFR BLD AUTO: 3 % (ref 12–44)
MAGNESIUM SERPL-MCNC: 2.1 MG/DL (ref 1.6–2.4)
MANUAL DIFFERENTIAL PERFORMED BLD QL: YES
MCH RBC QN AUTO: 30 PG (ref 25–34)
MCHC RBC AUTO-ENTMCNC: 35 G/DL (ref 32–36)
MCV RBC AUTO: 85 FL (ref 80–99)
METAMYELOCYTES NFR BLD: 2 %
MONOCYTES # BLD AUTO: 0.8 X 10^3 (ref 0–1)
MONOCYTES NFR BLD AUTO: 5 % (ref 0–12)
MONOCYTES NFR BLD: 3 %
NEUTROPHILS # BLD AUTO: 15.6 X 10^3 (ref 1.8–7.8)
NEUTROPHILS NFR BLD AUTO: 92 % (ref 42–75)
NEUTS BAND NFR BLD MANUAL: 67 %
NEUTS BAND NFR BLD: 26 %
NITRITE UR QL STRIP: NEGATIVE
PCO2 BLDA: 36 MMHG (ref 35–45)
PH BLDA: 7.45 [PH] (ref 7.37–7.43)
PH UR STRIP: 5.5 [PH] (ref 5–9)
PHOSPHATE SERPL-MCNC: 4.6 MG/DL (ref 2.3–4.7)
PLATELET # BLD: 171 10^3/UL (ref 130–400)
PMV BLD AUTO: 9.4 FL (ref 7.4–10.4)
PO2 BLDA: 73 MMHG (ref 79–93)
POTASSIUM SERPL-SCNC: 3.8 MMOL/L (ref 3.6–5)
PROT UR QL STRIP: NEGATIVE
SAO2 % BLDA FROM PO2: 96 % (ref 94–100)
SODIUM SERPL-SCNC: 128 MMOL/L (ref 135–145)
SP GR UR STRIP: 1.02 (ref 1.02–1.02)
VARIANT LYMPHS NFR BLD MANUAL: 2 %
VENTILATION MODE VENT: YES
WBC # BLD AUTO: 16.9 10^3/UL (ref 4.3–11)
WBC #/AREA URNS HPF: (no result) /HPF
YEAST #/AREA URNS HPF: (no result) /HPF

## 2020-07-29 RX ADMIN — SODIUM CHLORIDE, SODIUM LACTATE, POTASSIUM CHLORIDE, AND CALCIUM CHLORIDE SCH MLS/HR: 600; 310; 30; 20 INJECTION, SOLUTION INTRAVENOUS at 15:13

## 2020-07-29 RX ADMIN — PROPOFOL SCH MLS/HR: 10 INJECTION, EMULSION INTRAVENOUS at 07:48

## 2020-07-29 RX ADMIN — SODIUM CHLORIDE, SODIUM LACTATE, POTASSIUM CHLORIDE, AND CALCIUM CHLORIDE SCH MLS/HR: 600; 310; 30; 20 INJECTION, SOLUTION INTRAVENOUS at 06:48

## 2020-07-29 RX ADMIN — SODIUM CHLORIDE, SODIUM LACTATE, POTASSIUM CHLORIDE, AND CALCIUM CHLORIDE SCH MLS/HR: 600; 310; 30; 20 INJECTION, SOLUTION INTRAVENOUS at 19:45

## 2020-07-29 RX ADMIN — SODIUM CHLORIDE SCH MLS/HR: 900 INJECTION, SOLUTION INTRAVENOUS at 21:35

## 2020-07-29 RX ADMIN — MORPHINE SULFATE PRN MG: 4 INJECTION, SOLUTION INTRAMUSCULAR; INTRAVENOUS at 19:31

## 2020-07-29 RX ADMIN — POTASSIUM CHLORIDE SCH MLS/HR: 200 INJECTION, SOLUTION INTRAVENOUS at 04:03

## 2020-07-29 RX ADMIN — POTASSIUM CHLORIDE SCH MEQ: 1500 TABLET, EXTENDED RELEASE ORAL at 04:04

## 2020-07-29 RX ADMIN — VASOPRESSIN SCH MLS/HR: 20 INJECTION INTRAVENOUS at 00:36

## 2020-07-29 RX ADMIN — HYDROCORTISONE SODIUM SUCCINATE SCH MG: 100 INJECTION, POWDER, FOR SOLUTION INTRAMUSCULAR; INTRAVENOUS at 11:51

## 2020-07-29 RX ADMIN — MAGNESIUM SULFATE IN DEXTROSE SCH MLS/HR: 10 INJECTION, SOLUTION INTRAVENOUS at 04:03

## 2020-07-29 RX ADMIN — HYDROCORTISONE SODIUM SUCCINATE SCH MG: 100 INJECTION, POWDER, FOR SOLUTION INTRAMUSCULAR; INTRAVENOUS at 17:42

## 2020-07-29 RX ADMIN — SODIUM CHLORIDE, SODIUM LACTATE, POTASSIUM CHLORIDE, AND CALCIUM CHLORIDE SCH MLS/HR: 600; 310; 30; 20 INJECTION, SOLUTION INTRAVENOUS at 04:02

## 2020-07-29 RX ADMIN — SODIUM CHLORIDE SCH MLS/HR: 900 INJECTION, SOLUTION INTRAVENOUS at 13:14

## 2020-07-29 RX ADMIN — VASOPRESSIN SCH MLS/HR: 20 INJECTION INTRAVENOUS at 08:30

## 2020-07-29 RX ADMIN — SODIUM CHLORIDE, SODIUM LACTATE, POTASSIUM CHLORIDE, AND CALCIUM CHLORIDE SCH MLS/HR: 600; 310; 30; 20 INJECTION, SOLUTION INTRAVENOUS at 10:48

## 2020-07-29 RX ADMIN — Medication SCH MLS/HR: at 05:14

## 2020-07-29 RX ADMIN — PROPOFOL SCH MLS/HR: 10 INJECTION, EMULSION INTRAVENOUS at 19:46

## 2020-07-29 RX ADMIN — HYDROCORTISONE SODIUM SUCCINATE SCH MG: 100 INJECTION, POWDER, FOR SOLUTION INTRAMUSCULAR; INTRAVENOUS at 05:18

## 2020-07-29 RX ADMIN — PANTOPRAZOLE SODIUM SCH MG: 40 INJECTION, POWDER, FOR SOLUTION INTRAVENOUS at 08:30

## 2020-07-29 RX ADMIN — HYDROCORTISONE SODIUM SUCCINATE SCH MG: 100 INJECTION, POWDER, FOR SOLUTION INTRAMUSCULAR; INTRAVENOUS at 23:31

## 2020-07-29 RX ADMIN — MORPHINE SULFATE PRN MG: 4 INJECTION, SOLUTION INTRAMUSCULAR; INTRAVENOUS at 21:35

## 2020-07-29 RX ADMIN — MORPHINE SULFATE PRN MG: 4 INJECTION, SOLUTION INTRAMUSCULAR; INTRAVENOUS at 14:20

## 2020-07-29 NOTE — NUR
VANCOMYCIN DOSING



SCR 0.85 (USED 1.0); CRCL ~ 36; BOLUS VANC 20 MG/KG X 63 KG ~ 1250 MG THEN VANC 15 MG/KG ~ 1 
GM Q24H

CHECK TROUGH LEVEL 7/31 0630

HOLD DOSE AND CONTACT PHARMACY IF LEVEL IS GREATER THAN 20

## 2020-07-29 NOTE — PROGRESS NOTE - CARDIOLOGY
Cardiology SOAP Progress Note


Subjective:


Intubated and sedated





Objective:


I&O/Vital Signs











 20





 04:00 04:00 05:00 05:22


 


Pulse  69 66 66


 


Resp  24 24 


 


B/P (MAP)  135/41 (72) 131/40 (70) 131/42


 


Pulse Ox 95 93 93 


 


O2 Delivery Mechanical Ventilator Mechanical Ventilator Mechanical Ventilator 


 


O2 Flow Rate  28.00 28.00 


 


FiO2 28   





 20





 06:00 06:29 07:00 07:00


 


Pulse 70 80 75 78


 


Resp 24 24 34 


 


B/P (MAP) 138/43 (74) 147/45 (79) 153/53 (86) 


 


Pulse Ox 93 93 95 


 


O2 Delivery Mechanical Ventilator Nasal Cannula Nasal Cannula 


 


O2 Flow Rate 28.00 2.00 2.00 





 20





 07:30 08:00 08:00 08:00


 


Temp    36.7


 


Pulse   77 


 


Resp   27 


 


B/P (MAP)   160/52 (88) 


 


Pulse Ox 94 93 93 


 


O2 Delivery Nasal Cannula Nasal Cannula Nasal Cannula 


 


O2 Flow Rate 3.00 3.00 2.00 


 


    





 20





 09:00 09:57 10:00 10:15


 


Pulse 76  76 


 


Resp 27  29 


 


B/P (MAP) 135/39 (71)  151/50 (83) 


 


Pulse Ox 91 92 93 93


 


O2 Delivery Nasal Cannula Nasal Cannula Nasal Cannula Nasal Cannula


 


O2 Flow Rate 2.00 5.00 2.00 5.00





 20





 11:00 11:37 12:00 12:00


 


Temp    36.7


 


Pulse 78  78 


 


Resp 35  35 


 


B/P (MAP) 134/43 (73)  131/42 (71) 


 


Pulse Ox 93 93 95 


 


O2 Delivery Nasal Cannula Nasal Cannula Nasal Cannula 


 


O2 Flow Rate 5.00 5.00 5.00 


 


    





 20





 12:34 13:10 13:52 14:00


 


Pulse 81  76 74


 


Resp   31 34


 


B/P (MAP)   130/36 (67) 129/44 (72)


 


Pulse Ox  95 95 90


 


O2 Delivery  Nasal Cannula Nasal Cannula Nasal Cannula


 


O2 Flow Rate  3.00 3.00 3.00





 20  





 14:13 15:00  


 


Pulse  84  


 


Resp  26  


 


B/P (MAP)  139/44 (75)  


 


Pulse Ox 91 90  


 


O2 Delivery Nasal Cannula Nasal Cannula  


 


O2 Flow Rate 3.00 3.00  














 20





 00:00


 


Intake Total 2250 ml


 


Output Total 725 ml


 


Balance 1525 ml








Weight (Pounds):  112


Weight (Ounces):  0.0


Weight (Calculated Kilograms):  50.328541


Constitutional:  other (intabated and sedate)


Respiratory:  rhonchi (scattered), other (fair air entry, intubated; coarse 

breathsounds)


Cardiovascular:  regular rate-rhythm, systolic murmur (2-3/6 MSM)


Gastrointestional:  other (s/p abdominal surgery with dressing in place)


Extremities:  other (bilat pitting LE swelling)


Neurologic/Psychiatric:  other (intubated and sedated, unable to cooperate with 

exam)


Skin:  No rash on exposed areas, No ulcerations on exposed areas





Results/Procedures:


Labs


Laboratory Tests


20 03:29: 


White Blood Count 20.9H, Red Blood Count 3.04L, Hemoglobin 9.1#L, Hematocrit 26L

, Mean Corpuscular Volume 85, Mean Corpuscular Hemoglobin 30, Mean Corpuscular 

Hemoglobin Concent 35, Red Cell Distribution Width 15.6H, Platelet Count 157, 

Mean Platelet Volume 9.8, Neutrophils (%) (Auto) 96H, Lymphocytes (%) (Auto) 1L,

Monocytes (%) (Auto) 3, Eosinophils (%) (Auto) 0, Basophils (%) (Auto) 0, 

Neutrophils # (Auto) 20.1H, Lymphocytes # (Auto) 0.3L, Monocytes # (Auto) 0.5, 

Eosinophils # (Auto) 0.0, Basophils # (Auto) 0.0, Neutrophils % (Manual) 88, 

Lymphocytes % (Manual) 2, Monocytes % (Manual) 3, Band Neutrophils 7, Thurman Cells

SLIGHT, Schistocytes SLIGHT, Blood Gas Puncture Site LEFT BRACH ART, Blood Gas 

Patient Temperature 36.6, Arterial Blood pH 7.46H, Arterial Blood Partial 

Pressure CO2 39, Arterial Blood Partial Pressure O2 74L, Arterial Blood HCO3 27,

Arterial Blood Total CO2 28.4, Arterial Blood Oxygen Saturation 94, Arterial 

Blood Base Excess 3.4H, Jorge Test YES-POS, Blood Gas Ventilator Setting YES, 

Blood Gas Inspired Oxygen 28%, Sodium Level 134L, Potassium Level 3.4L, Chloride

Level 101, Carbon Dioxide Level 23, Anion Gap 10, Blood Urea Nitrogen 24H, 

Creatinine 0.71, Estimat Glomerular Filtration Rate > 60, BUN/Creatinine Ratio 

34, Glucose Level 96, Calcium Level 7.4L, Phosphorus Level 3.4, Magnesium Level 

2.1, B-Type Natriuretic Peptide 359.8H, Triglycerides Level 85


20 05:43: Lactic Acid Level 1.42





Microbiology


20 Gram Stain - Final, Resulted


          


20 Sputum Culture, Resulted


          Pending


20 Blood Culture - Preliminary, Resulted


          No growth





Procedures


NAME:   ED PHILLIPS


[facility] REC#:   [id number]


ACCOUNT#:   [id number]


PT STATUS:   ADM IN


:   1940


PHYSICIAN:   DAKSHA VELOZ DO


ADMIT DATE:   20/ICU


***Draft***


Date of Exam:20





CHEST 1 VIEW, AP/PA ONLY








Portable semierect AP chest at 359 hours.





INDICATION: Dyspnea.





FINDINGS: The appearance of the chest has worsened since the


prior exam of 2020 as the density along the periphery of


the right lung base seen on the prior study has increased. Most


likely this is due to greater involvement of the right lower lobe


by atelectasis/infiltrate and fluid. Conversely, the right upper


lobe and the left lung do seem somewhat better aerated than on


the prior exam. The heart is stable in size. The mediastinum is


not widened. The osseous structures are intact. The deformity of


the left clavicle seen previously is again evident.





The ET tube and central venous catheter on the left and the NG


line are stable in position. The NG line tip continues to overlie


the distal esophagus. It could be advanced another 10 to 15 cm to


assure that is well within the stomach.





IMPRESSION:


1. There are mixed results. There does appear to be somewhat


greater involvement of the right lung base by


pneumonia/atelectasis and fluid but the right upper lung and left


lung are better aerated. A followup study would be recommended


for continued evaluation.





  Dictated on workstation # [id number]








Dict:   [id number]


Trans:   20 0827


 [id number]





Interpreted by:     BRONSON ANTONIO MD


Electronically signed by:





A/P:


Assessment:





Hypovolemic (blood loss) shock - receiving transfusions, requiring pressor 

support





Sepsis - prob pneumonia - management per pulmonary services





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





Acute resp failure - intubated and sedated





CAD. Card cath of 19 showed angiographically mild CAD, LVEF 65%, LVEDP at 

top limit of normal.  Per Dr. Singh family is reporting recent coronary stenting 

by Dr. Dixon at [facility], exact details unknown, has been maintained 

on Effient and ASA.





Sinus node disfunction and PAF. On Xarelto for stroke prophylaxis





Echocardiogram of 2020 by Dr. Gamez showed LVEF 55-65%.  Grade 2 

diastolic dysfunction.  Mild mitral stenosis with mean gradient 2.2mHg, MVA 3.3 

cm2.  Mod calcified aortic valve with mod-severe stenosis, mean grad 26mmHg ASHELY 

1.1cm2, mild to mod regurg.  Severe TR.  PASP 65-70mmHg.  Echocardiogram of 2020 was a technically poor study.  LVEF 55-60%





H/o scleroderma and chronic dysphagia, followed and treated by her pcp





Quit smoking in 





Borderline low TSH on 11/3/18 (0.33), followed by her pcp Dr Cueva





Carotid u/s of 2019 showed 60-79% R ICA stenosis; less than 40% L ICA 

stenosis.





Incidental note of L thyroid nodules seen at time of carotid u/s of 2019 for

which f/u is with her PCP


Plan:





* Complex management


* Critically ill


* Sepsis with prob pneumonia - management per pulmonary services


   Continue to hold off on Xarelto and ASA due to bleeding requiring surgery and

   leading to hypovolemic shock that has required transfusion - receiving more 

   PRBC's today


* Because family reports h/o cor stent in 2020, she remains at high risk of

  stent thrombosis w/o any antiplatelet agents. This issue has to be weighed 

  carefully. If bleeding is showing signs of having stabilized,  it is 

  reasonable to start Plavix therapy if ok with Dr. Singh of medical services


* We will again request cardiac cath report from [facility]


* Monitor lab closely


* Oliguria - continue to monitor





Clinical Quality Measures


Type of Care:


Type of Care:  Comfort Measures











KIMMY SALAZAR [facility]           2020 08:52

## 2020-07-29 NOTE — PROGRESS NOTE - SURGERY
Subjective


Time Seen by a Provider:  14:46


Subjective/Events-last exam


Pt seen and examined, still on vent but looks at you when spoken too.  Nods yes 

to pain.


Review of Systems


pt on vent





Focused Exam


Lactate Level


20 05:25: Lactic Acid Level 3.90*H


20 09:50: Lactic Acid Level 4.36*H


20 14:45: Lactic Acid Level 4.20*H


Lactic Acid Level





Laboratory Tests








Test


 20


14:45


 


Lactic Acid Level


 4.20 MMOL/L


(0.50-2.00)  *H











Objective


Exam





Vital Signs








  Date Time  Temp Pulse Resp B/P (MAP) Pulse Ox O2 Delivery O2 Flow Rate FiO2


 


20 13:00  56      


 


20 13:00  62 23 164/48 (86) 94 Mechanical Ventilator 28.00 


 


20 12:35      Mechanical Ventilator  30


 


20 12:35     97 Mechanical Ventilator  30


 


20 12:00  54 19 158/46 (83) 94 Mechanical Ventilator 28.00 


 


20 11:26 36.8       


 


20 11:00  53 16  93 Mechanical Ventilator 28.00 


 


20 10:41 36.1 53 24 163/43  Mechanical Ventilator  28


 


20 10:33      Mechanical Ventilator 28.00 


 


20 10:00  50 23 147/41 (76) 96 Mechanical Ventilator 30.00 


 


20 09:00  58 23 133/38 (69) 75 Mechanical Ventilator 30.00 


 


20 08:46 36.2 56  126/35 30 Mechanical Ventilator  


 


20 08:44 36.2 56 24 124/34 2 Mechanical Ventilator  30


 


20 08:31 36.3 58 25 140/38 99 Mechanical Ventilator  30


 


20 08:30  49  119/33    


 


20 08:00     97 Mechanical Ventilator  30


 


20 08:00      Mechanical Ventilator  30


 


20 08:00  50 24 115/30 (58) 100 Mechanical Ventilator 30.00 


 


20 07:48  49  119/33    


 


20 07:32 36.3       


 


20 07:00  53      


 


20 07:00  53 27 139/36 (70) 100 Mechanical Ventilator 30.00 


 


20 06:00  54 27 145/36 (72) 99 Mechanical Ventilator 30.00 


 


20 05:14  54  154/37    


 


20 05:00  55 22 156/36 (76) 100 Mechanical Ventilator 30.00 


 


20 04:00  55 24 149/37 (74) 98 Mechanical Ventilator 30.00 


 


20 04:00     95 Mechanical Ventilator  30


 


20 04:00      Mechanical Ventilator  50


 


20 03:44 35.9       


 


20 03:00  54 27 139/35 (69) 100 Mechanical Ventilator 30.00 


 


20 02:00  55 27 131/34 (66) 99 Mechanical Ventilator 30.00 


 


20 01:03  56 28  98   30


 


20 01:00  56      


 


20 01:00  56  151/40 (77) 100 Mechanical Ventilator 30.00 


 


20 00:36  54  103/32    


 


20 00:00 36.0       


 


20 00:00  56 28 138/36 (70) 100 Mechanical Ventilator 30.00 


 


20 00:00      Mechanical Ventilator  50


 


20 00:00     95 Mechanical Ventilator  30


 


20 23:00  56 28 146/37 (73) 100 Mechanical Ventilator 30.00 


 


20 22:25  55 28  100   30


 


20 22:00  55 27 114/32 (59) 100 Mechanical Ventilator 30.00 


 


20 21:00  56 27 125/34 (64) 100 Mechanical Ventilator 30.00 


 


20 20:00  56 28 141/36 (71) 100 Mechanical Ventilator 30.00 


 


20 20:00      Mechanical Ventilator  50


 


20 20:00     95 Mechanical Ventilator  30


 


20 19:28 36.0       


 


20 19:01  55  124/34    


 


20 19:00  55 28 124/36 (65) 100 Mechanical Ventilator 40.00 


 


20 18:49  56 28  100   30


 


20 18:36  57      


 


20 18:00  55 13 115/33 (60) 100 Mechanical Ventilator 40.00 


 


20 17:45  55      


 


20 17:16  54  99/33    


 


20 17:00  54 27 115/36 (62) 100 Mechanical Ventilator 40.00 


 


20 16:48  54  119/36    


 


20 16:00      Mechanical Ventilator  30


 


20 16:00 35.7       


 


20 16:00  53 25 115/41 (65) 99 Mechanical Ventilator 40.00 


 


20 16:00     95 Mechanical Ventilator  30


 


20 15:29  53 28  99   45














I & O 


 


 20





 07:00


 


Intake Total 0 ml


 


Output Total 575 ml


 


Balance -575 ml





Capillary Refill : Less Than 3 Seconds


General Appearance:  Other (on vent)


Neck:  Other (central line)


Respiratory:  Decreased Breath Sounds; No Wheezing; Other (on vent)


Cardiovascular:  Regular Rate, Rhythm, No Murmur


Gastrointestinal:  distended (?? slightly firm), other (incision c/d/i)


Extremity:  Pedal Edema, Swelling (2+ to just above ankles)


Neurologic/Psychiatric:  Other (sedated, appears comfortable)


Skin:  Ecchymosis; No Mottled; Pallor





Results


Lab


Laboratory Tests


20 16:38: Lactic Acid Level 3.41*H


20 18:50: 


Lactic Acid Level 3.40*H, Blood Gas Puncture Site LEFT RADIAL, Blood Gas Patient

Temperature 35.7, Arterial Blood pH 7.37, Arterial Blood Partial Pressure CO2 

39, Arterial Blood Partial Pressure O2 70L, Arterial Blood HCO3 22L, Arterial 

Blood Total CO2 23.6, Arterial Blood Oxygen Saturation 96, Arterial Blood Base 

Excess -2.4, Jorge Test YES-POS, Blood Gas Ventilator Setting YES, Blood Gas 

Inspired Oxygen 30%


20 20:55: Lactic Acid Level 3.38*H


20 22:55: Lactic Acid Level 3.58*H


20 01:15: Lactic Acid Level 3.80*H


20 03:30: 


Lactic Acid Level 3.80*H, White Blood Count 16.9H, Red Blood Count 2.49L, 

Hemoglobin 7.4#L, Hematocrit 21L, Mean Corpuscular Volume 85, Mean Corpuscular 

Hemoglobin 30, Mean Corpuscular Hemoglobin Concent 35, Red Cell Distribution 

Width 16.2H, Platelet Count 171, Mean Platelet Volume 9.4, Neutrophils (%) 

(Auto) 92H, Lymphocytes (%) (Auto) 3L, Monocytes (%) (Auto) 5, Eosinophils (%) 

(Auto) 0, Basophils (%) (Auto) 0, Neutrophils # (Auto) 15.6H, Lymphocytes # 

(Auto) 0.5L, Monocytes # (Auto) 0.8, Eosinophils # (Auto) 0.0, Basophils # 

(Auto) 0.0, Neutrophils % (Manual) 67, Lymphocytes % (Manual) 2, Monocytes % (M

anual) 3, Metamyelocytes % 2, Band Neutrophils 26, Helmet Cells RARE, Crenated 

Cell MODERATE, Blood Gas Puncture Site ART LINE, Blood Gas Patient Temperature 

35.9, Arterial Blood pH 7.45H, Arterial Blood Partial Pressure CO2 36, Arterial 

Blood Partial Pressure O2 73L, Arterial Blood HCO3 24, Arterial Blood Total CO2 

25.5, Arterial Blood Oxygen Saturation 96, Arterial Blood Base Excess 0.5, Jorge

Test ART LINE, Blood Gas Ventilator Setting YES, Blood Gas Inspired Oxygen 30%, 

Sodium Level 128L, Potassium Level 3.8, Chloride Level 95L, Carbon Dioxide Level

20L, Anion Gap 13, Blood Urea Nitrogen 25H, Creatinine 0.85, Estimat Glomerular 

Filtration Rate > 60, BUN/Creatinine Ratio 29, Glucose Level 119H, Calcium Level

6.9L, Phosphorus Level 4.6, Magnesium Level 2.1, Procalcitonin 7.43H


20 05:25: Lactic Acid Level 3.90*H


20 06:35: 


Urine Color YELLOW, Urine Clarity SL CLOUDY, Urine pH 5.5, Urine Specific 

Gravity 1.020, Urine Protein NEGATIVE, Urine Glucose (UA) NEGATIVE, Urine 

Ketones NEGATIVE, Urine Nitrite NEGATIVE, Urine Bilirubin NEGATIVE, Urine 

Urobilinogen 0.2, Urine Leukocyte Esterase NEGATIVE, Urine RBC (Auto) 2+H, Urine

RBC 10-25H, Urine WBC RARE, Urine Crystals NONE, Urine Bacteria TRACE, Urine 

Casts NONE, Urine Mucus SMALLH, Urine Yeast LARGEH, Urine Culture Indicated NO


20 09:50: 


Lactic Acid Level 4.36*H, Procalcitonin 5.82H


20 14:10: Lab Scanned Report Transfusion Reaction Form


20 14:45: Lactic Acid Level 4.20*H





Microbiology


20 MRSA Screen - Final, Complete


          MRSA not isolated


20 Blood Culture - Preliminary, Resulted


          No growth





Assessment/Plan


S/P  Right Colon resection


Hx of Afib and aortic stenosis





Pt is having trouble coming off the vent, her lactic acid is elevated and her 

hemoglobin has fallen.  Unsure exactly what is going on, but she did just get 

off the Levophed; which may have been causing the


elevated Lactic Acid.  Monitor labs, continue IV fluids and pain control.





Clinical Quality Measures


DVT/VTE Risk/Contraindication:


Risk Factor Score Per Nursin


RFS Level Per Nursing on Admit:  4+=Very High











DAKSHA VELOZ DO               2020 15:32

## 2020-07-29 NOTE — NUR
DR BERRIOS NOTIFIED OF CRITICAL LACTIC ACID RESULTS, NEW ORDERS RECEIVED TO GIVE 1 LITER 
BOLUS OF LR OVER 2 HOURS.

## 2020-07-29 NOTE — PROGRESS NOTE - HOSPITALIST
Subjective


HPI/CC On Admission


Date Seen by Provider:  2020


Time Seen by Provider:  08:02


Pt is an 79yoCF with a PMH of aortic stenosis, COPD, CAD with recent stenting, 

HTN, HLD, hypothyroidism, paroxysmal atrial fibrillation on chronic 

anticoagulation who presented to the ER due to hypoxia. She is unable to provide

much history and onlyanswered a few yes or no questions for me. She told me she 

was sick and that she was not having pain. Otherwise all history obtained from 

family and records. She was apparently in her normal state this morning 

(ambulatory and alert and oriented x4). She spoke to her son and then had lunch.

Sometime after lunch they found her in her room minimallyresponsive. There was 

concern for an episode of apnea as well. EMS was summoned and she was found to 

have oxygen saturations in the 60%. She was placed on a nonrebreather which 

brought her oxygen saturations up. She was taken for CT head as she is on 

Xarelto.





Focused Exam


Lactate Level


20 01:15: Lactic Acid Level 3.80*H


20 03:30: Lactic Acid Level 3.80*H


20 05:25: Lactic Acid Level 3.90*H





Lactic Acid Level





Laboratory Tests








Test


 20


05:25


 


Lactic Acid Level


 3.90 MMOL/L


(0.50-2.00)  *H











Objective


Exam


Vital Signs





Vital Signs








  Date Time  Temp Pulse Resp B/P (MAP) Pulse Ox O2 Delivery O2 Flow Rate FiO2


 


20 07:48  49  119/33    


 


20 07:32 36.3       


 


20 06:00   27  99 Mechanical Ventilator 30.00 


 


20 04:00        30





Capillary Refill : Less Than 3 Seconds


General Appearance:  Other (sedated and on vent)


Neck:  Other (central line)


Respiratory:  Decreased Breath Sounds; No Wheezing; Other (on vent)


Cardiovascular:  Regular Rate, Rhythm, No Murmur


Gastrointestinal:  Other (abdominal binder in place)


Genital/Rectal:  Other (poole in place)


Extremity:  Pedal Edema, Swelling (2+ to just above ankles)


Neurologic/Psychiatric:  Other (sedated, appears comfortable)


Skin:  Ecchymosis; No Mottled; Pallor





Results/Procedures


Lab


Laboratory Tests


20 10:15








20 03:30








Patient resulted labs reviewed.





Assessment/Plan


Assessment and Plan


Assess & Plan/Chief Complaint


Hemorrhagic shock


bleeding cecal mass


Severe anemia


   Went to ex lap last night and bleeding mass found


   Transfused 2 units pRBCs so far


      Hgb continues to trend down, one more unit of pRBCs ordered


   Currently on vasopressin and levophed, MAP 67-70 while I was at bedside, 

attempt to wean pressors as able





Leukocytosis


   Likely due to steroids


   Check procal


   Started on abx this AM


   


Acute Hypoxic Respiratory Failure


Recently treated pneumonia


   Maintain on vent


   Pulm/TeleICU consulted, appreciate recs


   


Mixed metabolic and respiratory acidosis


   vent setting per pulm


   Continue bicarb gtt


   Creatinine up a bit since last night (0.70->0.93), will trend





CAD


Aortic Stenosis


   Son reports she recently had a stent placed by Dr Dixon


   Request records


   Will have to unfortunately hold DAPT right now due to bleeding and worsening 

anemia


   Consult cardiology for assistance


   


COPD


   Pulm consulted


   MAT protocol


   





DVT ppx: SCDs





Critical Care


Critically Ill Patient





Diagnosis/Problems


Diagnosis/Problems





(1) Acute respiratory failure


Status:  Acute


Qualifiers:  


   Respiratory failure complication:  hypercapnia  Qualified Codes:  J96.02 - 

Acute respiratory failure with hypercapnia


(2) Hemorrhagic shock


Status:  Acute


(3) Hypomagnesemia


Status:  Acute


(4) Normocytic anemia


Status:  Acute


(5) COPD (chronic obstructive pulmonary disease)


Qualifiers:  


   COPD type:  unspecified COPD  Qualified Codes:  J44.9 - Chronic obstructive 

pulmonary disease, unspecified


(6) Hypothyroidism


Status:  Chronic


Qualifiers:  


   Hypothyroidism type:  unspecified  Qualified Codes:  E03.9 - Hypothyroidism, 

unspecified


(7) Metabolic acidosis


Status:  Acute


(8) Respiratory acidosis


Status:  Acute


(9) Aortic stenosis


Qualifiers:  


   Cardiac valve disease etiology:  etiology unspecified  Qualified Codes:  

I35.0 - Nonrheumatic aortic (valve) stenosis


(10) Atrial fibrillation


Status:  Acute


Qualifiers:  


   Atrial fibrillation type:  paroxysmal  Qualified Codes:  I48.0 - Paroxysmal 

atrial fibrillation


(11) CAD (coronary artery disease)


Status:  Acute


Qualifiers:  


   Coronary Disease-Associated Artery/Lesion type:  native artery  Native vs. 

transplanted heart:  native heart  Associated angina:  without angina  Qualified

Codes:  I25.10 - Atherosclerotic heart disease of native coronary artery without

angina pectoris





Clinical Quality Measures


DVT/VTE Risk/Contraindication:


Risk Factor Score Per Nursin


RFS Level Per Nursing on Admit:  4+=Very High











SELINA JASMINE MD 29, 2020 08:27

## 2020-07-29 NOTE — DIAGNOSTIC IMAGING REPORT
Portable semierect AP chest at 359 hours.



INDICATION: Dyspnea.



FINDINGS: The appearance of the chest has worsened since the

prior exam of 07/28/2020 as the density along the periphery of

the right lung base seen on the prior study has increased. Most

likely this is due to greater involvement of the right lower lobe

by atelectasis/infiltrate and fluid. Conversely, the right upper

lobe and the left lung do seem somewhat better aerated than on

the prior exam. The heart is stable in size. The mediastinum is

not widened. The osseous structures are intact. The deformity of

the left clavicle seen previously is again evident.



The ET tube and central venous catheter on the left and the NG

line are stable in position. The NG line tip continues to overlie

the distal esophagus. It could be advanced another 10 to 15 cm to

assure that is well within the stomach.



IMPRESSION:

1. There are mixed results. There does appear to be somewhat

greater involvement of the right lung base by

pneumonia/atelectasis and fluid but the right upper lung and left

lung are better aerated. A followup study would be recommended

for continued evaluation.



Dictated by: 



  Dictated on workstation # [id number]

## 2020-07-29 NOTE — NUR
PT'S ABDOMEN NOTED TO BE HARD, RIGID, NO BOWEL SOUNDS AUSCULTATED, PLACEMENT OF NGT CHECKED 
BY THIS RN AND LEIGH HELMS RN.  NGT ADVANCED APPROXIMATELY 12 CM AFTER READING IMPRESSION OF 
CXR. AT THIS TIME PT NOTED TO HAVE MINIMAL AMOUNT OF GASTRIC CONTENT. NOTED TO BE 
REDDISH/BROWN IN COLOR, WILL CONTINUE TO MONITOR.

## 2020-07-29 NOTE — PROGRESS NOTE - CARDIOLOGY
Cardiology SOAP Progress Note


Subjective:


Intubated and on [facility] vent


Not able to provide any history





Objective:


I&O/Vital Signs











 7/29/20 7/29/20 7/29/20 7/29/20





 02:00 03:00 03:44 04:00


 


Temp   35.9 


 


Pulse 55 54  


 


Resp 27 27  


 


B/P (MAP) 131/34 (66) 139/35 (69)  


 


Pulse Ox 99 100  


 


O2 Delivery Mechanical Ventilator Mechanical Ventilator  Mechanical Ventilator


 


O2 Flow Rate 30.00 30.00  


 


FiO2    50


 


    





 7/29/20 7/29/20 7/29/20 7/29/20





 04:00 04:00 05:00 05:14


 


Pulse  55 55 54


 


Resp  24 22 


 


B/P (MAP)  149/37 (74) 156/36 (76) 154/37


 


Pulse Ox 95 98 100 


 


O2 Delivery Mechanical Ventilator Mechanical Ventilator Mechanical Ventilator 


 


O2 Flow Rate  30.00 30.00 


 


FiO2 30   





 7/29/20 7/29/20 7/29/20 7/29/20





 06:00 07:00 07:00 07:32


 


Temp    36.3


 


Pulse 54 53 53 


 


Resp 27 27  


 


B/P (MAP) 145/36 (72) 139/36 (70)  


 


Pulse Ox 99 100  


 


O2 Delivery Mechanical Ventilator Mechanical Ventilator  


 


O2 Flow Rate 30.00 30.00  


 


    





 7/29/20 7/29/20 7/29/20 7/29/20





 07:48 08:00 08:00 08:00


 


Pulse 49 50  


 


Resp  24  


 


B/P (MAP) 119/33 115/30 (58)  


 


Pulse Ox  100  97


 


O2 Delivery  Mechanical Ventilator Mechanical Ventilator Mechanical Ventilator


 


O2 Flow Rate  30.00  


 


FiO2   30 30





 7/29/20 7/29/20 7/29/20 7/29/20





 08:30 08:31 08:44 08:46


 


Temp  36.3 36.2 36.2


 


Pulse 49 58 56 56


 


Resp  25 24 


 


B/P (MAP) 119/33 140/38 124/34 126/35


 


Pulse Ox  99 2 30


 


O2 Delivery  Mechanical Ventilator Mechanical Ventilator Mechanical Ventilator


 


FiO2  30 30 


 


    





 7/29/20 7/29/20 7/29/20 7/29/20





 09:00 10:00 10:33 10:41


 


Temp    36.1


 


Pulse 58 50  53


 


Resp 23 23  24


 


B/P (MAP) 133/38 (69) 147/41 (76)  163/43


 


Pulse Ox 75 96  


 


O2 Delivery Mechanical Ventilator Mechanical Ventilator Mechanical Ventilator 

Mechanical Ventilator


 


O2 Flow Rate 30.00 30.00 28.00 


 


FiO2    28


 


    





 7/29/20 7/29/20 7/29/20 





 11:00 11:26 12:00 


 


Temp  36.8  


 


Pulse 53  54 


 


Resp 16  19 


 


B/P (MAP)   158/46 (83) 


 


Pulse Ox 93  94 


 


O2 Delivery Mechanical Ventilator  Mechanical Ventilator 


 


O2 Flow Rate 28.00  28.00 














 7/29/20





 00:00


 


Intake Total 0 ml


 


Output Total 400 ml


 


Balance -400 ml








Weight (Pounds):  112


Weight (Ounces):  0.0


Weight (Calculated Kilograms):  50.823608


Constitutional:  other (intabated and sedate)


Respiratory:  rhonchi (scattered), other (fair air entry, intubated; coarse 

breathsounds)


Cardiovascular:  regular rate-rhythm, systolic murmur (2-3/6 MSM)


Gastrointestional:  other (s/p abdominal surgery with dressing in place)


Extremities:  other (bilat pitting LE swelling)


Neurologic/Psychiatric:  other (intubated and sedated, unable to cooperate with 

exam)


Skin:  No rash on exposed areas, No ulcerations on exposed areas





Results/Procedures:


Labs


Laboratory Tests


7/28/20 13:20: Lactic Acid Level 3.45*H


7/28/20 16:38: Lactic Acid Level 3.41*H


7/28/20 18:50: 


Lactic Acid Level 3.40*H, Blood Gas Puncture Site LEFT RADIAL, Blood Gas Patient

Temperature 35.7, Arterial Blood pH 7.37, Arterial Blood Partial Pressure CO2 

39, Arterial Blood Partial Pressure O2 70L, Arterial Blood HCO3 22L, Arterial 

Blood Total CO2 23.6, Arterial Blood Oxygen Saturation 96, Arterial Blood Base 

Excess -2.4, Jorge Test YES-POS, Blood Gas Ventilator Setting YES, Blood Gas 

Inspired Oxygen 30%


7/28/20 20:55: Lactic Acid Level 3.38*H


7/28/20 22:55: Lactic Acid Level 3.58*H


7/29/20 01:15: Lactic Acid Level 3.80*H


7/29/20 03:30: 


Lactic Acid Level 3.80*H, White Blood Count 16.9H, Red Blood Count 2.49L, He

moglobin 7.4#L, Hematocrit 21L, Mean Corpuscular Volume 85, Mean Corpuscular 

Hemoglobin 30, Mean Corpuscular Hemoglobin Concent 35, Red Cell Distribution 

Width 16.2H, Platelet Count 171, Mean Platelet Volume 9.4, Neutrophils (%) 

(Auto) 92H, Lymphocytes (%) (Auto) 3L, Monocytes (%) (Auto) 5, Eosinophils (%) 

(Auto) 0, Basophils (%) (Auto) 0, Neutrophils # (Auto) 15.6H, Lymphocytes # 

(Auto) 0.5L, Monocytes # (Auto) 0.8, Eosinophils # (Auto) 0.0, Basophils # (Auto

) 0.0, Neutrophils % (Manual) 67, Lymphocytes % (Manual) 2, Monocytes % (Manual)

3, Metamyelocytes % 2, Band Neutrophils 26, Helmet Cells RARE, Crenated Cell 

MODERATE, Blood Gas Puncture Site ART LINE, Blood Gas Patient Temperature 35.9, 

Arterial Blood pH 7.45H, Arterial Blood Partial Pressure CO2 36, Arterial Blood 

Partial Pressure O2 73L, Arterial Blood HCO3 24, Arterial Blood Total CO2 25.5, 

Arterial Blood Oxygen Saturation 96, Arterial Blood Base Excess 0.5, Jorge Test 

ART LINE, Blood Gas Ventilator Setting YES, Blood Gas Inspired Oxygen 30%, 

Sodium Level 128L, Potassium Level 3.8, Chloride Level 95L, Carbon Dioxide Level

20L, Anion Gap 13, Blood Urea Nitrogen 25H, Creatinine 0.85, Estimat Glomerular 

Filtration Rate > 60, BUN/Creatinine Ratio 29, Glucose Level 119H, Calcium Level

6.9L, Phosphorus Level 4.6, Magnesium Level 2.1, Procalcitonin 7.43H


7/29/20 05:25: Lactic Acid Level 3.90*H


7/29/20 06:35: 


Urine Color YELLOW, Urine Clarity SL CLOUDY, Urine pH 5.5, Urine Specific 

Gravity 1.020, Urine Protein NEGATIVE, Urine Glucose (UA) NEGATIVE, Urine 

Ketones NEGATIVE, Urine Nitrite NEGATIVE, Urine Bilirubin NEGATIVE, Urine 

Urobilinogen 0.2, Urine Leukocyte Esterase NEGATIVE, Urine RBC (Auto) 2+H, Urine

RBC 10-25H, Urine WBC RARE, Urine Crystals NONE, Urine Bacteria TRACE, Urine 

Casts NONE, Urine Mucus SMALLH, Urine Yeast LARGEH, Urine Culture Indicated NO


7/29/20 09:50: 


Lactic Acid Level 4.36*H, Procalcitonin 5.82H





Microbiology


7/27/20 MRSA Screen - Final, Complete


          MRSA not isolated


7/27/20 Blood Culture - Preliminary, Resulted


          No growth





Laboratory Tests


7/27/20 14:20








7/27/20 22:00








7/28/20 03:10








7/28/20 10:15








7/29/20 03:30











A/P:


Assessment:





Hypovolemic (blood loss) shock - receiving transfusions, requiring pressor 

support





Sepsis - prob pneumonia - management per pulmonary services





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





Acute resp failure - intubated and sedated





CAD. Card cath of 8/27/19 showed angiographically mild CAD, LVEF 65%, LVEDP at 

top limit of normal.  Per Dr. Singh family is reporting recent coronary stenting 

by Dr. Dixon at [facility], exact details unknown, has been maintained 

on Effient and ASA.





Sinus node disfunction and PAF. On Xarelto for stroke prophylaxis





Echocardiogram of June 11, 2020 by Dr. Gamez showed LVEF 55-65%.  Grade 2 

diastolic dysfunction.  Mild mitral stenosis with mean gradient 2.2mHg, MVA 3.3 

cm2.  Mod calcified aortic valve with mod-severe stenosis, mean grad 26mmHg ASHELY 

1.1cm2, mild to mod regurg.  Severe TR.  PASP 65-70mmHg.  Echocardiogram of July 28, 2020 was a technically poor study.  LVEF 55-60%





H/o scleroderma and chronic dysphagia, followed and treated by her pcp





Quit smoking in 1980





Borderline low TSH on 11/3/18 (0.33), followed by her pcp Dr Cueva





Carotid u/s of 8- showed 60-79% R ICA stenosis; less than 40% L ICA 

stenosis.





Incidental note of L thyroid nodules seen at time of carotid u/s of Feb 2019 for

which f/u is with her PCP


Plan:





* Complex management


* Critically ill


* Sepsis with prob pneumonia - management per pulmonary services


* Continue to hold off on Xarelto and ASA due to bleeding requiring surgery and 

  leading to hypovolemic shock that has required transfusion - receiving more 

  PRBC's today


* Because family reports h/o cor stent in June 2020, she remains at high risk of

  stent thrombosis w/o any antiplatelet agents. This issue has to be weighed 

  carefully. If bleeding is showing signs of having stabilized,  it is 

  reasonable to start Plavix therapy if ok with Dr. Singh of medical services


* We will again request cardiac cath report from [facility]


* Monitor lab closely


* Oliguria - probably related to intravascular vol depletion, continue to 

  monitor





Clinical Quality Measures


Type of Care:


Type of Care:  Comfort Measures











NICOLE DUMONT MD FACP [facility] CCDS   Jul 29, 2020 13:16

## 2020-07-29 NOTE — NUR
PALLIATIVE CARE RN in to see patient.  Still intubated but responsive to voice...as noted by 
 eye movement and attempt to move head.   No family in the room at this time.  Will continue 
to follow and assist with discharge planning at the direction of Dr. Singh.

## 2020-07-29 NOTE — PULMONARY PROGRESS NOTE
Subjective


Time Seen by a Provider:  06:08





Sepsis Event


Evaluation


Height, Weight, BMI


Height: 5'3.00"


Weight: 112lbs. 0.0oz. 50.612919ri; 23.63 BMI


Method:Stated





Focused Exam


Lactate Level


7/29/20 01:15: Lactic Acid Level 3.80*H


7/29/20 03:30: Lactic Acid Level 3.80*H


7/29/20 05:25: Lactic Acid Level 3.90*H


Lactic Acid Level





Laboratory Tests








Test


 7/29/20


03:30 7/29/20


05:25


 


Lactic Acid Level


 3.80 MMOL/L


(0.50-2.00)  *H 3.90 MMOL/L


(0.50-2.00)  *H











Exam


Exam





Vital Signs








  Date Time  Temp Pulse Resp B/P (MAP) Pulse Ox O2 Delivery O2 Flow Rate FiO2


 


7/29/20 05:14  54  154/37    


 


7/29/20 04:00     95 Mechanical Ventilator  30


 


7/29/20 04:00      Mechanical Ventilator  50


 


7/29/20 03:44 35.9       


 


7/29/20 03:00  54 27 139/35 (69) 100 Mechanical Ventilator 30.00 


 


7/29/20 02:00  55 27 131/34 (66) 99 Mechanical Ventilator 30.00 


 


7/29/20 01:03  56 28  98   30


 


7/29/20 01:00  56      


 


7/29/20 01:00  56  151/40 (77) 100 Mechanical Ventilator 30.00 


 


7/29/20 00:36  54  103/32    


 


7/29/20 00:00 36.0       


 


7/29/20 00:00  56 28 138/36 (70) 100 Mechanical Ventilator 30.00 


 


7/29/20 00:00      Mechanical Ventilator  50


 


7/29/20 00:00     95 Mechanical Ventilator  30


 


7/28/20 23:00  56 28 146/37 (73) 100 Mechanical Ventilator 30.00 


 


7/28/20 22:25  55 28  100   30


 


7/28/20 22:00  55 27 114/32 (59) 100 Mechanical Ventilator 30.00 


 


7/28/20 21:00  56 27 125/34 (64) 100 Mechanical Ventilator 30.00 


 


7/28/20 20:00  56 28 141/36 (71) 100 Mechanical Ventilator 30.00 


 


7/28/20 20:00      Mechanical Ventilator  50


 


7/28/20 20:00     95 Mechanical Ventilator  30


 


7/28/20 19:28 36.0       


 


7/28/20 19:01  55  124/34    


 


7/28/20 19:00  55 28 124/36 (65) 100 Mechanical Ventilator 40.00 


 


7/28/20 18:49  56 28  100   30


 


7/28/20 18:36  57      


 


7/28/20 18:00  55 13 115/33 (60) 100 Mechanical Ventilator 40.00 


 


7/28/20 17:45  55      


 


7/28/20 17:16  54  99/33    


 


7/28/20 17:00  54 27 115/36 (62) 100 Mechanical Ventilator 40.00 


 


7/28/20 16:48  54  119/36    


 


7/28/20 16:00      Mechanical Ventilator  30


 


7/28/20 16:00 35.7       


 


7/28/20 16:00  53 25 115/41 (65) 99 Mechanical Ventilator 40.00 


 


7/28/20 16:00     95 Mechanical Ventilator  30


 


7/28/20 15:29  53 28  99   45


 


7/28/20 15:00  52 24 132/43 (72) 100 Mechanical Ventilator 40.00 


 


7/28/20 14:00  51 27 130/49 (76) 100 Mechanical Ventilator 40.00 


 


7/28/20 13:32 36.0       


 


7/28/20 13:00  50 10 126/51 (76) 100 Mechanical Ventilator 40.00 


 


7/28/20 12:41  50      


 


7/28/20 12:00  49 13 122/47 (72) 99 Mechanical Ventilator 40.00 


 


7/28/20 12:00     97 Mechanical Ventilator  50


 


7/28/20 12:00      Mechanical Ventilator  50


 


7/28/20 12:00 34.3       


 


7/28/20 11:00  49 14 151/49 (83) 91 Mechanical Ventilator 40.00 


 


7/28/20 10:56  48 30  93   45


 


7/28/20 10:49  49  122/44    


 


7/28/20 10:00  47 16 123/60 (81) 93 Mechanical Ventilator 40.00 


 


7/28/20 09:55  46  137/40    


 


7/28/20 09:23  41  70/30    


 


7/28/20 09:20 35.7       


 


7/28/20 09:00  55 24 143/50 (81) 95 Mechanical Ventilator 40.00 


 


7/28/20 08:00  54 17 112/43 (66) 95 Mechanical Ventilator 40.00 


 


7/28/20 08:00     97 Mechanical Ventilator  50


 


7/28/20 08:00      Mechanical Ventilator  50


 


7/28/20 07:48  52 28  95   45


 


7/28/20 07:00  57 19 93/40 (57) 94 Mechanical Ventilator 40.00 


 


7/28/20 06:44  62      














I & O 


 


 7/29/20





 07:00


 


Intake Total 0 ml


 


Output Total 575 ml


 


Balance -575 ml








Height & Weight


Height: 5'3.00"


Weight: 112lbs. 0.0oz. 50.454509qv; 23.63 BMI


Method:Stated


General Appearance:  Other (ill appearing, sedated on vent)


Neck:  Other (cental line in place)


Respiratory:  Decreased Breath Sounds (in cases), Other (on vent)


Cardiovascular:  Regular Rate, Rhythm, Systolic Murmur


Capillary Refill:  Less Than 3 Seconds


Gastrointestinal:  other (incision c/d/i)


Extremity:  Swelling (2+ to ankles bilaterally)


Neurologic/Psychiatric:  Other (sedated, appears comfortable)





Results


Lab


Laboratory Tests


7/27/20 14:20








7/27/20 22:00








7/28/20 03:10








7/28/20 10:15








7/29/20 03:30











Assessment/Plan


Assessment/Plan


Acute respiratory failure s/p surgery 


   -Continue vent 


      -Pt is not ready for extubation yet. She is on levophed, and vasopressin. 


   -Decrease RR to 24. 


Severe Abd pain with acute GIB s/p ex lap 


Bleeding cecal mass s/p resection 


Anemia s/p 2 units of PRBC - with persistent hypotension 


   -Transfuse another 1 unit of PRBC


   -Monitor 


Hypotension 


   -Continue Levophed, and Vasopressin 


   -Repeat Pan cultures 


   -Add vanco and Zosyn 


   -Give a liter bolus of LR 


Moderate bilateral pleural effusions


   -Monitor 


Metabolic acidosis 


   -1/2 NS with 2 amps of bicarb at 100cc/hr 


Hypoxia


Hx of Afib and aortic stenosis











SIN BERRIOS DO              Jul 29, 2020 06:13

## 2020-07-30 VITALS — DIASTOLIC BLOOD PRESSURE: 43 MMHG | SYSTOLIC BLOOD PRESSURE: 134 MMHG

## 2020-07-30 VITALS — DIASTOLIC BLOOD PRESSURE: 34 MMHG | SYSTOLIC BLOOD PRESSURE: 107 MMHG

## 2020-07-30 VITALS — DIASTOLIC BLOOD PRESSURE: 39 MMHG | SYSTOLIC BLOOD PRESSURE: 135 MMHG

## 2020-07-30 VITALS — DIASTOLIC BLOOD PRESSURE: 50 MMHG | SYSTOLIC BLOOD PRESSURE: 151 MMHG

## 2020-07-30 VITALS — DIASTOLIC BLOOD PRESSURE: 80 MMHG | SYSTOLIC BLOOD PRESSURE: 118 MMHG

## 2020-07-30 VITALS — DIASTOLIC BLOOD PRESSURE: 39 MMHG | SYSTOLIC BLOOD PRESSURE: 145 MMHG

## 2020-07-30 VITALS — SYSTOLIC BLOOD PRESSURE: 131 MMHG | DIASTOLIC BLOOD PRESSURE: 40 MMHG

## 2020-07-30 VITALS — DIASTOLIC BLOOD PRESSURE: 36 MMHG | SYSTOLIC BLOOD PRESSURE: 130 MMHG

## 2020-07-30 VITALS — DIASTOLIC BLOOD PRESSURE: 42 MMHG | SYSTOLIC BLOOD PRESSURE: 131 MMHG

## 2020-07-30 VITALS — SYSTOLIC BLOOD PRESSURE: 116 MMHG | DIASTOLIC BLOOD PRESSURE: 35 MMHG

## 2020-07-30 VITALS — DIASTOLIC BLOOD PRESSURE: 39 MMHG | SYSTOLIC BLOOD PRESSURE: 126 MMHG

## 2020-07-30 VITALS — DIASTOLIC BLOOD PRESSURE: 43 MMHG | SYSTOLIC BLOOD PRESSURE: 144 MMHG

## 2020-07-30 VITALS — SYSTOLIC BLOOD PRESSURE: 138 MMHG | DIASTOLIC BLOOD PRESSURE: 43 MMHG

## 2020-07-30 VITALS — DIASTOLIC BLOOD PRESSURE: 82 MMHG | SYSTOLIC BLOOD PRESSURE: 115 MMHG

## 2020-07-30 VITALS — DIASTOLIC BLOOD PRESSURE: 42 MMHG | SYSTOLIC BLOOD PRESSURE: 137 MMHG

## 2020-07-30 VITALS — DIASTOLIC BLOOD PRESSURE: 44 MMHG | SYSTOLIC BLOOD PRESSURE: 129 MMHG

## 2020-07-30 VITALS — SYSTOLIC BLOOD PRESSURE: 135 MMHG | DIASTOLIC BLOOD PRESSURE: 41 MMHG

## 2020-07-30 VITALS — SYSTOLIC BLOOD PRESSURE: 153 MMHG | DIASTOLIC BLOOD PRESSURE: 53 MMHG

## 2020-07-30 VITALS — SYSTOLIC BLOOD PRESSURE: 139 MMHG | DIASTOLIC BLOOD PRESSURE: 44 MMHG

## 2020-07-30 VITALS — SYSTOLIC BLOOD PRESSURE: 154 MMHG | DIASTOLIC BLOOD PRESSURE: 48 MMHG

## 2020-07-30 VITALS — DIASTOLIC BLOOD PRESSURE: 52 MMHG | SYSTOLIC BLOOD PRESSURE: 160 MMHG

## 2020-07-30 VITALS — DIASTOLIC BLOOD PRESSURE: 51 MMHG | SYSTOLIC BLOOD PRESSURE: 171 MMHG

## 2020-07-30 VITALS — SYSTOLIC BLOOD PRESSURE: 124 MMHG | DIASTOLIC BLOOD PRESSURE: 36 MMHG

## 2020-07-30 VITALS — SYSTOLIC BLOOD PRESSURE: 147 MMHG | DIASTOLIC BLOOD PRESSURE: 45 MMHG

## 2020-07-30 VITALS — DIASTOLIC BLOOD PRESSURE: 40 MMHG | SYSTOLIC BLOOD PRESSURE: 146 MMHG

## 2020-07-30 VITALS — DIASTOLIC BLOOD PRESSURE: 43 MMHG | SYSTOLIC BLOOD PRESSURE: 153 MMHG

## 2020-07-30 LAB
ARTERIAL PATENCY WRIST A: (no result)
ARTERIAL PATENCY WRIST A: (no result)
BASE EXCESS STD BLDA CALC-SCNC: 3.4 MMOL/L (ref -2.5–2.5)
BASE EXCESS STD BLDA CALC-SCNC: 5.6 MMOL/L (ref -2.5–2.5)
BASOPHILS # BLD AUTO: 0 10^3/UL (ref 0–0.1)
BASOPHILS NFR BLD AUTO: 0 % (ref 0–10)
BDY SITE: (no result)
BDY SITE: (no result)
BODY TEMPERATURE: 36.6
BODY TEMPERATURE: 36.7
BUN/CREAT SERPL: 34
BURR CELLS BLD QL SMEAR: SLIGHT
CALCIUM SERPL-MCNC: 7.4 MG/DL (ref 8.5–10.1)
CHLORIDE SERPL-SCNC: 101 MMOL/L (ref 98–107)
CO2 BLDA CALC-SCNC: 28.4 MMOL/L (ref 21–31)
CO2 BLDA CALC-SCNC: 31.7 MMOL/L (ref 21–31)
CO2 SERPL-SCNC: 23 MMOL/L (ref 21–32)
CREAT SERPL-MCNC: 0.71 MG/DL (ref 0.6–1.3)
EOSINOPHIL # BLD AUTO: 0 10^3/UL (ref 0–0.3)
EOSINOPHIL NFR BLD AUTO: 0 % (ref 0–10)
ERYTHROCYTE [DISTWIDTH] IN BLOOD BY AUTOMATED COUNT: 15.6 % (ref 10–14.5)
GFR SERPLBLD BASED ON 1.73 SQ M-ARVRAT: > 60 ML/MIN
GLUCOSE SERPL-MCNC: 96 MG/DL (ref 70–105)
HCT VFR BLD CALC: 26 % (ref 35–52)
HCT VFR BLD CALC: 26 % (ref 35–52)
HGB BLD-MCNC: 9.1 G/DL (ref 11.5–16)
HGB BLD-MCNC: 9.1 G/DL (ref 11.5–16)
INHALED O2 FLOW RATE: (no result) L/MIN
INHALED O2 FLOW RATE: (no result) L/MIN
LYMPHOCYTES # BLD AUTO: 0.3 X 10^3 (ref 1–4)
LYMPHOCYTES NFR BLD AUTO: 1 % (ref 12–44)
MAGNESIUM SERPL-MCNC: 2.1 MG/DL (ref 1.6–2.4)
MANUAL DIFFERENTIAL PERFORMED BLD QL: YES
MCH RBC QN AUTO: 30 PG (ref 25–34)
MCHC RBC AUTO-ENTMCNC: 35 G/DL (ref 32–36)
MCV RBC AUTO: 85 FL (ref 80–99)
MONOCYTES # BLD AUTO: 0.5 X 10^3 (ref 0–1)
MONOCYTES NFR BLD AUTO: 3 % (ref 0–12)
MONOCYTES NFR BLD: 3 %
NEUTROPHILS # BLD AUTO: 20.1 X 10^3 (ref 1.8–7.8)
NEUTROPHILS NFR BLD AUTO: 96 % (ref 42–75)
NEUTS BAND NFR BLD MANUAL: 88 %
NEUTS BAND NFR BLD: 7 %
PCO2 BLDA: 39 MMHG (ref 35–45)
PCO2 BLDA: 49 MMHG (ref 35–45)
PH BLDA: 7.41 [PH] (ref 7.37–7.43)
PH BLDA: 7.46 [PH] (ref 7.37–7.43)
PHOSPHATE SERPL-MCNC: 3.4 MG/DL (ref 2.3–4.7)
PLATELET # BLD: 157 10^3/UL (ref 130–400)
PMV BLD AUTO: 9.8 FL (ref 7.4–10.4)
PO2 BLDA: 74 MMHG (ref 79–93)
PO2 BLDA: 99 MMHG (ref 79–93)
POTASSIUM SERPL-SCNC: 3.4 MMOL/L (ref 3.6–5)
SAO2 % BLDA FROM PO2: 94 % (ref 94–100)
SAO2 % BLDA FROM PO2: 96 % (ref 94–100)
SCHISTOCYTES BLD QL SMEAR: SLIGHT
SODIUM SERPL-SCNC: 134 MMOL/L (ref 135–145)
VARIANT LYMPHS NFR BLD MANUAL: 2 %
VENTILATION MODE VENT: NO
VENTILATION MODE VENT: YES
WBC # BLD AUTO: 20.9 10^3/UL (ref 4.3–11)

## 2020-07-30 RX ADMIN — HYDROCORTISONE SODIUM SUCCINATE SCH MG: 100 INJECTION, POWDER, FOR SOLUTION INTRAMUSCULAR; INTRAVENOUS at 05:22

## 2020-07-30 RX ADMIN — MORPHINE SULFATE PRN MG: 4 INJECTION, SOLUTION INTRAMUSCULAR; INTRAVENOUS at 23:56

## 2020-07-30 RX ADMIN — Medication SCH MLS/HR: at 13:48

## 2020-07-30 RX ADMIN — PROPOFOL SCH MLS/HR: 10 INJECTION, EMULSION INTRAVENOUS at 05:22

## 2020-07-30 RX ADMIN — HYDROCORTISONE SODIUM SUCCINATE SCH MG: 100 INJECTION, POWDER, FOR SOLUTION INTRAMUSCULAR; INTRAVENOUS at 17:17

## 2020-07-30 RX ADMIN — MORPHINE SULFATE PRN MG: 4 INJECTION, SOLUTION INTRAMUSCULAR; INTRAVENOUS at 17:39

## 2020-07-30 RX ADMIN — IPRATROPIUM BROMIDE AND ALBUTEROL SULFATE SCH ML: .5; 3 SOLUTION RESPIRATORY (INHALATION) at 18:43

## 2020-07-30 RX ADMIN — CLOPIDOGREL BISULFATE SCH MG: 75 TABLET, FILM COATED ORAL at 17:23

## 2020-07-30 RX ADMIN — POTASSIUM CHLORIDE SCH MLS/HR: 200 INJECTION, SOLUTION INTRAVENOUS at 04:52

## 2020-07-30 RX ADMIN — HYDROCORTISONE SODIUM SUCCINATE SCH MG: 100 INJECTION, POWDER, FOR SOLUTION INTRAMUSCULAR; INTRAVENOUS at 11:27

## 2020-07-30 RX ADMIN — SODIUM CHLORIDE SCH MLS/HR: 900 INJECTION, SOLUTION INTRAVENOUS at 22:24

## 2020-07-30 RX ADMIN — IPRATROPIUM BROMIDE AND ALBUTEROL SULFATE SCH ML: .5; 3 SOLUTION RESPIRATORY (INHALATION) at 21:28

## 2020-07-30 RX ADMIN — POTASSIUM CHLORIDE SCH MEQ: 1500 TABLET, EXTENDED RELEASE ORAL at 04:53

## 2020-07-30 RX ADMIN — SODIUM CHLORIDE SCH MLS/HR: 900 INJECTION, SOLUTION INTRAVENOUS at 13:08

## 2020-07-30 RX ADMIN — MAGNESIUM SULFATE IN DEXTROSE SCH MLS/HR: 10 INJECTION, SOLUTION INTRAVENOUS at 04:53

## 2020-07-30 RX ADMIN — MORPHINE SULFATE PRN MG: 4 INJECTION, SOLUTION INTRAMUSCULAR; INTRAVENOUS at 08:21

## 2020-07-30 RX ADMIN — SODIUM CHLORIDE, SODIUM LACTATE, POTASSIUM CHLORIDE, AND CALCIUM CHLORIDE SCH MLS/HR: 600; 310; 30; 20 INJECTION, SOLUTION INTRAVENOUS at 00:44

## 2020-07-30 RX ADMIN — IPRATROPIUM BROMIDE AND ALBUTEROL SULFATE SCH ML: .5; 3 SOLUTION RESPIRATORY (INHALATION) at 09:56

## 2020-07-30 RX ADMIN — PANTOPRAZOLE SODIUM SCH MG: 40 INJECTION, POWDER, FOR SOLUTION INTRAVENOUS at 08:21

## 2020-07-30 RX ADMIN — MORPHINE SULFATE PRN MG: 4 INJECTION, SOLUTION INTRAMUSCULAR; INTRAVENOUS at 02:00

## 2020-07-30 RX ADMIN — Medication SCH MLS/HR: at 02:02

## 2020-07-30 RX ADMIN — SODIUM CHLORIDE, SODIUM LACTATE, POTASSIUM CHLORIDE, AND CALCIUM CHLORIDE SCH MLS/HR: 600; 310; 30; 20 INJECTION, SOLUTION INTRAVENOUS at 10:59

## 2020-07-30 RX ADMIN — IPRATROPIUM BROMIDE AND ALBUTEROL SULFATE SCH ML: .5; 3 SOLUTION RESPIRATORY (INHALATION) at 14:13

## 2020-07-30 RX ADMIN — VANCOMYCIN HYDROCHLORIDE SCH MLS/HR: 500 INJECTION, POWDER, LYOPHILIZED, FOR SOLUTION INTRAVENOUS at 07:35

## 2020-07-30 RX ADMIN — MORPHINE SULFATE PRN MG: 4 INJECTION, SOLUTION INTRAMUSCULAR; INTRAVENOUS at 05:39

## 2020-07-30 RX ADMIN — POTASSIUM CHLORIDE SCH MLS/HR: 200 INJECTION, SOLUTION INTRAVENOUS at 05:23

## 2020-07-30 RX ADMIN — POTASSIUM CHLORIDE SCH MLS/HR: 200 INJECTION, SOLUTION INTRAVENOUS at 06:28

## 2020-07-30 RX ADMIN — SODIUM CHLORIDE SCH MLS/HR: 900 INJECTION, SOLUTION INTRAVENOUS at 05:22

## 2020-07-30 NOTE — PHYSICAL THERAPY PROGRESS NOTE
Therapy Progress Note


Patient extubated this a.m. and remains lethargic and listless.  PT will 

initiate treatment in a.m. when patient has been off ventilator and deemed 

medically able to safely participate with skilled therapy.  RN is aware.











SONG ISRAEL PT              Jul 30, 2020 10:06

## 2020-07-30 NOTE — PROGRESS NOTE - CARDIOLOGY
Cardiology SOAP Progress Note


Subjective:


Extubated, but not verbally responsive. Does not report symptoms





Objective:


I&O/Vital Signs











 7/30/20 7/30/20 7/30/20 7/30/20





 01:00 01:00 01:41 02:00


 


Pulse 70 70 70 72


 


Resp  24 24 24


 


B/P (MAP)  116/35 (62)  107/34 (58)


 


Pulse Ox  93 92 93


 


O2 Delivery  Mechanical Ventilator  Mechanical Ventilator


 


O2 Flow Rate  28.00  28.00


 


FiO2   28 





 7/30/20 7/30/20 7/30/20 7/30/20





 02:02 03:00 03:37 04:00


 


Temp   36.6 


 


Pulse 69 66  


 


Resp  24  


 


B/P (MAP) 117/36 137/42 (73)  


 


Pulse Ox  93  95


 


O2 Delivery  Mechanical Ventilator  Mechanical Ventilator


 


O2 Flow Rate  28.00  


 


FiO2    28


 


    





 7/30/20 7/30/20 7/30/20 7/30/20





 04:00 05:00 05:22 06:00


 


Pulse 69 66 66 70


 


Resp 24 24  24


 


B/P (MAP) 135/41 (72) 131/40 (70) 131/42 138/43 (74)


 


Pulse Ox 93 93  93


 


O2 Delivery Mechanical Ventilator Mechanical Ventilator  Mechanical Ventilator


 


O2 Flow Rate 28.00 28.00  28.00





 7/30/20 7/30/20 7/30/20 7/30/20





 06:29 07:00 07:00 07:30


 


Pulse 80 75 78 


 


Resp 24 34  


 


B/P (MAP) 147/45 (79) 153/53 (86)  


 


Pulse Ox 93 95  94


 


O2 Delivery Nasal Cannula Nasal Cannula  Nasal Cannula


 


O2 Flow Rate 2.00 2.00  3.00





 7/30/20 7/30/20 7/30/20 7/30/20





 08:00 08:00 08:00 09:00


 


Temp   36.7 


 


Pulse  77  76


 


Resp  27  27


 


B/P (MAP)  160/52 (88)  135/39 (71)


 


Pulse Ox 93 93  91


 


O2 Delivery Nasal Cannula Nasal Cannula  Nasal Cannula


 


O2 Flow Rate 3.00 2.00  2.00


 


    





 7/30/20 7/30/20 7/30/20 7/30/20





 09:57 10:00 10:15 11:00


 


Pulse  76  78


 


Resp  29  35


 


B/P (MAP)  151/50 (83)  134/43 (73)


 


Pulse Ox 92 93 93 93


 


O2 Delivery Nasal Cannula Nasal Cannula Nasal Cannula Nasal Cannula


 


O2 Flow Rate 5.00 2.00 5.00 5.00





 7/30/20 7/30/20 7/30/20 





 11:37 12:00 12:34 


 


Pulse  78 81 


 


Resp  35  


 


B/P (MAP)  131/42 (71)  


 


Pulse Ox 93 95  


 


O2 Delivery Nasal Cannula Nasal Cannula  


 


O2 Flow Rate 5.00 5.00  














 7/30/20





 00:00


 


Intake Total 2250 ml


 


Output Total 725 ml


 


Balance 1525 ml








Weight (Pounds):  112


Weight (Ounces):  0.0


Weight (Calculated Kilograms):  50.133875


Constitutional:  other (frail, extubated but not verbally responsive)


Respiratory:  rhonchi (scattered), other (fair air entry; coarse breathsounds)


Cardiovascular:  regular rate-rhythm, systolic murmur (2-3/6 MSM)


Gastrointestional:  other (s/p abdominal surgery with dressing in place)


Extremities:  other (bilat pitting LE swelling)


Neurologic/Psychiatric:  grossly intact (moves extremities)


Skin:  No rash on exposed areas, No ulcerations on exposed areas





Results/Procedures:


Labs


Laboratory Tests


7/29/20 14:10: Lab Scanned Report Transfusion Reaction Form


7/29/20 14:45: Lactic Acid Level 4.20*H


7/30/20 03:29: 


White Blood Count 20.9H, Red Blood Count 3.04L, Hemoglobin 9.1#L, Hematocrit 26L

, Mean Corpuscular Volume 85, Mean Corpuscular Hemoglobin 30, Mean Corpuscular 

Hemoglobin Concent 35, Red Cell Distribution Width 15.6H, Platelet Count 157, 

Mean Platelet Volume 9.8, Neutrophils (%) (Auto) 96H, Lymphocytes (%) (Auto) 1L,

Monocytes (%) (Auto) 3, Eosinophils (%) (Auto) 0, Basophils (%) (Auto) 0, 

Neutrophils # (Auto) 20.1H, Lymphocytes # (Auto) 0.3L, Monocytes # (Auto) 0.5, 

Eosinophils # (Auto) 0.0, Basophils # (Auto) 0.0, Neutrophils % (Manual) 88, 

Lymphocytes % (Manual) 2, Monocytes % (Manual) 3, Band Neutrophils 7, Gladys Cells

SLIGHT, Schistocytes SLIGHT, Blood Gas Puncture Site LEFT BRACH ART, Blood Gas 

Patient Temperature 36.6, Arterial Blood pH 7.46H, Arterial Blood Partial 

Pressure CO2 39, Arterial Blood Partial Pressure O2 74L, Arterial Blood HCO3 27,

Arterial Blood Total CO2 28.4, Arterial Blood Oxygen Saturation 94, Arterial 

Blood Base Excess 3.4H, Jorge Test YES-POS, Blood Gas Ventilator Setting YES, 

Blood Gas Inspired Oxygen 28%, Sodium Level 134L, Potassium Level 3.4L, Chloride

Level 101, Carbon Dioxide Level 23, Anion Gap 10, Blood Urea Nitrogen 24H, 

Creatinine 0.71, Estimat Glomerular Filtration Rate > 60, BUN/Creatinine Ratio 

34, Glucose Level 96, Calcium Level 7.4L, Phosphorus Level 3.4, Magnesium Level 

2.1, B-Type Natriuretic Peptide 359.8H


7/30/20 05:43: Lactic Acid Level 1.42





Microbiology


7/29/20 Gram Stain - Final, Resulted


          


7/29/20 Sputum Culture, Resulted


          Pending


7/27/20 Blood Culture - Preliminary, Resulted


          No growth





Laboratory Tests


7/29/20 03:30








7/30/20 03:29











A/P:


Assessment:





Hypovolemic (blood loss) shock - receiving transfusions, requiring pressor 

support





Sepsis - prob pneumonia - management per pulmonary services





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





Acute resp failure - extubated on 7-





CAD. Card cath of June 30, 2020 by Dr. Dixon at [facility] showed 80-

90% lesion in the RCA for which KEISHA x1 was placed.  Other vessels showed mild to

mod CAD.  Valvuloplasty to the aortic valve carried out at time of cardiac cath.

 Has been on Effient and ASA.





Sinus node disfunction and PAF. On Xarelto for stroke prophylaxis





Echocardiogram of June 11, 2020 by Dr. Gamez showed LVEF 55-65%.  Grade 2 diast

olic dysfunction.  Mild mitral stenosis with mean gradient 2.2mHg, MVA 3.3 cm2. 

Mod calcified aortic valve with mod-severe stenosis, mean grad 26mmHg ASHELY 

1.1cm2, mild to mod regurg.  Severe TR.  PASP 65-70mmHg.  Echocardiogram of July 28, 2020 was a technically poor study.  LVEF 55-60%





H/o scleroderma and chronic dysphagia, followed and treated by her pcp





Quit smoking in 1980





Borderline low TSH on 11/3/18 (0.33), followed by her pcp Dr Cueva





Carotid u/s of 8- showed 60-79% R ICA stenosis; less than 40% L ICA 

stenosis.





Incidental note of L thyroid nodules seen at time of carotid u/s of Feb 2019 for

which f/u is with her PCP


Plan:





* Complex management


* Critically ill


* Extubated this morning


* Sepsis with prob pneumonia - management per Pulmonary services


* Continue to hold off on Xarelto and ASA due to bleeding requiring surgery and 

  leading to hypovolemic shock that has required transfusion - receiving more 

  PRBC's today


* Cardiac cath report from [facility] has been reviewed showing stent 

  placement on June 30, 2020 to the RCA at which time she also had valvuloplasty

  to the aortic valve.  She was started on Effient and ASA at that time.  We do 

  advise antiplatelet tx be initiated with Plavix to protect recent stent from 

  thrombosis


* Monitor lab closely


* Replace electrolytes


* Received Lasix this morning





Clinical Quality Measures


Type of Care:


Type of Care:  Comfort Measures











NICOLE DUMONT MD FACP [facility] CCDS   Jul 30, 2020 12:39

## 2020-07-30 NOTE — PULMONARY PROGRESS NOTE
MAYTE KHANNA,MED STUDENT 7/30/20 0521:


Subjective


Date Seen by a Provider:  Jul 30, 2020


Time Seen by a Provider:  04:15


Subjective/Events-last exam


Pt. sedated and on vent.





Sepsis Event


Evaluation


Height, Weight, BMI


Height: 5'3.00"


Weight: 112lbs. 0.0oz. 50.150169fd; 23.63 BMI


Method:Stated





Focused Exam


Lactate Level


7/29/20 05:25: Lactic Acid Level 3.90*H


7/29/20 09:50: Lactic Acid Level 4.36*H


7/29/20 14:45: Lactic Acid Level 4.20*H





Exam


Exam





Vital Signs








  Date Time  Temp Pulse Resp B/P (MAP) Pulse Ox O2 Delivery O2 Flow Rate FiO2


 


7/30/20 04:00     95 Mechanical Ventilator  28


 


7/30/20 03:37 36.6       


 


7/30/20 03:00  66 23 137/42 (73) 93 Mechanical Ventilator 28.00 


 


7/30/20 02:02  69  117/36    


 


7/30/20 02:00  72 14 107/34 (58) 93 Mechanical Ventilator 28.00 


 


7/30/20 01:41  70 24  92   28


 


7/30/20 01:00  70 13 116/35 (62) 93 Mechanical Ventilator 28.00 


 


7/30/20 01:00  70      


 


7/30/20 00:00  68 24 124/36 (65) 92 Mechanical Ventilator 28.00 


 


7/30/20 00:00     95 Mechanical Ventilator  28


 


7/29/20 23:38 36.6       


 


7/29/20 23:00  68 24 126/39 (68) 92 Mechanical Ventilator 28.00 


 


7/29/20 22:00  68 24 127/37 (67) 93 Mechanical Ventilator 28.00 


 


7/29/20 21:15  63 24  94   28


 


7/29/20 21:00  65 24 116/37 (63) 94 Mechanical Ventilator 28.00 


 


7/29/20 20:00  68 24 118/39 (65) 92 Mechanical Ventilator 28.00 


 


7/29/20 20:00     95 Mechanical Ventilator  28


 


7/29/20 19:46  69  124/40    


 


7/29/20 19:00  64      


 


7/29/20 19:00  64 24 113/38 (63) 92 Mechanical Ventilator 28.00 


 


7/29/20 18:59  63 24  92   28


 


7/29/20 18:00  62 12 119/40 (66) 92 Mechanical Ventilator 28.00 


 


7/29/20 17:00  68 12 121/42 (68) 92 Mechanical Ventilator 28.00 


 


7/29/20 16:00     95 Mechanical Ventilator  28


 


7/29/20 16:00  61 9 120/40 (66)  Mechanical Ventilator 28.00 


 


7/29/20 16:00      Mechanical Ventilator  28


 


7/29/20 15:39 36.4       


 


7/29/20 15:00  64 25 133/42 (72) 93 Mechanical Ventilator 28.00 


 


7/29/20 14:10  55 25  93   28


 


7/29/20 14:00  55 17 126/40 (68) 93 Mechanical Ventilator 28.00 


 


7/29/20 13:00  56      


 


7/29/20 13:00  62 23 164/48 (86) 94 Mechanical Ventilator 28.00 


 


7/29/20 12:35      Mechanical Ventilator  30


 


7/29/20 12:35     97 Mechanical Ventilator  30


 


7/29/20 12:00  54 19 158/46 (83) 94 Mechanical Ventilator 28.00 


 


7/29/20 11:26 36.8       


 


7/29/20 11:00  53 16  93 Mechanical Ventilator 28.00 


 


7/29/20 10:41 36.1 53 24 163/43  Mechanical Ventilator  28


 


7/29/20 10:33      Mechanical Ventilator 28.00 


 


7/29/20 10:18  51 26  96   28


 


7/29/20 10:00  50 23 147/41 (76) 96 Mechanical Ventilator 30.00 


 


7/29/20 09:00  58 23 133/38 (69) 75 Mechanical Ventilator 30.00 


 


7/29/20 08:46 36.2 56  126/35 30 Mechanical Ventilator  


 


7/29/20 08:44 36.2 56 24 124/34 2 Mechanical Ventilator  30


 


7/29/20 08:31 36.3 58 25 140/38 99 Mechanical Ventilator  30


 


7/29/20 08:30  49  119/33    


 


7/29/20 08:00     97 Mechanical Ventilator  30


 


7/29/20 08:00      Mechanical Ventilator  30


 


7/29/20 08:00  50 24 115/30 (58) 100 Mechanical Ventilator 30.00 


 


7/29/20 07:48  49  119/33    


 


7/29/20 07:32 36.3       


 


7/29/20 07:00  53      


 


7/29/20 07:00  53 27 139/36 (70) 100 Mechanical Ventilator 30.00 


 


7/29/20 06:40  53 24  100   30


 


7/29/20 06:00  54 27 145/36 (72) 99 Mechanical Ventilator 30.00 














I & O 


 


 7/30/20





 07:00


 


Intake Total 3886 ml


 


Output Total 1650 ml


 


Balance 2236 ml








Height & Weight


Height: 5'3.00"


Weight: 112lbs. 0.0oz. 50.281195zb; 23.63 BMI


Method:Stated


General Appearance:  Other (on vent)


Neck:  Other (central line)


Respiratory:  Decreased Breath Sounds; No Wheezing; Other (on vent)


Cardiovascular:  Regular Rate, Rhythm, No Murmur


Capillary Refill:  Less Than 3 Seconds


Peripheral Pulses:  2+ Radial Pulses (R), 2+ Radial Pulses (L)


Gastrointestinal:  distended (?? slightly firm), other (incision c/d/i)


Extremity:  Pedal Edema, Swelling (2+ to just above ankles)


Neurologic/Psychiatric:  Other (sedated, appears comfortable)


Skin:  Ecchymosis; No Mottled; Pallor





Results


Lab


Laboratory Tests


7/28/20 10:15








7/29/20 03:30








7/30/20 03:29











Assessment/Plan


Assessment/Plan


Acute respiratory failure s/p surgery 


   -Continue vent 


      -Pt is not ready for extubation yet. She is on levophed, and vasopressin. 


   -Decrease RR to 24. 


Severe Abd pain with acute GIB s/p ex lap 


Bleeding cecal mass s/p resection 


Anemia s/p 2 units of PRBC - with persistent hypotension 


   -Has received 3 units of PRBC


   -improving


   -Monitor 


Hypotension 


   -Continue Levophed, and Vasopressin 


   -Repeat Pan cultures 


   -On vanco and Zosyn 


   -Give a liter bolus of LR 


Moderate bilateral pleural effusions


   -Monitor 


Metabolic acidosis


   -improving 


    


Hypoxia


Hx of Afib and aortic stenosis





CHINEDU JENSEN DO 7/30/20 0546:


Subjective


Time Seen by a Provider:  05:34





Exam


Exam


General Appearance:  Other (on vent)


HEENT:  PERRL/EOMI, Pharynx Normal


Respiratory:  Decreased Breath Sounds


Cardiovascular:  Regular Rate, Rhythm


Gastrointestinal:  distended (?? slightly firm), other (incision c/d/i)


Extremity:  Pedal Edema, Swelling (2+ to just above ankles)


Skin:  Ecchymosis





Assessment/Plan


Assessment/Plan


Acute respiratory failure s/p surgery 


   -Continue vent 


      -Will attempt ventilator weaning today. 


      -Repeat LA, and BNP 


   -Hold propofol for vent weaning 


Septic shock 


   -Titrate Levophed for MAP >60 and SBP >90


   -Solucortef


   -Now off Vasopressin 


Severe Abd pain with acute GIB s/p ex lap 


Bleeding cecal mass s/p resection 


Anemia s/p 2 units of PRBC - with persistent hypotension 


   -Has received 3 units of PRBC


   -improving


   -Monitor 


Moderate bilateral pleural effusions


   -Monitor 


Metabolic acidosis


   -improving 


Hx of Afib and aortic stenosis





Supervisory-Addendum Brief


Verification & Attestation


Participated in pt care:  history, MDM, physical


Personally performed:  exam, history, MDM


Care discussed with:  Medical Student


Procedures:  n/a


Verification and Attestation of Medical Student E/M Service





A medical student performed and documented this service in my presence. I 

reviewed and verified all information documented by the medical student and made

modifications to such information, when appropriate. I personally performed the 

physical exam and medical decision making. 





 Chinedu Jensen, Jul 30, 2020,05:47











MAYTE KHANNA,MED STUDENT       Jul 30, 2020 05:21


CHINEDU JENSEN DO              Jul 30, 2020 05:46

## 2020-07-30 NOTE — DIAGNOSTIC IMAGING REPORT
INDICATION: Shortness of breath, intubated



COMPARISON: 07/29/2020



FINDINGS: Single view of the chest demonstrates stable support

lines. There is no pneumothorax. There is continued atelectasis,

effusion, and infiltrate in the right base. Small effusion seen

in the left base. The heart is prominent with slight central

vascular congestion.



IMPRESSION:

1. Stable support devices.

2. Unchanged aeration.



Dictated by: 



  Dictated on workstation # [id number]

## 2020-07-30 NOTE — NUR
0623 RT ARRIVED TO EXTUBATE PATIENT PER DR BERRIOS. 0626 PATIENT EXTUBATED, PATIENT TOLERATED 
WELL, FOLLOWS INSTRUCTIONS PLACED ON NASAL CANULA AT 3LPM. 0626 RESTRAINTS REMOVED.

## 2020-07-30 NOTE — SPEECH THERAPY PROGRESS NOTE
Therapy Progress Note


ST attempted to complete Bedside Dysphagia Evaluation. Patient was given 1/2 tsp

thin liquids, with a delayed swallow noted. Patient has a lot of audible 

secretions which are being suctioned. Patient is not deemed a safe oral intake 

at this time. ST will follow up in the morning with patient's status.











NELSON MURPHY            Jul 30, 2020 13:21

## 2020-07-30 NOTE — PROGRESS NOTE - SURGERY
Subjective


Time Seen by a Provider:  09:11


Subjective/Events-last exam


Pt seen and examined, she is extubated today.  When asked about abdominal pain 

she states minimal.  Son says he thinks he heard a little flatus this am, no BMs

yet


Review of Systems


Pulmonary:  No Dyspnea, No Cough


Cardiovascular:  No: Chest Pain


Gastrointestinal:  No: Nausea, Vomiting


pt on vent





Focused Exam


Lactate Level


20 09:50: Lactic Acid Level 4.36*H


20 14:45: Lactic Acid Level 4.20*H


20 05:43: Lactic Acid Level 1.42





Objective


Exam





Vital Signs








  Date Time  Temp Pulse Resp B/P (MAP) Pulse Ox O2 Delivery O2 Flow Rate FiO2


 


20 11:37     93 Nasal Cannula 5.00 


 


20 11:00  78 35 134/43 (73) 93 Nasal Cannula 5.00 


 


20 10:15     93 Nasal Cannula 5.00 


 


20 10:00  76 29 151/50 (83) 93 Nasal Cannula 2.00 


 


20 09:57     92 Nasal Cannula 5.00 


 


20 09:00  76 27 135/39 (71) 91 Nasal Cannula 2.00 


 


20 08:00 36.7       


 


20 08:00  77 27 160/52 (88) 93 Nasal Cannula 2.00 


 


20 08:00     93 Nasal Cannula 3.00 


 


20 07:30     94 Nasal Cannula 3.00 


 


20 07:00  78      


 


20 07:00  75 34 153/53 (86) 95 Nasal Cannula 2.00 


 


20 06:29  80 24 147/45 (79) 93 Nasal Cannula 2.00 


 


20 06:00  70 24 138/43 (74) 93 Mechanical Ventilator 28.00 


 


20 05:22  66  131/42    


 


20 05:00  66 24 131/40 (70) 93 Mechanical Ventilator 28.00 


 


20 04:00  69 24 135/41 (72) 93 Mechanical Ventilator 28.00 


 


20 04:00     95 Mechanical Ventilator  28


 


20 03:37 36.6       


 


20 03:00  66 24 137/42 (73) 93 Mechanical Ventilator 28.00 


 


20 02:02  69  117/36    


 


20 02:00  72 24 107/34 (58) 93 Mechanical Ventilator 28.00 


 


20 01:41  70 24  92   28


 


20 01:00  70 24 116/35 (62) 93 Mechanical Ventilator 28.00 


 


20 01:00  70      


 


20 00:00  68 24 124/36 (65) 92 Mechanical Ventilator 28.00 


 


20 00:00     95 Mechanical Ventilator  28


 


20 23:38 36.6       


 


20 23:00  68 24 126/39 (68) 92 Mechanical Ventilator 28.00 


 


20 22:00  68 24 127/37 (67) 93 Mechanical Ventilator 28.00 


 


20 21:15  63 24  94   28


 


20 21:00  65 24 116/37 (63) 94 Mechanical Ventilator 28.00 


 


20 20:00  68 24 118/39 (65) 92 Mechanical Ventilator 28.00 


 


20 20:00     95 Mechanical Ventilator  28


 


20 19:46  69  124/40    


 


20 19:00  64      


 


20 19:00  64 24 113/38 (63) 92 Mechanical Ventilator 28.00 


 


20 18:59  63 24  92   28


 


20 18:00  62 12 119/40 (66) 92 Mechanical Ventilator 28.00 


 


20 17:00  68 12 121/42 (68) 92 Mechanical Ventilator 28.00 


 


20 16:00     95 Mechanical Ventilator  28


 


20 16:00  61 9 120/40 (66)  Mechanical Ventilator 28.00 


 


20 16:00      Mechanical Ventilator  28


 


20 15:39 36.4       


 


20 15:00  64 25 133/42 (72) 93 Mechanical Ventilator 28.00 


 


20 14:10  55 25  93   28


 


20 14:00  55 17 126/40 (68) 93 Mechanical Ventilator 28.00 


 


20 13:00  56      


 


20 13:00  62 23 164/48 (86) 94 Mechanical Ventilator 28.00 


 


20 12:35      Mechanical Ventilator  30


 


20 12:35     97 Mechanical Ventilator  30














I & O 


 


 20





 07:00


 


Intake Total 4156 ml


 


Output Total 2060 ml


 


Balance 2096 ml





Capillary Refill : Less Than 3 Seconds


General Appearance:  No Apparent Distress, Chronically ill


HEENT:  PERRL/EOMI, Pharynx Normal


Neck:  Other (central line)


Respiratory:  Crackles


Cardiovascular:  Regular Rate, Rhythm, No Murmur


Peripheral Pulses:  2+ Radial Pulses (R), 2+ Radial Pulses (L)


Gastrointestinal:  soft, tenderness (at incision), other (incision c/d/i)


Extremity:  Pedal Edema, Swelling (2+ to just above ankles, severe swelling and 

ecchymosis in bilateral arms)


Neurologic/Psychiatric:  Alert


Skin:  Ecchymosis





Results


Lab


Laboratory Tests


20 14:10: Lab Scanned Report Transfusion Reaction Form


20 14:45: Lactic Acid Level 4.20*H


20 03:29: 


White Blood Count 20.9H, Red Blood Count 3.04L, Hemoglobin 9.1#L, Hematocrit 26L

, Mean Corpuscular Volume 85, Mean Corpuscular Hemoglobin 30, Mean Corpuscular 

Hemoglobin Concent 35, Red Cell Distribution Width 15.6H, Platelet Count 157, 

Mean Platelet Volume 9.8, Neutrophils (%) (Auto) 96H, Lymphocytes (%) (Auto) 1L,

Monocytes (%) (Auto) 3, Eosinophils (%) (Auto) 0, Basophils (%) (Auto) 0, 

Neutrophils # (Auto) 20.1H, Lymphocytes # (Auto) 0.3L, Monocytes # (Auto) 0.5, 

Eosinophils # (Auto) 0.0, Basophils # (Auto) 0.0, Neutrophils % (Manual) 88, 

Lymphocytes % (Manual) 2, Monocytes % (Manual) 3, Band Neutrophils 7, Gladys Cells

SLIGHT, Schistocytes SLIGHT, Blood Gas Puncture Site LEFT BRACH ART, Blood Gas 

Patient Temperature 36.6, Arterial Blood pH 7.46H, Arterial Blood Partial 

Pressure CO2 39, Arterial Blood Partial Pressure O2 74L, Arterial Blood HCO3 27,

Arterial Blood Total CO2 28.4, Arterial Blood Oxygen Saturation 94, Arterial 

Blood Base Excess 3.4H, Jorge Test YES-POS, Blood Gas Ventilator Setting YES, 

Blood Gas Inspired Oxygen 28%, Sodium Level 134L, Potassium Level 3.4L, Chloride

Level 101, Carbon Dioxide Level 23, Anion Gap 10, Blood Urea Nitrogen 24H, 

Creatinine 0.71, Estimat Glomerular Filtration Rate > 60, BUN/Creatinine Ratio 

34, Glucose Level 96, Calcium Level 7.4L, Phosphorus Level 3.4, Magnesium Level 

2.1, B-Type Natriuretic Peptide 359.8H


20 05:43: Lactic Acid Level 1.42





Microbiology


20 Gram Stain - Final, Resulted


          


20 Sputum Culture, Resulted


          Pending


20 Blood Culture - Preliminary, Resulted


          No growth





Assessment/Plan


S/P  Right Colon resection


Hx of Afib and aortic stenosis





Pt is off the vent and seems to be doing better today.  Her WBC is up, but she 

also was started on steroids.  Had a long talk with her son, just explaining 

that this will be a slightly longer process than normal because of her age.  He 

understood.





Clinical Quality Measures


DVT/VTE Risk/Contraindication:


Risk Factor Score Per Nursin


RFS Level Per Nursing on Admit:  4+=Very High











DAKSHA VELOZ DO               2020 12:09

## 2020-07-30 NOTE — NUR
Palliative Care RN in to see patient.  Son is in the room but on the phone so did not talk 
with him on this visit.  Patient is sitting up in bed, extubated this morning.  She is 
denying pain this morning, however the NG tube makes it difficult for her.  Respirations 
were even and regular upon entering but with the visit and her desire to communicate they 
became more tachypneic.   She is very edematous due to the amount of fluid she has received. 
  



Will continue to monitor for needs at discharge.  She still has a ways to go before she is 
ready for that. She may need IRF consideration once she is more stable.

## 2020-07-30 NOTE — OCC THERAPY PROGRESS NOTE
Therapy Progress Note


OT order received, chart reviewed.  Pt. just extubated from ventilator this a.m.

 Spoke with nursing.  Nursing reports pt. is still sleeping and very weak.  

Nursing requests to allow pt. to rest at this time.  Will check back as time 

allows.








Hold at this time


0914











ELMIRA JUAN OT           Jul 30, 2020 10:18

## 2020-07-30 NOTE — PROGRESS NOTE - CARDIOLOGY
Cardiology SOAP Progress Note


Objective:


I&O/Vital Signs











 20





 04:00 04:00 05:00 05:22


 


Pulse  69 66 66


 


Resp  24 24 


 


B/P (MAP)  135/41 (72) 131/40 (70) 131/42


 


Pulse Ox 95 93 93 


 


O2 Delivery Mechanical Ventilator Mechanical Ventilator Mechanical Ventilator 


 


O2 Flow Rate  28.00 28.00 


 


FiO2 28   





 20





 06:00 06:29 07:00 07:00


 


Pulse 70 80 75 78


 


Resp 24 24 34 


 


B/P (MAP) 138/43 (74) 147/45 (79) 153/53 (86) 


 


Pulse Ox 93 93 95 


 


O2 Delivery Mechanical Ventilator Nasal Cannula Nasal Cannula 


 


O2 Flow Rate 28.00 2.00 2.00 





 20





 07:30 08:00 08:00 08:00


 


Temp    36.7


 


Pulse   77 


 


Resp   27 


 


B/P (MAP)   160/52 (88) 


 


Pulse Ox 94 93 93 


 


O2 Delivery Nasal Cannula Nasal Cannula Nasal Cannula 


 


O2 Flow Rate 3.00 3.00 2.00 


 


    





 20





 09:00 09:57 10:00 10:15


 


Pulse 76  76 


 


Resp 27  29 


 


B/P (MAP) 135/39 (71)  151/50 (83) 


 


Pulse Ox 91 92 93 93


 


O2 Delivery Nasal Cannula Nasal Cannula Nasal Cannula Nasal Cannula


 


O2 Flow Rate 2.00 5.00 2.00 5.00





 20





 11:00 11:37 12:00 12:00


 


Temp    36.7


 


Pulse 78  78 


 


Resp 35  35 


 


B/P (MAP) 134/43 (73)  131/42 (71) 


 


Pulse Ox 93 93 95 


 


O2 Delivery Nasal Cannula Nasal Cannula Nasal Cannula 


 


O2 Flow Rate 5.00 5.00 5.00 


 


    





 20





 12:34 13:10 13:52 14:00


 


Pulse 81  76 74


 


Resp   31 34


 


B/P (MAP)   130/36 (67) 129/44 (72)


 


Pulse Ox  95 95 90


 


O2 Delivery  Nasal Cannula Nasal Cannula Nasal Cannula


 


O2 Flow Rate  3.00 3.00 3.00





 20  





 14:13 15:00  


 


Pulse  84  


 


Resp  26  


 


B/P (MAP)  139/44 (75)  


 


Pulse Ox 91 90  


 


O2 Delivery Nasal Cannula Nasal Cannula  


 


O2 Flow Rate 3.00 3.00  














 20





 00:00


 


Intake Total 2250 ml


 


Output Total 725 ml


 


Balance 1525 ml








Weight (Pounds):  112


Weight (Ounces):  0.0


Weight (Calculated Kilograms):  50.001753


Constitutional:  other (frail)


Respiratory:  rhonchi (scattered), other (fair air entry; coarse breathsounds)


Cardiovascular:  regular rate-rhythm, systolic murmur (2-3/6 MSM)


Gastrointestional:  other (s/p abdominal surgery with dressing in place)


Extremities:  other (bilat pitting LE swelling)


Neurologic/Psychiatric:  grossly intact (moves extremities)


Skin:  No rash on exposed areas, No ulcerations on exposed areas





Results/Procedures:


Labs


Laboratory Tests


20 03:29: 


White Blood Count 20.9H, Red Blood Count 3.04L, Hemoglobin 9.1#L, Hematocrit 26L

, Mean Corpuscular Volume 85, Mean Corpuscular Hemoglobin 30, Mean Corpuscular 

Hemoglobin Concent 35, Red Cell Distribution Width 15.6H, Platelet Count 157, 

Mean Platelet Volume 9.8, Neutrophils (%) (Auto) 96H, Lymphocytes (%) (Auto) 1L,

Monocytes (%) (Auto) 3, Eosinophils (%) (Auto) 0, Basophils (%) (Auto) 0, 

Neutrophils # (Auto) 20.1H, Lymphocytes # (Auto) 0.3L, Monocytes # (Auto) 0.5, 

Eosinophils # (Auto) 0.0, Basophils # (Auto) 0.0, Neutrophils % (Manual) 88, 

Lymphocytes % (Manual) 2, Monocytes % (Manual) 3, Band Neutrophils 7, [place] Cells

SLIGHT, Schistocytes SLIGHT, Blood Gas Puncture Site LEFT BRACH ART, Blood Gas 

Patient Temperature 36.6, Arterial Blood pH 7.46H, Arterial Blood Partial 

Pressure CO2 39, Arterial Blood Partial Pressure O2 74L, Arterial Blood HCO3 27,

Arterial Blood Total CO2 28.4, Arterial Blood Oxygen Saturation 94, Arterial 

Blood Base Excess 3.4H, Jorge Test YES-POS, Blood Gas Ventilator Setting YES, 

Blood Gas Inspired Oxygen 28%, Sodium Level 134L, Potassium Level 3.4L, Chloride

Level 101, Carbon Dioxide Level 23, Anion Gap 10, Blood Urea Nitrogen 24H, 

Creatinine 0.71, Estimat Glomerular Filtration Rate > 60, BUN/Creatinine Ratio 

34, Glucose Level 96, Calcium Level 7.4L, Phosphorus Level 3.4, Magnesium Level 

2.1, B-Type Natriuretic Peptide 359.8H, Triglycerides Level 85


20 05:43: Lactic Acid Level 1.42





Microbiology


20 Gram Stain - Final, Resulted


          


20 Sputum Culture, Resulted


          Pending


20 Blood Culture - Preliminary, Resulted


          No growth





Procedures


NAME:   ED PHILLIPS


[facility] REC#:   [id number]


ACCOUNT#:   [id number]


PT STATUS:   ADM IN


:   1940


PHYSICIAN:   DAKSHA VELOZ DO


ADMIT DATE:   20/ICU


***Draft***


Date of Exam:20





CHEST 1 VIEW, AP/PA ONLY








INDICATION: Shortness of breath, intubated





COMPARISON: 2020





FINDINGS: Single view of the chest demonstrates stable support


lines. There is no pneumothorax. There is continued atelectasis,


effusion, and infiltrate in the right base. Small effusion seen


in the left base. The heart is prominent with slight central


vascular congestion.





IMPRESSION:


1. Stable support devices.


2. Unchanged aeration.





  Dictated on workstation # [id number]








Dict:   [id number]


Trans:   [id number]


Formerly [facility] [phone]





Interpreted by:     VENU LONGORIA


Electronically signed by:





A/P:


Assessment:





Hypovolemic (blood loss) shock - receiving transfusions, requiring pressor 

support





Sepsis - prob pneumonia - management per pulmonary services





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





Acute resp failure - extubated on 2020





CAD. Card cath of 2020 by Dr. Dixon at [facility] showed 80-

90% lesion in the RCA for which KEISHA x1 was placed.  Other vessels showed mild to

mod CAD.  Valvuloplasty to the aortic valve carried out at time of cardiac cath.

 Has been on Effient and ASA.





Sinus node disfunction and PAF. On Xarelto for stroke prophylaxis





Echocardiogram of 2020 by Dr. Gamez showed LVEF 55-65%.  Grade 2 

diastolic dysfunction.  Mild mitral stenosis with mean gradient 2.2mHg, MVA 3.3 

cm2.  Mod calcified aortic valve with mod-severe stenosis, mean grad 26mmHg ASHELY 

1.1cm2, mild to mod regurg.  Severe TR.  PASP 65-70mmHg.  Echocardiogram of 2020 was a technically poor study.  LVEF 55-60%





H/o scleroderma and chronic dysphagia, followed and treated by her pcp





Quit smoking in 





Borderline low TSH on 11/3/18 (0.33), followed by her pcp Dr Cueva





Carotid u/s of 2019 showed 60-79% R ICA stenosis; less than 40% L ICA 

stenosis.





Incidental note of L thyroid nodules seen at time of carotid u/s of 2019 for

which f/u is with her PCP


Plan:





* Complex management


* Critically ill


* Extubated this morning


* Sepsis with prob pneumonia - management per pulmonary services


* Continue to hold off on Xarelto and ASA due to bleeding requiring surgery and 

  leading to hypovolemic shock that has required transfusion - receiving more 

  PRBC's today


* Cardiac cath report from [facility] has been reviewed showing stent 

  placement on 2020 to the RCA at which time she also had valvuloplasty

  to the aortic valve.  She was started on Effient and ASA at that time.  We do 

  advise antiplatelet tx be initiated with Plavix to protect recent stent from 

  thrombosis once H/H has stabilized


* Monitor lab closely


* Replace electrolytes


* Received Lasix this morning





Clinical Quality Measures


Type of Care:


Type of Care:  Comfort Measures











KIMMY SALAZAR           2020 08:49

## 2020-07-30 NOTE — NUR
Pt is able to communicated today and seemed to indicated that she is not Anabaptist although 
her son wanted her anointed. She was anointed by Fr Mendoza when she was intubated.

## 2020-07-30 NOTE — NUR
THIS NURSE NOTIFIED DR JASMINE OF H&H RESULTS. DR JASMINE SAID PT CAN BE STARTED ON 75MG OF PLAVIX 
PO DAILY. PHARMACY NOTIFIED

## 2020-07-30 NOTE — DIAGNOSTIC IMAGING REPORT
PROCEDURE: CT head without contrast.



TECHNIQUE: Multiple contiguous axial images were obtained through

the brain without the use of intravenous contrast. Auto Exposure

Controls were utilized during the CT exam to meet ALARA standards

for radiation dose reduction. 



INDICATION: Altered mental status.



FINDINGS: Ventricles and sulci are within normal limits for size.

Parenchymal calcification is again noted in the high left frontal

and right parietal white matter as well as in the left basal

ganglia region. These findings have not significantly changed.

There is no abnormal mass effect or shift of midline structures.

The calvarium is intact and visualized paranasal sinuses are

clear.



IMPRESSION: Stable CT scan of the head without evidence of acute

intracranial abnormality detected. 



Dictated by: 



  Dictated on workstation # [id number]

## 2020-07-30 NOTE — NUR
2000 - THIS RN AND MARII RN AT BEDSIDE PREFORMING PHYSICAL ASSESSMENT, PUPILS NOTED TO BE 
UNEQUAL AT THIS TIME, LEFT PUPIL: 3 AND REACTIVE TO LIGHT, RIGHT PUPIL: 2 AND NONREACTIVE TO 
LIGHT. PT IS ABLE TO STATE HER NAME BUT SPEECH IS VERY GARBLED AND DIFFICULT TO UNDERSTAND. 
PT IS ABLE TO FOLLOW COMMANDS, SQUEEZE , PRESS AGAINST MARII RN HANDS WITH HER FEET 
EQUAL BILATERALLY. PT IS ABLE TO OPEN HER MOUTH, STICK OUT TONGUE, NO FACIAL ASYMMETRY, VSS. 
SEE INTERVENTIONS FOR VS AND OTHER ASSESSMENT FINDINGS. 



2020 - EICU NOTIFIED OF UNEQUAL PUPILS AND OTHER ASSESSMENT FINDINGS, ORDERS RECEIVED FOR 
STAT ABG AND TO ACTIVATE STROKE PROTOCOL. 



2030 - HOUSE SUPERVISOR NOTIFIED OF CODE STROKE, CODE STROKE PAGED OUT. RT AND HOUSE 
SUPERVISOR AT BEDSIDE TO EVALUATE PT. ABG COLLECTED AT THIS TIME AND SENT TO LAB.



2045 - PT TAKEN DOWN FOR HEAD CT AT THIS TIME



2100 - PT BACK TO ROOM, BEDSIDE GLUCOSE 95, NIH SCORE OF 19 



2130 - EICU NOTIFIED OF HEAD RESULTS, ABG RESULTS, GLUCOSE, AND NIH SCORE. EICU TO CONSULT 
WITH  STROKE TEAM.



2152 - NO FURTHER ACTION REQUIRED PER EICU AND RECOMMENDATIONS OF [facility]. 



2200 - ATTEMPTED TO CONTACT PTS SON, VOICEMAIL LEFT. 



VSS AT THIS TIME, WILL CONTINUE TO MONITOR.

## 2020-07-30 NOTE — PROGRESS NOTE - HOSPITALIST
Subjective


HPI/CC On Admission


Date Seen by Provider:  2020


Time Seen by Provider:  08:22


Pt is an 79yoCF with a PMH of aortic stenosis, COPD, CAD with recent stenting, 

HTN, HLD, hypothyroidism, paroxysmal atrial fibrillation on chronic 

anticoagulation who presented to the ER due to hypoxia. She is unable to provide

much history and onlyanswered a few yes or no questions for me. She told me she 

was sick and that she was not having pain. Otherwise all history obtained from 

family and records. She was apparently in her normal state this morning 

(ambulatory and alert and oriented x4). She spoke to her son and then had lunch.

Sometime after lunch they found her in her room minimallyresponsive. There was 

concern for an episode of apnea as well. EMS was summoned and she was found to 

have oxygen saturations in the 60%. She was placed on a nonrebreather which 

brought her oxygen saturations up. She was taken for CT head as she is on 

Xarelto.


Subjective/Events-last exam


Pt was extubated this morning. Only complaint is about dry mouth.





Focused Exam


Lactate Level


20 09:50: Lactic Acid Level 4.36*H


20 14:45: Lactic Acid Level 4.20*H


20 05:43: Lactic Acid Level 1.42





Lactic Acid Level





Laboratory Tests








Test


 20


05:43


 


Lactic Acid Level


 1.42 MMOL/L


(0.50-2.00)











Objective


Exam


Vital Signs





Vital Signs








  Date Time  Temp Pulse Resp B/P (MAP) Pulse Ox O2 Delivery O2 Flow Rate FiO2


 


20 08:00 36.7       


 


20 07:00  78      


 


20 06:29   24 147/45 (79) 93 Nasal Cannula 2.00 


 


20 04:00        28





Capillary Refill : Less Than 3 Seconds


General Appearance:  No Apparent Distress, Chronically ill


Respiratory:  Crackles


Cardiovascular:  Regular Rate, Rhythm, No Murmur


Neurologic/Psychiatric:  Alert, Other (didn't answer many questions due to dry 

mouth)





Results/Procedures


Lab


Laboratory Tests


20 03:29








Patient resulted labs reviewed.





Assessment/Plan


Assessment and Plan


Assess & Plan/Chief Complaint


Hemorrhagic shock


bleeding cecal mass


Severe anemia


   Went to ex lap on  and bleeding mass found


   Transfused 3 units pRBCs total


   Now off pressors and maintaining her own BP





Leukocytosis


   Likely due to steroids


   Procal up yesterday


   Continue abx


   Blood cultures negative


   


Acute Hypoxic Respiratory Failure


Recently treated pneumonia


COPD


   Extubated this AM


   Pulm/TeleICU consulted, appreciate recs


   Lasix given this AM


   


Mixed metabolic and respiratory acidosis- resolved





CAD


Aortic Stenosis


   Son reports she recently had a stent placed by Dr Dixon


   Records sent and cath report reveals 2020 RCA stenting done and 

valvuloplasty by Dr Dixon


   Will check H&H this afternoon and if not significantly dropping will resume 

plavix


   Consult cardiology, appreciate their help


   





DVT ppx: SCDs





Critical Care


Critically Ill Patient





Diagnosis/Problems


Diagnosis/Problems





(1) Acute respiratory failure


Status:  Acute


Qualifiers:  


   Respiratory failure complication:  hypercapnia  Qualified Codes:  J96.02 - 

Acute respiratory failure with hypercapnia


(2) Hemorrhagic shock


Status:  Acute


(3) Hypomagnesemia


Status:  Acute


(4) Normocytic anemia


Status:  Acute


(5) COPD (chronic obstructive pulmonary disease)


Qualifiers:  


   COPD type:  unspecified COPD  Qualified Codes:  J44.9 - Chronic obstructive 

pulmonary disease, unspecified


(6) Hypothyroidism


Status:  Chronic


Qualifiers:  


   Hypothyroidism type:  unspecified  Qualified Codes:  E03.9 - Hypothyroidism, 

unspecified


(7) Metabolic acidosis


Status:  Acute


(8) Respiratory acidosis


Status:  Acute


(9) Aortic stenosis


Qualifiers:  


   Cardiac valve disease etiology:  etiology unspecified  Qualified Codes:  

I35.0 - Nonrheumatic aortic (valve) stenosis


(10) Atrial fibrillation


Status:  Acute


Qualifiers:  


   Atrial fibrillation type:  paroxysmal  Qualified Codes:  I48.0 - Paroxysmal 

atrial fibrillation


(11) CAD (coronary artery disease)


Status:  Acute


Qualifiers:  


   Coronary Disease-Associated Artery/Lesion type:  native artery  Native vs. 

transplanted heart:  native heart  Associated angina:  without angina  Qualified

Codes:  I25.10 - Atherosclerotic heart disease of native coronary artery without

angina pectoris





Clinical Quality Measures


DVT/VTE Risk/Contraindication:


Risk Factor Score Per Nursin


RFS Level Per Nursing on Admit:  4+=Very High











SELINA JASMINE MD              2020 08:31

## 2020-07-31 VITALS — DIASTOLIC BLOOD PRESSURE: 38 MMHG | SYSTOLIC BLOOD PRESSURE: 132 MMHG

## 2020-07-31 VITALS — SYSTOLIC BLOOD PRESSURE: 174 MMHG | DIASTOLIC BLOOD PRESSURE: 50 MMHG

## 2020-07-31 VITALS — SYSTOLIC BLOOD PRESSURE: 118 MMHG | DIASTOLIC BLOOD PRESSURE: 35 MMHG

## 2020-07-31 VITALS — DIASTOLIC BLOOD PRESSURE: 45 MMHG | SYSTOLIC BLOOD PRESSURE: 144 MMHG

## 2020-07-31 VITALS — DIASTOLIC BLOOD PRESSURE: 40 MMHG | SYSTOLIC BLOOD PRESSURE: 148 MMHG

## 2020-07-31 VITALS — DIASTOLIC BLOOD PRESSURE: 42 MMHG | SYSTOLIC BLOOD PRESSURE: 146 MMHG

## 2020-07-31 VITALS — SYSTOLIC BLOOD PRESSURE: 120 MMHG | DIASTOLIC BLOOD PRESSURE: 36 MMHG

## 2020-07-31 VITALS — DIASTOLIC BLOOD PRESSURE: 42 MMHG | SYSTOLIC BLOOD PRESSURE: 143 MMHG

## 2020-07-31 VITALS — SYSTOLIC BLOOD PRESSURE: 149 MMHG | DIASTOLIC BLOOD PRESSURE: 47 MMHG

## 2020-07-31 VITALS — SYSTOLIC BLOOD PRESSURE: 147 MMHG | DIASTOLIC BLOOD PRESSURE: 42 MMHG

## 2020-07-31 VITALS — SYSTOLIC BLOOD PRESSURE: 151 MMHG | DIASTOLIC BLOOD PRESSURE: 41 MMHG

## 2020-07-31 VITALS — DIASTOLIC BLOOD PRESSURE: 42 MMHG | SYSTOLIC BLOOD PRESSURE: 131 MMHG

## 2020-07-31 VITALS — SYSTOLIC BLOOD PRESSURE: 148 MMHG | DIASTOLIC BLOOD PRESSURE: 42 MMHG

## 2020-07-31 VITALS — DIASTOLIC BLOOD PRESSURE: 39 MMHG | SYSTOLIC BLOOD PRESSURE: 136 MMHG

## 2020-07-31 VITALS — SYSTOLIC BLOOD PRESSURE: 140 MMHG | DIASTOLIC BLOOD PRESSURE: 43 MMHG

## 2020-07-31 VITALS — SYSTOLIC BLOOD PRESSURE: 139 MMHG | DIASTOLIC BLOOD PRESSURE: 38 MMHG

## 2020-07-31 VITALS — SYSTOLIC BLOOD PRESSURE: 123 MMHG | DIASTOLIC BLOOD PRESSURE: 45 MMHG

## 2020-07-31 VITALS — DIASTOLIC BLOOD PRESSURE: 54 MMHG | SYSTOLIC BLOOD PRESSURE: 165 MMHG

## 2020-07-31 VITALS — DIASTOLIC BLOOD PRESSURE: 47 MMHG | SYSTOLIC BLOOD PRESSURE: 146 MMHG

## 2020-07-31 VITALS — SYSTOLIC BLOOD PRESSURE: 131 MMHG | DIASTOLIC BLOOD PRESSURE: 48 MMHG

## 2020-07-31 VITALS — SYSTOLIC BLOOD PRESSURE: 136 MMHG | DIASTOLIC BLOOD PRESSURE: 40 MMHG

## 2020-07-31 VITALS — DIASTOLIC BLOOD PRESSURE: 48 MMHG | SYSTOLIC BLOOD PRESSURE: 142 MMHG

## 2020-07-31 VITALS — DIASTOLIC BLOOD PRESSURE: 47 MMHG | SYSTOLIC BLOOD PRESSURE: 152 MMHG

## 2020-07-31 LAB
BASOPHILS # BLD AUTO: 0 10^3/UL (ref 0–0.1)
BASOPHILS NFR BLD AUTO: 0 % (ref 0–10)
BUN/CREAT SERPL: 35
CALCIUM SERPL-MCNC: 7.6 MG/DL (ref 8.5–10.1)
CHLORIDE SERPL-SCNC: 103 MMOL/L (ref 98–107)
CO2 SERPL-SCNC: 26 MMOL/L (ref 21–32)
CREAT SERPL-MCNC: 0.63 MG/DL (ref 0.6–1.3)
EOSINOPHIL # BLD AUTO: 0 10^3/UL (ref 0–0.3)
EOSINOPHIL NFR BLD AUTO: 0 % (ref 0–10)
ERYTHROCYTE [DISTWIDTH] IN BLOOD BY AUTOMATED COUNT: 16.1 % (ref 10–14.5)
GFR SERPLBLD BASED ON 1.73 SQ M-ARVRAT: > 60 ML/MIN
GLUCOSE SERPL-MCNC: 91 MG/DL (ref 70–105)
HCT VFR BLD CALC: 26 % (ref 35–52)
HGB BLD-MCNC: 8.9 G/DL (ref 11.5–16)
LYMPHOCYTES # BLD AUTO: 0.2 X 10^3 (ref 1–4)
LYMPHOCYTES NFR BLD AUTO: 1 % (ref 12–44)
MAGNESIUM SERPL-MCNC: 2 MG/DL (ref 1.6–2.4)
MANUAL DIFFERENTIAL PERFORMED BLD QL: NO
MCH RBC QN AUTO: 30 PG (ref 25–34)
MCHC RBC AUTO-ENTMCNC: 35 G/DL (ref 32–36)
MCV RBC AUTO: 87 FL (ref 80–99)
MONOCYTES # BLD AUTO: 1 X 10^3 (ref 0–1)
MONOCYTES NFR BLD AUTO: 4 % (ref 0–12)
NEUTROPHILS # BLD AUTO: 24.6 X 10^3 (ref 1.8–7.8)
NEUTROPHILS NFR BLD AUTO: 95 % (ref 42–75)
PHOSPHATE SERPL-MCNC: 2.9 MG/DL (ref 2.3–4.7)
PLATELET # BLD: 104 10^3/UL (ref 130–400)
PMV BLD AUTO: 9.6 FL (ref 7.4–10.4)
POTASSIUM SERPL-SCNC: 2.8 MMOL/L (ref 3.6–5)
SODIUM SERPL-SCNC: 140 MMOL/L (ref 135–145)
WBC # BLD AUTO: 25.8 10^3/UL (ref 4.3–11)

## 2020-07-31 RX ADMIN — POTASSIUM CHLORIDE SCH MLS/HR: 200 INJECTION, SOLUTION INTRAVENOUS at 07:24

## 2020-07-31 RX ADMIN — SODIUM CHLORIDE SCH MLS/HR: 900 INJECTION, SOLUTION INTRAVENOUS at 14:29

## 2020-07-31 RX ADMIN — POTASSIUM CHLORIDE SCH MLS/HR: 200 INJECTION, SOLUTION INTRAVENOUS at 05:40

## 2020-07-31 RX ADMIN — PANTOPRAZOLE SODIUM SCH MG: 40 INJECTION, POWDER, FOR SOLUTION INTRAVENOUS at 09:56

## 2020-07-31 RX ADMIN — VANCOMYCIN HYDROCHLORIDE SCH MLS/HR: 500 INJECTION, POWDER, LYOPHILIZED, FOR SOLUTION INTRAVENOUS at 09:57

## 2020-07-31 RX ADMIN — CLOPIDOGREL BISULFATE SCH MG: 75 TABLET, FILM COATED ORAL at 09:56

## 2020-07-31 RX ADMIN — POTASSIUM CHLORIDE SCH MEQ: 1500 TABLET, EXTENDED RELEASE ORAL at 03:42

## 2020-07-31 RX ADMIN — SODIUM CHLORIDE SCH MLS/HR: 900 INJECTION, SOLUTION INTRAVENOUS at 20:50

## 2020-07-31 RX ADMIN — POTASSIUM CHLORIDE SCH MLS/HR: 200 INJECTION, SOLUTION INTRAVENOUS at 03:55

## 2020-07-31 RX ADMIN — POTASSIUM CHLORIDE SCH MLS/HR: 200 INJECTION, SOLUTION INTRAVENOUS at 09:57

## 2020-07-31 RX ADMIN — SODIUM CHLORIDE SCH MLS/HR: 900 INJECTION, SOLUTION INTRAVENOUS at 04:37

## 2020-07-31 RX ADMIN — MORPHINE SULFATE PRN MG: 4 INJECTION, SOLUTION INTRAMUSCULAR; INTRAVENOUS at 03:56

## 2020-07-31 RX ADMIN — POTASSIUM CHLORIDE SCH MLS/HR: 200 INJECTION, SOLUTION INTRAVENOUS at 07:58

## 2020-07-31 RX ADMIN — POTASSIUM CHLORIDE SCH MLS/HR: 200 INJECTION, SOLUTION INTRAVENOUS at 05:27

## 2020-07-31 RX ADMIN — MAGNESIUM SULFATE IN DEXTROSE SCH MLS/HR: 10 INJECTION, SOLUTION INTRAVENOUS at 03:44

## 2020-07-31 RX ADMIN — IPRATROPIUM BROMIDE AND ALBUTEROL SULFATE SCH ML: .5; 3 SOLUTION RESPIRATORY (INHALATION) at 18:19

## 2020-07-31 RX ADMIN — IPRATROPIUM BROMIDE AND ALBUTEROL SULFATE SCH ML: .5; 3 SOLUTION RESPIRATORY (INHALATION) at 02:16

## 2020-07-31 RX ADMIN — SULFAMETHOXAZOLE AND TRIMETHOPRIM SCH EA: 800; 160 TABLET ORAL at 18:36

## 2020-07-31 RX ADMIN — SODIUM CHLORIDE, SODIUM LACTATE, POTASSIUM CHLORIDE, AND CALCIUM CHLORIDE SCH MLS/HR: 600; 310; 30; 20 INJECTION, SOLUTION INTRAVENOUS at 18:52

## 2020-07-31 RX ADMIN — IPRATROPIUM BROMIDE AND ALBUTEROL SULFATE SCH ML: .5; 3 SOLUTION RESPIRATORY (INHALATION) at 14:00

## 2020-07-31 RX ADMIN — IPRATROPIUM BROMIDE AND ALBUTEROL SULFATE SCH ML: .5; 3 SOLUTION RESPIRATORY (INHALATION) at 10:42

## 2020-07-31 RX ADMIN — Medication SCH MLS/HR: at 03:15

## 2020-07-31 RX ADMIN — POTASSIUM CHLORIDE SCH MLS/HR: 200 INJECTION, SOLUTION INTRAVENOUS at 04:37

## 2020-07-31 RX ADMIN — IPRATROPIUM BROMIDE AND ALBUTEROL SULFATE SCH ML: .5; 3 SOLUTION RESPIRATORY (INHALATION) at 07:01

## 2020-07-31 RX ADMIN — PROPOFOL SCH MLS/HR: 10 INJECTION, EMULSION INTRAVENOUS at 03:15

## 2020-07-31 RX ADMIN — POTASSIUM CHLORIDE SCH MLS/HR: 200 INJECTION, SOLUTION INTRAVENOUS at 06:59

## 2020-07-31 RX ADMIN — SULFAMETHOXAZOLE AND TRIMETHOPRIM SCH EA: 800; 160 TABLET ORAL at 09:57

## 2020-07-31 RX ADMIN — MORPHINE SULFATE PRN MG: 4 INJECTION, SOLUTION INTRAMUSCULAR; INTRAVENOUS at 10:57

## 2020-07-31 RX ADMIN — POTASSIUM CHLORIDE SCH MLS/HR: 200 INJECTION, SOLUTION INTRAVENOUS at 06:16

## 2020-07-31 RX ADMIN — POTASSIUM CHLORIDE SCH MLS/HR: 200 INJECTION, SOLUTION INTRAVENOUS at 06:07

## 2020-07-31 RX ADMIN — POTASSIUM CHLORIDE SCH MLS/HR: 200 INJECTION, SOLUTION INTRAVENOUS at 07:23

## 2020-07-31 RX ADMIN — HYDROCORTISONE SODIUM SUCCINATE SCH MG: 100 INJECTION, POWDER, FOR SOLUTION INTRAMUSCULAR; INTRAVENOUS at 00:32

## 2020-07-31 RX ADMIN — IPRATROPIUM BROMIDE AND ALBUTEROL SULFATE SCH ML: .5; 3 SOLUTION RESPIRATORY (INHALATION) at 22:04

## 2020-07-31 NOTE — NUR
PALLIATIVE CARE RN  in to see patient.  She is just finishing a bed bath and appears tired.  
Trying to cough but is weak.  Still edematous in upper and lower extremity and is receiving 
her oxygen via Vapotherm.  Will check in on her on Monday.

## 2020-07-31 NOTE — PHYSICIAN QUERY CLARIFICATION
PQ-Conflicting Diagnosis


Admission/Discharge


Admission Date: Jul 27, 2020 at 15:38 


Discharge Date:








The medical record reflects the following clinical scenario:


Dr. Singh,





History/Risk Factors:


Leukocytosis


Steroids





Clinical Findings:WBC16.9 on 7/29 rising to 20.9 and 25.8. Band neutrophils 7/29

26. 7/29 vitals: T 35.9, pulse 55, Resp 24, /37.








Treatment:IV Vancomycin HCI 1,000mg and IV Zosyn 4.5 gm and IV Solu Cortef 

injection.








Question: Your progress notes give leukocytosis probably due to steroids. Dr. Babcock and Dr. Jensen indicate dx below.





Do you agree with the impression of Sepsis, probable pneumonia per Dr. Babcock 

and septic shock per Dr. Jensen?


Please document a response in Progress Note or Discharge Summary.





   1. Yes





   2. No





   3. Other, with explanation of clinical findings





   4. Clinically undetermined, no explanation for clinical findings.





PHYSICIAN RESPONSE


Do you agree w/Consulting Dx?:  No


Please remember a lack of response to the above will prompt a phone page by 

CDI/Coding staff. 





In responding to this query, please exercise your independent professional 

judgment.  The purpose of this communication is to more accurately reflect the 

complexity of your patients condition. The fact that a question is asked does 

not imply that any particular answer is desired or expected.  





Thank you for your timely response to this clarification.      


   


Requestors name: Jesse[facility] [place]   





Phone # ext [phone] or [phone]








THIS PHYSICIAN QUERY FORM IS A PERMANENT PART OF THE MEDICAL RECORD











JESSIE HATFIELD                 Jul 31, 2020 10:08


SELINA SINGH MD              Jul 31, 2020 13:09

## 2020-07-31 NOTE — PHYSICAL THERAPY EVALUATION
PT Evaluation-General


Medical Diagnosis


Admission Date


Jul 27, 2020 at 15:38


Medical Diagnosis:  AMS


Onset Date:  Jul 27, 2020





Therapy Diagnosis


Therapy Diagnosis:  generalized weakness/debility





Height/Weight


Height (Feet):  5


Height (Inches):  3.00


Weight (Pounds):  112


Weight (Ounces):  0.0





Precautions


Precautions/Isolations:  Aspiration, Fall Prevention, Standard Precautions, 

Pressure Ulcer





Weight Bear Status


Right Lower Extremity:  Right


Weight Bearing/Tolerated


Left Lower Extremity:  Left


Weight Bearing/Tolerated





Referral


Physician:  Francisco


Reason for Referral:  Evaluation/Treatment





Medical History


Pertinent Medical History:  Atrial Fib, CAD, COPD, GERD, HTN, Neuropathy, PVD, 

Rheumatoid Arthritis


Additional Medical History


TIA


Current History


EMS from NH with AMS and hypoxia; extubated 7/30/20, then probable CVA during 

night of same date.  Patient left side is flaccid.


Reviewed History:  Yes





Social History


Home:  Nursing Home





Prior


Prior Level of Function


SCALE: Activities may be completed with or without assistive devices.





6-Indepedent-patient completes the activity by him/herself with no assistance 

from a helper.


5-Set-up or Clean-up Assistance-helper sets up or cleans up; patient completes 

activity. [place] assists only prior to or  


    following the activity.


4-Supervision or Touching Assistance-helper provides verbal cues and/or 

touching/steadying and/or contact guard assistance as patient completes 

activity. Assistance may be provided   


    throughout the activity or intermittently.


3-Partial/Moderate Assistance-helper does LESS THAN HALF the effort. Freeburn 

lifts, holds or supports trunk or limbs, but provides less than half the effort.


2-Substantial/Maximal Assistance-helper does MORE THAN HALF the effort. Freeburn 

lifts or holds trunk or limbs and provides more than half the effort.


9-Lbkeyomcu-hmzmoc does ALL the effort. Patient does none of the effort to co

mplete the activity. Or, the assistance of 2 or more helpers is required for the

patient to complete the  


    activity.


If activity was not attempted, code reason:


7-Patient Refused.


9-Not Applicable-not attempted and the patient did not perform the activity 

before the current illness, exacerbation or injury.


10-Not Attempted due to Environmental Limitations-(lack of equipment, weather 

restraints, etc.).


88-Not Attempted due to Medical Conditions or Safety Concerns.


Bed Mobility:  5


Transfers (B,C,W/C):  5


Gait:  5


Indoor Mobility (Ambulation):  Independent


Prior Devices Use:  Walker





PT Evaluation-Current


Subjective


Patient is in bed with multiple attachments.  Per physician, no OOB activity due

to probable CVA with left sided flaccid.





Pain





   Numeric Pain Scale:  0-No Pain


   Location:  No Pain Reported





Objective


Patient Orientation:  Person


Attachments:  NG Tube, Oxygen (vapotherm), Kim Catheter, IV





ROM/Strength


ROM Lower Extremities


bilateral LE WFL


Strength Lower Extremities


right LE 2-/5 grossly


left LE flaccid





Integumentary/Posture


Integumentary


refer to nursing notes


Bladder Incontinence:  Kim Cath





Neuromuscular


(Tone, Coordination, Reflexes)


flaccid left LE and UE





Sensory


Vision:  Unable to Assess


Hearing:  Functional


Sensation Right Lower Extremit:  Impaired


Sensation Left Lower Extremity:  Impaired





Gait


Does the Patient Walk?:  No and Walking Goal NOT indicated





Treatment


PROM bilateral LE all planes in supine x 8 min





Assessment/Needs


79 y.o. female with probable CVA, will be seen by skilled PT to address 

functional strength.  Due to patient current LOF, nursing will provide ROM 

during repositioning and bathing and PT will address ROM 3 days/wk and increase 

as indicated by skilled need.


Rehab Potential:  Poor





PT Long Term Goals


Long Term Goals


PT Long Term Goals Time Frame:  Aug 15, 2020


Roll Left & Right (QC):  2


Sit to Lying (QC):  2


Lying-Sitting on Side/Bed(QC):  2


Sit to Stand (QC):  2


Chair/Bed-to-Chair Xfer(QC):  2





PT Plan


Problem List


Problem List:  Activity Tolerance, Functional Strength, Safety, Balance, Gait, 

Transfer, Bed Mobility





Treatment/Plan


Treatment Plan:  Continue Plan of Care, Modify Plan, see comments


Treatment Plan:  Bed Mobility, Education, Functional Activity Eliu, Functional 

Strength, Safety, Therapeutic Exercise, Transfers


Treatment Duration:  Aug 15, 2020


Frequency:  3 times per week (M-W-F)


Estimated Hrs Per Day:  .25 hour per day


Patient and/or Family Agrees t:  Yes





Time/GCodes


Time In:  1035


Time Out:  1054


Total Billed Treatment Time:  19


Total Billed Treatment


1 visit


EVModC 19 min











SONG ISRAEL PT              Jul 31, 2020 11:02

## 2020-07-31 NOTE — NUR
CM/SS:  Visited with pt's son at the bedside, he is coping at this time based on pt's 
decline in health status. 



Plan: Undetermined at this time, pt is from [facility]



Summary:  Pt is minimally responsive and son Isrrael is at the bedside talking with pt.  Pt is 
able to ask for ice cream. Pt is unable to have ice cream due to her failing her swallow 
study.  RN indicates that they will do another swallow study for pt to see where she is at.  
Son verbalizes understanding.  Son is very attentive to pt and reminding her of having 
difficulty in swallowing.  Son is from the area. Let him know that I had visited with the 
doctor and was made aware of his request.  He verbalized understanding.  Thanked this worker 
for visiting.

## 2020-07-31 NOTE — PULMONARY PROGRESS NOTE
Subjective


Time Seen by a Provider:  04:10


Subjective/Events-last exam


Pt is very weak. She failed swallow eval yesterday.





Sepsis Event


Evaluation


Height, Weight, BMI


Height: 5'3.00"


Weight: 112lbs. 0.0oz. 50.769076jo; 23.63 BMI


Method:Stated





Focused Exam


Lactate Level


7/29/20 09:50: Lactic Acid Level 4.36*H


7/29/20 14:45: Lactic Acid Level 4.20*H


7/30/20 05:43: Lactic Acid Level 1.42





Exam


Exam





Vital Signs








  Date Time  Temp Pulse Resp B/P (MAP) Pulse Ox O2 Delivery O2 Flow Rate FiO2


 


7/31/20 03:00  87 19 139/38 (71) 95 Vapotherm 40.00 





       36.00 


 


7/31/20 02:16     97 Vapotherm 40.00 36


 


7/31/20 02:00  84 20 148/42 (77) 95 Vapotherm 40.00 





       36.00 


 


7/31/20 01:00  81 42 136/39 (71) 96 Vapotherm 40.00 





       36.00 


 


7/31/20 01:00  84      


 


7/31/20 00:31 36.6       


 


7/31/20 00:00      Vapotherm 40.00 36


 


7/30/20 23:00  82 18 146/40 (75) 95 Vapotherm 40.00 





       36.00 


 


7/30/20 22:00  86 21 145/39 (74) 95 Vapotherm 40.00 





       36.00 


 


7/30/20 21:44      Vapotherm 40.00 





       36.00 


 


7/30/20 21:29     97 Nasal Cannula 4.00 


 


7/30/20 21:00  87 25 118/80 (93) 94 Nasal Cannula 3.00 


 


7/30/20 20:16 36.7 82 29 154/48 (83) 96 Nasal Cannula 3.00 


 


7/30/20 20:00      Nasal Cannula 3.00 


 


7/30/20 19:00  98 28 115/82 (93) 95 Nasal Cannula 4.00 


 


7/30/20 19:00  83      


 


7/30/20 18:43     97 Nasal Cannula 4.00 


 


7/30/20 18:00  81 21 153/43 (79) 96 Nasal Cannula 4.00 


 


7/30/20 17:00  80 18 171/51 (91) 90 Nasal Cannula 4.00 


 


7/30/20 16:57      Nasal Cannula 4.00 


 


7/30/20 16:51     92 Nasal Cannula 3.00 


 


7/30/20 16:00  81 34 144/43 (76) 91 Nasal Cannula 3.00 


 


7/30/20 15:00  84 26 139/44 (75) 90 Nasal Cannula 3.00 


 


7/30/20 14:13     91 Nasal Cannula 3.00 


 


7/30/20 14:00  74 34 129/44 (72) 90 Nasal Cannula 3.00 


 


7/30/20 13:52  76 31 130/36 (67) 95 Nasal Cannula 3.00 


 


7/30/20 13:10     95 Nasal Cannula 3.00 


 


7/30/20 12:34  81      


 


7/30/20 12:00 36.7       


 


7/30/20 12:00  78 35 131/42 (71) 95 Nasal Cannula 5.00 


 


7/30/20 11:37     93 Nasal Cannula 5.00 


 


7/30/20 11:00  78 35 134/43 (73) 93 Nasal Cannula 5.00 


 


7/30/20 10:15     93 Nasal Cannula 5.00 


 


7/30/20 10:00  76 29 151/50 (83) 93 Nasal Cannula 2.00 


 


7/30/20 09:57     92 Nasal Cannula 5.00 


 


7/30/20 09:00  76 27 135/39 (71) 91 Nasal Cannula 2.00 


 


7/30/20 08:00 36.7       


 


7/30/20 08:00  77 27 160/52 (88) 93 Nasal Cannula 2.00 


 


7/30/20 08:00     93 Nasal Cannula 3.00 


 


7/30/20 07:30     94 Nasal Cannula 3.00 


 


7/30/20 07:00  78      


 


7/30/20 07:00  75 34 153/53 (86) 95 Nasal Cannula 2.00 


 


7/30/20 06:29  80 24 147/45 (79) 93 Nasal Cannula 2.00 


 


7/30/20 06:00  70 24 138/43 (74) 93 Mechanical Ventilator 28.00 


 


7/30/20 05:22  66  131/42    


 


7/30/20 05:00  66 24 131/40 (70) 93 Mechanical Ventilator 28.00 














I & O 


 


 7/31/20





 07:00


 


Intake Total 75 ml


 


Output Total 2825 ml


 


Balance -2750 ml








Height & Weight


Height: 5'3.00"


Weight: 112lbs. 0.0oz. 50.979412jw; 23.63 BMI


Method:Stated


General Appearance:  No Apparent Distress, Chronically ill


HEENT:  PERRL/EOMI, Pharynx Normal


Neck:  Other (central line)


Respiratory:  Crackles


Cardiovascular:  Regular Rate, Rhythm, No Murmur


Capillary Refill:  Less Than 3 Seconds


Peripheral Pulses:  2+ Radial Pulses (R), 2+ Radial Pulses (L)


Gastrointestinal:  soft, tenderness (at incision), other (incision c/d/i)


Extremity:  Pedal Edema, Swelling (2+ to just above ankles, severe swelling and 

ecchymosis in bilateral arms)


Neurologic/Psychiatric:  Alert


Skin:  Ecchymosis





Results


Lab


Laboratory Tests


7/30/20 03:29








7/30/20 16:25








7/31/20 03:04











Assessment/Plan


Assessment/Plan


Acute respiratory failure s/p surgery 


   -PT was extubated yesterday 


   -Pt failed swallow eval yeasterday. 


   -Aspiration precautions 


      -ABG reviewed 


   -Currently on Vapotherm 36% 


   -Gve 80mg of Lasix x 1. Pt is hypertensive with pulmonary edema and 

edematous. Repeat BNP 


Pneumonia with Stenotrophomonas 


   -Pt is not able to take PO meds. will give BTM DS down NG tube 


   -Continue Vanco and Zosyn for now. 


Hypokalemia


   -Replace and recheck 2hrs after replacement. 


Septic shock - resolved 


   -Solucortef -- D/C 


Severe Abd pain with acute GIB s/p ex lap 


Bleeding cecal mass s/p resection 


Anemia s/p 3 units of PRBC - with persistent hypotension 


   -improving


   -Monitor 


bilateral pleural effusions


   -Lasix 


   -Monitor 


Metabolic acidosis


   -improving 


Hx of Afib and aortic stenosis











SIN BERRIOS DO              Jul 31, 2020 04:15

## 2020-07-31 NOTE — PROGRESS NOTE - SURGERY
Subjective


Time Seen by a Provider:  09:54


Subjective/Events-last exam


Pt seen and examined, events of last night noted.  Nursing thinks pt may have 

had a stroke.  She does not appear to be in any distress.  Unfortunately, failed

swallow study yesterday


they were going to try again today.


Review of Systems


General:  Fatigue


Pulmonary:  Dyspnea


Cardiovascular:  No: Chest Pain


Gastrointestinal:  Abdominal Pain; No: Nausea, Vomiting


pt on vent





Focused Exam


Lactate Level


20 09:50: Lactic Acid Level 4.36*H


20 14:45: Lactic Acid Level 4.20*H


20 05:43: Lactic Acid Level 1.42





Objective


Exam





Vital Signs








  Date Time  Temp Pulse Resp B/P (MAP) Pulse Ox O2 Delivery O2 Flow Rate FiO2


 


20 11:00  90 17 123/45 (71) 96 Vapotherm 40.00 





       36.00 


 


20 10:42     93 Vapotherm 40.00 36


 


20 10:00  86 11 136/40 (72) 95 Vapotherm 40.00 





       36.00 


 


20 09:00  93 15 131/42 (71) 86 Vapotherm 40.00 





       36.00 


 


20 08:00      Vapotherm 40.00 36


 


20 08:00  98 35 142/48 (79) 94 Vapotherm 40.00 





       36.00 


 


20 08:00 36.2       


 


20 07:01     94 Vapotherm 40.00 36


 


20 07:00  78 17 143/42 (75) 93 Vapotherm 40.00 





       36.00 


 


20 06:40  81      


 


20 06:00  83 15 174/50 (91) 95 Vapotherm 40.00 





       36.00 


 


20 05:00  81 15 151/41 (77) 94 Vapotherm 40.00 





       36.00 


 


20 04:00      Vapotherm 40.00 36


 


20 04:00  85 26 148/40 (76) 94 Vapotherm 40.00 





       36.00 


 


20 03:00  87 19 139/38 (71) 95 Vapotherm 40.00 





       36.00 


 


20 02:16     97 Vapotherm 40.00 36


 


20 02:00  84 20 148/42 (77) 95 Vapotherm 40.00 





       36.00 


 


20 01:00  81 42 136/39 (71) 96 Vapotherm 40.00 





       36.00 


 


20 01:00  84      


 


20 00:31 36.6       


 


20 00:00      Vapotherm 40.00 36


 


20 23:00  82 18 146/40 (75) 95 Vapotherm 40.00 





       36.00 


 


20 22:00  86 21 145/39 (74) 95 Vapotherm 40.00 





       36.00 


 


20 21:44      Vapotherm 40.00 





       36.00 


 


20 21:29     97 Nasal Cannula 4.00 


 


20 21:00  87 25 118/80 (93) 94 Nasal Cannula 3.00 


 


20 20:16 36.7 82 29 154/48 (83) 96 Nasal Cannula 3.00 


 


20 20:00      Nasal Cannula 3.00 


 


20 19:00  98 28 115/82 (93) 95 Nasal Cannula 4.00 


 


20 19:00  83      


 


20 18:43     97 Nasal Cannula 4.00 


 


20 18:00  81 21 153/43 (79) 96 Nasal Cannula 4.00 


 


20 17:00  80 18 171/51 (91) 90 Nasal Cannula 4.00 


 


20 16:57      Nasal Cannula 4.00 


 


20 16:51     92 Nasal Cannula 3.00 


 


20 16:00  81 34 144/43 (76) 91 Nasal Cannula 3.00 


 


20 15:00  84 26 139/44 (75) 90 Nasal Cannula 3.00 


 


20 14:13     91 Nasal Cannula 3.00 


 


20 14:00  74 34 129/44 (72) 90 Nasal Cannula 3.00 


 


20 13:52  76 31 130/36 (67) 95 Nasal Cannula 3.00 


 


20 13:10     95 Nasal Cannula 3.00 


 


20 12:34  81      














I & O 


 


 20





 07:00


 


Intake Total 765 ml


 


Output Total 3525 ml


 


Balance -2760 ml





Capillary Refill : Less Than 3 Seconds


General Appearance:  Chronically ill, Cachetic


HEENT:  Other (right pupil looks fixed, mucous membranes slightly dry)


Neck:  Other (central line)


Respiratory:  No Respiratory Distress, Accessory Muscle Use, Crackles; No 

Wheezing


Cardiovascular:  Regular Rate, Rhythm, Systolic Murmur


Peripheral Pulses:  2+ Radial Pulses (R), 2+ Radial Pulses (L)


Gastrointestinal:  soft, tenderness (at incision), other (incision c/d/i)


Extremity:  Swelling (3+ bilaterally)


Neurologic/Psychiatric:  Depressed Affect, Motor Weakness


Skin:  Ecchymosis, Other (weeping edema in her arms)





Results


Lab


Laboratory Tests


20 16:25: 


Hemoglobin 9.1L, Hematocrit 26L


20 20:30: 


Blood Gas Puncture Site LEFT BRACH ART, Blood Gas Patient Temperature 36.7, 

Arterial Blood pH 7.41, Arterial Blood Partial Pressure CO2 49H, Arterial Blood 

Partial Pressure O2 99H, Arterial Blood HCO3 30H, Arterial Blood Total CO2 31.7H

, Arterial Blood Oxygen Saturation 96, Arterial Blood Base Excess 5.6H, Jorge 

Test YES-POS, Blood Gas Ventilator Setting NO, Blood Gas Inspired Oxygen 3L


20 21:25: Glucometer 95


20 03:04: 


Hemoglobin 8.9L, Hematocrit 26L, White Blood Count 25.8H, Red Blood Count 2.95L,

Mean Corpuscular Volume 87, Mean Corpuscular Hemoglobin 30, Mean Corpuscular 

Hemoglobin Concent 35, Red Cell Distribution Width 16.1H, Platelet Count 104L, 

Mean Platelet Volume 9.6, Neutrophils (%) (Auto) 95H, Lymphocytes (%) (Auto) 1L,

Monocytes (%) (Auto) 4, Eosinophils (%) (Auto) 0, Basophils (%) (Auto) 0, 

Neutrophils # (Auto) 24.6H, Lymphocytes # (Auto) 0.2L, Monocytes # (Auto) 1.0, 

Eosinophils # (Auto) 0.0, Basophils # (Auto) 0.0, Sodium Level 140, Potassium 

Level 2.8L, Chloride Level 103, Carbon Dioxide Level 26, Anion Gap 11, Blood 

Urea Nitrogen 22H, Creatinine 0.63, Estimat Glomerular Filtration Rate > 60, 

BUN/Creatinine Ratio 35, Glucose Level 91, Calcium Level 7.6L, Phosphorus Level 

2.9, Magnesium Level 2.0


20 06:27: Vancomycin Level Trough 9.8L





Microbiology


20 Gram Stain - Final, Resulted


          


20 Sputum Culture - Preliminary, Resulted


          Stenotrophomonas Maltophilia


20 Blood Culture - Preliminary, Resulted


          No growth





Assessment/Plan


S/P  Right Colon resection


Hx of Afib and aortic stenosis


?? Stroke





Pt seems stable but had a bad night and talk with her son; he does not want MRI 

for CVA work-up.  Will continue supportive care and re-attempt swallow 

evaluation; she may need PEG


if she can't eat and long-term rehab if family wants that.





Clinical Quality Measures


DVT/VTE Risk/Contraindication:


Risk Factor Score Per Nursin


RFS Level Per Nursing on Admit:  4+=Very High











DAKSHA VELOZ DO               2020 12:16

## 2020-07-31 NOTE — PROGRESS NOTE - HOSPITALIST
Subjective


HPI/CC On Admission


Date Seen by Provider:  2020


Time Seen by Provider:  08:56


Pt is an 79yoCF with a PMH of aortic stenosis, COPD, CAD with recent stenting, 

HTN, HLD, hypothyroidism, paroxysmal atrial fibrillation on chronic 

anticoagulation who presented to the ER due to hypoxia. She is unable to provide

much history and onlyanswered a few yes or no questions for me. She told me she 

was sick and that she was not having pain. Otherwise all history obtained from 

family and records. She was apparently in her normal state this morning 

(ambulatory and alert and oriented x4). She spoke to her son and then had lunch.

Sometime after lunch they found her in her room minimallyresponsive. There was 

concern for an episode of apnea as well. EMS was summoned and she was found to 

have oxygen saturations in the 60%. She was placed on a nonrebreather which 

brought her oxygen saturations up. She was taken for CT head as she is on 

Xarelto.


Subjective/Events-last exam


Pt awake and alert. Reviewed events from overnight. Code Stroke activated. CT 

head is negative. [facility] contacted and no further interventions 

recommended. Discussed with patient regarding respiratory status and she agrees 

to intubation again if need but only short term. Would not want long term 

intubation or trach if needed.





Focused Exam


Lactate Level


20 09:50: Lactic Acid Level 4.36*H


20 14:45: Lactic Acid Level 4.20*H


20 05:43: Lactic Acid Level 1.42








Objective


Exam


Vital Signs





Vital Signs








  Date Time  Temp Pulse Resp B/P (MAP) Pulse Ox O2 Delivery O2 Flow Rate FiO2


 


20 14:00     94 Vapotherm 40.00 36


 


20 12:38  98      


 


20 12:00   26 131/48 (75)    


 


20 12:00 36.1       





Capillary Refill : Less Than 3 Seconds


General Appearance:  Chronically ill, Other (ill appearing)


Respiratory:  Crackles; No Wheezing; Other


Cardiovascular:  Regular Rate, Rhythm, Systolic Murmur


Gastrointestinal:  Normal Bowel Sounds, Soft


Extremity:  Swelling (3+ bilaterally)


Neurologic/Psychiatric:  Alert, Oriented x3





Results/Procedures


Lab


Laboratory Tests


20 16:25








20 03:04








Patient resulted labs reviewed.





Assessment/Plan


Assessment and Plan


Assess & Plan/Chief Complaint


New Onset left sided weakness


   Unequal pupils noticed overnight, code stroke activated


   CT Head negative, not a candidate for TPA


   Would ideally like an MRI but discussed with Dr Jensen regarding respiratory 

status and I don't think she would tolerate laying flat


   PT/OT


   Speech to see


   NGT in place


   Already on Plavix for antiplatelet





Hemorrhagic shock


bleeding cecal mass


Ischemic colitis


Severe anemia- stabilized


   Went to ex lap on  and bleeding mass found


   Transfused 3 units pRBCs total


   Now off pressors and maintaining her own BP


   path reveals ischemic colitis


   


Acute Hypoxic Respiratory Failure


Leukocytosis


Recently treated pneumonia


COPD


Mixed metabolic and respiratory acidosis- resolved


   Extubated , on Vapotherm


   Pulm/TeleICU consulted, appreciate recs


   Repeat Lasix this AM


   Continue abx


   Blood cultures negative


   Bactrim added for Stenotrophomonas in sputum


   


CAD


Aortic Stenosis


   Son reports she recently had a stent placed by Dr Dixon


   Records sent and cath report reveals 2020 RCA stenting done and 

valvuloplasty by Dr Dixon


   Plavix resumed


   Consult cardiology, appreciate their help


   





DVT ppx: SCDs





Critical Care


Critically Ill Patient





Diagnosis/Problems


Diagnosis/Problems





(1) Acute respiratory failure


Status:  Acute


Qualifiers:  


   Respiratory failure complication:  hypercapnia  Qualified Codes:  J96.02 - 

Acute respiratory failure with hypercapnia


(2) Hemorrhagic shock


Status:  Acute


(3) Hypomagnesemia


Status:  Acute


(4) Normocytic anemia


Status:  Acute


(5) COPD (chronic obstructive pulmonary disease)


Qualifiers:  


   COPD type:  unspecified COPD  Qualified Codes:  J44.9 - Chronic obstructive 

pulmonary disease, unspecified


(6) Hypothyroidism


Status:  Chronic


Qualifiers:  


   Hypothyroidism type:  unspecified  Qualified Codes:  E03.9 - Hypothyroidism, 

unspecified


(7) Metabolic acidosis


Status:  Acute


(8) Respiratory acidosis


Status:  Acute


(9) Aortic stenosis


Qualifiers:  


   Cardiac valve disease etiology:  etiology unspecified  Qualified Codes:  

I35.0 - Nonrheumatic aortic (valve) stenosis


(10) Atrial fibrillation


Status:  Acute


Qualifiers:  


   Atrial fibrillation type:  paroxysmal  Qualified Codes:  I48.0 - Paroxysmal 

atrial fibrillation


(11) CAD (coronary artery disease)


Status:  Acute


Qualifiers:  


   Coronary Disease-Associated Artery/Lesion type:  native artery  Native vs. 

transplanted heart:  native heart  Associated angina:  without angina  Qualified

Codes:  I25.10 - Atherosclerotic heart disease of native coronary artery without

angina pectoris





Clinical Quality Measures


DVT/VTE Risk/Contraindication:


Risk Factor Score Per Nursin


RFS Level Per Nursing on Admit:  4+=Very High











SELINA JASMINE MD              2020 09:01

## 2020-07-31 NOTE — OCCUPATIONAL THERAPY EVAL
OT Evaluation-General/PLF


Medical Diagnosis


Admission Date


Jul 27, 2020 at 15:38


Medical Diagnosis:  AMS


Onset Date:  Jul 27, 2020





Therapy Diagnosis


Therapy Diagnosis:  impaired self care skills





Height/Weight


Height (Feet):  5


Height (Inches):  3.00


Weight (Pounds):  112


Weight (Ounces):  0.0





Precautions


Precautions/Isolations:  Aspiration, Fall Prevention, Standard Precautions, 

Pressure Ulcer





Referral


Physician:  Francisco





Medical History


Pertinent Medical History:  Atrial Fib, CAD, COPD, GERD, HTN, Neuropathy, PVD, 

Rheumatoid Arthritis


Additional Medical History


aortic stenosis, HLD, hypothyroidism,


Current History


admitted with hypoxia. Pt had right colon resection and was intubated. Extubated

7/30.





Social History


Home:  Nursing Home





ADL-Prior Level of Function


SCALE: Activities may be completed with or without assistive devices.





6-Indepedent-patient completes the activity by him/herself with no assistance 

from a helper.


5-Set-up or Clean-up Assistance-helper sets up or cleans up; patient completes 

activity. [place] assists only prior to or  


    following the activity.


4-Supervision or Touching Assistance-helper provides verbal cues and/or 

touching/steadying and/or contact guard assistance as patient completes 

activity. Assistance may be provided   


    throughout the activity or intermittently.


3-Partial/Moderate Assistance-helper does LESS THAN HALF the effort. [place] 

lifts, holds or supports trunk or limbs, but provides less than half the effort.


2-Substantial/Maximal Assistance-helper does MORE THAN HALF the effort. [place] 

lifts or holds trunk or limbs and provides more than half the effort.


6-Ygsyjekyw-iniozp does ALL the effort. Patient does none of the effort to 

complete the activity. Or, the assistance of 2 or more helpers is required for 

the patient to complete the  


    activity.


If activity was not attempted, code reason:


7-Patient Refused.


9-Not Applicable-not attempted and the patient did not perform the activity 

before the current illness, exacerbation or injury.


10-Not Attempted due to Environmental Limitations-(lack of equipment, weather 

restraints, etc.).


88-Not Attempted due to Medical Conditions or Safety Concerns.


ADL PLOF Comments


Pt unable to provide PLOF.





OT Current Status


Subjective


RN okays eval, but no OOB activity. Pt resting in bed with eyes open.





Mental Status/Objective


Patient Orientation:  Unable to Assess


Attachments:  Kim Catheter, IV, NG Tube, Oxygen (vapotherm)





Current


Upper Extremity ROM


grossly WFL


Upper Extremity Coordination


impaired


Upper Extremity Sensation


unable to assess


Upper Extremity Strength


left UE: no active movement noted





ADL-Treatment


ADL-Current


UE assessment completed with pt in supine. Pt's head is turned to right, able to

turn to midline. Pt is able to follow simple commands with right UE. No active 

movement left UE. AAROM completed right UE. PROM completed left UE. Pt does not 

indicate pain during ROM. Pt resting in bed with needs met after session.





Education


OT Patient Education:  Rehab process


Teaching Recipient:  Patient


Teaching Methods:  Discussion


Response to Teaching:  Unable to Comprehend





OT Long Term Goals


Long Term Goals


Time Frame:  Aug 14, 2020


Oral Hygiene (QC):  2


Pt will wash face with min assist


Additional Goals:  2-Verbalize Understanding, 3-ImproveStrength/Eliu


1=Demonstrate adherence to instructed precautions during ADL tasks.


2=Patient will verbalize/demonstrate understanding of assistive 

devices/modifications for ADL.


3=Patient will improve strength/tolerance for activity to enable patient to 

perform ADL's.





OT Education/Plan


Problem List/Assessment


Assessment:  Decreased Activ Tolerance, Decreased Safety Aware, Decreased UE 

Strength, Dependent Transfers, Impaired Bed Mobility, Impaired Coordination, 

Impaired Funct Balance, Impaired I ADL's, Impaired Self-Care Skills, Restricted 

Funct UE ROM





Discharge Recommendations


Plan/Recommendations:  Continue POC





Treatment Plan/Plan of Care


Treatment,Training & Education:  Yes


Patient would benefit from OT for education, treatment and training to promote 

independence in ADL's, mobility, safety and/or upper extremity function for 

ADL's.


Plan of Care:  ADL Retraining, Functional Mobility, UE Funct Exercise/Act, UE 

Neuromus Re-Ed/Coord


Treatment Duration:  Aug 14, 2020


Frequency:  5 times per week


Estimated Hrs Per Day:  .25 hour per day


Rehab Potential:  Poor





Time/GCodes


Start Time:  13:38


Stop Time:  13:48


Total Time Billed (hr/min):  10


Billed Treatment Time


1 visit, EVM(10minutes)











SALVADOR ADAIR OT            Jul 31, 2020 14:31

## 2020-07-31 NOTE — DIAGNOSTIC IMAGING REPORT
HISTORY: Altered mental status, dyspnea



COMPARISON: 07/30/2020



TECHNIQUE: Single frontal view of the chest.



FINDINGS:



There are small bilateral pleural effusions and stable mild

cardiomegaly. Patchy airspace opacities are seen in the lung

bases which appear stable. There is significantly increased

airspace opacity in the right upper lobe compared to the prior

study. No pneumothorax is seen. The left central line tip

projects over the SVC. The tip of the enteric tube projects over

the stomach with the sidehole at the distal esophagus. Lung

volumes are large. The endotracheal tube has been removed.



IMPRESSION:

1. Bilateral pulmonary opacities, with significantly increased

airspace opacity in the right upper lung. This may represent

edema or infection.

2. Small bilateral pleural effusions appear stable.

3. Stable mild cardiomegaly.

4. Extubation.



Dictated by: 



  Dictated on workstation # [id number]

## 2020-07-31 NOTE — PROGRESS NOTE - CARDIOLOGY
Cardiology SOAP Progress Note


Subjective:


Does not report symptoms of chest pain or palp or syncope


Notes gen weakness





Objective:


I&O/Vital Signs











 7/30/20 7/30/20 7/30/20 7/30/20





 21:00 21:29 21:44 22:00


 


Pulse 87   86


 


Resp 25   21


 


B/P (MAP) 118/80 (93)   145/39 (74)


 


Pulse Ox 94 97  95


 


O2 Delivery Nasal Cannula Nasal Cannula Vapotherm Vapotherm


 


O2 Flow Rate 3.00 4.00 40.00 40.00





   36.00 36.00





 7/30/20 7/31/20 7/31/20 7/31/20





 23:00 00:00 00:31 01:00


 


Temp   36.6 


 


Pulse 82   84


 


Resp 18   


 


B/P (MAP) 146/40 (75)   


 


Pulse Ox 95   


 


O2 Delivery Vapotherm Vapotherm  


 


O2 Flow Rate 40.00 40.00  





 36.00   


 


FiO2  36  


 


    





 7/31/20 7/31/20 7/31/20 7/31/20





 01:00 02:00 02:16 03:00


 


Pulse 81 84  87


 


Resp 42 20  19


 


B/P (MAP) 136/39 (71) 148/42 (77)  139/38 (71)


 


Pulse Ox 96 95 97 95


 


O2 Delivery Vapotherm Vapotherm Vapotherm Vapotherm


 


O2 Flow Rate 40.00 40.00 40.00 40.00





 36.00 36.00  36.00


 


FiO2   36 





 7/31/20 7/31/20 7/31/20 7/31/20





 04:00 04:00 05:00 06:00


 


Pulse 85  81 83


 


Resp 26  15 15


 


B/P (MAP) 148/40 (76)  151/41 (77) 174/50 (91)


 


Pulse Ox 94  94 95


 


O2 Delivery Vapotherm Vapotherm Vapotherm Vapotherm


 


O2 Flow Rate 40.00 40.00 40.00 40.00





 36.00  36.00 36.00


 


FiO2  36  





 7/31/20 7/31/20 7/31/20 7/31/20





 06:40 07:00 07:01 08:00


 


Temp    36.2


 


Pulse 81 78  


 


Resp  17  


 


B/P (MAP)  143/42 (75)  


 


Pulse Ox  93 94 


 


O2 Delivery  Vapotherm Vapotherm 


 


O2 Flow Rate  40.00 40.00 





  36.00  


 


FiO2   36 














 7/31/20





 00:00


 


Intake Total 370 ml


 


Output Total 2150 ml


 


Balance -1780 ml








Weight (Pounds):  112


Weight (Ounces):  0.0


Weight (Calculated Kilograms):  50.876377


Constitutional:  other (oriented to person and place, frail and weak-appearing)


Respiratory:  rhonchi (scattered), other (fair air entry; coarse breathsounds)


Cardiovascular:  regular rate-rhythm, systolic murmur (2-3/6 MSM)


Gastrointestional:  other (s/p abdominal surgery with dressing in place)


Extremities:  other (bilat pitting LE swelling)


Neurologic/Psychiatric:  grossly intact (moves extremities)


Skin:  No rash on exposed areas, No ulcerations on exposed areas





Results/Procedures:


Labs


Laboratory Tests


7/30/20 16:25: 


Hemoglobin 9.1L, Hematocrit 26L


7/30/20 20:30: 


Blood Gas Puncture Site LEFT BRACH ART, Blood Gas Patient Temperature 36.7, 

Arterial Blood pH 7.41, Arterial Blood Partial Pressure CO2 49H, Arterial Blood 

Partial Pressure O2 99H, Arterial Blood HCO3 30H, Arterial Blood Total CO2 31.7H

, Arterial Blood Oxygen Saturation 96, Arterial Blood Base Excess 5.6H, Jorge 

Test YES-POS, Blood Gas Ventilator Setting NO, Blood Gas Inspired Oxygen 3L


7/30/20 21:25: Glucometer 95


7/31/20 03:04: 


Hemoglobin 8.9L, Hematocrit 26L, White Blood Count 25.8H, Red Blood Count 2.95L,

Mean Corpuscular Volume 87, Mean Corpuscular Hemoglobin 30, Mean Corpuscular 

Hemoglobin Concent 35, Red Cell Distribution Width 16.1H, Platelet Count 104L, 

Mean Platelet Volume 9.6, Neutrophils (%) (Auto) 95H, Lymphocytes (%) (Auto) 1L,

Monocytes (%) (Auto) 4, Eosinophils (%) (Auto) 0, Basophils (%) (Auto) 0, Neutro

phils # (Auto) 24.6H, Lymphocytes # (Auto) 0.2L, Monocytes # (Auto) 1.0, 

Eosinophils # (Auto) 0.0, Basophils # (Auto) 0.0, Sodium Level 140, Potassium 

Level 2.8L, Chloride Level 103, Carbon Dioxide Level 26, Anion Gap 11, Blood 

Urea Nitrogen 22H, Creatinine 0.63, Estimat Glomerular Filtration Rate > 60, 

BUN/Creatinine Ratio 35, Glucose Level 91, Calcium Level 7.6L, Phosphorus Level 

2.9, Magnesium Level 2.0


7/31/20 06:27: Vancomycin Level Trough 9.8L





Microbiology


7/29/20 Gram Stain - Final, Resulted


          


7/29/20 Sputum Culture - Preliminary, Resulted


          Stenotrophomonas Maltophilia


7/29/20 Blood Culture - Preliminary, Resulted


          No growth





Laboratory Tests


7/30/20 03:29








7/30/20 16:25








7/31/20 03:04











A/P:


Assessment:





Hypovolemic (blood loss) shock - receiving transfusions, requiring pressor 

support





Sepsis due to pneumonia - management per Pulm/ICU services





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





Acute resp failure - extubated on 7-





CAD. Card cath of June 30, 2020 by Dr. Dixon at [facility] showed 80-

90% lesion in the RCA for which KEISHA x1 was placed.  Other vessels showed mild to

mod CAD.  Valvuloplasty to the aortic valve carried out at time of cardiac cath.

 Had been on Effient and ASA.





Sinus node disfunction and PAF. On Xarelto for stroke prophylaxis





Echocardiogram of June 11, 2020 by Dr. Gamez showed LVEF 55-65%.  Grade 2 

diastolic dysfunction.  Mild mitral stenosis with mean gradient 2.2mHg, MVA 3.3 

cm2.  Mod calcified aortic valve with mod-severe stenosis, mean grad 26mmHg ASHELY 

1.1cm2, mild to mod regurg.  Severe TR.  PASP 65-70mmHg.  Echocardiogram of July 28, 2020 was a technically poor study.  LVEF 55-60%





H/o scleroderma and chronic dysphagia, followed and treated by her pcp





Quit smoking in 1980





Borderline low TSH on 11/3/18 (0.33), followed by her pcp Dr Cueva





Carotid u/s of 8- showed 60-79% R ICA stenosis; less than 40% L ICA 

stenosis.





Incidental note of L thyroid nodules seen at time of carotid u/s of Feb 2019 for

which f/u is with her PCP


Plan:





* Complex management


* Critically ill


* Continue to hold off on Xarelto and ASA due to bleeding requiring surgery and 

  leading to hypovolemic shock that has required transfusions 


* Continue Plavix to reduce risk of stent thrombosis


* I reviewed all of the above in detail with her son and answered questions


* Monitor lab closely


* Replace electrolytes





Clinical Quality Measures


Type of Care:


Type of Care:  Comfort Measures











NICOLE DUMONT MD [facility]   Jul 31, 2020 08:53

## 2020-07-31 NOTE — OCC THERAPY PROGRESS NOTE
Therapy Progress Note


Order received for OT eval and treat. Chart review completed. Attempted 

evaluation at 1105. RN states pt just had pain medication and requests to allow 

pt to rest at this time. Will attempt this afternoon











SALVADOR ADAIR OT            Jul 31, 2020 11:11

## 2020-07-31 NOTE — SPEECH THERAPY PROGRESS NOTE
Therapy Progress Note


ST attempted to completed Bedside Dysphagia Evaluation, however patient exhibits

moderate delay in swallow with 1/4 tsp thickened liquids. Patient is recommended

to continue NPO status at this time. ST will follow up on Monday.











NELSON MURPHY            Jul 31, 2020 13:57

## 2020-08-01 VITALS — SYSTOLIC BLOOD PRESSURE: 177 MMHG | DIASTOLIC BLOOD PRESSURE: 59 MMHG

## 2020-08-01 VITALS — SYSTOLIC BLOOD PRESSURE: 141 MMHG | DIASTOLIC BLOOD PRESSURE: 46 MMHG

## 2020-08-01 VITALS — SYSTOLIC BLOOD PRESSURE: 136 MMHG | DIASTOLIC BLOOD PRESSURE: 49 MMHG

## 2020-08-01 VITALS — DIASTOLIC BLOOD PRESSURE: 49 MMHG | SYSTOLIC BLOOD PRESSURE: 147 MMHG

## 2020-08-01 VITALS — DIASTOLIC BLOOD PRESSURE: 38 MMHG | SYSTOLIC BLOOD PRESSURE: 128 MMHG

## 2020-08-01 VITALS — DIASTOLIC BLOOD PRESSURE: 53 MMHG | SYSTOLIC BLOOD PRESSURE: 158 MMHG

## 2020-08-01 VITALS — DIASTOLIC BLOOD PRESSURE: 45 MMHG | SYSTOLIC BLOOD PRESSURE: 122 MMHG

## 2020-08-01 VITALS — DIASTOLIC BLOOD PRESSURE: 43 MMHG | SYSTOLIC BLOOD PRESSURE: 104 MMHG

## 2020-08-01 VITALS — DIASTOLIC BLOOD PRESSURE: 47 MMHG | SYSTOLIC BLOOD PRESSURE: 152 MMHG

## 2020-08-01 VITALS — SYSTOLIC BLOOD PRESSURE: 165 MMHG | DIASTOLIC BLOOD PRESSURE: 57 MMHG

## 2020-08-01 VITALS — DIASTOLIC BLOOD PRESSURE: 57 MMHG | SYSTOLIC BLOOD PRESSURE: 150 MMHG

## 2020-08-01 VITALS — SYSTOLIC BLOOD PRESSURE: 155 MMHG | DIASTOLIC BLOOD PRESSURE: 52 MMHG

## 2020-08-01 VITALS — SYSTOLIC BLOOD PRESSURE: 161 MMHG | DIASTOLIC BLOOD PRESSURE: 55 MMHG

## 2020-08-01 VITALS — SYSTOLIC BLOOD PRESSURE: 131 MMHG | DIASTOLIC BLOOD PRESSURE: 38 MMHG

## 2020-08-01 VITALS — SYSTOLIC BLOOD PRESSURE: 144 MMHG | DIASTOLIC BLOOD PRESSURE: 52 MMHG

## 2020-08-01 VITALS — SYSTOLIC BLOOD PRESSURE: 142 MMHG | DIASTOLIC BLOOD PRESSURE: 41 MMHG

## 2020-08-01 VITALS — SYSTOLIC BLOOD PRESSURE: 149 MMHG | DIASTOLIC BLOOD PRESSURE: 60 MMHG

## 2020-08-01 VITALS — DIASTOLIC BLOOD PRESSURE: 54 MMHG | SYSTOLIC BLOOD PRESSURE: 135 MMHG

## 2020-08-01 VITALS — DIASTOLIC BLOOD PRESSURE: 57 MMHG | SYSTOLIC BLOOD PRESSURE: 144 MMHG

## 2020-08-01 VITALS — DIASTOLIC BLOOD PRESSURE: 41 MMHG | SYSTOLIC BLOOD PRESSURE: 132 MMHG

## 2020-08-01 VITALS — DIASTOLIC BLOOD PRESSURE: 51 MMHG | SYSTOLIC BLOOD PRESSURE: 166 MMHG

## 2020-08-01 VITALS — DIASTOLIC BLOOD PRESSURE: 48 MMHG | SYSTOLIC BLOOD PRESSURE: 146 MMHG

## 2020-08-01 VITALS — SYSTOLIC BLOOD PRESSURE: 149 MMHG | DIASTOLIC BLOOD PRESSURE: 51 MMHG

## 2020-08-01 VITALS — DIASTOLIC BLOOD PRESSURE: 51 MMHG | SYSTOLIC BLOOD PRESSURE: 155 MMHG

## 2020-08-01 LAB
ARTERIAL PATENCY WRIST A: (no result)
BASE EXCESS STD BLDA CALC-SCNC: 5.6 MMOL/L (ref -2.5–2.5)
BASOPHILS # BLD AUTO: 0 10^3/UL (ref 0–0.1)
BASOPHILS NFR BLD AUTO: 0 % (ref 0–10)
BDY SITE: (no result)
BODY TEMPERATURE: 37.1
BUN/CREAT SERPL: 31
CALCIUM SERPL-MCNC: 6.4 MG/DL (ref 8.5–10.1)
CHLORIDE SERPL-SCNC: 112 MMOL/L (ref 98–107)
CO2 BLDA CALC-SCNC: 30.8 MMOL/L (ref 21–31)
CO2 SERPL-SCNC: 21 MMOL/L (ref 21–32)
CREAT SERPL-MCNC: 0.54 MG/DL (ref 0.6–1.3)
EOSINOPHIL # BLD AUTO: 0 10^3/UL (ref 0–0.3)
EOSINOPHIL NFR BLD AUTO: 0 % (ref 0–10)
ERYTHROCYTE [DISTWIDTH] IN BLOOD BY AUTOMATED COUNT: 16.5 % (ref 10–14.5)
GFR SERPLBLD BASED ON 1.73 SQ M-ARVRAT: > 60 ML/MIN
GLUCOSE SERPL-MCNC: 90 MG/DL (ref 70–105)
HCT VFR BLD CALC: 25 % (ref 35–52)
HGB BLD-MCNC: 8.2 G/DL (ref 11.5–16)
INHALED O2 FLOW RATE: 40 L/MIN
LYMPHOCYTES # BLD AUTO: 0.2 X 10^3 (ref 1–4)
LYMPHOCYTES NFR BLD AUTO: 1 % (ref 12–44)
MAGNESIUM SERPL-MCNC: 1.7 MG/DL (ref 1.6–2.4)
MANUAL DIFFERENTIAL PERFORMED BLD QL: NO
MCH RBC QN AUTO: 30 PG (ref 25–34)
MCHC RBC AUTO-ENTMCNC: 34 G/DL (ref 32–36)
MCV RBC AUTO: 90 FL (ref 80–99)
MONOCYTES # BLD AUTO: 1.1 X 10^3 (ref 0–1)
MONOCYTES NFR BLD AUTO: 4 % (ref 0–12)
NEUTROPHILS # BLD AUTO: 25.6 X 10^3 (ref 1.8–7.8)
NEUTROPHILS NFR BLD AUTO: 95 % (ref 42–75)
PCO2 BLDA: 43 MMHG (ref 35–45)
PH BLDA: 7.45 [PH] (ref 7.37–7.43)
PHOSPHATE SERPL-MCNC: 2 MG/DL (ref 2.3–4.7)
PLATELET # BLD: 87 10^3/UL (ref 130–400)
PMV BLD AUTO: 10.5 FL (ref 7.4–10.4)
PO2 BLDA: 71 MMHG (ref 79–93)
POTASSIUM SERPL-SCNC: 2.7 MMOL/L (ref 3.6–5)
SAO2 % BLDA FROM PO2: 92 % (ref 94–100)
SODIUM SERPL-SCNC: 145 MMOL/L (ref 135–145)
VENTILATION MODE VENT: NO
WBC # BLD AUTO: 27 10^3/UL (ref 4.3–11)

## 2020-08-01 RX ADMIN — AMIODARONE HYDROCHLORIDE SCH MG: 200 TABLET ORAL at 21:09

## 2020-08-01 RX ADMIN — SULFAMETHOXAZOLE AND TRIMETHOPRIM SCH EA: 800; 160 TABLET ORAL at 17:29

## 2020-08-01 RX ADMIN — DEXTROSE SCH MLS/HR: 5 SOLUTION INTRAVENOUS at 20:15

## 2020-08-01 RX ADMIN — CLOPIDOGREL BISULFATE SCH MG: 75 TABLET, FILM COATED ORAL at 08:01

## 2020-08-01 RX ADMIN — SULFAMETHOXAZOLE AND TRIMETHOPRIM SCH EA: 800; 160 TABLET ORAL at 08:01

## 2020-08-01 RX ADMIN — POTASSIUM CHLORIDE SCH MLS/HR: 200 INJECTION, SOLUTION INTRAVENOUS at 05:03

## 2020-08-01 RX ADMIN — MAGNESIUM SULFATE IN DEXTROSE SCH MLS/HR: 10 INJECTION, SOLUTION INTRAVENOUS at 05:04

## 2020-08-01 RX ADMIN — IPRATROPIUM BROMIDE AND ALBUTEROL SULFATE SCH ML: .5; 3 SOLUTION RESPIRATORY (INHALATION) at 07:07

## 2020-08-01 RX ADMIN — AMIODARONE HYDROCHLORIDE SCH MG: 200 TABLET ORAL at 19:48

## 2020-08-01 RX ADMIN — SODIUM CHLORIDE SCH MLS/HR: 900 INJECTION, SOLUTION INTRAVENOUS at 19:48

## 2020-08-01 RX ADMIN — VANCOMYCIN HYDROCHLORIDE SCH MLS/HR: 500 INJECTION, POWDER, LYOPHILIZED, FOR SOLUTION INTRAVENOUS at 08:01

## 2020-08-01 RX ADMIN — PANTOPRAZOLE SODIUM SCH MG: 40 INJECTION, POWDER, FOR SOLUTION INTRAVENOUS at 08:01

## 2020-08-01 RX ADMIN — VANCOMYCIN HYDROCHLORIDE SCH MLS/HR: 500 INJECTION, POWDER, LYOPHILIZED, FOR SOLUTION INTRAVENOUS at 18:43

## 2020-08-01 RX ADMIN — IPRATROPIUM BROMIDE AND ALBUTEROL SULFATE SCH ML: .5; 3 SOLUTION RESPIRATORY (INHALATION) at 22:10

## 2020-08-01 RX ADMIN — POTASSIUM CHLORIDE SCH MLS/HR: 200 INJECTION, SOLUTION INTRAVENOUS at 05:04

## 2020-08-01 RX ADMIN — POTASSIUM CHLORIDE SCH MEQ: 1500 TABLET, EXTENDED RELEASE ORAL at 04:51

## 2020-08-01 RX ADMIN — POTASSIUM CHLORIDE SCH MLS/HR: 200 INJECTION, SOLUTION INTRAVENOUS at 04:50

## 2020-08-01 RX ADMIN — MORPHINE SULFATE PRN MG: 4 INJECTION, SOLUTION INTRAMUSCULAR; INTRAVENOUS at 16:35

## 2020-08-01 RX ADMIN — DEXTROSE SCH MLS/HR: 5 SOLUTION INTRAVENOUS at 12:37

## 2020-08-01 RX ADMIN — IPRATROPIUM BROMIDE AND ALBUTEROL SULFATE SCH ML: .5; 3 SOLUTION RESPIRATORY (INHALATION) at 11:34

## 2020-08-01 RX ADMIN — POTASSIUM CHLORIDE SCH MLS/HR: 200 INJECTION, SOLUTION INTRAVENOUS at 05:05

## 2020-08-01 RX ADMIN — POTASSIUM CHLORIDE SCH MLS/HR: 200 INJECTION, SOLUTION INTRAVENOUS at 16:21

## 2020-08-01 RX ADMIN — SODIUM CHLORIDE SCH MLS/HR: 900 INJECTION, SOLUTION INTRAVENOUS at 05:03

## 2020-08-01 RX ADMIN — MAGNESIUM SULFATE IN DEXTROSE SCH MLS/HR: 10 INJECTION, SOLUTION INTRAVENOUS at 04:52

## 2020-08-01 RX ADMIN — SODIUM CHLORIDE SCH MLS/HR: 900 INJECTION, SOLUTION INTRAVENOUS at 13:52

## 2020-08-01 RX ADMIN — MAGNESIUM SULFATE IN DEXTROSE SCH MLS/HR: 10 INJECTION, SOLUTION INTRAVENOUS at 05:03

## 2020-08-01 RX ADMIN — IPRATROPIUM BROMIDE AND ALBUTEROL SULFATE SCH ML: .5; 3 SOLUTION RESPIRATORY (INHALATION) at 18:36

## 2020-08-01 RX ADMIN — IPRATROPIUM BROMIDE AND ALBUTEROL SULFATE SCH ML: .5; 3 SOLUTION RESPIRATORY (INHALATION) at 02:52

## 2020-08-01 RX ADMIN — IPRATROPIUM BROMIDE AND ALBUTEROL SULFATE SCH ML: .5; 3 SOLUTION RESPIRATORY (INHALATION) at 14:36

## 2020-08-01 NOTE — NUR
Spoke with Dr. chávez and informed her that pt is rapidly declining. Lung sounds very 
wet/coarse, pt having difficulty breathing, pt not following any commands at this time. 
Telephone orders received for lasix 80mg IV x1 dose.

## 2020-08-01 NOTE — DIAGNOSTIC IMAGING REPORT
EXAMINATION: Chest 1 view



HISTORY: Nasogastric tube placement



COMPARISON: 08/01/2020



FINDINGS: 



Nasogastric tube is been advanced with the tip now in the body of

the stomach and the side-port at the gastroesophageal junction.

Persistent right upper lobe airspace opacity is present with

stable small pleural effusions and no pneumothorax. Heart size is

unchanged. Left internal jugular central venous catheter tip

terminates in the superior vena cava.



IMPRESSION: 



1. Nasogastric tube tip is now in the body of the stomach.





2. Stable right upper lobe airspace opacity concerning for

pneumonia with stable pleural effusions.



Dictated by: 



  Dictated on workstation # [id number]

## 2020-08-01 NOTE — DIAGNOSTIC IMAGING REPORT
EXAMINATION: Chest 1 view



HISTORY: Shortness of breath, altered metal status



COMPARISON: 07/31/2020



FINDINGS: 



Gastric tube is retracted and is in the distal esophagus. Left

internal jugular central venous catheter tip terminates in the

superior vena cava. There has been an increase in right upper

zone airspace opacity concerning for pneumonia. Small pleural

effusions are stable. No pneumothorax is seen. Aortic arch is

calcified.



IMPRESSION: 



1. Increase in right upper zone airspace opacity concerning for

pneumonia. Stable small bilateral pleural effusions.



2. Retraction of the gastric tube which is now in the distal

esophagus.



Dictated by: 



  Dictated on workstation # [id number]

## 2020-08-01 NOTE — NUR
THIS NURSE NOTIFIED DR CAMARA WITH EICU OF ABG RESULTS. NO NEW ORDERS AT THIS TIME. DR CHASE ALSO SAID NGT WAS GOOD TO USE PER CXR.

## 2020-08-01 NOTE — PROGRESS NOTE - HOSPITALIST
Subjective


HPI/CC On Admission


Date Seen by Provider:  Aug 1, 2020


Time Seen by Provider:  10:07


Pt is an 79yoCF with a PMH of aortic stenosis, COPD, CAD with recent stenting, 

HTN, HLD, hypothyroidism, paroxysmal atrial fibrillation on chronic 

anticoagulation who presented to the ER due to hypoxia. She is unable to provide

much history and onlyanswered a few yes or no questions for me. She told me she 

was sick and that she was not having pain. Otherwise all history obtained from 

family and records. She was apparently in her normal state this morning 

(ambulatory and alert and oriented x4). She spoke to her son and then had lunch.

Sometime after lunch they found her in her room minimallyresponsive. There was 

concern for an episode of apnea as well. EMS was summoned and she was found to 

have oxygen saturations in the 60%. She was placed on a nonrebreather which 

brought her oxygen saturations up. She was taken for CT head as she is on 

Xarelto.


Subjective/Events-last exam


Pt is minimally unresponsive. She was unable to follow any commands.





Focused Exam


Lactate Level


20 14:45: Lactic Acid Level 4.20*H


20 05:43: Lactic Acid Level 1.42








Objective


Exam


Vital Signs





Vital Signs








  Date Time  Temp Pulse Resp B/P (MAP) Pulse Ox O2 Delivery O2 Flow Rate FiO2


 


20 09:50 36.8       


 


20 08:00      Vapotherm 40.00 40


 


20 08:00  113 19 144/57 (86) 93   





Capillary Refill : Less Than 3 Seconds


General Appearance:  Chronically ill, Other (ill appearing)


Respiratory:  Rhonci, Other (on vapotherm)


Cardiovascular:  Regular Rate, Rhythm, Systolic Murmur


Gastrointestinal:  Normal Bowel Sounds, Soft


Neurologic/Psychiatric:  Other (does not follow commands, only opened eyes once 

to physical stimuli when I touched her forehead)





Results/Procedures


Lab


Laboratory Tests


20 04:15








Patient resulted labs reviewed.





Assessment/Plan


Assessment and Plan


Assess & Plan/Chief Complaint


New Onset left sided weakness- presumed stroke


   CT Head negative, not a candidate for TPA


   Would ideally like an MRI but discussed with Dr Jensen regarding respiratory 

status and I don't think she would tolerate laying flat- family also declined 

MRI


   PT/OT/SLP


   NGT in place- start trophic feeds today


   Already on Plavix for antiplatelet





Hemorrhagic shock


bleeding cecal mass


Ischemic colitis


Severe anemia- stabilized


   Went to ex lap on  and bleeding mass found


   Transfused 3 units pRBCs total


   Now off pressors and maintaining her own BP


   path reveals ischemic colitis


   


Acute Hypoxic Respiratory Failure


Leukocytosis


Recently treated pneumonia


COPD


Mixed metabolic and respiratory acidosis- resolved


   Extubated , on Vapotherm


   Pulm/TeleICU consulted, appreciate recs


      Discussed with son about current status and if she worsens and need a 

ventilator it would be very unlikely for her to ever come off, he agrees she w

ould not want that (as she confirmed yesterday to me) so will make DNR. I 

updated eICU to this


   Continue abx, Vanc/Zosyn/Bactrim or Stenotrophomonas in sputum


   Blood cultures negative


   


CAD


Aortic Stenosis


A-fib


   Son reports she recently had a stent placed by Dr Dixon


   Records sent and cath report reveals 2020 RCA stenting done and 

valvuloplasty by Dr Dixon


   Plavix resumed


   Consult cardiology, appreciate their help


   Went in to a-fib overnight


   Started on home amiodarone via NGT


   


Unfortunately very complex patient with multiple acute issues. Still being 

treated as aggressively as possible but continues to worsen. Very poor 

prognosis. Discussed with son who expresses understanding of this and that she 

may not survive this.





Critical Care


Critically Ill Patient





Diagnosis/Problems


Diagnosis/Problems





(1) Acute respiratory failure


Status:  Acute


Qualifiers:  


   Respiratory failure complication:  hypercapnia  Qualified Codes:  J96.02 - 

Acute respiratory failure with hypercapnia


(2) Hemorrhagic shock


Status:  Acute


(3) Hypomagnesemia


Status:  Acute


(4) Normocytic anemia


Status:  Acute


(5) COPD (chronic obstructive pulmonary disease)


Qualifiers:  


   COPD type:  unspecified COPD  Qualified Codes:  J44.9 - Chronic obstructive 

pulmonary disease, unspecified


(6) Hypothyroidism


Status:  Chronic


Qualifiers:  


   Hypothyroidism type:  unspecified  Qualified Codes:  E03.9 - Hypothyroidism, 

unspecified


(7) Metabolic acidosis


Status:  Acute


(8) Respiratory acidosis


Status:  Acute


(9) Aortic stenosis


Qualifiers:  


   Cardiac valve disease etiology:  etiology unspecified  Qualified Codes:  

I35.0 - Nonrheumatic aortic (valve) stenosis


(10) Atrial fibrillation


Status:  Acute


Qualifiers:  


   Atrial fibrillation type:  paroxysmal  Qualified Codes:  I48.0 - Paroxysmal 

atrial fibrillation


(11) CAD (coronary artery disease)


Status:  Acute


Qualifiers:  


   Coronary Disease-Associated Artery/Lesion type:  native artery  Native vs. 

transplanted heart:  native heart  Associated angina:  without angina  Qualified

Codes:  I25.10 - Atherosclerotic heart disease of native coronary artery without

angina pectoris





Clinical Quality Measures


DVT/VTE Risk/Contraindication:


Risk Factor Score Per Nursin


RFS Level Per Nursing on Admit:  4+=Very High











SELINA JASMINE MD               Aug 1, 2020 10:11

## 2020-08-01 NOTE — NUR
Spoke to Dr. Babcock for order clarification. IV amiodarone to continue. Hold PO amiodarone 
until IV amiodarone complete.

## 2020-08-01 NOTE — PROGRESS NOTE
Subjective


Date Seen by a Provider:  Aug 1, 2020


Time Seen by a Provider:  10:00


Subjective/Events-last exam


patient non-verbal. no spontaneous movements. on vapotherm 40%. tolerating TF's 

at 20cc/hr. patient a DNI and poor prognosis.





Focused Exam


Lactate Level


20 14:45: Lactic Acid Level 4.20*H


20 05:43: Lactic Acid Level 1.42





Objective


Exam





Vital Signs








  Date Time  Temp Pulse Resp B/P (MAP) Pulse Ox O2 Delivery O2 Flow Rate FiO2


 


20 11:34     94 Vapotherm 40.00 40


 


20 11:00  101 18 104/43 (63) 95 Vapotherm 40.00 





       40.00 


 


20 10:00  110 18 135/54 (81) 95 Vapotherm 40.00 





       40.00 


 


20 09:50 36.8       


 


20 09:00  106 31 150/57 (88) 88 Vapotherm 40.00 





       40.00 


 


20 08:00      Vapotherm 40.00 40


 


20 08:00  113 19 144/57 (86) 93 Vapotherm 40.00 





       40.00 


 


20 07:07     91 Vapotherm 40.00 40


 


20 07:00  118 19 149/60 (89) 92 Vapotherm 40.00 





       40.00 


 


20 07:00  118      


 


20 06:00  110 18 165/57 (93) 91 Vapotherm 40.00 





       36.00 


 


20 05:00  114 17 128/38 (68) 92 Vapotherm 40.00 





       36.00 


 


20 04:25 36.7       


 


20 04:00      Vapotherm 40.00 40


 


20 04:00  90 17 142/41 (74) 93 Vapotherm 40.00 





       36.00 


 


20 03:00  90 31 152/47 (82) 93 Vapotherm 40.00 





       36.00 


 


20 02:53     92 Vapotherm 40.00 30


 


20 02:00  87 37 166/51 (89) 94 Vapotherm 40.00 





       36.00 


 


20 01:00  75 16 131/38 (69) 94 Vapotherm 40.00 





       36.00 


 


20 01:00  83      


 


20 00:00  82 22 132/41 (71) 91 Vapotherm 40.00 





       36.00 


 


20 23:50 36.7       


 


20 23:48      Vapotherm 40.00 40


 


20 23:00  92 26 146/42 (76) 92 Vapotherm 40.00 





       36.00 


 


20 22:04     94 Vapotherm 40.00 36


 


20 22:00  93 22 165/54 (91) 94 Vapotherm 40.00 





       36.00 


 


20 21:00  87 35 132/38 (69) 94 Vapotherm 40.00 





       36.00 


 


20 20:30      Vapotherm 40.00 36


 


20 20:30 36.8     Vapotherm 40.00 





       36.00 


 


20 20:00  93 18 152/47 (82) 93 Vapotherm 40.00 





       36.00 


 


20 19:00  100      


 


20 19:00  96 18 146/47 (80) 93 Vapotherm 40.00 





       36.00 


 


20 18:20     93 Vapotherm 40.00 36


 


20 18:00  89 24 149/47 (81) 95 Vapotherm 40.00 





       36.00 


 


20 17:00  96 17 144/45 (78) 95 Vapotherm 40.00 





       36.00 


 


20 16:27 36.3       


 


20 16:00      Vapotherm 40.00 36


 


20 16:00  93 14 147/42 (77) 94 Vapotherm 40.00 





       36.00 


 


20 15:00  95 9 140/43 (75) 93 Vapotherm 40.00 





       36.00 


 


20 14:00  85 27 120/36 (64)  Vapotherm 40.00 





       36.00 


 


20 14:00     94 Vapotherm 40.00 36














I & O 


 


 20





 07:00


 


Intake Total 1250 ml


 


Output Total 2550 ml


 


Balance -1300 ml





Capillary Refill : Less Than 3 Seconds


General Appearance:  Chronically ill


HEENT:  Other (no ocular movements.)


Neck:  Full Range of Motion


Respiratory:  Decreased Breath Sounds, Rhonci


Cardiovascular:  Tachycardia


Gastrointestinal:  normal bowel sounds, soft


Extremity:  Normal Capillary Refill


Neurologic/Psychiatric:  Abnormal Cerebellar Tests, Sensory Deficit


Skin:  Normal Color


Lymphatic:  No Adenopathy





Results


Lab


Laboratory Tests


20 04:15: 


White Blood Count 27.0H, Red Blood Count 2.73L, Hemoglobin 8.2L, Hematocrit 25L,

Mean Corpuscular Volume 90, Mean Corpuscular Hemoglobin 30, Mean Corpuscular 

Hemoglobin Concent 34, Red Cell Distribution Width 16.5H, Platelet Count 87L, 

Mean Platelet Volume 10.5H, Neutrophils (%) (Auto) 95H, Lymphocytes (%) (Auto) 

1L, Monocytes (%) (Auto) 4, Eosinophils (%) (Auto) 0, Basophils (%) (Auto) 0, 

Neutrophils # (Auto) 25.6H, Lymphocytes # (Auto) 0.2L, Monocytes # (Auto) 1.1H, 

Eosinophils # (Auto) 0.0, Basophils # (Auto) 0.0, Sodium Level 145, Potassium 

Level 2.7L, Chloride Level 112H, Carbon Dioxide Level 21, Anion Gap 12, Blood 

Urea Nitrogen 17, Creatinine 0.54L, Estimat Glomerular Filtration Rate > 60, 

BUN/Creatinine Ratio 31, Glucose Level 90, Calcium Level 6.4L, Phosphorus Level 

2.0L, Magnesium Level 1.7


20 06:25: Vancomycin Level Trough 10.0


20 10:37: 


Blood Gas Puncture Site LT RADIAL, Blood Gas Patient Temperature 37.1, Arterial 

Blood pH 7.45H, Arterial Blood Partial Pressure CO2 43, Arterial Blood Partial 

Pressure O2 71L, Arterial Blood HCO3 30H, Arterial Blood Total CO2 30.8, 

Arterial Blood Oxygen Saturation 92L, Arterial Blood Base Excess 5.6H, Jorge 

Test ARTLINE, Blood Gas Ventilator Setting NO, Blood Gas Inspired Oxygen 40


20 11:47: Potassium Level 3.6





Microbiology


20 Gram Stain - Final, Resulted


          


20 Sputum Culture - Preliminary, Resulted


          Stenotrophomonas Maltophilia


          Enterobacter cloacae complex


20 Blood Culture - Preliminary, Resulted


          No growth





Assessment/Plan


Assessment/Plan


Assess & Plan/Chief Complaint


s/p ileocecal resection for bleeding with likely stroke and significant 

neurologic deficit. 


patient DNI.


cont current care.





Clinical Quality Measures


DVT/VTE Risk/Contraindication:


Risk Factor Score Per Nursin


RFS Level Per Nursing on Admit:  4+=Very High











REJI DELUNA MD                 Aug 1, 2020 13:12

## 2020-08-01 NOTE — PROGRESS NOTE - CARDIOLOGY
Cardiology SOAP Progress Note


Subjective:


Does not report any symptoms


Appears sluggish


Son by bedside





Objective:


I&O/Vital Signs











 8/1/20 8/1/20 8/1/20 8/1/20





 02:00 02:53 03:00 04:00


 


Pulse 87  90 90


 


Resp 37  31 17


 


B/P (MAP) 166/51 (89)  152/47 (82) 142/41 (74)


 


Pulse Ox 94 92 93 93


 


O2 Delivery Vapotherm Vapotherm Vapotherm Vapotherm


 


O2 Flow Rate 40.00 40.00 40.00 40.00





 36.00  36.00 36.00


 


FiO2  30  





 8/1/20 8/1/20 8/1/20 8/1/20





 04:00 04:25 05:00 06:00


 


Temp  36.7  


 


Pulse   114 110


 


Resp   17 18


 


B/P (MAP)   128/38 (68) 165/57 (93)


 


Pulse Ox   92 91


 


O2 Delivery Vapotherm  Vapotherm Vapotherm


 


O2 Flow Rate 40.00  40.00 40.00





   36.00 36.00


 


FiO2 40   


 


    





 8/1/20 8/1/20 8/1/20 8/1/20





 07:00 07:00 07:07 08:00


 


Pulse 118 118  113


 


Resp  19  19


 


B/P (MAP)  149/60 (89)  144/57 (86)


 


Pulse Ox  92 91 93


 


O2 Delivery  Vapotherm Vapotherm Vapotherm


 


O2 Flow Rate  40.00 40.00 40.00





  40.00  40.00


 


FiO2   40 





 8/1/20 8/1/20 8/1/20 8/1/20





 08:00 09:00 09:50 10:00


 


Temp   36.8 


 


Pulse  106  110


 


Resp  31  18


 


B/P (MAP)  150/57 (88)  135/54 (81)


 


Pulse Ox  88  95


 


O2 Delivery Vapotherm Vapotherm  Vapotherm


 


O2 Flow Rate 40.00 40.00  40.00





  40.00  40.00


 


FiO2 40   


 


    





 8/1/20 8/1/20 8/1/20 8/1/20





 11:00 11:34 12:00 12:00


 


Temp    37.1


 


Pulse 101   


 


Resp 18   


 


B/P (MAP) 104/43 (63)   


 


Pulse Ox 95 94 95 


 


O2 Delivery Vapotherm Vapotherm Vapotherm 


 


O2 Flow Rate 40.00 40.00 40.00 





 40.00   


 


FiO2  40 40 














 7/31/20





 23:59


 


Intake Total 120 ml


 


Output Total 1150 ml


 


Balance -1030 ml








Weight (Pounds):  112


Weight (Ounces):  0.0


Weight (Calculated Kilograms):  50.131482


Constitutional:  other (oriented to person and place, frail and weak-appearing)


Respiratory:  rhonchi (scattered), other (fair air entry; coarse breathsounds)


Cardiovascular:  regular rate-rhythm, systolic murmur (2-3/6 MSM)


Gastrointestional:  other (s/p abdominal surgery with dressing in place)


Extremities:  other (bilat pitting LE swelling)


Neurologic/Psychiatric:  grossly intact (moves extremities)


Skin:  No rash on exposed areas, No ulcerations on exposed areas





Results/Procedures:


Labs


Laboratory Tests


8/1/20 04:15: 


White Blood Count 27.0H, Red Blood Count 2.73L, Hemoglobin 8.2L, Hematocrit 25L,

Mean Corpuscular Volume 90, Mean Corpuscular Hemoglobin 30, Mean Corpuscular 

Hemoglobin Concent 34, Red Cell Distribution Width 16.5H, Platelet Count 87L, 

Mean Platelet Volume 10.5H, Neutrophils (%) (Auto) 95H, Lymphocytes (%) (Auto) 

1L, Monocytes (%) (Auto) 4, Eosinophils (%) (Auto) 0, Basophils (%) (Auto) 0, 

Neutrophils # (Auto) 25.6H, Lymphocytes # (Auto) 0.2L, Monocytes # (Auto) 1.1H, 

Eosinophils # (Auto) 0.0, Basophils # (Auto) 0.0, Sodium Level 145, Potassium 

Level 2.7L, Chloride Level 112H, Carbon Dioxide Level 21, Anion Gap 12, Blood 

Urea Nitrogen 17, Creatinine 0.54L, Estimat Glomerular Filtration Rate > 60, 

BUN/Creatinine Ratio 31, Glucose Level 90, Calcium Level 6.4L, Phosphorus Level 

2.0L, Magnesium Level 1.7


8/1/20 06:25: Vancomycin Level Trough 10.0


8/1/20 10:37: 


Blood Gas Puncture Site LT RADIAL, Blood Gas Patient Temperature 37.1, Arterial 

Blood pH 7.45H, Arterial Blood Partial Pressure CO2 43, Arterial Blood Partial 

Pressure O2 71L, Arterial Blood HCO3 30H, Arterial Blood Total CO2 30.8, 

Arterial Blood Oxygen Saturation 92L, Arterial Blood Base Excess 5.6H, Jorge 

Test ARTLINE, Blood Gas Ventilator Setting NO, Blood Gas Inspired Oxygen 40


8/1/20 11:47: Potassium Level 3.6





Microbiology


7/29/20 Gram Stain - Final, Resulted


          


7/29/20 Sputum Culture - Preliminary, Resulted


          Stenotrophomonas Maltophilia


          Enterobacter cloacae complex


7/29/20 Blood Culture - Preliminary, Resulted


          No growth





Laboratory Tests


7/30/20 16:25








7/31/20 03:04








8/1/20 04:15








8/1/20 11:47











A/P:


Assessment:





Hypovolemic (blood loss) shock - receiving transfusions, requiring pressor 

support





Sepsis due to pneumonia - management per Pulm/ICU services





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





Acute resp failure - extubated on 7-





CAD. Card cath of June 30, 2020 by Dr. Dixon at [facility] showed 80-

90% lesion in the RCA for which KEISHA x1 was placed.  Other vessels showed mild to

mod CAD.  Valvuloplasty to the aortic valve carried out at time of cardiac cath.

 Had been on Effient and ASA.





Sinus node disfunction and PAF. On Xarelto for stroke prophylaxis





Echocardiogram of June 11, 2020 by Dr. Gamez showed LVEF 55-65%.  Grade 2 

diastolic dysfunction.  Mild mitral stenosis with mean gradient 2.2mHg, MVA 3.3 

cm2.  Mod calcified aortic valve with mod-severe stenosis, mean grad 26mmHg ASHELY 

1.1cm2, mild to mod regurg.  Severe TR.  PASP 65-70mmHg.  Echocardiogram of July 28, 2020 was a technically poor study.  LVEF 55-60%





H/o scleroderma and chronic dysphagia, followed and treated by her pcp





Quit smoking in 1980





Borderline low TSH on 11/3/18 (0.33), followed by her pcp Dr Cueva





Carotid u/s of 8- showed 60-79% R ICA stenosis; less than 40% L ICA 

stenosis.





Incidental note of L thyroid nodules seen at time of carotid u/s of Feb 2019 for

which f/u is with her PCP


Plan:





* Complex management


* Critically ill


* In A Fib (started this am). Son reports that she is chronically on oral amio 

  that controls A Fib well. Will start amio because within 24 hr frame of onset 

  of this episode of A Fib


* Cannot give Xarelto and ASA due to bleeding requiring surgery and leading to 

  hypovolemic shock that has required transfusions 


* Continue Plavix to reduce risk of stent thrombosis


* I reviewed all of the above in detail with her son and answered questions


* Monitor lab closely


* Replace electrolytes





Clinical Quality Measures


Type of Care:


Type of Care:  Comfort Measures











NICOLE DUMONT MD [facility]    Aug 1, 2020 13:32

## 2020-08-02 VITALS — SYSTOLIC BLOOD PRESSURE: 123 MMHG | DIASTOLIC BLOOD PRESSURE: 58 MMHG

## 2020-08-02 VITALS — SYSTOLIC BLOOD PRESSURE: 132 MMHG | DIASTOLIC BLOOD PRESSURE: 59 MMHG

## 2020-08-02 VITALS — SYSTOLIC BLOOD PRESSURE: 133 MMHG | DIASTOLIC BLOOD PRESSURE: 43 MMHG

## 2020-08-02 VITALS — SYSTOLIC BLOOD PRESSURE: 129 MMHG | DIASTOLIC BLOOD PRESSURE: 56 MMHG

## 2020-08-02 VITALS — DIASTOLIC BLOOD PRESSURE: 60 MMHG | SYSTOLIC BLOOD PRESSURE: 131 MMHG

## 2020-08-02 VITALS — SYSTOLIC BLOOD PRESSURE: 122 MMHG | DIASTOLIC BLOOD PRESSURE: 40 MMHG

## 2020-08-02 VITALS — DIASTOLIC BLOOD PRESSURE: 63 MMHG | SYSTOLIC BLOOD PRESSURE: 124 MMHG

## 2020-08-02 VITALS — SYSTOLIC BLOOD PRESSURE: 126 MMHG | DIASTOLIC BLOOD PRESSURE: 62 MMHG

## 2020-08-02 VITALS — DIASTOLIC BLOOD PRESSURE: 40 MMHG | SYSTOLIC BLOOD PRESSURE: 129 MMHG

## 2020-08-02 VITALS — SYSTOLIC BLOOD PRESSURE: 140 MMHG | DIASTOLIC BLOOD PRESSURE: 51 MMHG

## 2020-08-02 VITALS — DIASTOLIC BLOOD PRESSURE: 45 MMHG | SYSTOLIC BLOOD PRESSURE: 144 MMHG

## 2020-08-02 VITALS — DIASTOLIC BLOOD PRESSURE: 50 MMHG | SYSTOLIC BLOOD PRESSURE: 155 MMHG

## 2020-08-02 VITALS — SYSTOLIC BLOOD PRESSURE: 131 MMHG | DIASTOLIC BLOOD PRESSURE: 43 MMHG

## 2020-08-02 VITALS — DIASTOLIC BLOOD PRESSURE: 43 MMHG | SYSTOLIC BLOOD PRESSURE: 134 MMHG

## 2020-08-02 VITALS — DIASTOLIC BLOOD PRESSURE: 47 MMHG | SYSTOLIC BLOOD PRESSURE: 144 MMHG

## 2020-08-02 VITALS — DIASTOLIC BLOOD PRESSURE: 41 MMHG | SYSTOLIC BLOOD PRESSURE: 127 MMHG

## 2020-08-02 VITALS — SYSTOLIC BLOOD PRESSURE: 126 MMHG | DIASTOLIC BLOOD PRESSURE: 65 MMHG

## 2020-08-02 VITALS — SYSTOLIC BLOOD PRESSURE: 134 MMHG | DIASTOLIC BLOOD PRESSURE: 46 MMHG

## 2020-08-02 LAB
BASOPHILS # BLD AUTO: 0 10^3/UL (ref 0–0.1)
BASOPHILS NFR BLD AUTO: 0 % (ref 0–10)
BUN/CREAT SERPL: 30
CALCIUM SERPL-MCNC: 7.2 MG/DL (ref 8.5–10.1)
CHLORIDE SERPL-SCNC: 108 MMOL/L (ref 98–107)
CO2 SERPL-SCNC: 27 MMOL/L (ref 21–32)
CREAT SERPL-MCNC: 0.6 MG/DL (ref 0.6–1.3)
EOSINOPHIL # BLD AUTO: 0 10^3/UL (ref 0–0.3)
EOSINOPHIL NFR BLD AUTO: 0 % (ref 0–10)
ERYTHROCYTE [DISTWIDTH] IN BLOOD BY AUTOMATED COUNT: 16.6 % (ref 10–14.5)
GFR SERPLBLD BASED ON 1.73 SQ M-ARVRAT: > 60 ML/MIN
GLUCOSE SERPL-MCNC: 187 MG/DL (ref 70–105)
HCT VFR BLD CALC: 28 % (ref 35–52)
HGB BLD-MCNC: 9.4 G/DL (ref 11.5–16)
LYMPHOCYTES # BLD AUTO: 0.4 X 10^3 (ref 1–4)
LYMPHOCYTES NFR BLD AUTO: 1 % (ref 12–44)
MAGNESIUM SERPL-MCNC: 2 MG/DL (ref 1.6–2.4)
MANUAL DIFFERENTIAL PERFORMED BLD QL: NO
MCH RBC QN AUTO: 30 PG (ref 25–34)
MCHC RBC AUTO-ENTMCNC: 33 G/DL (ref 32–36)
MCV RBC AUTO: 90 FL (ref 80–99)
MONOCYTES # BLD AUTO: 1.1 X 10^3 (ref 0–1)
MONOCYTES NFR BLD AUTO: 4 % (ref 0–12)
NEUTROPHILS # BLD AUTO: 26.4 X 10^3 (ref 1.8–7.8)
NEUTROPHILS NFR BLD AUTO: 95 % (ref 42–75)
PHOSPHATE SERPL-MCNC: 1.5 MG/DL (ref 2.3–4.7)
PLATELET # BLD: 76 10^3/UL (ref 130–400)
PMV BLD AUTO: 10.8 FL (ref 7.4–10.4)
POTASSIUM SERPL-SCNC: 3.2 MMOL/L (ref 3.6–5)
SODIUM SERPL-SCNC: 145 MMOL/L (ref 135–145)
WBC # BLD AUTO: 27.9 10^3/UL (ref 4.3–11)

## 2020-08-02 RX ADMIN — MORPHINE SULFATE PRN MG: 4 INJECTION, SOLUTION INTRAMUSCULAR; INTRAVENOUS at 05:39

## 2020-08-02 RX ADMIN — POTASSIUM CHLORIDE SCH MLS/HR: 200 INJECTION, SOLUTION INTRAVENOUS at 05:12

## 2020-08-02 RX ADMIN — MORPHINE SULFATE PRN MG: 4 INJECTION, SOLUTION INTRAMUSCULAR; INTRAVENOUS at 08:27

## 2020-08-02 RX ADMIN — SULFAMETHOXAZOLE AND TRIMETHOPRIM SCH EA: 800; 160 TABLET ORAL at 17:50

## 2020-08-02 RX ADMIN — SODIUM CHLORIDE SCH MLS/HR: 900 INJECTION, SOLUTION INTRAVENOUS at 13:55

## 2020-08-02 RX ADMIN — AMIODARONE HYDROCHLORIDE SCH MG: 200 TABLET ORAL at 08:14

## 2020-08-02 RX ADMIN — POTASSIUM CHLORIDE SCH MEQ: 1500 TABLET, EXTENDED RELEASE ORAL at 04:38

## 2020-08-02 RX ADMIN — SULFAMETHOXAZOLE AND TRIMETHOPRIM SCH EA: 800; 160 TABLET ORAL at 08:14

## 2020-08-02 RX ADMIN — IPRATROPIUM BROMIDE AND ALBUTEROL SULFATE SCH ML: .5; 3 SOLUTION RESPIRATORY (INHALATION) at 07:24

## 2020-08-02 RX ADMIN — IPRATROPIUM BROMIDE AND ALBUTEROL SULFATE SCH ML: .5; 3 SOLUTION RESPIRATORY (INHALATION) at 14:54

## 2020-08-02 RX ADMIN — IPRATROPIUM BROMIDE AND ALBUTEROL SULFATE SCH ML: .5; 3 SOLUTION RESPIRATORY (INHALATION) at 21:38

## 2020-08-02 RX ADMIN — AMIODARONE HYDROCHLORIDE SCH MG: 200 TABLET ORAL at 20:17

## 2020-08-02 RX ADMIN — SODIUM CHLORIDE SCH MLS/HR: 900 INJECTION, SOLUTION INTRAVENOUS at 20:33

## 2020-08-02 RX ADMIN — PANTOPRAZOLE SODIUM SCH MG: 40 INJECTION, POWDER, FOR SOLUTION INTRAVENOUS at 08:13

## 2020-08-02 RX ADMIN — MAGNESIUM SULFATE IN DEXTROSE SCH MLS/HR: 10 INJECTION, SOLUTION INTRAVENOUS at 04:37

## 2020-08-02 RX ADMIN — POTASSIUM CHLORIDE SCH MLS/HR: 200 INJECTION, SOLUTION INTRAVENOUS at 04:38

## 2020-08-02 RX ADMIN — CLOPIDOGREL BISULFATE SCH MG: 75 TABLET, FILM COATED ORAL at 08:14

## 2020-08-02 RX ADMIN — VANCOMYCIN HYDROCHLORIDE SCH MLS/HR: 500 INJECTION, POWDER, LYOPHILIZED, FOR SOLUTION INTRAVENOUS at 18:18

## 2020-08-02 RX ADMIN — SODIUM CHLORIDE SCH MLS/HR: 900 INJECTION, SOLUTION INTRAVENOUS at 09:15

## 2020-08-02 RX ADMIN — IPRATROPIUM BROMIDE AND ALBUTEROL SULFATE SCH ML: .5; 3 SOLUTION RESPIRATORY (INHALATION) at 10:44

## 2020-08-02 RX ADMIN — IPRATROPIUM BROMIDE AND ALBUTEROL SULFATE SCH ML: .5; 3 SOLUTION RESPIRATORY (INHALATION) at 02:12

## 2020-08-02 RX ADMIN — MORPHINE SULFATE PRN MG: 4 INJECTION, SOLUTION INTRAMUSCULAR; INTRAVENOUS at 23:51

## 2020-08-02 RX ADMIN — SODIUM CHLORIDE SCH MLS/HR: 900 INJECTION, SOLUTION INTRAVENOUS at 05:11

## 2020-08-02 RX ADMIN — VANCOMYCIN HYDROCHLORIDE SCH MLS/HR: 500 INJECTION, POWDER, LYOPHILIZED, FOR SOLUTION INTRAVENOUS at 08:14

## 2020-08-02 RX ADMIN — MORPHINE SULFATE PRN MG: 4 INJECTION, SOLUTION INTRAMUSCULAR; INTRAVENOUS at 20:33

## 2020-08-02 RX ADMIN — POTASSIUM CHLORIDE SCH MLS/HR: 200 INJECTION, SOLUTION INTRAVENOUS at 05:11

## 2020-08-02 RX ADMIN — IPRATROPIUM BROMIDE AND ALBUTEROL SULFATE SCH ML: .5; 3 SOLUTION RESPIRATORY (INHALATION) at 18:41

## 2020-08-02 RX ADMIN — SODIUM CHLORIDE, SODIUM LACTATE, POTASSIUM CHLORIDE, AND CALCIUM CHLORIDE SCH MLS/HR: 600; 310; 30; 20 INJECTION, SOLUTION INTRAVENOUS at 04:34

## 2020-08-02 NOTE — DIAGNOSTIC IMAGING REPORT
INDICATION: Altered mental status and dyspnea.



Comparison made with prior examination from  08/01/2020.



FINDINGS: Heart size is normal. There are bilateral pulmonary

infiltrates right greater than left. There are bilateral pleural

effusions. Some venous congestion. There is no pneumothorax.

Lines and tubes are in satisfactory position.



IMPRESSION: Bilateral pulmonary infiltrates and bilateral pleural

effusions. Infiltrates are somewhat more consolidated in the

right upper lobe. Findings are suspect for underlying pneumonia.



Moderate central pulmonary venous congestion.



Dictated by: 



  Dictated on workstation # [id number]

## 2020-08-02 NOTE — NUR
SPOKE WITH DR. JASMINE INFORMED HER THAT PT CONDITION RAPIDLY DECLINING-ORDERS RECEIVED TO MAKE 
PT 4TH FLOOR STATUS AND CONTINUE TO MONITOR.

## 2020-08-02 NOTE — PROGRESS NOTE - HOSPITALIST
Subjective


HPI/CC On Admission


Date Seen by Provider:  Aug 2, 2020


Time Seen by Provider:  08:58


Pt is an 79yoCF with a PMH of aortic stenosis, COPD, CAD with recent stenting, 

HTN, HLD, hypothyroidism, paroxysmal atrial fibrillation on chronic 

anticoagulation who presented to the ER due to hypoxia. She is unable to provide

much history and onlyanswered a few yes or no questions for me. She told me she 

was sick and that she was not having pain. Otherwise all history obtained from 

family and records. She was apparently in her normal state this morning 

(ambulatory and alert and oriented x4). She spoke to her son and then had lunch.

Sometime after lunch they found her in her room minimallyresponsive. There was 

concern for an episode of apnea as well. EMS was summoned and she was found to 

have oxygen saturations in the 60%. She was placed on a nonrebreather which 

brought her oxygen saturations up. She was taken for CT head as she is on 

Xarelto.


Subjective/Events-last exam


Pt more alert today. Mumbles but doesn't respond consistently. This is much 

improved from yesterday though. Son and daughter in law at bedside and had 

discussed comfort measures only last night but now that she is more alert want 

to wait and see. We discussed that while this is encouraging and a continued 

time trial is reasonable she still has a very long road ahead and prognosis 

remains poor long term. They expressed understanding of this.





Objective


Exam


Vital Signs





Vital Signs








  Date Time  Temp Pulse Resp B/P (MAP) Pulse Ox O2 Delivery O2 Flow Rate FiO2


 


20 08:00  107 22 144/47 (79) 94 Vapotherm 40.00 





       40.00 


 


20 07:24        40


 


20 07:18 36.4       





Capillary Refill : Less Than 3 Seconds


General Appearance:  Chronically ill


HEENT:  Other (NGT in place)


Respiratory:  Rhonci; No Wheezing; Other (on Vapotherm)


Cardiovascular:  Systolic Murmur, Irregularly Irregular


Gastrointestinal:  Abnormal Bowel Sounds (quiet bowel sounds)


Extremity:  Pedal Edema, Swelling


Neurologic/Psychiatric:  Other (arouses and more alert then yesterday, did not 

follow commands still)





Results/Procedures


Lab


Laboratory Tests


20 11:47








20 04:05








Patient resulted labs reviewed.





Assessment/Plan


Assessment and Plan


Assess & Plan/Chief Complaint


New Onset left sided weakness- presumed stroke


   CT Head negative, not a candidate for TPA


   Would ideally like an MRI but discussed with Dr Jensen regarding respiratory 

status and I don't think she would tolerate laying flat- family also declined 

MRI


   PT/OT/SLP


   NGT in place- tolerating feeds well


   Already on Plavix for antiplatelet





Hemorrhagic shock


bleeding cecal mass


Ischemic colitis


Severe anemia- stabilized


   Went to ex lap on  and bleeding mass found


   Transfused 3 units pRBCs total


   path reveals ischemic colitis


   Eraxis added by Surgery


   


Acute Hypoxic Respiratory Failure


Leukocytosis- stable


Recently treated pneumonia


COPD


Mixed metabolic and respiratory acidosis- resolved


   Extubated , on Vapotherm


   Pulm/TeleICU consulted, appreciate recs


   Continue abx, Eraxis/Vanc/Zosyn/Bactrim or Stenotrophomonas in sputum


   Blood cultures negative


   


CAD


Aortic Stenosis


A-fib


   Son reports she recently had a stent placed by Dr Dixon


   Records sent and cath report reveals 2020 RCA stenting done and 

valvuloplasty by Dr Dixon


   Plavix as above


   Consult cardiology, appreciate their help


   Continue on amiodarone


   


Unfortunately very complex patient with multiple acute issues. Slightly better 

today with mentation and otherwise not much change in status or labs. Family 

would like to continue a time trial over the next 24-48 hours and see if she 

continues to improve. They are realistic that she may not and that this 

improvement may be transient. Will continue current measures.





Critical Care


Critically Ill Patient





Diagnosis/Problems


Diagnosis/Problems





(1) Acute respiratory failure


Status:  Acute


Qualifiers:  


   Respiratory failure complication:  hypercapnia  Qualified Codes:  J96.02 - 

Acute respiratory failure with hypercapnia


(2) Hemorrhagic shock


Status:  Acute


(3) Hypomagnesemia


Status:  Acute


(4) Normocytic anemia


Status:  Acute


(5) COPD (chronic obstructive pulmonary disease)


Qualifiers:  


   COPD type:  unspecified COPD  Qualified Codes:  J44.9 - Chronic obstructive 

pulmonary disease, unspecified


(6) Hypothyroidism


Status:  Chronic


Qualifiers:  


   Hypothyroidism type:  unspecified  Qualified Codes:  E03.9 - Hypothyroidism, 

unspecified


(7) Metabolic acidosis


Status:  Acute


(8) Respiratory acidosis


Status:  Acute


(9) Aortic stenosis


Qualifiers:  


   Cardiac valve disease etiology:  etiology unspecified  Qualified Codes:  

I35.0 - Nonrheumatic aortic (valve) stenosis


(10) Atrial fibrillation


Status:  Acute


Qualifiers:  


   Atrial fibrillation type:  paroxysmal  Qualified Codes:  I48.0 - Paroxysmal 

atrial fibrillation


(11) CAD (coronary artery disease)


Status:  Acute


Qualifiers:  


   Coronary Disease-Associated Artery/Lesion type:  native artery  Native vs. 

transplanted heart:  native heart  Associated angina:  without angina  Qualified

Codes:  I25.10 - Atherosclerotic heart disease of native coronary artery without

angina pectoris





Clinical Quality Measures


DVT/VTE Risk/Contraindication:


Risk Factor Score Per Nursin


RFS Level Per Nursing on Admit:  4+=Very High











SELINA JASMINE MD               Aug 2, 2020 09:07

## 2020-08-02 NOTE — PROGRESS NOTE
Subjective


Date Seen by a Provider:  Aug 2, 2020


Time Seen by a Provider:  10:15


Subjective/Events-last exam


Patient seen with Dr. Olmstead.  Patient non-verbal and does not respond to 

commands.  Patient is DNI and poor prognosis.





Objective


Exam





Vital Signs








  Date Time  Temp Pulse Resp B/P (MAP) Pulse Ox O2 Delivery O2 Flow Rate FiO2


 


20 08:00     93 Vapotherm 40.00 40


 


20 08:00  107 22 144/47 (79) 94 Vapotherm 40.00 





       40.00 


 


20 07:24     95 Vapotherm 40.00 40


 


20 07:18 36.4       


 


20 07:00  98 17 127/41 (69) 95 Vapotherm 40.00 





       40.00 


 


20 06:39  103      


 


20 06:00  101 17 131/43 (72) 94 Vapotherm 40.00 





       40.00 


 


20 05:00  106 17 140/51 (80) 95 Vapotherm 40.00 





       40.00 


 


20 04:31 36.3       


 


20 04:00  105 20 155/50 (85) 95 Vapotherm 40.00 





       40.00 


 


20 04:00      Vapotherm 40.00 40


 


20 03:00  101 18 134/46 (75) 95 Vapotherm 40.00 





       40.00 


 


20 02:11     95 Vapotherm 40.00 40


 


20 02:00  104 18 134/43 (73) 95 Vapotherm 40.00 





       40.00 


 


20 01:00  106 17 133/43 (73) 95 Vapotherm 40.00 





       40.00 


 


20 01:00  106      


 


20 00:15      Vapotherm 40.00 40


 


20 00:09 36.5       


 


20 00:00  113 21 144/45 (78) 94 Vapotherm 40.00 





       40.00 


 


20 23:00  115 22 158/53 (88) 94 Vapotherm 40.00 





       40.00 


 


20 22:10     94 Vapotherm 40.00 40


 


20 22:00  116 28 149/51 (83) 95 Vapotherm 40.00 





       40.00 


 


20 21:00  114 19 146/48 (80) 95 Vapotherm 40.00 





       40.00 


 


20 20:00 36.4     Vapotherm 40.00 





       40.00 


 


20 20:00  106 21 147/49 (81)  Vapotherm 40.00 





       40.00 


 


20 20:00      Vapotherm 40.00 40


 


20 19:00  105      


 


20 19:00  105 27 155/52 (86)  Vapotherm 40.00 





       40.00 


 


20 18:34     96 Vapotherm 40.00 40


 


20 18:00  118 17 144/52 (82) 95 Vapotherm 40.00 





       40.00 


 


20 17:00  121 15 141/46 (77) 96 Vapotherm 40.00 





       40.00 


 


20 16:37 36.4       


 


20 16:23     96 Vapotherm 40.00 40


 


20 16:00  111 20 155/51 (85) 96 Vapotherm 40.00 





       40.00 


 


20 15:00  118 18 136/49 (78) 95 Vapotherm 40.00 





       40.00 


 


20 14:36     95 Vapotherm 40.00 40


 


20 14:00  111 21 177/59 (98) 97 Vapotherm 40.00 





       40.00 


 


20 13:00  113      


 


20 13:00  113 27 161/55 (90) 95 Vapotherm 40.00 





       40.00 


 


20 12:00 37.1       


 


20 12:00     95 Vapotherm 40.00 40


 


20 12:00  111 17 122/45 (70) 94 Vapotherm 40.00 





       40.00 


 


20 11:34     94 Vapotherm 40.00 40


 


20 11:00  101 18 104/43 (63) 95 Vapotherm 40.00 





       40.00 














I & O 


 


 20





 07:00


 


Intake Total 3208 ml


 


Output Total 2280 ml


 


Balance 928 ml





Capillary Refill : Less Than 3 Seconds


General Appearance:  Chronically ill


Respiratory:  No Accessory Muscle Use, No Respiratory Distress, Decreased Breath

Sounds


Cardiovascular:  Regular Rate, Rhythm, No Murmur


Gastrointestinal:  normal bowel sounds, soft


Extremity:  Swelling (3+)


Skin:  Normal Color, Warm/Dry





Results


Lab


Laboratory Tests


20 11:47: Potassium Level 3.6


20 04:05: 


Potassium Level 3.2L, White Blood Count 27.9H, Red Blood Count 3.14L, Hemoglobin

9.4L, Hematocrit 28L, Mean Corpuscular Volume 90, Mean Corpuscular Hemoglobin 

30, Mean Corpuscular Hemoglobin Concent 33, Red Cell Distribution Width 16.6H, P

latelet Count 76L, Mean Platelet Volume 10.8H, Neutrophils (%) (Auto) 95H, 

Lymphocytes (%) (Auto) 1L, Monocytes (%) (Auto) 4, Eosinophils (%) (Auto) 0, 

Basophils (%) (Auto) 0, Neutrophils # (Auto) 26.4H, Lymphocytes # (Auto) 0.4L, 

Monocytes # (Auto) 1.1H, Eosinophils # (Auto) 0.0, Basophils # (Auto) 0.0, 

Sodium Level 145, Chloride Level 108H, Carbon Dioxide Level 27, Anion Gap 10, 

Blood Urea Nitrogen 18, Creatinine 0.60, Estimat Glomerular Filtration Rate > 

60, BUN/Creatinine Ratio 30, Glucose Level 187H, Calcium Level 7.2L, Phosphorus 

Level 1.5L, Magnesium Level 2.0


20 06:50: Vancomycin Level Trough 17.8





Microbiology


20 Gram Stain - Final, Resulted


          


20 Sputum Culture - Preliminary, Resulted


          Stenotrophomonas Maltophilia


          Enterobacter cloacae complex


20 Blood Culture - Preliminary, Resulted


          No growth





Assessment/Plan


Assessment/Plan


Assess & Plan/Chief Complaint


s/p ileocecal resection for bleeding with likely stroke and significant 

neurologic deficit. 


patient DNI.


cont current care.





Clinical Quality Measures


DVT/VTE Risk/Contraindication:


Risk Factor Score Per Nursin


RFS Level Per Nursing on Admit:  4+=Very High











WESTLEY RAMOS APRN           Aug 2, 2020 10:44

## 2020-08-03 VITALS — SYSTOLIC BLOOD PRESSURE: 114 MMHG | DIASTOLIC BLOOD PRESSURE: 57 MMHG

## 2020-08-03 VITALS — DIASTOLIC BLOOD PRESSURE: 61 MMHG | SYSTOLIC BLOOD PRESSURE: 131 MMHG

## 2020-08-03 VITALS — SYSTOLIC BLOOD PRESSURE: 111 MMHG | DIASTOLIC BLOOD PRESSURE: 54 MMHG

## 2020-08-03 VITALS — DIASTOLIC BLOOD PRESSURE: 56 MMHG | SYSTOLIC BLOOD PRESSURE: 122 MMHG

## 2020-08-03 VITALS — SYSTOLIC BLOOD PRESSURE: 122 MMHG | DIASTOLIC BLOOD PRESSURE: 60 MMHG

## 2020-08-03 LAB
BASOPHILS # BLD AUTO: 0.1 10^3/UL (ref 0–0.1)
BASOPHILS NFR BLD AUTO: 0 % (ref 0–10)
BUN/CREAT SERPL: 42
CALCIUM SERPL-MCNC: 7.2 MG/DL (ref 8.5–10.1)
CHLORIDE SERPL-SCNC: 109 MMOL/L (ref 98–107)
CO2 SERPL-SCNC: 30 MMOL/L (ref 21–32)
CREAT SERPL-MCNC: 0.65 MG/DL (ref 0.6–1.3)
EOSINOPHIL # BLD AUTO: 0 10^3/UL (ref 0–0.3)
EOSINOPHIL NFR BLD AUTO: 0 % (ref 0–10)
ERYTHROCYTE [DISTWIDTH] IN BLOOD BY AUTOMATED COUNT: 16.9 % (ref 10–14.5)
GFR SERPLBLD BASED ON 1.73 SQ M-ARVRAT: > 60 ML/MIN
GLUCOSE SERPL-MCNC: 147 MG/DL (ref 70–105)
HCT VFR BLD CALC: 29 % (ref 35–52)
HGB BLD-MCNC: 9.2 G/DL (ref 11.5–16)
LYMPHOCYTES # BLD AUTO: 0.5 X 10^3 (ref 1–4)
LYMPHOCYTES NFR BLD AUTO: 2 % (ref 12–44)
MAGNESIUM SERPL-MCNC: 2 MG/DL (ref 1.6–2.4)
MANUAL DIFFERENTIAL PERFORMED BLD QL: NO
MCH RBC QN AUTO: 30 PG (ref 25–34)
MCHC RBC AUTO-ENTMCNC: 32 G/DL (ref 32–36)
MCV RBC AUTO: 94 FL (ref 80–99)
MONOCYTES # BLD AUTO: 1 X 10^3 (ref 0–1)
MONOCYTES NFR BLD AUTO: 3 % (ref 0–12)
NEUTROPHILS # BLD AUTO: 33.5 X 10^3 (ref 1.8–7.8)
NEUTROPHILS NFR BLD AUTO: 96 % (ref 42–75)
PHOSPHATE SERPL-MCNC: 2.3 MG/DL (ref 2.3–4.7)
PLATELET # BLD: 60 10^3/UL (ref 130–400)
PMV BLD AUTO: 11.6 FL (ref 7.4–10.4)
POTASSIUM SERPL-SCNC: 4.2 MMOL/L (ref 3.6–5)
SODIUM SERPL-SCNC: 147 MMOL/L (ref 135–145)
WBC # BLD AUTO: 35.1 10^3/UL (ref 4.3–11)

## 2020-08-03 RX ADMIN — IPRATROPIUM BROMIDE AND ALBUTEROL SULFATE SCH ML: .5; 3 SOLUTION RESPIRATORY (INHALATION) at 06:21

## 2020-08-03 RX ADMIN — MORPHINE SULFATE PRN MG: 4 INJECTION, SOLUTION INTRAMUSCULAR; INTRAVENOUS at 14:53

## 2020-08-03 RX ADMIN — MORPHINE SULFATE PRN MG: 4 INJECTION, SOLUTION INTRAMUSCULAR; INTRAVENOUS at 09:39

## 2020-08-03 RX ADMIN — MORPHINE SULFATE PRN MG: 4 INJECTION, SOLUTION INTRAMUSCULAR; INTRAVENOUS at 17:30

## 2020-08-03 RX ADMIN — SODIUM CHLORIDE SCH MLS/HR: 900 INJECTION, SOLUTION INTRAVENOUS at 09:42

## 2020-08-03 RX ADMIN — LORAZEPAM PRN MG: 2 INJECTION INTRAMUSCULAR; INTRAVENOUS at 13:21

## 2020-08-03 RX ADMIN — POTASSIUM CHLORIDE SCH MLS/HR: 200 INJECTION, SOLUTION INTRAVENOUS at 03:47

## 2020-08-03 RX ADMIN — LORAZEPAM PRN MG: 2 INJECTION INTRAMUSCULAR; INTRAVENOUS at 18:09

## 2020-08-03 RX ADMIN — POTASSIUM CHLORIDE SCH MEQ: 1500 TABLET, EXTENDED RELEASE ORAL at 03:47

## 2020-08-03 RX ADMIN — MAGNESIUM SULFATE IN DEXTROSE SCH MLS/HR: 10 INJECTION, SOLUTION INTRAVENOUS at 03:47

## 2020-08-03 RX ADMIN — PANTOPRAZOLE SODIUM SCH MG: 40 INJECTION, POWDER, FOR SOLUTION INTRAVENOUS at 08:05

## 2020-08-03 RX ADMIN — SULFAMETHOXAZOLE AND TRIMETHOPRIM SCH EA: 800; 160 TABLET ORAL at 08:05

## 2020-08-03 RX ADMIN — AMIODARONE HYDROCHLORIDE SCH MG: 200 TABLET ORAL at 08:05

## 2020-08-03 RX ADMIN — MORPHINE SULFATE PRN MG: 4 INJECTION, SOLUTION INTRAMUSCULAR; INTRAVENOUS at 01:42

## 2020-08-03 RX ADMIN — IPRATROPIUM BROMIDE AND ALBUTEROL SULFATE SCH ML: .5; 3 SOLUTION RESPIRATORY (INHALATION) at 02:53

## 2020-08-03 RX ADMIN — LORAZEPAM PRN MG: 2 INJECTION INTRAMUSCULAR; INTRAVENOUS at 15:44

## 2020-08-03 RX ADMIN — CLOPIDOGREL BISULFATE SCH MG: 75 TABLET, FILM COATED ORAL at 08:05

## 2020-08-03 RX ADMIN — IPRATROPIUM BROMIDE AND ALBUTEROL SULFATE SCH ML: .5; 3 SOLUTION RESPIRATORY (INHALATION) at 09:53

## 2020-08-03 RX ADMIN — MORPHINE SULFATE PRN MG: 4 INJECTION, SOLUTION INTRAMUSCULAR; INTRAVENOUS at 20:31

## 2020-08-03 NOTE — OCC THERAPY PROGRESS NOTE
Therapy Progress Note


Pt's nurse states pt will likely be put on comfort care. Hold on this date and 

OT to continue to monitor pt status.











SASKIA SLATER OTR                 Aug 3, 2020 15:53

## 2020-08-03 NOTE — PROGRESS NOTE - HOSPITALIST
Subjective


HPI/CC On Admission


Date Seen by Provider:  Aug 3, 2020


Time Seen by Provider:  10:00


Pt is an 79yoCF with a PMH of aortic stenosis, COPD, CAD with recent stenting, 

HTN, HLD, hypothyroidism, paroxysmal atrial fibrillation on chronic 

anticoagulation who presented to the ER due to hypoxia. She is unable to provide

much history and onlyanswered a few yes or no questions for me. She told me she 

was sick and that she was not having pain. Otherwise all history obtained from 

family and records. She was apparently in her normal state this morning 

(ambulatory and alert and oriented x4). She spoke to her son and then had lunch.

Sometime after lunch they found her in her room minimallyresponsive. There was 

concern for an episode of apnea as well. EMS was summoned and she was found to 

have oxygen saturations in the 60%. She was placed on a nonrebreather which 

brought her oxygen saturations up. She was taken for CT head as she is on 

Xarelto.


Subjective/Events-last exam


She is unresponsive. Her son is at the bedside.





Objective


Exam


Vital Signs





Vital Signs








  Date Time  Temp Pulse Resp B/P (MAP) Pulse Ox O2 Delivery O2 Flow Rate FiO2


 


8/3/20 16:01      Vapotherm 40.00 60


 


8/3/20 12:54  120      


 


8/3/20 12:00 36.3  21 131/61 (84) 96   





Capillary Refill : Less Than 3 Seconds


General Appearance:  Chronically ill, Moderate Distress


HEENT:  Other (NG tube in place, high-flow nasal cannula)


Respiratory:  Decreased Breath Sounds, Respiratory Distress (tachypnea), Other 

(coarse breath sounds)


Cardiovascular:  No Murmur, Tachycardia (regular rhythm)


Gastrointestinal:  Soft, Abnormal Bowel Sounds (hypoactive)


Extremity:  Pedal Edema


Neurologic/Psychiatric:  Other (obtunded, unresponsive)


Skin:  Warm/Dry, Pallor





Results/Procedures


Lab


Laboratory Tests


8/3/20 03:15








Patient resulted labs reviewed.





Assessment/Plan


Assessment and Plan


Assess & Plan/Chief Complaint


Comfort measures only status


Poor prognosis


Likely acute ischemic stroke


Hemorrhagic shock


Bleeding cecal mass


Ischemic colitis


Severe anemia


Acute Hypoxic Respiratory Failure


Leukocytosis


Pneumonia


COPD   


CAD


Aortic Stenosis


A-fib





-Patient nearing end of life


-Palliative care consulted


-Discussed transition to comfort


-Son would like to transition to comfort, but continue high-flow oxygen





Critical Care


Critically Ill Patient





Diagnosis/Problems


Diagnosis/Problems





(1) Comfort measures only status


Status:  Acute





Clinical Quality Measures


DVT/VTE Risk/Contraindication:


Risk Factor Score Per Nursin


RFS Level Per Nursing on Admit:  4+=Very High











KEDAR TAVAREZ MD               Aug 3, 2020 20:24

## 2020-08-03 NOTE — PROGRESS NOTE - CARDIOLOGY
Cardiology SOAP Progress Note


Subjective:


Not verbally responsive


Does not provide any history or report any symptoms





Objective:


I&O/Vital Signs











 8/3/20 8/3/20 8/3/20 8/3/20





 04:00 04:00 04:00 06:00


 


Temp  36.0  


 


Pulse 108   105


 


Resp    22


 


B/P (MAP) 111/54 (73)   114/57 (76)


 


Pulse Ox 93  92 93


 


O2 Delivery Vapotherm  Vapotherm Vapotherm


 


O2 Flow Rate 40.00  40.00 40.00





 40.00   60.00


 


FiO2   40 


 


    





 8/3/20 8/3/20 8/3/20 8/3/20





 06:12 06:22 07:00 08:00


 


Pulse   113 


 


Pulse Ox  95  96


 


O2 Delivery Vapotherm Vapotherm  Vapotherm


 


O2 Flow Rate 40.00 40.00  40.00





 60.00   


 


FiO2  60  60





 8/3/20 8/3/20 8/3/20 8/3/20





 08:00 09:53 12:00 12:00


 


Temp 36.4   36.3


 


Pulse 108   117


 


Resp 22   21


 


B/P (MAP) 122/60 (80)   131/61 (84)


 


Pulse Ox 96 96  96


 


O2 Delivery Vapotherm Vapotherm Vapotherm Vapotherm


 


O2 Flow Rate 40.00 40.00 40.00 40.00





 60.00   60.00


 


FiO2  60 60 


 


    





 8/3/20   





 12:54   


 


Pulse 120   














 8/3/20





 00:00


 


Intake Total 840 ml


 


Output Total 950 ml


 


Balance -110 ml








Weight (Pounds):  112


Weight (Ounces):  0.0


Weight (Calculated Kilograms):  50.080661


Constitutional:  other (verbally unresponsive at the time of this exam)


Respiratory:  rhonchi (scattered), other (scattered rhonchi over large airways; 

crackles at lung bases; labored breathing)


Cardiovascular:  regular rate-rhythm, systolic murmur (2-3/6 MSM)


Gastrointestional:  other (s/p abdominal surgery with dressing in place)


Extremities:  other (bilat pitting LE swelling)


Neurologic/Psychiatric:  other (not able to cooperate with any neuro exam)


Skin:  No rash on exposed areas, No ulcerations on exposed areas





Results/Procedures:


Labs


Laboratory Tests


8/3/20 03:15: 


White Blood Count 35.1*H, Red Blood Count 3.10L, Hemoglobin 9.2L, Hematocrit 29L

, Mean Corpuscular Volume 94, Mean Corpuscular Hemoglobin 30, Mean Corpuscular 

Hemoglobin Concent 32, Red Cell Distribution Width 16.9H, Platelet Count 60L, 

Mean Platelet Volume 11.6H, Neutrophils (%) (Auto) 96H, Lymphocytes (%) (Auto) 

2L, Monocytes (%) (Auto) 3, Eosinophils (%) (Auto) 0, Basophils (%) (Auto) 0, 

Neutrophils # (Auto) 33.5H, Lymphocytes # (Auto) 0.5L, Monocytes # (Auto) 1.0, 

Eosinophils # (Auto) 0.0, Basophils # (Auto) 0.1, Sodium Level 147H, Potassium 

Level 4.2, Chloride Level 109H, Carbon Dioxide Level 30, Anion Gap 8, Blood Urea

Nitrogen 27H, Creatinine 0.65, Estimat Glomerular Filtration Rate > 60, 

BUN/Creatinine Ratio 42, Glucose Level 147H, Calcium Level 7.2L, Phosphorus 

Level 2.3, Magnesium Level 2.0





Microbiology


7/29/20 Gram Stain - Final, Complete


          


7/29/20 Sputum Culture - Final, Complete


          Stenotrophomonas Maltophilia


          Enterobacter cloacae complex


7/29/20 Blood Culture - Final, Complete


          No growth





Laboratory Tests


8/2/20 04:05








8/3/20 03:15











A/P:


Assessment:





Sepsis





Pneumonia





Shock: septic and h'hagic





Suspected stroke (L-sided weakness reported, but no CT finding of ac stroke)





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





Ac diastolic heart failure and volume overload





Acute resp failure due to above-noted conditions





CAD. Card cath of June 30, 2020 by Dr. Dixon at [facility] showed 80-

90% lesion in the RCA for which KEISHA x1 was placed.  Other vessels showed mild to

mod CAD.  Valvuloplasty to the aortic valve carried out at time of cardiac cath.

 Had been on Effient and ASA.





Sinus node disfunction and PAF. On Xarelto for stroke prophylaxis





Echocardiogram of June 11, 2020 by Dr. Gamez showed LVEF 55-65%.  Grade 2 

diastolic dysfunction.  Mild mitral stenosis with mean gradient 2.2mHg, MVA 3.3 

cm2.  Mod calcified aortic valve with mod-severe stenosis, mean grad 26mmHg ASHELY 

1.1cm2, mild to mod regurg.  Severe TR.  PASP 65-70mmHg.  Echocardiogram of July 28, 2020 was a technically poor study.  LVEF 55-60%





H/o scleroderma and chronic dysphagia, followed and treated by her pcp





Quit smoking in 1980





Borderline low TSH on 11/3/18 (0.33), followed by her pcp Dr Cueva





Carotid u/s of 8- showed 60-79% R ICA stenosis; less than 40% L ICA 

stenosis.





Incidentally seen L thyroid nodules seen at time of carotid u/s of Feb 2019 for 

which f/u is with her PCP


Plan:





* Complex management. Guarded/poor prognosis


* Appears to have taken a turn for the worse. Currently verbally unresponsive


* Cannot give Xarelto and ASA due to bleeding requiring surgery and leading to 

  hypovolemic shock that has required transfusions 


* Continue Plavix to reduce risk of stent thrombosis


* Appears volume overloaded today. Give iv furosemide


* Monitor lab closely


* Replace electrolytes as needed





Clinical Quality Measures


Type of Care:


Type of Care:  Comfort Measures











NICOLE DUMONT MD FACP [facility] CCDS    Aug 3, 2020 15:15

## 2020-08-03 NOTE — NUR
PC RN in to see patient.  There is relatively no change in her condition.  Continues to have 
Vapotherm on but all other medications not related to comfort have been discontinued.  Plan 
is for Vapotherm to remin until her daughter can visit hopefully tomorrow.

## 2020-08-03 NOTE — NUR
THIS NURSE NOTIFIED DR TAVAREZ THAT THE SON,JAI, WOULD LIKE COMFORT CARE FOR THE PT. THE SON 
WOULD LIKE TO KEEP VAPOTHERM ON. ORDERS GIVEN FOR COMFORT CARE. SEE ORDER HX.

## 2020-08-03 NOTE — NUR
THIS NURSE SPOKE WITH FAMILY ABOUT SWITCHING PT TO NC. THIS NURSE EDUCATED THE FAMILY THAT 
THE PT MIGHT BE MORE COMFORTABLE ON NC. JAI THE SON INSISTED PT STAY ON VAPOTHERM. JAI AND 
FAMILY LEAVING FOR THE NIGHT. THE NURSE WILL KEEP SON UPDATED ON PT CONDITION.

## 2020-08-03 NOTE — NUR
PLACED A CALL TO [employer] WITH NO REFERRAL NUMBER GIVEN.  PER THEIR STAFF, PT WILL 
NOT BE A CANDIDATE FOR ORGAN DONATION.  ADVISED TO CALL WITH TOD FOR TISSUE REFERRAL NUMBER.

## 2020-08-03 NOTE — PHYSICAL THERAPY PROGRESS NOTE
Therapy Progress Note


Per physician documentation, pt verbally unresponsive.  Per nursing, pt is 

likely going comfort care; asked this therapist to hold off on therapy at this 

time.  Will follow.











TESHA LOWE PT              Aug 3, 2020 10:57

## 2020-08-03 NOTE — NUR
Palliative Care RN in to see patient and speak with her son regarding the difficult decision 
to make her CCMO.  He reports he is waiting on his sister to call and speak to patient.  He 
is inquiring of whether others can see her before ..I explained the visitation  for CCMO 
patient's being 1 visitor at a time but that it could be more than one in a day.  He is to 
make phone calls and then let us know when he is ready to remove the life sustaining 
interventions.  



By appearance, it appears that patient will likely  quickly.  Evidenced by the changes 
seen since last I saw her on Friday afternoon.  She has lost a significant amount of fluid 
in her face and is appearing more gaunt.  Her ears a thin and curling.  Minimal mottling 
noted on her feet, that have 2 plus pitting edema.  She is being supplied oxygen via 
Vapotherm and her RR is in the upper 30s, by the time I had left the visit they were low 20s 
with apnea.

## 2020-08-03 NOTE — PROGRESS NOTE - SURGERY
Subjective


Time Seen by a Provider:  12:34


Subjective/Events-last exam


Pt seen, family has decided to make pt comfort care....hoping daughter can make 

it by tomorrow to see her.


Review of Systems


Non-communicative





Objective


Exam





Vital Signs








  Date Time  Temp Pulse Resp B/P (MAP) Pulse Ox O2 Delivery O2 Flow Rate FiO2


 


8/3/20 09:53     96 Vapotherm 40.00 60


 


8/3/20 08:00 36.4 108 22 122/60 (80) 96 Vapotherm 40.00 





       60.00 


 


8/3/20 08:00     96 Vapotherm 40.00 60


 


8/3/20 07:00  113      


 


8/3/20 06:22     95 Vapotherm 40.00 60


 


8/3/20 06:12      Vapotherm 40.00 





       60.00 


 


8/3/20 06:00  105 22 114/57 (76) 93 Vapotherm 40.00 





       60.00 


 


8/3/20 04:00     92 Vapotherm 40.00 40


 


8/3/20 04:00 36.0       


 


8/3/20 04:00  108  111/54 (73) 93 Vapotherm 40.00 





       40.00 


 


8/3/20 02:53     91 Vapotherm 40.00 40


 


8/3/20 01:00  108      


 


8/3/20 00:00  109 21 122/56 (78) 93 Vapotherm 40.00 





       40.00 


 


8/3/20 00:00 36.6       


 


8/3/20 00:00     94 Vapotherm 40.00 40


 


20 21:38     95 Vapotherm 40.00 40


 


20 20:00     93 Vapotherm 40.00 40


 


20 20:00 36.9       


 


20 20:00  112 24 123/58 (79) 94 Vapotherm 40.00 





       40.00 


 


20 19:00  110      


 


20 19:00  110 24 129/56 (80) 94 Vapotherm 40.00 





       40.00 


 


20 18:41     94 Vapotherm 40.00 40


 


20 18:00  110 25 132/59 (83) 93 Vapotherm 40.00 





       40.00 


 


20 17:00  116 22 126/62 (83) 92 Vapotherm 40.00 





       40.00 


 


20 16:00 37.1       


 


20 16:00  117 33 126/65 (85) 94 Vapotherm 40.00 





       40.00 


 


20 15:27     94 Vapotherm 40.00 40


 


20 14:55     93 Vapotherm 40.00 40














I & O 


 


 8/3/20





 07:00


 


Intake Total 1380 ml


 


Output Total 1450 ml


 


Balance -70 ml





Capillary Refill : Less Than 3 Seconds


General Appearance:  Chronically ill


Respiratory:  Decreased Breath Sounds, Other (pt appears to have apneic 

breathing)


Peripheral Pulses:  2+ Radial Pulses (R), 2+ Radial Pulses (L)


Gastrointestinal:  soft


Extremity:  Swelling (3+)


Neurologic/Psychiatric:  Other





Results


Lab


Laboratory Tests


8/3/20 03:15: 


White Blood Count 35.1*H, Red Blood Count 3.10L, Hemoglobin 9.2L, Hematocrit 29L

, Mean Corpuscular Volume 94, Mean Corpuscular Hemoglobin 30, Mean Corpuscular 

Hemoglobin Concent 32, Red Cell Distribution Width 16.9H, Platelet Count 60L, 

Mean Platelet Volume 11.6H, Neutrophils (%) (Auto) 96H, Lymphocytes (%) (Auto) 

2L, Monocytes (%) (Auto) 3, Eosinophils (%) (Auto) 0, Basophils (%) (Auto) 0, 

Neutrophils # (Auto) 33.5H, Lymphocytes # (Auto) 0.5L, Monocytes # (Auto) 1.0, 

Eosinophils # (Auto) 0.0, Basophils # (Auto) 0.1, Sodium Level 147H, Potassium 

Level 4.2, Chloride Level 109H, Carbon Dioxide Level 30, Anion Gap 8, Blood Urea

Nitrogen 27H, Creatinine 0.65, Estimat Glomerular Filtration Rate > 60, 

BUN/Creatinine Ratio 42, Glucose Level 147H, Calcium Level 7.2L, Phosphorus 

Level 2.3, Magnesium Level 2.0





Microbiology


20 Gram Stain - Final, Complete


          


20 Sputum Culture - Final, Complete


          Stenotrophomonas Maltophilia


          Enterobacter cloacae complex


20 Blood Culture - Final, Complete


          No growth





Assessment/Plan


s/p ileocecal resection for bleeding with likely stroke and significant neuro

logic deficit. 





Pt is being made comfort care.





Clinical Quality Measures


DVT/VTE Risk/Contraindication:


Risk Factor Score Per Nursin


RFS Level Per Nursing on Admit:  4+=Very High











DAKSHA VELOZ DO                Aug 3, 2020 12:53

## 2020-08-03 NOTE — NUR
THIS NURSE TALKED EXTENSIVELY TO SON OF PT, JAI, ABOUT PT CONDITION AND PLAN OF CARE. PT 
STATES HE FEARS TO MAKE THE WRONG CHOICE BUT DOES NOT WANT HER TO SUFFER. JAI STATES HE 
WOULD LIKE TO TALK TO THE DOCTOR AND HIS SISTER BEFORE HE MAKES ANY DECISIONS. THIS NURSE AT 
BEDSIDE. JAI STATES HE DOES NOT NEED A MARA AT THIS TIME.

## 2020-08-03 NOTE — NUR
THIS NURSE AT BEDSIDE. FAMILY AND FRIENDS AT BEDSIDE. PT APPEARS COMFORTABLE ON VAPOTHERM. 
WILL CONTINUE TO MONITOR.

## 2020-08-03 NOTE — DIAGNOSTIC IMAGING REPORT
REASON FOR EXAMINATION: Shortness of breath.



Upright AP portable chest was obtained and compared to yesterday.



Central line remains in place as well as the NG tube. The heart

size is unchanged and there is no mediastinal widening.

Interstitial and alveolar infiltrates are again noted bilaterally

with bilateral pleural effusions. No significant change. No

pneumothorax.



IMPRESSION:

1. Mixed infiltrates bilaterally with bilateral pleural

effusions. Findings could represent edema versus

infectious/inflammatory infiltrate or combination of both. No

significant change.



Dictated by: 



  Dictated on workstation # [id number]

## 2020-08-04 RX ADMIN — MORPHINE SULFATE PRN MG: 4 INJECTION, SOLUTION INTRAMUSCULAR; INTRAVENOUS at 05:40

## 2020-08-04 RX ADMIN — MORPHINE SULFATE PRN MG: 4 INJECTION, SOLUTION INTRAMUSCULAR; INTRAVENOUS at 08:21

## 2020-08-04 RX ADMIN — LORAZEPAM PRN MG: 2 INJECTION INTRAMUSCULAR; INTRAVENOUS at 00:04

## 2020-08-04 RX ADMIN — LORAZEPAM PRN MG: 2 INJECTION INTRAMUSCULAR; INTRAVENOUS at 02:27

## 2020-08-04 NOTE — NUR
CM/SS:  [facility] ([place]) [phone] -  is notified that pt has passed away this 
morning.

## 2020-08-04 NOTE — NUR
Pt passed at this time.  Son, Meño, called and informed of pt passing.  Son stated he was 
right down stairs and would be up to be with her.  Dr. Hassan notified of pt passing as 
well.

## 2021-10-24 NOTE — PHYSICAL THERAPY EVALUATION
PT Evaluation-General


Medical Diagnosis


Admission Date


Steven 10, 2020 at 10:00


Medical Diagnosis:  abdominal pain/ hyponatremia


Onset Date:  Steven 10, 2020





Therapy Diagnosis


Therapy Diagnosis:  debility





Height/Weight


Height (Feet):  5


Height (Inches):  3.00


Weight (Pounds):  112


Weight (Ounces):  0.0





Precautions


Precautions/Isolations:  Fall Prevention, Standard Precautions





Weight Bear Status


Right Lower Extremity:  Right


Weight Bearing/Tolerated


Left Lower Extremity:  Left


Weight Bearing/Tolerated





Referral


Physician:  Mo


Reason for Referral:  Evaluation/Treatment





Medical History


Pertinent Medical History:  Atrial Fib, CAD, COPD, HTN, PVD, Rheumatoid 

Arthritis


Current History


ER secondary to abdominal pain


Reviewed History:  Yes





Social History


Home:  Single Level


Current Living Status:  Alone


Entry Into Home:  Stairs With Railing


PT Steps Into Home:  3





Prior


Prior Level of Function


SCALE: Activities may be completed with or without assistive devices.





6-Indepedent-patient completes the activity by him/herself with no assistance 

from a helper.


5-Set-up or Clean-up Assistance-helper sets up or cleans up; patient completes 

activity. [place] assists only prior to or  


    following the activity.


4-Supervision or Touching Assistance-helper provides verbal cues and/or 

touching/steadying and/or contact guard assistance as patient completes 

activity. Assistance may be provided   


    throughout the activity or intermittently.


3-Partial/Moderate Assistance-helper does LESS THAN HALF the effort. [place] 

lifts, holds or supports trunk or limbs, but provides less than half the effort.


2-Substantial/Maximal Assistance-helper does MORE THAN HALF the effort. [place] 

lifts or holds trunk or limbs and provides more than half the effort.


7-Ikhpdnpzj-teokhu does ALL the effort. Patient does none of the effort to 

complete the activity. Or, the assistance of 2 or more helpers is required for 

the patient to complete the  


    activity.


If activity was not attempted, code reason:


7-Patient Refused.


9-Not Applicable-not attempted and the patient did not perform the activity 

before the current illness, exacerbation or injury.


10-Not Attempted due to Environmental Limitations-(lack of equipment, weather 

restraints, etc.).


88-Not Attempted due to Medical Conditions or Safety Concerns.


Bed Mobility:  6


Transfers (B,C,W/C):  6


Gait:  6


Stairs:  6


Indoor Mobility (Ambulation):  Independent


Stairs:  Independent


Prior Devices Use:  Walker





PT Evaluation-Current


Subjective


Patient agrees to PT.  She reports she if feeling better today.





Pain





   Numeric Pain Scale:  0-No Pain


   Location:  No Pain Reported





Objective


Patient Orientation:  Person, Time, Situation


Attachments:  IV





ROM/Strength


ROM Lower Extremities


bilateral LE WFL


Strength Lower Extremities


3+5 grossly bilateral LE





Integumentary/Posture


Integumentary


refer to nursing notes


Bladder Incontinence:  Yes


Posture


WFL





Neuromuscular


(Tone, Coordination, Reflexes)


grossly intact





Sensory


Vision:  Wears Glasses


Hearing:  Impaired


Sensation Right Lower Extremit:  Intact


Sensation Left Lower Extremity:  Intact





Transfers


Roll Left to Right (QC):  5


Lying to Sitting/Side of Bed(Q:  5


Sit to Stand (QC):  5


Chair/Bed-to-Chair Xfer(QC):  5


Toilet Transfer (QC):  5





Gait


Does the Patient Walk?:  Yes


Mode of Locomotion:  Walk


Anticipated Mode of Locomotion:  Walk


Walk 10 feet (QC):  5


Walk 50 ft with 2 Turns(QC):  5


Walk 150 ft (QC):  5


Distance:  300'


Gait Assistive Device:  FWW


Comments/Gait Description


slow, steady with no deviation





Wheelchair Training


Does the Pt Use a Wheelchair?:  No





Balance


Sitting Static:  Normal


Sitting Dynamic:  Normal


Standing Static:  Good


 Standing Dynamic:  Good





Assessment/Needs


79 y.o. female, will be seen short term by skilled PT to address functional 

strength and mobility to improve current LOF to safely return to home at maximum

LOF.


Rehab Potential:  Fair





PT Short Term Goals


Short Term Goals


Time Frame:  2020


Roll Left & Right:  6


Sit to lyin


Lying to sitting on side of be:  6


Sit to stand:  6


Chair/bed-to-chair transfer:  6


Toilet transfer:  6


Walk 10 feet:  6


Walk 50 feet with two turns:  6


Walk 150 feet:  6





PT Plan


Problem List


Problem List:  Activity Tolerance, Functional Strength, Balance, Gait





Treatment/Plan


Treatment Plan:  Continue Plan of Care


Treatment Plan:  Bed Mobility, Education, Functional Activity Eliu, Functional 

Strength, Gait, Safety, Therapeutic Exercise, Transfers


Treatment Duration:  2020


Frequency:  3 times per week


Estimated Hrs Per Day:  .25 hour per day


Patient and/or Family Agrees t:  Yes





Discharge Recommendations


Therapy Discharge Recommendati:  Post Acute PT (home health)





Time/GCodes


Time In:  921


Time Out:  951


Total Billed Treatment Time:  30


Total Billed Treatment


1 visit


EVModC 15 min


FA 15 min











[facility] PT              2020 10:12
none

## 2023-06-01 NOTE — PROGRESS NOTE - CARDIOLOGY
Cardiology SOAP Progress Note


Subjective:


Verbally unresponsive


Does not report any symptoms





Objective:


I&O/Vital Signs











 8/2/20 8/2/20 8/2/20 8/2/20





 03:00 04:00 04:00 04:31


 


Temp    36.3


 


Pulse 101  105 


 


Resp 18  20 


 


B/P (MAP) 134/46 (75)  155/50 (85) 


 


Pulse Ox 95  95 


 


O2 Delivery Vapotherm Vapotherm Vapotherm 


 


O2 Flow Rate 40.00 40.00 40.00 





 40.00  40.00 


 


FiO2  40  


 


    





 8/2/20 8/2/20 8/2/20 8/2/20





 05:00 06:00 06:39 07:00


 


Pulse 106 101 103 98


 


Resp 17 17  17


 


B/P (MAP) 140/51 (80) 131/43 (72)  127/41 (69)


 


Pulse Ox 95 94  95


 


O2 Delivery Vapotherm Vapotherm  Vapotherm


 


O2 Flow Rate 40.00 40.00  40.00





 40.00 40.00  40.00





 8/2/20 8/2/20 8/2/20 8/2/20





 07:18 07:24 08:00 08:00


 


Temp 36.4   


 


Pulse   107 


 


Resp   22 


 


B/P (MAP)   144/47 (79) 


 


Pulse Ox  95 94 93


 


O2 Delivery  Vapotherm Vapotherm Vapotherm


 


O2 Flow Rate  40.00 40.00 40.00





   40.00 


 


FiO2  40  40


 


    





 8/2/20 8/2/20 8/2/20 8/2/20





 09:00 10:00 10:44 11:00


 


Pulse 100 100  107


 


Resp 20 20  19


 


B/P (MAP) 129/40 (69) 122/40 (67)  124/63 (83)


 


Pulse Ox 93 93 93 94


 


O2 Delivery Vapotherm Vapotherm Vapotherm Vapotherm


 


O2 Flow Rate 40.00 40.00 40.00 40.00





 40.00 40.00  40.00


 


FiO2   40 





 8/2/20 8/2/20 8/2/20 





 11:13 12:00 12:43 


 


Temp 36.3   


 


Pulse   102 


 


Pulse Ox  95  


 


O2 Delivery  Vapotherm  


 


O2 Flow Rate  40.00  


 


FiO2  40  














 8/2/20





 00:00


 


Intake Total 1468 ml


 


Output Total 430 ml


 


Balance 1038 ml








Weight (Pounds):  112


Weight (Ounces):  0.0


Weight (Calculated Kilograms):  [id number]


Constitutional:  other (verbally unresponsive at the time of this exam)


Respiratory:  rhonchi (scattered), other (scattered rhonchi over large airways; 

crackles at lung bases)


Cardiovascular:  regular rate-rhythm, systolic murmur (2-3/6 MSM)


Gastrointestional:  other (s/p abdominal surgery with dressing in place)


Extremities:  other (bilat pitting LE swelling)


Neurologic/Psychiatric:  grossly intact (not able to cooperate with any neuro 

exam at the time of this exam)


Skin:  No rash on exposed areas, No ulcerations on exposed areas





Results/Procedures:


Labs


Laboratory Tests


8/2/20 04:05: 


White Blood Count 27.9H, Red Blood Count 3.14L, Hemoglobin 9.4L, Hematocrit 28L,

Mean Corpuscular Volume 90, Mean Corpuscular Hemoglobin 30, Mean Corpuscular 

Hemoglobin Concent 33, Red Cell Distribution Width 16.6H, Platelet Count 76L, 

Mean Platelet Volume 10.8H, Neutrophils (%) (Auto) 95H, Lymphocytes (%) (Auto) 

1L, Monocytes (%) (Auto) 4, Eosinophils (%) (Auto) 0, Basophils (%) (Auto) 0, 

Neutrophils # (Auto) 26.4H, Lymphocytes # (Auto) 0.4L, Monocytes # (Auto) 1.1H, 

Eosinophils # (Auto) 0.0, Basophils # (Auto) 0.0, Sodium Level 145, Potassium 

Level 3.2L, Chloride Level 108H, Carbon Dioxide Level 27, Anion Gap 10, Blood 

Urea Nitrogen 18, Creatinine 0.60, Estimat Glomerular Filtration Rate > 60, 

BUN/Creatinine Ratio 30, Glucose Level 187H, Calcium Level 7.2L, Phosphorus 

Level 1.5L, Magnesium Level 2.0


8/2/20 06:50: Vancomycin Level Trough 17.8





Microbiology


7/29/20 Gram Stain - Final, Complete


          


7/29/20 Sputum Culture - Final, Complete


          Stenotrophomonas Maltophilia


          Enterobacter cloacae complex


7/29/20 Blood Culture - Preliminary, Resulted


          No growth





A/P:


Assessment:





Suspected stroke (L-sided weakness reported, but no CT finding of ac stroke)





Hypovolemic (blood loss) shock - receiving transfusions, requiring pressor 

support





Sepsis due to pneumonia - management per Pulm/ICU services





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





Acute resp failure - extubated on 7-





CAD. Card cath of June 30, 2020 by Dr. Dixon at [facility] showed 80-

90% lesion in the RCA for which KEISHA x1 was placed.  Other vessels showed mild to

mod CAD.  Valvuloplasty to the aortic valve carried out at time of cardiac cath.

 Had been on Effient and ASA.





Sinus node disfunction and PAF. On Xarelto for stroke prophylaxis





Echocardiogram of June 11, 2020 by Dr. Gamez showed LVEF 55-65%.  Grade 2 

diastolic dysfunction.  Mild mitral stenosis with mean gradient 2.2mHg, MVA 3.3 

cm2.  Mod calcified aortic valve with mod-severe stenosis, mean grad 26mmHg ASHELY 

1.1cm2, mild to mod regurg.  Severe TR.  PASP 65-70mmHg.  Echocardiogram of July 28, 2020 was a technically poor study.  LVEF 55-60%





H/o scleroderma and chronic dysphagia, followed and treated by her pcp





Quit smoking in 1980





Borderline low TSH on 11/3/18 (0.33), followed by her pcp Dr Cueva





Carotid u/s of 8- showed 60-79% R ICA stenosis; less than 40% L ICA 

stenosis.





Incidental note of L thyroid nodules seen at time of carotid u/s of Feb 2019 for

which f/u is with her PCP


Plan:





* Complex management


* Appears to have taken a turn for the worse. Currently verbally unresponsive


* Cannot give Xarelto and ASA due to bleeding requiring surgery and leading to 

  hypovolemic shock that has required transfusions 


* Continue Plavix to reduce risk of stent thrombosis


* Appears volume overloaded today. Give iv furosemide


* Monitor lab closely


* Replace electrolytes





Clinical Quality Measures


Type of Care:


Type of Care:  Comfort Measures











NICOLE DUMONT MD FACP [facility] CCDS    Aug 2, 2020 14:51 5